# Patient Record
Sex: MALE | Race: WHITE | NOT HISPANIC OR LATINO | Employment: OTHER | URBAN - METROPOLITAN AREA
[De-identification: names, ages, dates, MRNs, and addresses within clinical notes are randomized per-mention and may not be internally consistent; named-entity substitution may affect disease eponyms.]

---

## 2017-01-06 ENCOUNTER — COMMUNICATION - HEALTHEAST (OUTPATIENT)
Dept: NURSING | Facility: CLINIC | Age: 62
End: 2017-01-06

## 2017-01-06 ENCOUNTER — COMMUNICATION - HEALTHEAST (OUTPATIENT)
Dept: FAMILY MEDICINE | Facility: CLINIC | Age: 62
End: 2017-01-06

## 2017-01-06 DIAGNOSIS — F52.8 PSYCHOSEXUAL DYSFUNCTION WITH INHIBITED SEXUAL EXCITEMENT: ICD-10-CM

## 2017-01-06 DIAGNOSIS — E11.9 DIABETES MELLITUS TYPE 2, CONTROLLED (H): ICD-10-CM

## 2017-01-23 ENCOUNTER — OFFICE VISIT - HEALTHEAST (OUTPATIENT)
Dept: FAMILY MEDICINE | Facility: CLINIC | Age: 62
End: 2017-01-23

## 2017-01-23 ENCOUNTER — OFFICE VISIT - HEALTHEAST (OUTPATIENT)
Dept: NURSING | Facility: CLINIC | Age: 62
End: 2017-01-23

## 2017-01-23 ENCOUNTER — COMMUNICATION - HEALTHEAST (OUTPATIENT)
Dept: FAMILY MEDICINE | Facility: CLINIC | Age: 62
End: 2017-01-23

## 2017-01-23 DIAGNOSIS — F52.8 PSYCHOSEXUAL DYSFUNCTION WITH INHIBITED SEXUAL EXCITEMENT: ICD-10-CM

## 2017-01-23 DIAGNOSIS — I10 ESSENTIAL HYPERTENSION: ICD-10-CM

## 2017-01-23 DIAGNOSIS — E11.9 DIABETES (H): ICD-10-CM

## 2017-01-23 DIAGNOSIS — E11.9 DIABETES MELLITUS TYPE 2, CONTROLLED (H): ICD-10-CM

## 2017-01-23 DIAGNOSIS — E11.9 TYPE 2 DIABETES MELLITUS WITHOUT COMPLICATION, WITH LONG-TERM CURRENT USE OF INSULIN (H): ICD-10-CM

## 2017-01-23 DIAGNOSIS — N40.0 BENIGN PROSTATIC HYPERPLASIA, PRESENCE OF LOWER URINARY TRACT SYMPTOMS UNSPECIFIED, UNSPECIFIED MORPHOLOGY: ICD-10-CM

## 2017-01-23 DIAGNOSIS — Z79.4 TYPE 2 DIABETES MELLITUS WITHOUT COMPLICATION, WITH LONG-TERM CURRENT USE OF INSULIN (H): ICD-10-CM

## 2017-01-23 DIAGNOSIS — E78.49 OTHER HYPERLIPIDEMIA: ICD-10-CM

## 2017-01-23 DIAGNOSIS — E66.9 OBESITY, UNSPECIFIED: ICD-10-CM

## 2017-01-23 DIAGNOSIS — E78.5 HYPERLIPIDEMIA, UNSPECIFIED HYPERLIPIDEMIA TYPE: ICD-10-CM

## 2017-01-23 DIAGNOSIS — E11.9 TYPE 2 DIABETES MELLITUS (H): ICD-10-CM

## 2017-01-23 DIAGNOSIS — G47.33 OBSTRUCTIVE SLEEP APNEA (ADULT) (PEDIATRIC): ICD-10-CM

## 2017-01-23 DIAGNOSIS — I10 ESSENTIAL HYPERTENSION WITH GOAL BLOOD PRESSURE LESS THAN 140/90: ICD-10-CM

## 2017-01-23 DIAGNOSIS — Z00.00 PHYSICAL EXAM: ICD-10-CM

## 2017-01-23 LAB
CHOLEST SERPL-MCNC: 182 MG/DL
FASTING STATUS PATIENT QL REPORTED: YES
HBA1C MFR BLD: 7.4 % (ref 3.5–6)
HDLC SERPL-MCNC: 54 MG/DL
LDLC SERPL CALC-MCNC: 61 MG/DL
PSA SERPL-MCNC: 0.3 NG/ML (ref 0–4.5)
TRIGL SERPL-MCNC: 336 MG/DL

## 2017-01-23 ASSESSMENT — MIFFLIN-ST. JEOR: SCORE: 1946.79

## 2017-01-24 ENCOUNTER — COMMUNICATION - HEALTHEAST (OUTPATIENT)
Dept: FAMILY MEDICINE | Facility: CLINIC | Age: 62
End: 2017-01-24

## 2017-03-04 ENCOUNTER — RECORDS - HEALTHEAST (OUTPATIENT)
Dept: ADMINISTRATIVE | Facility: OTHER | Age: 62
End: 2017-03-04

## 2017-03-06 ENCOUNTER — OFFICE VISIT - HEALTHEAST (OUTPATIENT)
Dept: FAMILY MEDICINE | Facility: CLINIC | Age: 62
End: 2017-03-06

## 2017-03-06 DIAGNOSIS — M54.50 LOW BACK PAIN: ICD-10-CM

## 2017-03-18 ENCOUNTER — COMMUNICATION - HEALTHEAST (OUTPATIENT)
Dept: NURSING | Facility: CLINIC | Age: 62
End: 2017-03-18

## 2017-03-18 DIAGNOSIS — E11.9 DIABETES (H): ICD-10-CM

## 2017-05-02 ENCOUNTER — COMMUNICATION - HEALTHEAST (OUTPATIENT)
Dept: FAMILY MEDICINE | Facility: CLINIC | Age: 62
End: 2017-05-02

## 2017-06-14 ENCOUNTER — RECORDS - HEALTHEAST (OUTPATIENT)
Dept: ADMINISTRATIVE | Facility: OTHER | Age: 62
End: 2017-06-14

## 2017-06-15 ENCOUNTER — COMMUNICATION - HEALTHEAST (OUTPATIENT)
Dept: FAMILY MEDICINE | Facility: CLINIC | Age: 62
End: 2017-06-15

## 2017-07-28 ENCOUNTER — COMMUNICATION - HEALTHEAST (OUTPATIENT)
Dept: FAMILY MEDICINE | Facility: CLINIC | Age: 62
End: 2017-07-28

## 2017-07-28 ENCOUNTER — OFFICE VISIT - HEALTHEAST (OUTPATIENT)
Dept: FAMILY MEDICINE | Facility: CLINIC | Age: 62
End: 2017-07-28

## 2017-07-28 DIAGNOSIS — I10 ESSENTIAL HYPERTENSION: ICD-10-CM

## 2017-07-28 DIAGNOSIS — E78.49 OTHER HYPERLIPIDEMIA: ICD-10-CM

## 2017-07-28 DIAGNOSIS — M54.50 LOW BACK PAIN: ICD-10-CM

## 2017-07-28 DIAGNOSIS — Z79.4 TYPE 2 DIABETES MELLITUS WITHOUT COMPLICATION, WITH LONG-TERM CURRENT USE OF INSULIN (H): ICD-10-CM

## 2017-07-28 DIAGNOSIS — E11.9 TYPE 2 DIABETES MELLITUS WITHOUT COMPLICATION, WITH LONG-TERM CURRENT USE OF INSULIN (H): ICD-10-CM

## 2017-07-28 LAB
CHOLEST SERPL-MCNC: 152 MG/DL
FASTING STATUS PATIENT QL REPORTED: YES
HBA1C MFR BLD: 6.4 % (ref 3.5–6)
HDLC SERPL-MCNC: 46 MG/DL
LDLC SERPL CALC-MCNC: 55 MG/DL
TRIGL SERPL-MCNC: 255 MG/DL

## 2017-08-10 ENCOUNTER — COMMUNICATION - HEALTHEAST (OUTPATIENT)
Dept: NURSING | Facility: CLINIC | Age: 62
End: 2017-08-10

## 2017-08-10 DIAGNOSIS — E78.5 HYPERLIPIDEMIA, UNSPECIFIED HYPERLIPIDEMIA TYPE: ICD-10-CM

## 2017-08-10 DIAGNOSIS — E11.9 DIABETES (H): ICD-10-CM

## 2017-10-10 ENCOUNTER — COMMUNICATION - HEALTHEAST (OUTPATIENT)
Dept: NURSING | Facility: CLINIC | Age: 62
End: 2017-10-10

## 2017-10-10 DIAGNOSIS — E11.9 DIABETES (H): ICD-10-CM

## 2017-12-29 ENCOUNTER — COMMUNICATION - HEALTHEAST (OUTPATIENT)
Dept: FAMILY MEDICINE | Facility: CLINIC | Age: 62
End: 2017-12-29

## 2017-12-29 DIAGNOSIS — E11.9 DIABETES MELLITUS TYPE 2, CONTROLLED (H): ICD-10-CM

## 2018-01-24 ENCOUNTER — COMMUNICATION - HEALTHEAST (OUTPATIENT)
Dept: FAMILY MEDICINE | Facility: CLINIC | Age: 63
End: 2018-01-24

## 2018-01-30 ENCOUNTER — COMMUNICATION - HEALTHEAST (OUTPATIENT)
Dept: FAMILY MEDICINE | Facility: CLINIC | Age: 63
End: 2018-01-30

## 2018-01-31 ENCOUNTER — COMMUNICATION - HEALTHEAST (OUTPATIENT)
Dept: NURSING | Facility: CLINIC | Age: 63
End: 2018-01-31

## 2018-01-31 DIAGNOSIS — E11.9 DIABETES MELLITUS, TYPE 2 (H): ICD-10-CM

## 2018-01-31 DIAGNOSIS — E11.9 DIABETES (H): ICD-10-CM

## 2018-01-31 DIAGNOSIS — N40.0 BPH (BENIGN PROSTATIC HYPERPLASIA): ICD-10-CM

## 2018-01-31 DIAGNOSIS — E11.51 DIABETES TYPE 2 WITH ATHEROSCLEROSIS OF ARTERIES OF EXTREMITIES (H): ICD-10-CM

## 2018-01-31 DIAGNOSIS — I10 HYPERTENSION: ICD-10-CM

## 2018-01-31 DIAGNOSIS — I70.209 DIABETES TYPE 2 WITH ATHEROSCLEROSIS OF ARTERIES OF EXTREMITIES (H): ICD-10-CM

## 2018-02-02 ENCOUNTER — OFFICE VISIT - HEALTHEAST (OUTPATIENT)
Dept: FAMILY MEDICINE | Facility: CLINIC | Age: 63
End: 2018-02-02

## 2018-02-02 DIAGNOSIS — Z79.4 TYPE 2 DIABETES MELLITUS WITHOUT COMPLICATION, WITH LONG-TERM CURRENT USE OF INSULIN (H): ICD-10-CM

## 2018-02-02 DIAGNOSIS — E78.5 HYPERLIPIDEMIA, UNSPECIFIED HYPERLIPIDEMIA TYPE: ICD-10-CM

## 2018-02-02 DIAGNOSIS — F52.8 PSYCHOSEXUAL DYSFUNCTION WITH INHIBITED SEXUAL EXCITEMENT: ICD-10-CM

## 2018-02-02 DIAGNOSIS — E11.9 DIABETES MELLITUS, TYPE 2 (H): ICD-10-CM

## 2018-02-02 DIAGNOSIS — E11.9 DIABETES MELLITUS TYPE 2, CONTROLLED (H): ICD-10-CM

## 2018-02-02 DIAGNOSIS — Z23 NEED FOR PROPHYLACTIC VACCINATION AND INOCULATION AGAINST INFLUENZA: ICD-10-CM

## 2018-02-02 DIAGNOSIS — I10 HYPERTENSION: ICD-10-CM

## 2018-02-02 DIAGNOSIS — E11.9 TYPE 2 DIABETES MELLITUS WITHOUT COMPLICATION, WITH LONG-TERM CURRENT USE OF INSULIN (H): ICD-10-CM

## 2018-02-02 DIAGNOSIS — N40.0 BPH (BENIGN PROSTATIC HYPERPLASIA): ICD-10-CM

## 2018-02-02 LAB
ALBUMIN SERPL-MCNC: 4.2 G/DL (ref 3.5–5)
ALP SERPL-CCNC: 80 U/L (ref 45–120)
ALT SERPL W P-5'-P-CCNC: 24 U/L (ref 0–45)
ANION GAP SERPL CALCULATED.3IONS-SCNC: 11 MMOL/L (ref 5–18)
AST SERPL W P-5'-P-CCNC: 20 U/L (ref 0–40)
BILIRUB SERPL-MCNC: 1 MG/DL (ref 0–1)
BUN SERPL-MCNC: 19 MG/DL (ref 8–22)
CALCIUM SERPL-MCNC: 9.6 MG/DL (ref 8.5–10.5)
CHLORIDE BLD-SCNC: 99 MMOL/L (ref 98–107)
CHOLEST SERPL-MCNC: 344 MG/DL
CO2 SERPL-SCNC: 24 MMOL/L (ref 22–31)
CREAT SERPL-MCNC: 1.16 MG/DL (ref 0.7–1.3)
CREAT UR-MCNC: 98.8 MG/DL
FASTING STATUS PATIENT QL REPORTED: YES
GFR SERPL CREATININE-BSD FRML MDRD: >60 ML/MIN/1.73M2
GLUCOSE BLD-MCNC: 383 MG/DL (ref 70–125)
HBA1C MFR BLD: 8.7 % (ref 3.5–6)
HDLC SERPL-MCNC: 44 MG/DL
LDLC SERPL CALC-MCNC: 144 MG/DL
LDLC SERPL CALC-MCNC: ABNORMAL MG/DL
MICROALBUMIN UR-MCNC: 22.66 MG/DL (ref 0–1.99)
MICROALBUMIN/CREAT UR: 229.4 MG/G
POTASSIUM BLD-SCNC: 4.1 MMOL/L (ref 3.5–5)
PROT SERPL-MCNC: 7.6 G/DL (ref 6–8)
SODIUM SERPL-SCNC: 134 MMOL/L (ref 136–145)
TRIGL SERPL-MCNC: 1030 MG/DL

## 2018-02-05 ENCOUNTER — COMMUNICATION - HEALTHEAST (OUTPATIENT)
Dept: FAMILY MEDICINE | Facility: CLINIC | Age: 63
End: 2018-02-05

## 2018-02-05 DIAGNOSIS — R80.9 MICROALBUMINURIA: ICD-10-CM

## 2018-02-09 ENCOUNTER — AMBULATORY - HEALTHEAST (OUTPATIENT)
Dept: FAMILY MEDICINE | Facility: CLINIC | Age: 63
End: 2018-02-09

## 2018-03-21 ENCOUNTER — COMMUNICATION - HEALTHEAST (OUTPATIENT)
Dept: FAMILY MEDICINE | Facility: CLINIC | Age: 63
End: 2018-03-21

## 2018-03-26 ENCOUNTER — AMBULATORY - HEALTHEAST (OUTPATIENT)
Dept: NURSING | Facility: CLINIC | Age: 63
End: 2018-03-26

## 2018-05-29 ENCOUNTER — COMMUNICATION - HEALTHEAST (OUTPATIENT)
Dept: NURSING | Facility: CLINIC | Age: 63
End: 2018-05-29

## 2018-05-29 DIAGNOSIS — E11.9 DIABETES MELLITUS, TYPE 2 (H): ICD-10-CM

## 2018-06-05 ENCOUNTER — AMBULATORY - HEALTHEAST (OUTPATIENT)
Dept: PHARMACY | Facility: CLINIC | Age: 63
End: 2018-06-05

## 2018-06-05 ENCOUNTER — COMMUNICATION - HEALTHEAST (OUTPATIENT)
Dept: NURSING | Facility: CLINIC | Age: 63
End: 2018-06-05

## 2018-06-05 DIAGNOSIS — E11.9 DIABETES MELLITUS, TYPE 2 (H): ICD-10-CM

## 2018-06-05 DIAGNOSIS — Z79.899 MEDICATION MANAGEMENT: ICD-10-CM

## 2018-06-13 ENCOUNTER — OFFICE VISIT - HEALTHEAST (OUTPATIENT)
Dept: PHARMACY | Facility: CLINIC | Age: 63
End: 2018-06-13

## 2018-06-13 ENCOUNTER — AMBULATORY - HEALTHEAST (OUTPATIENT)
Dept: LAB | Facility: CLINIC | Age: 63
End: 2018-06-13

## 2018-06-13 ENCOUNTER — COMMUNICATION - HEALTHEAST (OUTPATIENT)
Dept: PHARMACY | Facility: CLINIC | Age: 63
End: 2018-06-13

## 2018-06-13 DIAGNOSIS — I10 ESSENTIAL HYPERTENSION: ICD-10-CM

## 2018-06-13 DIAGNOSIS — E78.49 OTHER HYPERLIPIDEMIA: ICD-10-CM

## 2018-06-13 DIAGNOSIS — Z79.4 TYPE 2 DIABETES MELLITUS WITHOUT COMPLICATION, WITH LONG-TERM CURRENT USE OF INSULIN (H): ICD-10-CM

## 2018-06-13 DIAGNOSIS — N40.0 BPH (BENIGN PROSTATIC HYPERPLASIA): ICD-10-CM

## 2018-06-13 DIAGNOSIS — E11.9 DIABETES MELLITUS, TYPE 2 (H): ICD-10-CM

## 2018-06-13 DIAGNOSIS — E10.9 TYPE 1 DIABETES MELLITUS WITHOUT COMPLICATION (H): ICD-10-CM

## 2018-06-13 DIAGNOSIS — E11.9 TYPE 2 DIABETES MELLITUS WITHOUT COMPLICATION, WITH LONG-TERM CURRENT USE OF INSULIN (H): ICD-10-CM

## 2018-06-13 DIAGNOSIS — N40.0 BENIGN PROSTATIC HYPERPLASIA, UNSPECIFIED WHETHER LOWER URINARY TRACT SYMPTOMS PRESENT: ICD-10-CM

## 2018-06-13 LAB
ALBUMIN SERPL-MCNC: 4.2 G/DL (ref 3.5–5)
ALP SERPL-CCNC: 68 U/L (ref 45–120)
ALT SERPL W P-5'-P-CCNC: 23 U/L (ref 0–45)
ANION GAP SERPL CALCULATED.3IONS-SCNC: 12 MMOL/L (ref 5–18)
AST SERPL W P-5'-P-CCNC: 20 U/L (ref 0–40)
BILIRUB SERPL-MCNC: 0.7 MG/DL (ref 0–1)
BUN SERPL-MCNC: 23 MG/DL (ref 8–22)
CALCIUM SERPL-MCNC: 9.8 MG/DL (ref 8.5–10.5)
CHLORIDE BLD-SCNC: 103 MMOL/L (ref 98–107)
CHOLEST SERPL-MCNC: 213 MG/DL
CO2 SERPL-SCNC: 23 MMOL/L (ref 22–31)
CREAT SERPL-MCNC: 1.13 MG/DL (ref 0.7–1.3)
FASTING STATUS PATIENT QL REPORTED: YES
GFR SERPL CREATININE-BSD FRML MDRD: >60 ML/MIN/1.73M2
GLUCOSE BLD-MCNC: 215 MG/DL (ref 70–125)
HBA1C MFR BLD: 8.2 % (ref 3.5–6)
HDLC SERPL-MCNC: 55 MG/DL
LDLC SERPL CALC-MCNC: 82 MG/DL
POTASSIUM BLD-SCNC: 4.8 MMOL/L (ref 3.5–5)
PROT SERPL-MCNC: 7.2 G/DL (ref 6–8)
SODIUM SERPL-SCNC: 138 MMOL/L (ref 136–145)
TRIGL SERPL-MCNC: 379 MG/DL

## 2018-07-09 ENCOUNTER — COMMUNICATION - HEALTHEAST (OUTPATIENT)
Dept: FAMILY MEDICINE | Facility: CLINIC | Age: 63
End: 2018-07-09

## 2018-08-10 ENCOUNTER — COMMUNICATION - HEALTHEAST (OUTPATIENT)
Dept: NURSING | Facility: CLINIC | Age: 63
End: 2018-08-10

## 2018-08-10 DIAGNOSIS — E78.5 HYPERLIPIDEMIA, UNSPECIFIED HYPERLIPIDEMIA TYPE: ICD-10-CM

## 2018-08-10 DIAGNOSIS — E11.9 DIABETES MELLITUS, TYPE 2 (H): ICD-10-CM

## 2018-11-15 ENCOUNTER — OFFICE VISIT - HEALTHEAST (OUTPATIENT)
Dept: FAMILY MEDICINE | Facility: CLINIC | Age: 63
End: 2018-11-15

## 2018-11-15 DIAGNOSIS — Z87.81 HISTORY OF FRACTURE OF RIGHT ANKLE: ICD-10-CM

## 2018-11-15 DIAGNOSIS — M25.471 ANKLE SWELLING, RIGHT: ICD-10-CM

## 2018-11-16 ENCOUNTER — RECORDS - HEALTHEAST (OUTPATIENT)
Dept: ADMINISTRATIVE | Facility: OTHER | Age: 63
End: 2018-11-16

## 2018-11-25 ENCOUNTER — COMMUNICATION - HEALTHEAST (OUTPATIENT)
Dept: NURSING | Facility: CLINIC | Age: 63
End: 2018-11-25

## 2018-11-25 DIAGNOSIS — E11.9 DIABETES MELLITUS, TYPE 2 (H): ICD-10-CM

## 2019-03-24 ENCOUNTER — COMMUNICATION - HEALTHEAST (OUTPATIENT)
Dept: FAMILY MEDICINE | Facility: CLINIC | Age: 64
End: 2019-03-24

## 2019-03-24 DIAGNOSIS — I10 HYPERTENSION: ICD-10-CM

## 2019-03-24 DIAGNOSIS — N40.0 BPH (BENIGN PROSTATIC HYPERPLASIA): ICD-10-CM

## 2019-03-24 DIAGNOSIS — E78.5 HYPERLIPIDEMIA, UNSPECIFIED HYPERLIPIDEMIA TYPE: ICD-10-CM

## 2019-03-24 DIAGNOSIS — E11.9 DIABETES MELLITUS, TYPE 2 (H): ICD-10-CM

## 2019-03-27 ENCOUNTER — COMMUNICATION - HEALTHEAST (OUTPATIENT)
Dept: NURSING | Facility: CLINIC | Age: 64
End: 2019-03-27

## 2019-03-27 DIAGNOSIS — E11.9 DIABETES MELLITUS, TYPE 2 (H): ICD-10-CM

## 2019-04-03 ENCOUNTER — RECORDS - HEALTHEAST (OUTPATIENT)
Dept: ADMINISTRATIVE | Facility: OTHER | Age: 64
End: 2019-04-03

## 2019-04-10 ENCOUNTER — OFFICE VISIT - HEALTHEAST (OUTPATIENT)
Dept: PHARMACY | Facility: CLINIC | Age: 64
End: 2019-04-10

## 2019-04-10 ENCOUNTER — OFFICE VISIT - HEALTHEAST (OUTPATIENT)
Dept: FAMILY MEDICINE | Facility: CLINIC | Age: 64
End: 2019-04-10

## 2019-04-10 DIAGNOSIS — I10 ESSENTIAL HYPERTENSION: ICD-10-CM

## 2019-04-10 DIAGNOSIS — Z79.4 TYPE 2 DIABETES MELLITUS WITHOUT COMPLICATION, WITH LONG-TERM CURRENT USE OF INSULIN (H): ICD-10-CM

## 2019-04-10 DIAGNOSIS — N40.0 BENIGN PROSTATIC HYPERPLASIA, UNSPECIFIED WHETHER LOWER URINARY TRACT SYMPTOMS PRESENT: ICD-10-CM

## 2019-04-10 DIAGNOSIS — G47.33 OBSTRUCTIVE SLEEP APNEA (ADULT) (PEDIATRIC): ICD-10-CM

## 2019-04-10 DIAGNOSIS — E11.9 DIABETES MELLITUS, TYPE 2 (H): ICD-10-CM

## 2019-04-10 DIAGNOSIS — E11.9 TYPE 2 DIABETES MELLITUS WITHOUT COMPLICATION, WITH LONG-TERM CURRENT USE OF INSULIN (H): ICD-10-CM

## 2019-04-10 DIAGNOSIS — R53.83 LOW ENERGY: ICD-10-CM

## 2019-04-10 DIAGNOSIS — R80.9 MICROALBUMINURIA: ICD-10-CM

## 2019-04-10 DIAGNOSIS — E78.49 OTHER HYPERLIPIDEMIA: ICD-10-CM

## 2019-04-10 DIAGNOSIS — M54.5 LOW BACK PAIN, UNSPECIFIED BACK PAIN LATERALITY, UNSPECIFIED CHRONICITY, WITH SCIATICA PRESENCE UNSPECIFIED: ICD-10-CM

## 2019-04-10 DIAGNOSIS — Z12.5 SCREENING FOR PROSTATE CANCER: ICD-10-CM

## 2019-04-10 DIAGNOSIS — Z00.00 PHYSICAL EXAM: ICD-10-CM

## 2019-04-10 DIAGNOSIS — F52.8 PSYCHOSEXUAL DYSFUNCTION WITH INHIBITED SEXUAL EXCITEMENT: ICD-10-CM

## 2019-04-10 LAB
ALBUMIN SERPL-MCNC: 4.3 G/DL (ref 3.5–5)
ALP SERPL-CCNC: 64 U/L (ref 45–120)
ALT SERPL W P-5'-P-CCNC: 20 U/L (ref 0–45)
ANION GAP SERPL CALCULATED.3IONS-SCNC: 12 MMOL/L (ref 5–18)
AST SERPL W P-5'-P-CCNC: 23 U/L (ref 0–40)
BILIRUB SERPL-MCNC: 1 MG/DL (ref 0–1)
BUN SERPL-MCNC: 18 MG/DL (ref 8–22)
CALCIUM SERPL-MCNC: 10.1 MG/DL (ref 8.5–10.5)
CHLORIDE BLD-SCNC: 101 MMOL/L (ref 98–107)
CHOLEST SERPL-MCNC: 170 MG/DL
CO2 SERPL-SCNC: 25 MMOL/L (ref 22–31)
CREAT SERPL-MCNC: 0.96 MG/DL (ref 0.7–1.3)
CREAT UR-MCNC: 106.4 MG/DL
ERYTHROCYTE [DISTWIDTH] IN BLOOD BY AUTOMATED COUNT: 12.4 % (ref 11–14.5)
FASTING STATUS PATIENT QL REPORTED: YES
GFR SERPL CREATININE-BSD FRML MDRD: >60 ML/MIN/1.73M2
GLUCOSE BLD-MCNC: 186 MG/DL (ref 70–125)
HBA1C MFR BLD: 9.9 % (ref 3.5–6)
HCT VFR BLD AUTO: 47.6 % (ref 40–54)
HDLC SERPL-MCNC: 52 MG/DL
HGB BLD-MCNC: 16.1 G/DL (ref 14–18)
LDLC SERPL CALC-MCNC: 50 MG/DL
MCH RBC QN AUTO: 31 PG (ref 27–34)
MCHC RBC AUTO-ENTMCNC: 33.7 G/DL (ref 32–36)
MCV RBC AUTO: 92 FL (ref 80–100)
MICROALBUMIN UR-MCNC: 13.41 MG/DL (ref 0–1.99)
MICROALBUMIN/CREAT UR: 126 MG/G
PLATELET # BLD AUTO: 191 THOU/UL (ref 140–440)
PMV BLD AUTO: 6.9 FL (ref 7–10)
POTASSIUM BLD-SCNC: 4.4 MMOL/L (ref 3.5–5)
PROT SERPL-MCNC: 7.4 G/DL (ref 6–8)
PSA SERPL-MCNC: 0.3 NG/ML (ref 0–4.5)
RBC # BLD AUTO: 5.18 MILL/UL (ref 4.4–6.2)
SODIUM SERPL-SCNC: 138 MMOL/L (ref 136–145)
TRIGL SERPL-MCNC: 338 MG/DL
TSH SERPL DL<=0.005 MIU/L-ACNC: 2.26 UIU/ML (ref 0.3–5)
WBC: 6.9 THOU/UL (ref 4–11)

## 2019-04-10 ASSESSMENT — MIFFLIN-ST. JEOR: SCORE: 1938.74

## 2019-04-11 ENCOUNTER — COMMUNICATION - HEALTHEAST (OUTPATIENT)
Dept: NURSING | Facility: CLINIC | Age: 64
End: 2019-04-11

## 2019-04-11 ENCOUNTER — AMBULATORY - HEALTHEAST (OUTPATIENT)
Dept: PHARMACY | Facility: CLINIC | Age: 64
End: 2019-04-11

## 2019-04-11 DIAGNOSIS — E11.9 TYPE 2 DIABETES MELLITUS WITHOUT COMPLICATION, WITH LONG-TERM CURRENT USE OF INSULIN (H): ICD-10-CM

## 2019-04-11 DIAGNOSIS — E11.9 DIABETES MELLITUS, TYPE 2 (H): ICD-10-CM

## 2019-04-11 DIAGNOSIS — Z79.4 TYPE 2 DIABETES MELLITUS WITHOUT COMPLICATION, WITH LONG-TERM CURRENT USE OF INSULIN (H): ICD-10-CM

## 2019-04-12 ENCOUNTER — COMMUNICATION - HEALTHEAST (OUTPATIENT)
Dept: NURSING | Facility: CLINIC | Age: 64
End: 2019-04-12

## 2019-04-12 ENCOUNTER — COMMUNICATION - HEALTHEAST (OUTPATIENT)
Dept: FAMILY MEDICINE | Facility: CLINIC | Age: 64
End: 2019-04-12

## 2019-04-12 DIAGNOSIS — G47.33 OBSTRUCTIVE SLEEP APNEA (ADULT) (PEDIATRIC): ICD-10-CM

## 2019-04-12 LAB
SHBG SERPL-SCNC: 17 NMOL/L (ref 11–80)
TESTOST FREE SERPL-MCNC: 5.23 NG/DL (ref 4.7–24.4)
TESTOST SERPL-MCNC: 198 NG/DL (ref 240–950)

## 2019-04-13 ENCOUNTER — COMMUNICATION - HEALTHEAST (OUTPATIENT)
Dept: FAMILY MEDICINE | Facility: CLINIC | Age: 64
End: 2019-04-13

## 2019-04-16 ENCOUNTER — RECORDS - HEALTHEAST (OUTPATIENT)
Dept: HEALTH INFORMATION MANAGEMENT | Facility: CLINIC | Age: 64
End: 2019-04-16

## 2019-04-19 ENCOUNTER — COMMUNICATION - HEALTHEAST (OUTPATIENT)
Dept: PHARMACY | Facility: CLINIC | Age: 64
End: 2019-04-19

## 2019-05-01 ENCOUNTER — OFFICE VISIT - HEALTHEAST (OUTPATIENT)
Dept: PHARMACY | Facility: CLINIC | Age: 64
End: 2019-05-01

## 2019-05-01 ENCOUNTER — AMBULATORY - HEALTHEAST (OUTPATIENT)
Dept: LAB | Facility: CLINIC | Age: 64
End: 2019-05-01

## 2019-05-01 DIAGNOSIS — I10 ESSENTIAL HYPERTENSION: ICD-10-CM

## 2019-05-01 LAB
ANION GAP SERPL CALCULATED.3IONS-SCNC: 11 MMOL/L (ref 5–18)
BUN SERPL-MCNC: 29 MG/DL (ref 8–22)
CALCIUM SERPL-MCNC: 9.8 MG/DL (ref 8.5–10.5)
CHLORIDE BLD-SCNC: 105 MMOL/L (ref 98–107)
CO2 SERPL-SCNC: 22 MMOL/L (ref 22–31)
CREAT SERPL-MCNC: 1.05 MG/DL (ref 0.7–1.3)
GFR SERPL CREATININE-BSD FRML MDRD: >60 ML/MIN/1.73M2
GLUCOSE BLD-MCNC: 107 MG/DL (ref 70–125)
POTASSIUM BLD-SCNC: 4.1 MMOL/L (ref 3.5–5)
SODIUM SERPL-SCNC: 138 MMOL/L (ref 136–145)

## 2019-05-03 ENCOUNTER — COMMUNICATION - HEALTHEAST (OUTPATIENT)
Dept: FAMILY MEDICINE | Facility: CLINIC | Age: 64
End: 2019-05-03

## 2019-05-08 ENCOUNTER — COMMUNICATION - HEALTHEAST (OUTPATIENT)
Dept: NURSING | Facility: CLINIC | Age: 64
End: 2019-05-08

## 2019-06-30 ENCOUNTER — COMMUNICATION - HEALTHEAST (OUTPATIENT)
Dept: FAMILY MEDICINE | Facility: CLINIC | Age: 64
End: 2019-06-30

## 2019-06-30 DIAGNOSIS — E11.9 DIABETES MELLITUS, TYPE 2 (H): ICD-10-CM

## 2019-09-28 ENCOUNTER — HEALTH MAINTENANCE LETTER (OUTPATIENT)
Age: 64
End: 2019-09-28

## 2019-10-08 ENCOUNTER — COMMUNICATION - HEALTHEAST (OUTPATIENT)
Dept: FAMILY MEDICINE | Facility: CLINIC | Age: 64
End: 2019-10-08

## 2019-10-08 DIAGNOSIS — E78.5 HYPERLIPIDEMIA, UNSPECIFIED HYPERLIPIDEMIA TYPE: ICD-10-CM

## 2019-10-08 DIAGNOSIS — I10 HYPERTENSION: ICD-10-CM

## 2019-10-09 ENCOUNTER — COMMUNICATION - HEALTHEAST (OUTPATIENT)
Dept: FAMILY MEDICINE | Facility: CLINIC | Age: 64
End: 2019-10-09

## 2019-10-09 DIAGNOSIS — N40.0 BPH (BENIGN PROSTATIC HYPERPLASIA): ICD-10-CM

## 2020-04-09 ENCOUNTER — COMMUNICATION - HEALTHEAST (OUTPATIENT)
Dept: FAMILY MEDICINE | Facility: CLINIC | Age: 65
End: 2020-04-09

## 2020-04-09 DIAGNOSIS — I10 ESSENTIAL HYPERTENSION: ICD-10-CM

## 2020-04-09 DIAGNOSIS — I10 HYPERTENSION: ICD-10-CM

## 2020-04-09 DIAGNOSIS — E11.9 DIABETES MELLITUS, TYPE 2 (H): ICD-10-CM

## 2020-04-09 DIAGNOSIS — Z79.4 TYPE 2 DIABETES MELLITUS WITHOUT COMPLICATION, WITH LONG-TERM CURRENT USE OF INSULIN (H): ICD-10-CM

## 2020-04-09 DIAGNOSIS — F52.8 PSYCHOSEXUAL DYSFUNCTION WITH INHIBITED SEXUAL EXCITEMENT: ICD-10-CM

## 2020-04-09 DIAGNOSIS — E78.5 HYPERLIPIDEMIA, UNSPECIFIED HYPERLIPIDEMIA TYPE: ICD-10-CM

## 2020-04-09 DIAGNOSIS — E11.9 TYPE 2 DIABETES MELLITUS WITHOUT COMPLICATION, WITH LONG-TERM CURRENT USE OF INSULIN (H): ICD-10-CM

## 2020-04-09 DIAGNOSIS — N40.0 BENIGN PROSTATIC HYPERPLASIA, UNSPECIFIED WHETHER LOWER URINARY TRACT SYMPTOMS PRESENT: ICD-10-CM

## 2020-04-09 DIAGNOSIS — N40.0 BPH (BENIGN PROSTATIC HYPERPLASIA): ICD-10-CM

## 2020-04-10 ENCOUNTER — COMMUNICATION - HEALTHEAST (OUTPATIENT)
Dept: FAMILY MEDICINE | Facility: CLINIC | Age: 65
End: 2020-04-10

## 2020-04-11 ENCOUNTER — COMMUNICATION - HEALTHEAST (OUTPATIENT)
Dept: FAMILY MEDICINE | Facility: CLINIC | Age: 65
End: 2020-04-11

## 2020-04-11 DIAGNOSIS — E11.9 DIABETES MELLITUS, TYPE 2 (H): ICD-10-CM

## 2020-04-11 DIAGNOSIS — Z79.4 TYPE 2 DIABETES MELLITUS WITHOUT COMPLICATION, WITH LONG-TERM CURRENT USE OF INSULIN (H): ICD-10-CM

## 2020-04-11 DIAGNOSIS — E11.9 TYPE 2 DIABETES MELLITUS WITHOUT COMPLICATION, WITH LONG-TERM CURRENT USE OF INSULIN (H): ICD-10-CM

## 2020-05-13 ENCOUNTER — COMMUNICATION - HEALTHEAST (OUTPATIENT)
Dept: FAMILY MEDICINE | Facility: CLINIC | Age: 65
End: 2020-05-13

## 2020-05-13 DIAGNOSIS — Z79.4 TYPE 2 DIABETES MELLITUS WITHOUT COMPLICATION, WITH LONG-TERM CURRENT USE OF INSULIN (H): ICD-10-CM

## 2020-05-13 DIAGNOSIS — E11.9 TYPE 2 DIABETES MELLITUS WITHOUT COMPLICATION, WITH LONG-TERM CURRENT USE OF INSULIN (H): ICD-10-CM

## 2020-05-13 DIAGNOSIS — E11.9 DIABETES MELLITUS, TYPE 2 (H): ICD-10-CM

## 2020-05-18 ENCOUNTER — COMMUNICATION - HEALTHEAST (OUTPATIENT)
Dept: FAMILY MEDICINE | Facility: CLINIC | Age: 65
End: 2020-05-18

## 2020-05-18 ENCOUNTER — OFFICE VISIT - HEALTHEAST (OUTPATIENT)
Dept: FAMILY MEDICINE | Facility: CLINIC | Age: 65
End: 2020-05-18

## 2020-05-18 DIAGNOSIS — Z12.5 SCREENING FOR PROSTATE CANCER: ICD-10-CM

## 2020-05-18 DIAGNOSIS — I10 ESSENTIAL HYPERTENSION: ICD-10-CM

## 2020-05-18 DIAGNOSIS — E11.9 TYPE 2 DIABETES MELLITUS WITHOUT COMPLICATION, WITH LONG-TERM CURRENT USE OF INSULIN (H): ICD-10-CM

## 2020-05-18 DIAGNOSIS — Z79.4 TYPE 2 DIABETES MELLITUS WITHOUT COMPLICATION, WITH LONG-TERM CURRENT USE OF INSULIN (H): ICD-10-CM

## 2020-05-18 DIAGNOSIS — E78.49 OTHER HYPERLIPIDEMIA: ICD-10-CM

## 2020-05-18 DIAGNOSIS — E11.9 DIABETES MELLITUS, TYPE 2 (H): ICD-10-CM

## 2020-05-18 ASSESSMENT — PATIENT HEALTH QUESTIONNAIRE - PHQ9: SUM OF ALL RESPONSES TO PHQ QUESTIONS 1-9: 0

## 2020-05-26 ENCOUNTER — AMBULATORY - HEALTHEAST (OUTPATIENT)
Dept: LAB | Facility: CLINIC | Age: 65
End: 2020-05-26

## 2020-05-26 DIAGNOSIS — Z79.4 TYPE 2 DIABETES MELLITUS WITHOUT COMPLICATION, WITH LONG-TERM CURRENT USE OF INSULIN (H): ICD-10-CM

## 2020-05-26 DIAGNOSIS — Z12.5 SCREENING FOR PROSTATE CANCER: ICD-10-CM

## 2020-05-26 DIAGNOSIS — E11.9 TYPE 2 DIABETES MELLITUS WITHOUT COMPLICATION, WITH LONG-TERM CURRENT USE OF INSULIN (H): ICD-10-CM

## 2020-05-26 DIAGNOSIS — E78.49 OTHER HYPERLIPIDEMIA: ICD-10-CM

## 2020-05-26 LAB
ALBUMIN SERPL-MCNC: 4 G/DL (ref 3.5–5)
ALP SERPL-CCNC: 67 U/L (ref 45–120)
ALT SERPL W P-5'-P-CCNC: 23 U/L (ref 0–45)
ANION GAP SERPL CALCULATED.3IONS-SCNC: 13 MMOL/L (ref 5–18)
AST SERPL W P-5'-P-CCNC: 21 U/L (ref 0–40)
BILIRUB SERPL-MCNC: 0.9 MG/DL (ref 0–1)
BUN SERPL-MCNC: 17 MG/DL (ref 8–22)
CALCIUM SERPL-MCNC: 10.2 MG/DL (ref 8.5–10.5)
CHLORIDE BLD-SCNC: 102 MMOL/L (ref 98–107)
CHOLEST SERPL-MCNC: 185 MG/DL
CO2 SERPL-SCNC: 22 MMOL/L (ref 22–31)
CREAT SERPL-MCNC: 1.17 MG/DL (ref 0.7–1.3)
CREAT UR-MCNC: 34.9 MG/DL
FASTING STATUS PATIENT QL REPORTED: YES
GFR SERPL CREATININE-BSD FRML MDRD: >60 ML/MIN/1.73M2
GLUCOSE BLD-MCNC: 243 MG/DL (ref 70–125)
HBA1C MFR BLD: 7.5 % (ref 3.5–6)
HDLC SERPL-MCNC: 52 MG/DL
LDLC SERPL CALC-MCNC: 85 MG/DL
LDLC SERPL CALC-MCNC: ABNORMAL MG/DL
MICROALBUMIN UR-MCNC: 15.72 MG/DL (ref 0–1.99)
MICROALBUMIN/CREAT UR: 450.4 MG/G
POTASSIUM BLD-SCNC: 4.3 MMOL/L (ref 3.5–5)
PROT SERPL-MCNC: 7 G/DL (ref 6–8)
PSA SERPL-MCNC: 0.3 NG/ML (ref 0–4.5)
SODIUM SERPL-SCNC: 137 MMOL/L (ref 136–145)
TRIGL SERPL-MCNC: 445 MG/DL

## 2020-05-27 ENCOUNTER — COMMUNICATION - HEALTHEAST (OUTPATIENT)
Dept: FAMILY MEDICINE | Facility: CLINIC | Age: 65
End: 2020-05-27

## 2020-06-02 ENCOUNTER — RECORDS - HEALTHEAST (OUTPATIENT)
Dept: ADMINISTRATIVE | Facility: OTHER | Age: 65
End: 2020-06-02

## 2020-06-02 LAB — RETINOPATHY: POSITIVE

## 2020-06-09 ENCOUNTER — RECORDS - HEALTHEAST (OUTPATIENT)
Dept: HEALTH INFORMATION MANAGEMENT | Facility: CLINIC | Age: 65
End: 2020-06-09

## 2020-07-05 ENCOUNTER — COMMUNICATION - HEALTHEAST (OUTPATIENT)
Dept: FAMILY MEDICINE | Facility: CLINIC | Age: 65
End: 2020-07-05

## 2020-07-05 DIAGNOSIS — I10 HYPERTENSION: ICD-10-CM

## 2020-07-05 DIAGNOSIS — N40.0 BPH (BENIGN PROSTATIC HYPERPLASIA): ICD-10-CM

## 2020-07-05 DIAGNOSIS — E78.5 HYPERLIPIDEMIA, UNSPECIFIED HYPERLIPIDEMIA TYPE: ICD-10-CM

## 2020-07-05 DIAGNOSIS — E11.9 DIABETES MELLITUS, TYPE 2 (H): ICD-10-CM

## 2020-07-14 ENCOUNTER — COMMUNICATION - HEALTHEAST (OUTPATIENT)
Dept: FAMILY MEDICINE | Facility: CLINIC | Age: 65
End: 2020-07-14

## 2020-07-14 DIAGNOSIS — E11.9 TYPE 2 DIABETES MELLITUS WITHOUT COMPLICATION, WITH LONG-TERM CURRENT USE OF INSULIN (H): ICD-10-CM

## 2020-07-14 DIAGNOSIS — Z79.4 TYPE 2 DIABETES MELLITUS WITHOUT COMPLICATION, WITH LONG-TERM CURRENT USE OF INSULIN (H): ICD-10-CM

## 2020-07-25 ENCOUNTER — COMMUNICATION - HEALTHEAST (OUTPATIENT)
Dept: FAMILY MEDICINE | Facility: CLINIC | Age: 65
End: 2020-07-25

## 2020-08-13 ENCOUNTER — COMMUNICATION - HEALTHEAST (OUTPATIENT)
Dept: FAMILY MEDICINE | Facility: CLINIC | Age: 65
End: 2020-08-13

## 2020-08-13 DIAGNOSIS — N40.0 BENIGN PROSTATIC HYPERPLASIA, UNSPECIFIED WHETHER LOWER URINARY TRACT SYMPTOMS PRESENT: ICD-10-CM

## 2020-08-13 DIAGNOSIS — F52.8 PSYCHOSEXUAL DYSFUNCTION WITH INHIBITED SEXUAL EXCITEMENT: ICD-10-CM

## 2020-08-14 RX ORDER — TADALAFIL 5 MG/1
TABLET ORAL
Qty: 85 TABLET | Refills: 2 | Status: SHIPPED | OUTPATIENT
Start: 2020-08-14 | End: 2021-11-29

## 2020-11-17 ENCOUNTER — COMMUNICATION - HEALTHEAST (OUTPATIENT)
Dept: FAMILY MEDICINE | Facility: CLINIC | Age: 65
End: 2020-11-17

## 2020-11-18 ENCOUNTER — COMMUNICATION - HEALTHEAST (OUTPATIENT)
Dept: FAMILY MEDICINE | Facility: CLINIC | Age: 65
End: 2020-11-18

## 2020-11-18 DIAGNOSIS — E11.9 DIABETES (H): ICD-10-CM

## 2021-01-10 ENCOUNTER — HEALTH MAINTENANCE LETTER (OUTPATIENT)
Age: 66
End: 2021-01-10

## 2021-01-27 ENCOUNTER — COMMUNICATION - HEALTHEAST (OUTPATIENT)
Dept: FAMILY MEDICINE | Facility: CLINIC | Age: 66
End: 2021-01-27

## 2021-01-28 ENCOUNTER — COMMUNICATION - HEALTHEAST (OUTPATIENT)
Dept: FAMILY MEDICINE | Facility: CLINIC | Age: 66
End: 2021-01-28

## 2021-02-16 ENCOUNTER — COMMUNICATION - HEALTHEAST (OUTPATIENT)
Dept: FAMILY MEDICINE | Facility: CLINIC | Age: 66
End: 2021-02-16

## 2021-02-16 DIAGNOSIS — I10 HYPERTENSION: ICD-10-CM

## 2021-02-16 DIAGNOSIS — N40.0 BPH (BENIGN PROSTATIC HYPERPLASIA): ICD-10-CM

## 2021-02-16 DIAGNOSIS — E11.9 DIABETES MELLITUS, TYPE 2 (H): ICD-10-CM

## 2021-02-16 DIAGNOSIS — E78.5 HYPERLIPIDEMIA, UNSPECIFIED HYPERLIPIDEMIA TYPE: ICD-10-CM

## 2021-05-08 ENCOUNTER — HEALTH MAINTENANCE LETTER (OUTPATIENT)
Age: 66
End: 2021-05-08

## 2021-05-19 ENCOUNTER — COMMUNICATION - HEALTHEAST (OUTPATIENT)
Dept: FAMILY MEDICINE | Facility: CLINIC | Age: 66
End: 2021-05-19

## 2021-05-19 DIAGNOSIS — E11.9 DIABETES MELLITUS, TYPE 2 (H): ICD-10-CM

## 2021-05-19 DIAGNOSIS — Z79.4 TYPE 2 DIABETES MELLITUS WITHOUT COMPLICATION, WITH LONG-TERM CURRENT USE OF INSULIN (H): ICD-10-CM

## 2021-05-19 DIAGNOSIS — E11.9 TYPE 2 DIABETES MELLITUS WITHOUT COMPLICATION, WITH LONG-TERM CURRENT USE OF INSULIN (H): ICD-10-CM

## 2021-05-19 DIAGNOSIS — I10 HYPERTENSION: ICD-10-CM

## 2021-05-19 DIAGNOSIS — E78.5 HYPERLIPIDEMIA, UNSPECIFIED HYPERLIPIDEMIA TYPE: ICD-10-CM

## 2021-05-19 DIAGNOSIS — E11.9 DIABETES (H): ICD-10-CM

## 2021-05-19 DIAGNOSIS — N40.0 BPH (BENIGN PROSTATIC HYPERPLASIA): ICD-10-CM

## 2021-05-21 RX ORDER — METFORMIN HCL 500 MG
2000 TABLET, EXTENDED RELEASE 24 HR ORAL DAILY
Qty: 360 TABLET | Refills: 0 | Status: SHIPPED | OUTPATIENT
Start: 2021-05-21 | End: 2021-08-25

## 2021-05-21 RX ORDER — ATORVASTATIN CALCIUM 40 MG/1
40 TABLET, FILM COATED ORAL DAILY
Qty: 90 TABLET | Refills: 0 | Status: SHIPPED | OUTPATIENT
Start: 2021-05-21 | End: 2021-10-19 | Stop reason: DRUGHIGH

## 2021-05-21 RX ORDER — TAMSULOSIN HYDROCHLORIDE 0.4 MG/1
CAPSULE ORAL
Qty: 90 CAPSULE | Refills: 0 | Status: SHIPPED | OUTPATIENT
Start: 2021-05-21 | End: 2021-08-25

## 2021-05-21 RX ORDER — LOSARTAN POTASSIUM 100 MG/1
100 TABLET ORAL DAILY
Qty: 90 TABLET | Refills: 0 | Status: SHIPPED | OUTPATIENT
Start: 2021-05-21 | End: 2021-08-25

## 2021-05-21 RX ORDER — GLIPIZIDE 10 MG/1
TABLET, FILM COATED, EXTENDED RELEASE ORAL
Qty: 180 TABLET | Refills: 0 | Status: SHIPPED | OUTPATIENT
Start: 2021-05-21 | End: 2021-08-25

## 2021-05-27 ENCOUNTER — RECORDS - HEALTHEAST (OUTPATIENT)
Dept: ADMINISTRATIVE | Facility: CLINIC | Age: 66
End: 2021-05-27

## 2021-05-27 ASSESSMENT — PATIENT HEALTH QUESTIONNAIRE - PHQ9: SUM OF ALL RESPONSES TO PHQ QUESTIONS 1-9: 0

## 2021-05-27 NOTE — PATIENT INSTRUCTIONS - HE
Recommendations from today's Medication Management (MTM) visit:                                                      1. Start chlorthalidone 25 mg every morning for blood pressure.     2. Take Cialis 10 mg as needed prior to sexual activity.     3. Start pioglitazone (Actos) 30 mg daily for diabetes.     4. Increase Basaglar insulin to 66 units injected daily.     Blood Sugar Goals  Before meals:   2 hours after meal: less than 180       Next MTM appointment:                                                            To schedule another MTM appointment, please call the clinic directly or you may call   the MTM scheduling line at [phone] or toll-free at [phone].     My MTM pharmacist's contact information:                                                      It was a pleasure seeing you today. Thank you for your engagement in getting the most out of your medications! Please feel free to contact me with any questions or concerns you have.      Marci Wang, PharmD, [facility]  Medication Management (MTM) Pharmacist  [facility]  [phone]    You may receive a survey about the MTM services you received by email and/or US Mail.  I would appreciate your feedback to help me serve you better in the future. Your comments will be anonymous.

## 2021-05-27 NOTE — TELEPHONE ENCOUNTER
Refills Approved x 3    Rx renewed per Medication Renewal Policy. Medication was last renewed on   Atorvastatin, Losartan and Tamsulosin =2/2/2018 with 3 refills on all 3 medications.  Last office visit: 11/15/2018 with SABA Garcia NP @ [facility]      Karolina Osborne, [facility] Triage/Med Refill 3/24/2019     Requested Prescriptions   Pending Prescriptions Disp Refills     metFORMIN (GLUCOPHAGE-XR) 500 MG 24 hr tablet [Pharmacy Med Name: METFORMIN ER 500MG 24HR TABS] 360 tablet 0     Sig: TAKE 4 TABLETS BY MOUTH EVERY DAY    Metformin Refill Protocol Failed - 3/24/2019 11:44 AM       Failed - A1C in last 6 months    Hemoglobin A1c   Date Value Ref Range Status   06/13/2018 8.2 (H) 3.5 - 6.0 % Final              Failed - Microalbumin in last year     Microalbumin, Random Urine   Date Value Ref Range Status   02/02/2018 22.66 (H) 0.00 - 1.99 mg/dL Final                 Passed - Blood pressure in last 12 months    BP Readings from Last 1 Encounters:   11/15/18 130/80            Passed - LFT or AST or ALT in last 12 months    Albumin   Date Value Ref Range Status   06/13/2018 4.2 3.5 - 5.0 g/dL Final     Bilirubin, Total   Date Value Ref Range Status   06/13/2018 0.7 0.0 - 1.0 mg/dL Final     Alkaline Phosphatase   Date Value Ref Range Status   06/13/2018 68 45 - 120 U/L Final     AST   Date Value Ref Range Status   06/13/2018 20 0 - 40 U/L Final     ALT   Date Value Ref Range Status   06/13/2018 23 0 - 45 U/L Final     Protein, Total   Date Value Ref Range Status   06/13/2018 7.2 6.0 - 8.0 g/dL Final               Passed - GFR or Serum Creatinine in last 6 months    GFR MDRD Non Af Amer   Date Value Ref Range Status   06/13/2018 >60 >60 mL/min/1.73m2 Final     GFR MDRD Af Amer   Date Value Ref Range Status   06/13/2018 >60 >60 mL/min/1.73m2 Final            Passed - Visit with PCP or prescribing provider visit in last 6 months or next 3 months    Last office visit with prescriber/PCP: Visit date not found OR  same dept: 11/15/2018 Debra Garcia NP OR same specialty: 11/15/2018 Debra Garcia NP Last physical: Visit date not found Last MTM visit: Visit date not found         Next appt within 3 mo: Visit date not found  Next physical within 3 mo: Visit date not found  Prescriber OR PCP: Janes Jeronimo MD  Last diagnosis associated with med order: 1. Diabetes mellitus, type 2 (H)  - metFORMIN (GLUCOPHAGE-XR) 500 MG 24 hr tablet [Pharmacy Med Name: METFORMIN ER 500MG 24HR TABS]; TAKE 4 TABLETS BY MOUTH EVERY DAY  Dispense: 360 tablet; Refill: 0    2. BPH (benign prostatic hyperplasia)  - tamsulosin (FLOMAX) 0.4 mg cap [Pharmacy Med Name: TAMSULOSIN 0.4MG CAPSULES]; TAKE 1 CAPSULE BY MOUTH DAILY  Dispense: 90 capsule; Refill: 0    3. Hypertension  - losartan (COZAAR) 100 MG tablet [Pharmacy Med Name: LOSARTAN 100MG TABLETS]; TAKE 1 TABLET BY MOUTH EVERY DAY  Dispense: 90 tablet; Refill: 0    4. Hyperlipidemia, unspecified hyperlipidemia type  - atorvastatin (LIPITOR) 40 MG tablet [Pharmacy Med Name: ATORVASTATIN 40MG TABLETS]; TAKE 1 TABLET BY MOUTH EVERY DAY  Dispense: 90 tablet; Refill: 0     If protocol passes may refill for 12 months if within 3 months of last provider visit (or a total of 15 months).           tamsulosin (FLOMAX) 0.4 mg cap [Pharmacy Med Name: TAMSULOSIN 0.4MG CAPSULES] 90 capsule 0     Sig: TAKE 1 CAPSULE BY MOUTH DAILY    Alfuzosin/Tamsulosin/Silodosin Refill Protocol  Passed - 3/24/2019 11:44 AM       Passed - PCP or prescribing provider visit in past 12 months      Last office visit with prescriber/PCP: 2/2/2018 Janes Jeronimo MD OR same dept: 11/15/2018 Debra Garcia NP OR same specialty: 11/15/2018 Debra Garcia NP  Last physical: 1/23/2017 Last MTM visit: Visit date not found   Next visit within 3 mo: Visit date not found  Next physical within 3 mo: Visit date not found  Prescriber OR PCP: Janes Jeronimo MD  Last diagnosis associated with med order: 1. Diabetes mellitus, type  2 (H)  - metFORMIN (GLUCOPHAGE-XR) 500 MG 24 hr tablet [Pharmacy Med Name: METFORMIN ER 500MG 24HR TABS]; TAKE 4 TABLETS BY MOUTH EVERY DAY  Dispense: 360 tablet; Refill: 0    2. BPH (benign prostatic hyperplasia)  - tamsulosin (FLOMAX) 0.4 mg cap [Pharmacy Med Name: TAMSULOSIN 0.4MG CAPSULES]; TAKE 1 CAPSULE BY MOUTH DAILY  Dispense: 90 capsule; Refill: 0    3. Hypertension  - losartan (COZAAR) 100 MG tablet [Pharmacy Med Name: LOSARTAN 100MG TABLETS]; TAKE 1 TABLET BY MOUTH EVERY DAY  Dispense: 90 tablet; Refill: 0    4. Hyperlipidemia, unspecified hyperlipidemia type  - atorvastatin (LIPITOR) 40 MG tablet [Pharmacy Med Name: ATORVASTATIN 40MG TABLETS]; TAKE 1 TABLET BY MOUTH EVERY DAY  Dispense: 90 tablet; Refill: 0    If protocol passes may refill for 12 months if within 3 months of last provider visit (or a total of 15 months).             losartan (COZAAR) 100 MG tablet [Pharmacy Med Name: LOSARTAN 100MG TABLETS] 90 tablet 0     Sig: TAKE 1 TABLET BY MOUTH EVERY DAY    Angiotensin Receptor Blocker Protocol Passed - 3/24/2019 11:44 AM       Passed - PCP or prescribing provider visit in past 12 months      Last office visit with prescriber/PCP: 2/2/2018 Janes Jeronimo MD OR same dept: 11/15/2018 Debra Garcia NP OR same specialty: 11/15/2018 Debra Garcia NP  Last physical: 1/23/2017 Last MTM visit: Visit date not found   Next visit within 3 mo: Visit date not found  Next physical within 3 mo: Visit date not found  Prescriber OR PCP: Janes Jeronimo MD  Last diagnosis associated with med order: 1. Diabetes mellitus, type 2 (H)  - metFORMIN (GLUCOPHAGE-XR) 500 MG 24 hr tablet [Pharmacy Med Name: METFORMIN ER 500MG 24HR TABS]; TAKE 4 TABLETS BY MOUTH EVERY DAY  Dispense: 360 tablet; Refill: 0    2. BPH (benign prostatic hyperplasia)  - tamsulosin (FLOMAX) 0.4 mg cap [Pharmacy Med Name: TAMSULOSIN 0.4MG CAPSULES]; TAKE 1 CAPSULE BY MOUTH DAILY  Dispense: 90 capsule; Refill: 0    3. Hypertension  -  losartan (COZAAR) 100 MG tablet [Pharmacy Med Name: LOSARTAN 100MG TABLETS]; TAKE 1 TABLET BY MOUTH EVERY DAY  Dispense: 90 tablet; Refill: 0    4. Hyperlipidemia, unspecified hyperlipidemia type  - atorvastatin (LIPITOR) 40 MG tablet [Pharmacy Med Name: ATORVASTATIN 40MG TABLETS]; TAKE 1 TABLET BY MOUTH EVERY DAY  Dispense: 90 tablet; Refill: 0    If protocol passes may refill for 12 months if within 3 months of last provider visit (or a total of 15 months).            Passed - Serum potassium within the past 12 months    Lab Results   Component Value Date    Potassium 4.8 06/13/2018            Passed - Blood pressure filed in past 12 months    BP Readings from Last 1 Encounters:   11/15/18 130/80            Passed - Serum creatinine within the past 12 months    Creatinine   Date Value Ref Range Status   06/13/2018 1.13 0.70 - 1.30 mg/dL Final             atorvastatin (LIPITOR) 40 MG tablet [Pharmacy Med Name: ATORVASTATIN 40MG TABLETS] 90 tablet 0     Sig: TAKE 1 TABLET BY MOUTH EVERY DAY    Statins Refill Protocol (Hmg CoA Reductase Inhibitors) Passed - 3/24/2019 11:44 AM       Passed - PCP or prescribing provider visit in past 12 months     Last office visit with prescriber/PCP: 2/2/2018 Janes Jeronimo MD OR same dept: 11/15/2018 Debra Garcia NP OR same specialty: 11/15/2018 Debra Garcia NP  Last physical: 1/23/2017 Last MTM visit: Visit date not found   Next visit within 3 mo: Visit date not found  Next physical within 3 mo: Visit date not found  Prescriber OR PCP: Janes Jeronimo MD  Last diagnosis associated with med order: 1. Diabetes mellitus, type 2 (H)  - metFORMIN (GLUCOPHAGE-XR) 500 MG 24 hr tablet [Pharmacy Med Name: METFORMIN ER 500MG 24HR TABS]; TAKE 4 TABLETS BY MOUTH EVERY DAY  Dispense: 360 tablet; Refill: 0    2. BPH (benign prostatic hyperplasia)  - tamsulosin (FLOMAX) 0.4 mg cap [Pharmacy Med Name: TAMSULOSIN 0.4MG CAPSULES]; TAKE 1 CAPSULE BY MOUTH DAILY  Dispense: 90 capsule;  Refill: 0    3. Hypertension  - losartan (COZAAR) 100 MG tablet [Pharmacy Med Name: LOSARTAN 100MG TABLETS]; TAKE 1 TABLET BY MOUTH EVERY DAY  Dispense: 90 tablet; Refill: 0    4. Hyperlipidemia, unspecified hyperlipidemia type  - atorvastatin (LIPITOR) 40 MG tablet [Pharmacy Med Name: ATORVASTATIN 40MG TABLETS]; TAKE 1 TABLET BY MOUTH EVERY DAY  Dispense: 90 tablet; Refill: 0    If protocol passes may refill for 12 months if within 3 months of last provider visit (or a total of 15 months).

## 2021-05-27 NOTE — TELEPHONE ENCOUNTER
RN cannot approve Refill Request: Metformin    RN can NOT refill this medication PCP messaged that patient is overdue for Labs. Last office visit: 2/2/2018 Janes Jeronimo MD Last Physical: 1/23/2017 Last MTM visit: Visit date not found Last visit same specialty: 11/15/2018 Debra Garcia, ALEXANDR.  Next visit within 3 mo: Visit date not found  Next physical within 3 mo: Visit date not found      Karolina Osborne, [facility] Triage/Med Refill 3/24/2019    Requested Prescriptions   Pending Prescriptions Disp Refills     metFORMIN (GLUCOPHAGE-XR) 500 MG 24 hr tablet [Pharmacy Med Name: METFORMIN ER 500MG 24HR TABS] 360 tablet 0     Sig: TAKE 4 TABLETS BY MOUTH EVERY DAY    Metformin Refill Protocol Failed - 3/24/2019 11:44 AM       Failed - A1C in last 6 months    Hemoglobin A1c   Date Value Ref Range Status   06/13/2018 8.2 (H) 3.5 - 6.0 % Final              Failed - Microalbumin in last year     Microalbumin, Random Urine   Date Value Ref Range Status   02/02/2018 22.66 (H) 0.00 - 1.99 mg/dL Final                 Passed - Blood pressure in last 12 months    BP Readings from Last 1 Encounters:   11/15/18 130/80            Passed - LFT or AST or ALT in last 12 months    Albumin   Date Value Ref Range Status   06/13/2018 4.2 3.5 - 5.0 g/dL Final     Bilirubin, Total   Date Value Ref Range Status   06/13/2018 0.7 0.0 - 1.0 mg/dL Final     Alkaline Phosphatase   Date Value Ref Range Status   06/13/2018 68 45 - 120 U/L Final     AST   Date Value Ref Range Status   06/13/2018 20 0 - 40 U/L Final     ALT   Date Value Ref Range Status   06/13/2018 23 0 - 45 U/L Final     Protein, Total   Date Value Ref Range Status   06/13/2018 7.2 6.0 - 8.0 g/dL Final               Passed - GFR or Serum Creatinine in last 6 months    GFR MDRD Non Af Amer   Date Value Ref Range Status   06/13/2018 >60 >60 mL/min/1.73m2 Final     GFR MDRD Af Amer   Date Value Ref Range Status   06/13/2018 >60 >60 mL/min/1.73m2 Final            Passed -  Visit with PCP or prescribing provider visit in last 6 months or next 3 months    Last office visit with prescriber/PCP: Visit date not found OR same dept: 11/15/2018 Debra Garcia NP OR same specialty: 11/15/2018 Debra Garcia NP Last physical: Visit date not found Last MTM visit: Visit date not found         Next appt within 3 mo: Visit date not found  Next physical within 3 mo: Visit date not found  Prescriber OR PCP: Janes Jeronimo MD  Last diagnosis associated with med order: 1. Diabetes mellitus, type 2 (H)  - metFORMIN (GLUCOPHAGE-XR) 500 MG 24 hr tablet [Pharmacy Med Name: METFORMIN ER 500MG 24HR TABS]; TAKE 4 TABLETS BY MOUTH EVERY DAY  Dispense: 360 tablet; Refill: 0    2. BPH (benign prostatic hyperplasia)  - tamsulosin (FLOMAX) 0.4 mg cap [Pharmacy Med Name: TAMSULOSIN 0.4MG CAPSULES]; TAKE 1 CAPSULE BY MOUTH DAILY  Dispense: 90 capsule; Refill: 0    3. Hypertension  - losartan (COZAAR) 100 MG tablet [Pharmacy Med Name: LOSARTAN 100MG TABLETS]; TAKE 1 TABLET BY MOUTH EVERY DAY  Dispense: 90 tablet; Refill: 0    4. Hyperlipidemia, unspecified hyperlipidemia type  - atorvastatin (LIPITOR) 40 MG tablet [Pharmacy Med Name: ATORVASTATIN 40MG TABLETS]; TAKE 1 TABLET BY MOUTH EVERY DAY  Dispense: 90 tablet; Refill: 0     If protocol passes may refill for 12 months if within 3 months of last provider visit (or a total of 15 months).           tamsulosin (FLOMAX) 0.4 mg cap [Pharmacy Med Name: TAMSULOSIN 0.4MG CAPSULES] 90 capsule 0     Sig: TAKE 1 CAPSULE BY MOUTH DAILY    Alfuzosin/Tamsulosin/Silodosin Refill Protocol  Passed - 3/24/2019 11:44 AM       Passed - PCP or prescribing provider visit in past 12 months      Last office visit with prescriber/PCP: 2/2/2018 Janes Jeronimo MD OR same dept: 11/15/2018 Debra Garcia NP OR same specialty: 11/15/2018 Debra Garcia NP  Last physical: 1/23/2017 Last MTM visit: Visit date not found   Next visit within 3 mo: Visit date not found  Next  physical within 3 mo: Visit date not found  Prescriber OR PCP: Janes Jeronimo MD  Last diagnosis associated with med order: 1. Diabetes mellitus, type 2 (H)  - metFORMIN (GLUCOPHAGE-XR) 500 MG 24 hr tablet [Pharmacy Med Name: METFORMIN ER 500MG 24HR TABS]; TAKE 4 TABLETS BY MOUTH EVERY DAY  Dispense: 360 tablet; Refill: 0    2. BPH (benign prostatic hyperplasia)  - tamsulosin (FLOMAX) 0.4 mg cap [Pharmacy Med Name: TAMSULOSIN 0.4MG CAPSULES]; TAKE 1 CAPSULE BY MOUTH DAILY  Dispense: 90 capsule; Refill: 0    3. Hypertension  - losartan (COZAAR) 100 MG tablet [Pharmacy Med Name: LOSARTAN 100MG TABLETS]; TAKE 1 TABLET BY MOUTH EVERY DAY  Dispense: 90 tablet; Refill: 0    4. Hyperlipidemia, unspecified hyperlipidemia type  - atorvastatin (LIPITOR) 40 MG tablet [Pharmacy Med Name: ATORVASTATIN 40MG TABLETS]; TAKE 1 TABLET BY MOUTH EVERY DAY  Dispense: 90 tablet; Refill: 0    If protocol passes may refill for 12 months if within 3 months of last provider visit (or a total of 15 months).             losartan (COZAAR) 100 MG tablet [Pharmacy Med Name: LOSARTAN 100MG TABLETS] 90 tablet 0     Sig: TAKE 1 TABLET BY MOUTH EVERY DAY    Angiotensin Receptor Blocker Protocol Passed - 3/24/2019 11:44 AM       Passed - PCP or prescribing provider visit in past 12 months      Last office visit with prescriber/PCP: 2/2/2018 Janes Jeronimo MD OR same dept: 11/15/2018 Debra Garcia NP OR same specialty: 11/15/2018 Debra Garcia NP  Last physical: 1/23/2017 Last MTM visit: Visit date not found   Next visit within 3 mo: Visit date not found  Next physical within 3 mo: Visit date not found  Prescriber OR PCP: Janes Jeronimo MD  Last diagnosis associated with med order: 1. Diabetes mellitus, type 2 (H)  - metFORMIN (GLUCOPHAGE-XR) 500 MG 24 hr tablet [Pharmacy Med Name: METFORMIN ER 500MG 24HR TABS]; TAKE 4 TABLETS BY MOUTH EVERY DAY  Dispense: 360 tablet; Refill: 0    2. BPH (benign prostatic hyperplasia)  - tamsulosin  (FLOMAX) 0.4 mg cap [Pharmacy Med Name: TAMSULOSIN 0.4MG CAPSULES]; TAKE 1 CAPSULE BY MOUTH DAILY  Dispense: 90 capsule; Refill: 0    3. Hypertension  - losartan (COZAAR) 100 MG tablet [Pharmacy Med Name: LOSARTAN 100MG TABLETS]; TAKE 1 TABLET BY MOUTH EVERY DAY  Dispense: 90 tablet; Refill: 0    4. Hyperlipidemia, unspecified hyperlipidemia type  - atorvastatin (LIPITOR) 40 MG tablet [Pharmacy Med Name: ATORVASTATIN 40MG TABLETS]; TAKE 1 TABLET BY MOUTH EVERY DAY  Dispense: 90 tablet; Refill: 0    If protocol passes may refill for 12 months if within 3 months of last provider visit (or a total of 15 months).            Passed - Serum potassium within the past 12 months    Lab Results   Component Value Date    Potassium 4.8 06/13/2018            Passed - Blood pressure filed in past 12 months    BP Readings from Last 1 Encounters:   11/15/18 130/80            Passed - Serum creatinine within the past 12 months    Creatinine   Date Value Ref Range Status   06/13/2018 1.13 0.70 - 1.30 mg/dL Final             atorvastatin (LIPITOR) 40 MG tablet [Pharmacy Med Name: ATORVASTATIN 40MG TABLETS] 90 tablet 0     Sig: TAKE 1 TABLET BY MOUTH EVERY DAY    Statins Refill Protocol (Hmg CoA Reductase Inhibitors) Passed - 3/24/2019 11:44 AM       Passed - PCP or prescribing provider visit in past 12 months     Last office visit with prescriber/PCP: 2/2/2018 Janes Jeronimo MD OR same dept: 11/15/2018 Debra Garcia NP OR same specialty: 11/15/2018 Debra Garcia NP  Last physical: 1/23/2017 Last MTM visit: Visit date not found   Next visit within 3 mo: Visit date not found  Next physical within 3 mo: Visit date not found  Prescriber OR PCP: Janes Jeronimo MD  Last diagnosis associated with med order: 1. Diabetes mellitus, type 2 (H)  - metFORMIN (GLUCOPHAGE-XR) 500 MG 24 hr tablet [Pharmacy Med Name: METFORMIN ER 500MG 24HR TABS]; TAKE 4 TABLETS BY MOUTH EVERY DAY  Dispense: 360 tablet; Refill: 0    2. BPH (benign  prostatic hyperplasia)  - tamsulosin (FLOMAX) 0.4 mg cap [Pharmacy Med Name: TAMSULOSIN 0.4MG CAPSULES]; TAKE 1 CAPSULE BY MOUTH DAILY  Dispense: 90 capsule; Refill: 0    3. Hypertension  - losartan (COZAAR) 100 MG tablet [Pharmacy Med Name: LOSARTAN 100MG TABLETS]; TAKE 1 TABLET BY MOUTH EVERY DAY  Dispense: 90 tablet; Refill: 0    4. Hyperlipidemia, unspecified hyperlipidemia type  - atorvastatin (LIPITOR) 40 MG tablet [Pharmacy Med Name: ATORVASTATIN 40MG TABLETS]; TAKE 1 TABLET BY MOUTH EVERY DAY  Dispense: 90 tablet; Refill: 0    If protocol passes may refill for 12 months if within 3 months of last provider visit (or a total of 15 months).

## 2021-05-27 NOTE — TELEPHONE ENCOUNTER
RN cannot approve Refill Request    RN can NOT refill this medication Protocol failed and NO refill given.      Jonna Gonzalez, [facility] Triage/Med Refill 3/27/2019    Requested Prescriptions   Pending Prescriptions Disp Refills     BASAGLAR KWIKPEN U-100 INSULIN 100 unit/mL (3 mL) pen [Pharmacy Med Name: BASAGLAR 100 U/ML KWIKPEN INJ 3ML]  0     Sig: INJECT 60 UNITS UNDER THE SKIN AT BEDTIME    Insulin/GLP-1 Refill Protocol Failed - 3/27/2019 10:10 AM       Failed - A1C in last 6 months    Hemoglobin A1c   Date Value Ref Range Status   06/13/2018 8.2 (H) 3.5 - 6.0 % Final              Failed - Microalbumin in last year    Microalbumin, Random Urine   Date Value Ref Range Status   02/02/2018 22.66 (H) 0.00 - 1.99 mg/dL Final                 Passed - Visit with PCP or prescribing provider visit in last 6 months    Last office visit with prescriber/PCP: Visit date not found OR same dept: Visit date not found OR same specialty: Visit date not found Last physical: Visit date not found Last MTM visit: 1/23/2017 Marci Wang, PharmD     Next appt within 3 mo: Visit date not found  Next physical within 3 mo: Visit date not found  Prescriber OR PCP: Janes Jeronimo MD  Last diagnosis associated with med order: 1. Diabetes mellitus, type 2 (H)  - BASAGLAR KWIKPEN U-100 INSULIN 100 unit/mL (3 mL) pen [Pharmacy Med Name: BASAGLAR 100 U/ML KWIKPEN INJ 3ML]; INJECT 60 UNITS UNDER THE SKIN AT BEDTIME; Refill: 0    If protocol passes may refill for 6 months if within 3 months of last provider visit (or a total of 9 months).             Passed - Blood pressure in last year    BP Readings from Last 1 Encounters:   11/15/18 130/80            Passed - Creatinine done in last year    Creatinine   Date Value Ref Range Status   06/13/2018 1.13 0.70 - 1.30 mg/dL Final

## 2021-05-27 NOTE — TELEPHONE ENCOUNTER
Fax received from [facility] pharmacy requesting Prior Authorization    Medication Name: Tadalafil 5mg tablet    [facility]    Patient ID Number: [id number]    Please start PA process

## 2021-05-27 NOTE — TELEPHONE ENCOUNTER
Central PA team  [phone]  Pool: HE PA MED ([id number])          PA has been initiated.       PA form completed and faxed insurance via Cover My Meds     Key:  [id number] - PA Case ID: [id number]     Medication:  Tadalafil 5MG OR TABS    Insurance:  [employer]        Response will be received via fax and may take up to 5-10 business days depending on plan

## 2021-05-27 NOTE — PROGRESS NOTES
MTM Follow Up Encounter  Assessment & Plan                                                     1. Type 2 diabetes mellitus  Uncontrolled as evidenced by elevated A1c of 9.9%; above goal of <7% per ADA guidelines. He would benefit from a GLP-1 agonist, but unfortunately these are all too cost prohibitive on his current insurance plan.  One option would be to change to a mixed insulin such as Novolin 70/30 which is available in pen form for $46 per box, but patient states he has an erratic eating schedule and does not eat regular meals which would place him at increased risk of hypoglycemia and make dosing more challenging with this type of insulin.  Another option is adding mealtime insulin, which he has been on in the past.  For now will add only other alternative generic oral medication, pioglitazone and based on elevated fasting blood sugars, recommended increasing basal insulin by 10% to 66 units SQ daily.  Of course, emphasized importance of lifestyle modification both with healthy eating and not skipping meals and exercise.  Plan   1. Check A1c.   2. Start pioglitazone (Actos) 30 mg daily.   3. Increase Basaglar insulin to 66 units injected daily.     2. Essential hypertension  Blood pressure is above goal of <140/90 mm Hg per JNC-8 hypertension guidelines.  He is on maximum dose losartan.  Discussed with PCP, and recommend adding thiazide diuretic. Medication education and counseling was provided, including indication, expected effect, dosing instructions, and possible side effects. The patient's questions were answered.  Patient will return in 2-3 weeks for blood pressure recheck and monitor electrolytes and kidney function.  Plan   1. Start chlorthalidone 25 mg every morning     3. Hyperlipidemia  Appropriately on a high intensity statin given age and diabetes diagnosis per ACC/AHA guidelines. Last LDL of 82 mg/dl.     4. BPH (benign prostatic hyperplasia)/ED  Sildenafil ineffective. After discussion with  PCP, will change to alternative phosphodiesterase 5 inhibitor, Cialis.  Improved blood sugar and blood pressure control may also improve symptoms.  Plan   1. Take Cialis 10 mg as needed prior to sexual activity.        Follow Up  Return in about 3 weeks (around 5/1/2019).      Subjective & Objective                                                     Ezekiel Aldrich is a 63 y.o. male coming in for a follow up visit for Medication Therapy Management. He was referred to me from Janes Jeronimo MD    Chief Complaint: Diabetes and blood pressure   Medication Adherence/Access: Cost is a concern, so generic, low cost medications are preferred options.     1. Type 2 diabetes mellitus  Ezekiel is taking Basaglar 60 units SQ daily, metformin ER 2000 mg daily, and glipizide ER 20 mg daily. Takes aspirin daily. He cannot afford Victoza so glipizide was started. No hypoglycemia.  Overall he is feeling quite sluggish and fatigued lately with lots of aches and pains.   He checks his BG about three times a week. His BG this morning was 102. Usually it is around 150-160. He has been losing weight and is down from 270 pounds to 252 pounds. He is working on portion control, but admits his diet is not always the best.  He does not usually eat breakfast and other meal times can vary based on his job duties for the day.    Lab Results   Component Value Date    HGBA1C 9.9 (H) 04/10/2019    HGBA1C 8.2 (H) 06/13/2018    HGBA1C 8.7 (H) 02/02/2018     Lab Results   Component Value Date    MICROALBUR 22.66 (H) 02/02/2018    LDLCALC 82 06/13/2018    CREATININE 1.13 06/13/2018       2. Essential hypertension  Ezekiel is taking losartan 100 mg daily. He had a dry cough with lisinopril.  He is surprised his blood pressure is so high today.    3. Hyperlipidemia  Ezekeil is taking atorvastatin 40 mg daily. No noted adverse effects. He is fasting today.     The 10-year ASCVD risk score ([place] ZEN Jr., et al., 2013) is: 28.1%    Values used to calculate  the score:      Age: 63 years      Sex: Male      Is Non- : No      Diabetic: Yes      Tobacco smoker: No      Systolic Blood Pressure: 153 mmHg      Is BP treated: Yes      HDL Cholesterol: 55 mg/dL      Total Cholesterol: 213 mg/dL    4. BPH (benign prostatic hyperplasia)/ED  Ezekiel is taking tamsulosin 0.4 mg daily. He is having issues with erectile dysfunction and not finding sildenafil effective. Is interested in other options.       PMH: reviewed in EPIC   Allergies/ADRs: reviewed in EPIC   Alcohol: reviewed in EPIC   Tobacco:   Social History     Tobacco Use   Smoking Status Former Smoker     Types: Cigarettes     Last attempt to quit: 1985     Years since quittin.9   Smokeless Tobacco Current User     Types: Chew   Tobacco Comment    2 tins/week     Today's Vitals:   BP Readings from Last 3 Encounters:   04/10/19 (!) 153/102   04/10/19 (!) 173/102   11/15/18 130/80     Pulse Readings from Last 3 Encounters:   04/10/19 75   04/10/19 (!) 112   11/15/18 (!) 128     Wt Readings from Last 3 Encounters:   04/10/19 (!) 252 lb 1.6 oz (114.4 kg)   11/15/18 (!) 270 lb 4.8 oz (122.6 kg)   18 (!) 268 lb 9.6 oz (121.8 kg)     ----------------  Much or all of the text in this note was generated through the use of Dragon Dictate voice-to-text software. Errors in spelling or words which seem out of context are unintentional. Sound alike errors, in particular, may have escaped editing.    The patient was given a summary of these recommendations via [employer]    I spent 30 minutes with this patient today; out of pocket cost for today's [facility] visit was reviewed with the patient. All changes were made via collaborative practice agreement with Janes Jeronimo MD. A copy of the visit note was provided to the patient's provider.     Marci Wang, PharmD, BCACP  Medication Management Pharmacist  [facility]        Current Outpatient Medications   Medication Sig  "Dispense Refill     aspirin 81 MG EC tablet Take 81 mg by mouth daily.       atorvastatin (LIPITOR) 40 MG tablet Take 1 tablet (40 mg total) by mouth daily. 90 tablet 1     BD INSULIN PEN NEEDLE UF SHORT 31 gauge x 5/16\" Ndle USE TWICE PER DAY TO INJECT VICTOZA AND LANTUS  3     blood glucose test strips Use 1 each As Directed 2 (two) times a day. Dispense brand per patient's insurance at pharmacy discretion. 200 strip 5     chlorthalidone (HYGROTEN) 25 MG tablet Take 1 tablet (25 mg total) by mouth daily. 30 tablet 3     generic lancets (ACCU-CHEK FASTCLIX) Use 1 each As Directed 2 (two) times a day. Dispense brand per patient's insurance at pharmacy discretion. 100 each 3     glipiZIDE (GLUCOTROL XL) 10 MG 24 hr tablet Take 2 tablets (20 mg total) by mouth daily. 180 tablet 3     insulin glargine (BASAGLAR KWIKPEN U-100 INSULIN) 100 unit/mL (3 mL) pen Inject 66 Units under the skin at bedtime. INJECT 60 UNITS UNDER THE SKIN AT BEDTIME 15 mL 5     losartan (COZAAR) 100 MG tablet Take 1 tablet (100 mg total) by mouth daily. 90 tablet 1     metFORMIN (GLUCOPHAGE-XR) 500 MG 24 hr tablet TAKE 4 TABLETS BY MOUTH EVERY  tablet 0     multivitamin therapeutic (THERAGRAN) tablet Take 1 tablet by mouth daily.       pen needle, diabetic (PEN NEEDLE) 31 gauge x 1/4\" Ndle Use twice daily to inject Victoza and Lantus 200 each 3     pioglitazone (ACTOS) 30 MG tablet Take 1 tablet (30 mg total) by mouth daily. 30 tablet 3     tamsulosin (FLOMAX) 0.4 mg cap Take 1 capsule (0.4 mg total) by mouth daily. 90 capsule 1     No current facility-administered medications for this visit.              "

## 2021-05-28 ENCOUNTER — RECORDS - HEALTHEAST (OUTPATIENT)
Dept: ADMINISTRATIVE | Facility: CLINIC | Age: 66
End: 2021-05-28

## 2021-05-28 NOTE — TELEPHONE ENCOUNTER
If his insurance will not pay for any of the erectile medications there is not much I can do about this though I have heard people getting a fairly good price on the 5 mg tablets of Cialis he may need to shop around to different pharmacies.

## 2021-05-28 NOTE — TELEPHONE ENCOUNTER
Called insurance to check the status of appeal.  Insurance states that this has been denied.  All medications used to treat Erectile dysfunction are excluded from coverage.

## 2021-05-28 NOTE — PROGRESS NOTES
"MTM Consult Encounter    Ezekiel Aldrich is a 64 y.o. male referred for a clinical pharmacist consult from Dr. Janes Jeronimo for BP recheck.     Discussion:   Ezekiel was recently started on chlorthalidone 25 mg daily for high BP. No noted side effects. He also takes losartan 100 mg daily.     BP Readings from Last 3 Encounters:   05/01/19 134/85   04/10/19 (!) 153/102   04/10/19 (!) 173/102     Plan:  1. Check BMP.   2. Blood pressure is well controlled and meeting goal of <140/90 mm Hg per JNC-8 hypertension guidelines.     Follow up:   With PCP & PharmD in 3 months    Marci Wang, PharmD, BCACP  Medication Management Pharmacist  [facility]       Current Outpatient Medications   Medication Sig Dispense Refill     aspirin 81 MG EC tablet Take 81 mg by mouth daily.       atorvastatin (LIPITOR) 40 MG tablet Take 1 tablet (40 mg total) by mouth daily. 90 tablet 1     BD INSULIN PEN NEEDLE UF SHORT 31 gauge x 5/16\" Ndle USE TWICE PER DAY TO INJECT VICTOZA AND LANTUS  3     blood glucose test strips Use 1 each As Directed 2 (two) times a day. Dispense brand per patient's insurance at pharmacy discretion. 200 strip 5     chlorthalidone (HYGROTEN) 25 MG tablet Take 1 tablet (25 mg total) by mouth daily. 30 tablet 3     generic lancets (ACCU-CHEK FASTCLIX) Use 1 each As Directed 2 (two) times a day. Dispense brand per patient's insurance at pharmacy discretion. 100 each 3     glipiZIDE (GLUCOTROL XL) 10 MG 24 hr tablet Take 2 tablets (20 mg total) by mouth daily. 180 tablet 3     insulin glargine (BASAGLAR KWIKPEN U-100 INSULIN) 100 unit/mL (3 mL) pen Inject 66 Units under the skin at bedtime. 45 mL 5     losartan (COZAAR) 100 MG tablet Take 1 tablet (100 mg total) by mouth daily. 90 tablet 1     metFORMIN (GLUCOPHAGE-XR) 500 MG 24 hr tablet TAKE 4 TABLETS BY MOUTH EVERY  tablet 0     multivitamin therapeutic (THERAGRAN) tablet Take 1 tablet by mouth daily.       pen needle, diabetic " "(PEN NEEDLE) 31 gauge x 1/4\" Ndle Use twice daily to inject Victoza and Lantus 200 each 3     pioglitazone (ACTOS) 30 MG tablet Take 1 tablet (30 mg total) by mouth daily. 30 tablet 3     tadalafil (CIALIS) 5 MG tablet 1 daily for a combination of BPH and male erectile disorder. 30 tablet 5     tamsulosin (FLOMAX) 0.4 mg cap Take 1 capsule (0.4 mg total) by mouth daily. 90 capsule 1     No current facility-administered medications for this visit.                         "

## 2021-05-30 VITALS — WEIGHT: 260 LBS | BODY MASS INDEX: 36.78 KG/M2

## 2021-05-30 VITALS — BODY MASS INDEX: 35.42 KG/M2 | HEIGHT: 71 IN | WEIGHT: 253 LBS

## 2021-05-30 NOTE — TELEPHONE ENCOUNTER
Refill Approved    Rx renewed per Medication Renewal Policy. Medication was last renewed on 6/13/18.3/25/19    Jonna Gonzalez, [facility] Triage/Med Refill 7/1/2019     Requested Prescriptions   Pending Prescriptions Disp Refills     metFORMIN (GLUCOPHAGE-XR) 500 MG 24 hr tablet [Pharmacy Med Name: METFORMIN ER 500MG 24HR TABS] 360 tablet 0     Sig: TAKE 4 TABLETS BY MOUTH DAILY       Metformin Refill Protocol Passed - 6/30/2019 12:38 PM        Passed - Blood pressure in last 12 months     BP Readings from Last 1 Encounters:   05/01/19 134/85             Passed - LFT or AST or ALT in last 12 months     Albumin   Date Value Ref Range Status   04/10/2019 4.3 3.5 - 5.0 g/dL Final     Bilirubin, Total   Date Value Ref Range Status   04/10/2019 1.0 0.0 - 1.0 mg/dL Final     Alkaline Phosphatase   Date Value Ref Range Status   04/10/2019 64 45 - 120 U/L Final     AST   Date Value Ref Range Status   04/10/2019 23 0 - 40 U/L Final     ALT   Date Value Ref Range Status   04/10/2019 20 0 - 45 U/L Final     Protein, Total   Date Value Ref Range Status   04/10/2019 7.4 6.0 - 8.0 g/dL Final                Passed - GFR or Serum Creatinine in last 6 months     GFR MDRD Non Af Amer   Date Value Ref Range Status   05/01/2019 >60 >60 mL/min/1.73m2 Final     GFR MDRD Af Amer   Date Value Ref Range Status   05/01/2019 >60 >60 mL/min/1.73m2 Final             Passed - Visit with PCP or prescribing provider visit in last 6 months or next 3 months     Last office visit with prescriber/PCP: Visit date not found OR same dept: 11/15/2018 Debra Garcia NP OR same specialty: 11/15/2018 Debra Garcia NP Last physical: 4/10/2019 Last MTM visit: Visit date not found         Next appt within 3 mo: Visit date not found  Next physical within 3 mo: Visit date not found  Prescriber OR PCP: Janes Jeronimo MD  Last diagnosis associated with med order: 1. Diabetes mellitus, type 2 (H)  - metFORMIN (GLUCOPHAGE-XR) 500 MG 24 hr tablet  [Pharmacy Med Name: METFORMIN ER 500MG 24HR TABS]; TAKE 4 TABLETS BY MOUTH DAILY  Dispense: 360 tablet; Refill: 0  - glipiZIDE (GLUCOTROL XL) 10 MG 24 hr tablet [Pharmacy Med Name: GLIPIZIDE ER 10MG TABLETS]; TAKE 2 TABLETS(20 MG) BY MOUTH DAILY  Dispense: 180 tablet; Refill: 0     If protocol passes may refill for 12 months if within 3 months of last provider visit (or a total of 15 months).           Passed - A1C in last 6 months     Hemoglobin A1c   Date Value Ref Range Status   04/10/2019 9.9 (H) 3.5 - 6.0 % Final               Passed - Microalbumin in last year      Microalbumin, Random Urine   Date Value Ref Range Status   04/10/2019 13.41 (H) 0.00 - 1.99 mg/dL Final                  glipiZIDE (GLUCOTROL XL) 10 MG 24 hr tablet [Pharmacy Med Name: GLIPIZIDE ER 10MG TABLETS] 180 tablet 0     Sig: TAKE 2 TABLETS(20 MG) BY MOUTH DAILY       Oral Hypoglycemics Refill Protocol Passed - 6/30/2019 12:38 PM        Passed - Visit with PCP or prescribing provider visit in last 6 months       Last office visit with prescriber/PCP: Visit date not found OR same dept: 11/15/2018 Debra Garcia NP OR same specialty: 11/15/2018 Debra Garcia NP Last physical: 4/10/2019 Last MTM visit: Visit date not found         Next appt within 3 mo: Visit date not found  Next physical within 3 mo: Visit date not found  Prescriber OR PCP: Janes Jeronimo MD  Last diagnosis associated with med order: 1. Diabetes mellitus, type 2 (H)  - metFORMIN (GLUCOPHAGE-XR) 500 MG 24 hr tablet [Pharmacy Med Name: METFORMIN ER 500MG 24HR TABS]; TAKE 4 TABLETS BY MOUTH DAILY  Dispense: 360 tablet; Refill: 0  - glipiZIDE (GLUCOTROL XL) 10 MG 24 hr tablet [Pharmacy Med Name: GLIPIZIDE ER 10MG TABLETS]; TAKE 2 TABLETS(20 MG) BY MOUTH DAILY  Dispense: 180 tablet; Refill: 0     If protocol passes may refill for 12 months if within 3 months of last provider visit (or a total of 15 months).           Passed - A1C in last 6 months     Hemoglobin A1c   Date  Value Ref Range Status   04/10/2019 9.9 (H) 3.5 - 6.0 % Final               Passed - Microalbumin in last year      Microalbumin, Random Urine   Date Value Ref Range Status   04/10/2019 13.41 (H) 0.00 - 1.99 mg/dL Final                  Passed - Blood pressure in last year     BP Readings from Last 1 Encounters:   05/01/19 134/85             Passed - Serum creatinine in last year     Creatinine   Date Value Ref Range Status   05/01/2019 1.05 0.70 - 1.30 mg/dL Final

## 2021-05-31 VITALS — WEIGHT: 249 LBS | BODY MASS INDEX: 35.22 KG/M2

## 2021-06-01 VITALS — BODY MASS INDEX: 38 KG/M2 | WEIGHT: 268.6 LBS

## 2021-06-01 VITALS — BODY MASS INDEX: 34.66 KG/M2 | WEIGHT: 245 LBS

## 2021-06-02 ENCOUNTER — RECORDS - HEALTHEAST (OUTPATIENT)
Dept: FAMILY MEDICINE | Facility: CLINIC | Age: 66
End: 2021-06-02

## 2021-06-02 VITALS — WEIGHT: 270.3 LBS | BODY MASS INDEX: 38.24 KG/M2

## 2021-06-02 VITALS — WEIGHT: 252.1 LBS | BODY MASS INDEX: 36.09 KG/M2 | HEIGHT: 70 IN

## 2021-06-02 NOTE — TELEPHONE ENCOUNTER
Refill Approved    Rx renewed per Medication Renewal Policy. Medication was last renewed on 3/24/19.    Jonna Gonzalez, [facility] Triage/Med Refill 10/11/2019     Requested Prescriptions   Pending Prescriptions Disp Refills     tamsulosin (FLOMAX) 0.4 mg cap [Pharmacy Med Name: TAMSULOSIN 0.4MG CAPSULES] 90 capsule 0     Sig: TAKE 1 CAPSULE(0.4 MG) BY MOUTH DAILY       Alfuzosin/Tamsulosin/Silodosin Refill Protocol  Passed - 10/9/2019  7:11 PM        Passed - PCP or prescribing provider visit in past 12 months       Last office visit with prescriber/PCP: 2/2/2018 Janes Jeronimo MD OR same dept: 11/15/2018 Debra Garcia NP OR same specialty: 11/15/2018 Debra Garcia NP  Last physical: 4/10/2019 Last MTM visit: Visit date not found   Next visit within 3 mo: Visit date not found  Next physical within 3 mo: Visit date not found  Prescriber OR PCP: Janes Jeronimo MD  Last diagnosis associated with med order: 1. BPH (benign prostatic hyperplasia)  - tamsulosin (FLOMAX) 0.4 mg cap [Pharmacy Med Name: TAMSULOSIN 0.4MG CAPSULES]; TAKE 1 CAPSULE(0.4 MG) BY MOUTH DAILY  Dispense: 90 capsule; Refill: 0    If protocol passes may refill for 12 months if within 3 months of last provider visit (or a total of 15 months).

## 2021-06-02 NOTE — TELEPHONE ENCOUNTER
Refill Approved    Rx renewed per Medication Renewal Policy. Medications were last renewed on 3/24/19.    Samira Alvares, [facility] Triage/Med Refill 10/9/2019     Requested Prescriptions   Pending Prescriptions Disp Refills     losartan (COZAAR) 100 MG tablet [Pharmacy Med Name: LOSARTAN 100MG TABLETS] 90 tablet 0     Sig: TAKE 1 TABLET(100 MG) BY MOUTH DAILY       Angiotensin Receptor Blocker Protocol Passed - 10/8/2019  1:09 PM        Passed - PCP or prescribing provider visit in past 12 months       Last office visit with prescriber/PCP: 2/2/2018 Janes Jeronimo MD OR same dept: 11/15/2018 Debra Garcia NP OR same specialty: 11/15/2018 Debra Garcia NP  Last physical: 4/10/2019 Last MTM visit: Visit date not found   Next visit within 3 mo: Visit date not found  Next physical within 3 mo: Visit date not found  Prescriber OR PCP: Janes Jeronimo MD  Last diagnosis associated with med order: 1. Hypertension  - losartan (COZAAR) 100 MG tablet [Pharmacy Med Name: LOSARTAN 100MG TABLETS]; TAKE 1 TABLET(100 MG) BY MOUTH DAILY  Dispense: 90 tablet; Refill: 0    2. Hyperlipidemia, unspecified hyperlipidemia type  - atorvastatin (LIPITOR) 40 MG tablet [Pharmacy Med Name: ATORVASTATIN 40MG TABLETS]; TAKE 1 TABLET(40 MG) BY MOUTH DAILY  Dispense: 90 tablet; Refill: 0    If protocol passes may refill for 12 months if within 3 months of last provider visit (or a total of 15 months).             Passed - Serum potassium within the past 12 months     Lab Results   Component Value Date    Potassium 4.1 05/01/2019             Passed - Blood pressure filed in past 12 months     BP Readings from Last 1 Encounters:   05/01/19 134/85             Passed - Serum creatinine within the past 12 months     Creatinine   Date Value Ref Range Status   05/01/2019 1.05 0.70 - 1.30 mg/dL Final             atorvastatin (LIPITOR) 40 MG tablet [Pharmacy Med Name: ATORVASTATIN 40MG TABLETS] 90 tablet 0     Sig: TAKE 1 TABLET(40 MG)  BY MOUTH DAILY       Statins Refill Protocol (Hmg CoA Reductase Inhibitors) Passed - 10/8/2019  1:09 PM        Passed - PCP or prescribing provider visit in past 12 months      Last office visit with prescriber/PCP: 2/2/2018 Janes Jeronimo MD OR same dept: 11/15/2018 Debra Garcia NP OR same specialty: 11/15/2018 Debra Garcia NP  Last physical: 4/10/2019 Last MTM visit: Visit date not found   Next visit within 3 mo: Visit date not found  Next physical within 3 mo: Visit date not found  Prescriber OR PCP: Janes Jeronimo MD  Last diagnosis associated with med order: 1. Hypertension  - losartan (COZAAR) 100 MG tablet [Pharmacy Med Name: LOSARTAN 100MG TABLETS]; TAKE 1 TABLET(100 MG) BY MOUTH DAILY  Dispense: 90 tablet; Refill: 0    2. Hyperlipidemia, unspecified hyperlipidemia type  - atorvastatin (LIPITOR) 40 MG tablet [Pharmacy Med Name: ATORVASTATIN 40MG TABLETS]; TAKE 1 TABLET(40 MG) BY MOUTH DAILY  Dispense: 90 tablet; Refill: 0    If protocol passes may refill for 12 months if within 3 months of last provider visit (or a total of 15 months).

## 2021-06-04 VITALS — BODY MASS INDEX: 34.9 KG/M2 | WEIGHT: 245 LBS

## 2021-06-07 NOTE — TELEPHONE ENCOUNTER
RN cannot approve Refill Request    RN can NOT refill this medication PCP messaged that patient is overdue for Labs and Office Visit. Last office visit: 2/2/2018 Janes Jeronimo MD Last Physical: 4/10/2019 Last MTM visit: Visit date not found Last visit same specialty: 11/15/2018 Debra Garcia NP.  Next visit within 3 mo: Visit date not found  Next physical within 3 mo: Visit date not found      Karmen Falcon, [facility] Triage/Med Refill 4/12/2020    Requested Prescriptions   Pending Prescriptions Disp Refills     insulin glargine (BASAGLAR KWIKPEN) 100 unit/mL (3 mL) pen [Pharmacy Med Name: BASAGLAR 100 U/ML KWIKPEN INJ 3ML] 15 mL 0     Sig: INJECT 66 UNITS UNDER THE SKIN AT BEDTIME       Insulin/GLP-1 Refill Protocol Failed - 4/11/2020  9:57 AM        Failed - Visit with PCP or prescribing provider visit in last 6 months     Last office visit with prescriber/PCP: Visit date not found OR same dept: Visit date not found OR same specialty: 11/15/2018 Debra Garcia NP Last physical: Visit date not found Last MTM visit: Visit date not found     Next appt within 3 mo: Visit date not found  Next physical within 3 mo: Visit date not found  Prescriber OR PCP: Janes Jeronimo MD  Last diagnosis associated with med order: 1. Diabetes mellitus, type 2 (H)  - insulin glargine (BASAGLAR KWIKPEN) 100 unit/mL (3 mL) pen [Pharmacy Med Name: BASAGLAR 100 U/ML KWIKPEN INJ 3ML]; INJECT 66 UNITS UNDER THE SKIN AT BEDTIME  Dispense: 15 mL; Refill: 0    2. Type 2 diabetes mellitus without complication, with long-term current use of insulin (H)  - insulin glargine (BASAGLAR KWIKPEN) 100 unit/mL (3 mL) pen [Pharmacy Med Name: BASAGLAR 100 U/ML KWIKPEN INJ 3ML]; INJECT 66 UNITS UNDER THE SKIN AT BEDTIME  Dispense: 15 mL; Refill: 0    If protocol passes may refill for 6 months if within 3 months of last provider visit (or a total of 9 months).              Failed - A1C in last 6 months     Hemoglobin A1c   Date Value Ref  Range Status   04/10/2019 9.9 (H) 3.5 - 6.0 % Final               Failed - Microalbumin in last year     Microalbumin, Random Urine   Date Value Ref Range Status   04/10/2019 13.41 (H) 0.00 - 1.99 mg/dL Final                  Passed - Blood pressure in last year     BP Readings from Last 1 Encounters:   05/01/19 134/85             Passed - Creatinine done in last year     Creatinine   Date Value Ref Range Status   05/01/2019 1.05 0.70 - 1.30 mg/dL Final

## 2021-06-07 NOTE — TELEPHONE ENCOUNTER
I did refill his medications, but he does need a telephone visit in the next several months for further refills.

## 2021-06-07 NOTE — TELEPHONE ENCOUNTER
RN cannot approve Refill Request    RN can NOT refill this medication Protocol failed and NO refill given.       Jonna Gonzalez, [facility] Triage/Med Refill 4/9/2020    Requested Prescriptions   Pending Prescriptions Disp Refills     tamsulosin (FLOMAX) 0.4 mg cap [Pharmacy Med Name: TAMSULOSIN 0.4MG CAPSULES] 90 capsule 1     Sig: TAKE 1 CAPSULE(0.4 MG) BY MOUTH DAILY       Alfuzosin/Tamsulosin/Silodosin Refill Protocol  Failed - 4/9/2020  9:45 AM        Failed - PCP or prescribing provider visit in past 12 months       Last office visit with prescriber/PCP: 2/2/2018 Janes Jeronimo MD OR same dept: Visit date not found OR same specialty: 11/15/2018 Debra Garcia NP  Last physical: 4/10/2019 Last MTM visit: Visit date not found   Next visit within 3 mo: Visit date not found  Next physical within 3 mo: Visit date not found  Prescriber OR PCP: Janes Jeronimo MD  Last diagnosis associated with med order: 1. BPH (benign prostatic hyperplasia)  - tamsulosin (FLOMAX) 0.4 mg cap [Pharmacy Med Name: TAMSULOSIN 0.4MG CAPSULES]; TAKE 1 CAPSULE(0.4 MG) BY MOUTH DAILY  Dispense: 90 capsule; Refill: 1    2. Hyperlipidemia, unspecified hyperlipidemia type  - atorvastatin (LIPITOR) 40 MG tablet [Pharmacy Med Name: ATORVASTATIN 40MG TABLETS]; TAKE 1 TABLET(40 MG) BY MOUTH DAILY  Dispense: 90 tablet; Refill: 1    3. Hypertension  - losartan (COZAAR) 100 MG tablet [Pharmacy Med Name: LOSARTAN 100MG TABLETS]; TAKE 1 TABLET(100 MG) BY MOUTH DAILY  Dispense: 90 tablet; Refill: 1    4. Diabetes mellitus, type 2 (H)  - glipiZIDE (GLUCOTROL XL) 10 MG 24 hr tablet [Pharmacy Med Name: GLIPIZIDE ER 10MG TABLETS]; TAKE 2 TABLETS(20 MG) BY MOUTH DAILY  Dispense: 180 tablet; Refill: 2  - metFORMIN (GLUCOPHAGE-XR) 500 MG 24 hr tablet [Pharmacy Med Name: METFORMIN ER 500MG 24HR TABS]; TAKE 4 TABLETS BY MOUTH DAILY  Dispense: 360 tablet; Refill: 2    5. Type 2 diabetes mellitus without complication, with long-term current use of  insulin  - TRUE METRIX GLUCOSE TEST STRIP strips [Pharmacy Med Name: TRUE METRIX BLOOD GLUCOSE TEST STRP]; AS DIRECTED TEST TWICE DAILY  Dispense: 200 strip; Refill: 5    6. Benign prostatic hyperplasia, unspecified whether lower urinary tract symptoms present  - tadalafiL (CIALIS) 5 MG tablet [Pharmacy Med Name: TADALAFIL 5MG TABLETS]; TAKE 1 DAILY FOR A COMBINATION OF BPH AND MALE ERECTILE DISORDER  Dispense: 30 tablet; Refill: 5    7. Male Erectile Disorder  - tadalafiL (CIALIS) 5 MG tablet [Pharmacy Med Name: TADALAFIL 5MG TABLETS]; TAKE 1 DAILY FOR A COMBINATION OF BPH AND MALE ERECTILE DISORDER  Dispense: 30 tablet; Refill: 5    If protocol passes may refill for 12 months if within 3 months of last provider visit (or a total of 15 months).                atorvastatin (LIPITOR) 40 MG tablet [Pharmacy Med Name: ATORVASTATIN 40MG TABLETS] 90 tablet 1     Sig: TAKE 1 TABLET(40 MG) BY MOUTH DAILY       Statins Refill Protocol (Hmg CoA Reductase Inhibitors) Failed - 4/9/2020  9:45 AM        Failed - PCP or prescribing provider visit in past 12 months      Last office visit with prescriber/PCP: 2/2/2018 Janes Jeronimo MD OR same dept: Visit date not found OR same specialty: 11/15/2018 Debra Garcia NP  Last physical: 4/10/2019 Last MTM visit: Visit date not found   Next visit within 3 mo: Visit date not found  Next physical within 3 mo: Visit date not found  Prescriber OR PCP: Janes Jeronimo MD  Last diagnosis associated with med order: 1. BPH (benign prostatic hyperplasia)  - tamsulosin (FLOMAX) 0.4 mg cap [Pharmacy Med Name: TAMSULOSIN 0.4MG CAPSULES]; TAKE 1 CAPSULE(0.4 MG) BY MOUTH DAILY  Dispense: 90 capsule; Refill: 1    2. Hyperlipidemia, unspecified hyperlipidemia type  - atorvastatin (LIPITOR) 40 MG tablet [Pharmacy Med Name: ATORVASTATIN 40MG TABLETS]; TAKE 1 TABLET(40 MG) BY MOUTH DAILY  Dispense: 90 tablet; Refill: 1    3. Hypertension  - losartan (COZAAR) 100 MG tablet [Pharmacy Med Name:  LOSARTAN 100MG TABLETS]; TAKE 1 TABLET(100 MG) BY MOUTH DAILY  Dispense: 90 tablet; Refill: 1    4. Diabetes mellitus, type 2 (H)  - glipiZIDE (GLUCOTROL XL) 10 MG 24 hr tablet [Pharmacy Med Name: GLIPIZIDE ER 10MG TABLETS]; TAKE 2 TABLETS(20 MG) BY MOUTH DAILY  Dispense: 180 tablet; Refill: 2  - metFORMIN (GLUCOPHAGE-XR) 500 MG 24 hr tablet [Pharmacy Med Name: METFORMIN ER 500MG 24HR TABS]; TAKE 4 TABLETS BY MOUTH DAILY  Dispense: 360 tablet; Refill: 2    5. Type 2 diabetes mellitus without complication, with long-term current use of insulin  - TRUE METRIX GLUCOSE TEST STRIP strips [Pharmacy Med Name: TRUE METRIX BLOOD GLUCOSE TEST STRP]; AS DIRECTED TEST TWICE DAILY  Dispense: 200 strip; Refill: 5    6. Benign prostatic hyperplasia, unspecified whether lower urinary tract symptoms present  - tadalafiL (CIALIS) 5 MG tablet [Pharmacy Med Name: TADALAFIL 5MG TABLETS]; TAKE 1 DAILY FOR A COMBINATION OF BPH AND MALE ERECTILE DISORDER  Dispense: 30 tablet; Refill: 5    7. Male Erectile Disorder  - tadalafiL (CIALIS) 5 MG tablet [Pharmacy Med Name: TADALAFIL 5MG TABLETS]; TAKE 1 DAILY FOR A COMBINATION OF BPH AND MALE ERECTILE DISORDER  Dispense: 30 tablet; Refill: 5    If protocol passes may refill for 12 months if within 3 months of last provider visit (or a total of 15 months).                losartan (COZAAR) 100 MG tablet [Pharmacy Med Name: LOSARTAN 100MG TABLETS] 90 tablet 1     Sig: TAKE 1 TABLET(100 MG) BY MOUTH DAILY       Angiotensin Receptor Blocker Protocol Failed - 4/9/2020  9:45 AM        Failed - PCP or prescribing provider visit in past 12 months       Last office visit with prescriber/PCP: 2/2/2018 Janes Jeronimo MD OR same dept: Visit date not found OR same specialty: 11/15/2018 Debra Garcia NP  Last physical: 4/10/2019 Last MTM visit: Visit date not found   Next visit within 3 mo: Visit date not found  Next physical within 3 mo: Visit date not found  Prescriber OR PCP: Janes Jeronimo  MD  Last diagnosis associated with med order: 1. BPH (benign prostatic hyperplasia)  - tamsulosin (FLOMAX) 0.4 mg cap [Pharmacy Med Name: TAMSULOSIN 0.4MG CAPSULES]; TAKE 1 CAPSULE(0.4 MG) BY MOUTH DAILY  Dispense: 90 capsule; Refill: 1    2. Hyperlipidemia, unspecified hyperlipidemia type  - atorvastatin (LIPITOR) 40 MG tablet [Pharmacy Med Name: ATORVASTATIN 40MG TABLETS]; TAKE 1 TABLET(40 MG) BY MOUTH DAILY  Dispense: 90 tablet; Refill: 1    3. Hypertension  - losartan (COZAAR) 100 MG tablet [Pharmacy Med Name: LOSARTAN 100MG TABLETS]; TAKE 1 TABLET(100 MG) BY MOUTH DAILY  Dispense: 90 tablet; Refill: 1    4. Diabetes mellitus, type 2 (H)  - glipiZIDE (GLUCOTROL XL) 10 MG 24 hr tablet [Pharmacy Med Name: GLIPIZIDE ER 10MG TABLETS]; TAKE 2 TABLETS(20 MG) BY MOUTH DAILY  Dispense: 180 tablet; Refill: 2  - metFORMIN (GLUCOPHAGE-XR) 500 MG 24 hr tablet [Pharmacy Med Name: METFORMIN ER 500MG 24HR TABS]; TAKE 4 TABLETS BY MOUTH DAILY  Dispense: 360 tablet; Refill: 2    5. Type 2 diabetes mellitus without complication, with long-term current use of insulin  - TRUE METRIX GLUCOSE TEST STRIP strips [Pharmacy Med Name: TRUE METRIX BLOOD GLUCOSE TEST STRP]; AS DIRECTED TEST TWICE DAILY  Dispense: 200 strip; Refill: 5    6. Benign prostatic hyperplasia, unspecified whether lower urinary tract symptoms present  - tadalafiL (CIALIS) 5 MG tablet [Pharmacy Med Name: TADALAFIL 5MG TABLETS]; TAKE 1 DAILY FOR A COMBINATION OF BPH AND MALE ERECTILE DISORDER  Dispense: 30 tablet; Refill: 5    7. Male Erectile Disorder  - tadalafiL (CIALIS) 5 MG tablet [Pharmacy Med Name: TADALAFIL 5MG TABLETS]; TAKE 1 DAILY FOR A COMBINATION OF BPH AND MALE ERECTILE DISORDER  Dispense: 30 tablet; Refill: 5    If protocol passes may refill for 12 months if within 3 months of last provider visit (or a total of 15 months).             Passed - Serum potassium within the past 12 months     Lab Results   Component Value Date    Potassium 4.1 05/01/2019              Passed - Blood pressure filed in past 12 months     BP Readings from Last 1 Encounters:   05/01/19 134/85             Passed - Serum creatinine within the past 12 months     Creatinine   Date Value Ref Range Status   05/01/2019 1.05 0.70 - 1.30 mg/dL Final                glipiZIDE (GLUCOTROL XL) 10 MG 24 hr tablet [Pharmacy Med Name: GLIPIZIDE ER 10MG TABLETS] 180 tablet 2     Sig: TAKE 2 TABLETS(20 MG) BY MOUTH DAILY       Oral Hypoglycemics Refill Protocol Failed - 4/9/2020  9:45 AM        Failed - Visit with PCP or prescribing provider visit in last 6 months       Last office visit with prescriber/PCP: Visit date not found OR same dept: Visit date not found OR same specialty: 11/15/2018 Debra Garcia NP Last physical: Visit date not found Last MTM visit: Visit date not found         Next appt within 3 mo: Visit date not found  Next physical within 3 mo: Visit date not found  Prescriber OR PCP: Janes Jeronimo MD  Last diagnosis associated with med order: 1. BPH (benign prostatic hyperplasia)  - tamsulosin (FLOMAX) 0.4 mg cap [Pharmacy Med Name: TAMSULOSIN 0.4MG CAPSULES]; TAKE 1 CAPSULE(0.4 MG) BY MOUTH DAILY  Dispense: 90 capsule; Refill: 1    2. Hyperlipidemia, unspecified hyperlipidemia type  - atorvastatin (LIPITOR) 40 MG tablet [Pharmacy Med Name: ATORVASTATIN 40MG TABLETS]; TAKE 1 TABLET(40 MG) BY MOUTH DAILY  Dispense: 90 tablet; Refill: 1    3. Hypertension  - losartan (COZAAR) 100 MG tablet [Pharmacy Med Name: LOSARTAN 100MG TABLETS]; TAKE 1 TABLET(100 MG) BY MOUTH DAILY  Dispense: 90 tablet; Refill: 1    4. Diabetes mellitus, type 2 (H)  - glipiZIDE (GLUCOTROL XL) 10 MG 24 hr tablet [Pharmacy Med Name: GLIPIZIDE ER 10MG TABLETS]; TAKE 2 TABLETS(20 MG) BY MOUTH DAILY  Dispense: 180 tablet; Refill: 2  - metFORMIN (GLUCOPHAGE-XR) 500 MG 24 hr tablet [Pharmacy Med Name: METFORMIN ER 500MG 24HR TABS]; TAKE 4 TABLETS BY MOUTH DAILY  Dispense: 360 tablet; Refill: 2    5. Type 2 diabetes  mellitus without complication, with long-term current use of insulin  - TRUE METRIX GLUCOSE TEST STRIP strips [Pharmacy Med Name: TRUE METRIX BLOOD GLUCOSE TEST STRP]; AS DIRECTED TEST TWICE DAILY  Dispense: 200 strip; Refill: 5    6. Benign prostatic hyperplasia, unspecified whether lower urinary tract symptoms present  - tadalafiL (CIALIS) 5 MG tablet [Pharmacy Med Name: TADALAFIL 5MG TABLETS]; TAKE 1 DAILY FOR A COMBINATION OF BPH AND MALE ERECTILE DISORDER  Dispense: 30 tablet; Refill: 5    7. Male Erectile Disorder  - tadalafiL (CIALIS) 5 MG tablet [Pharmacy Med Name: TADALAFIL 5MG TABLETS]; TAKE 1 DAILY FOR A COMBINATION OF BPH AND MALE ERECTILE DISORDER  Dispense: 30 tablet; Refill: 5     If protocol passes may refill for 12 months if within 3 months of last provider visit (or a total of 15 months).           Failed - A1C in last 6 months     Hemoglobin A1c   Date Value Ref Range Status   04/10/2019 9.9 (H) 3.5 - 6.0 % Final               Passed - Microalbumin in last year      Microalbumin, Random Urine   Date Value Ref Range Status   04/10/2019 13.41 (H) 0.00 - 1.99 mg/dL Final                  Passed - Blood pressure in last year     BP Readings from Last 1 Encounters:   05/01/19 134/85             Passed - Serum creatinine in last year     Creatinine   Date Value Ref Range Status   05/01/2019 1.05 0.70 - 1.30 mg/dL Final                TRUE METRIX GLUCOSE TEST STRIP strips [Pharmacy Med Name: TRUE METRIX BLOOD GLUCOSE TEST STRP] 200 strip 5     Sig: AS DIRECTED TEST TWICE DAILY       Diabetic Supplies Refill Protocol Failed - 4/9/2020  9:45 AM        Failed - Visit with PCP or prescribing provider visit in last 6 months     Last office visit with prescriber/PCP: 2/2/2018 Janes Jeronimo MD OR same dept: Visit date not found OR same specialty: 11/15/2018 Debra Garcia NP  Last physical: 4/10/2019 Last MTM visit: Visit date not found   Next visit within 3 mo: Visit date not found  Next physical within  3 mo: Visit date not found  Prescriber OR PCP: Janes Jeronimo MD  Last diagnosis associated with med order: 1. BPH (benign prostatic hyperplasia)  - tamsulosin (FLOMAX) 0.4 mg cap [Pharmacy Med Name: TAMSULOSIN 0.4MG CAPSULES]; TAKE 1 CAPSULE(0.4 MG) BY MOUTH DAILY  Dispense: 90 capsule; Refill: 1    2. Hyperlipidemia, unspecified hyperlipidemia type  - atorvastatin (LIPITOR) 40 MG tablet [Pharmacy Med Name: ATORVASTATIN 40MG TABLETS]; TAKE 1 TABLET(40 MG) BY MOUTH DAILY  Dispense: 90 tablet; Refill: 1    3. Hypertension  - losartan (COZAAR) 100 MG tablet [Pharmacy Med Name: LOSARTAN 100MG TABLETS]; TAKE 1 TABLET(100 MG) BY MOUTH DAILY  Dispense: 90 tablet; Refill: 1    4. Diabetes mellitus, type 2 (H)  - glipiZIDE (GLUCOTROL XL) 10 MG 24 hr tablet [Pharmacy Med Name: GLIPIZIDE ER 10MG TABLETS]; TAKE 2 TABLETS(20 MG) BY MOUTH DAILY  Dispense: 180 tablet; Refill: 2  - metFORMIN (GLUCOPHAGE-XR) 500 MG 24 hr tablet [Pharmacy Med Name: METFORMIN ER 500MG 24HR TABS]; TAKE 4 TABLETS BY MOUTH DAILY  Dispense: 360 tablet; Refill: 2    5. Type 2 diabetes mellitus without complication, with long-term current use of insulin  - TRUE METRIX GLUCOSE TEST STRIP strips [Pharmacy Med Name: TRUE METRIX BLOOD GLUCOSE TEST STRP]; AS DIRECTED TEST TWICE DAILY  Dispense: 200 strip; Refill: 5    6. Benign prostatic hyperplasia, unspecified whether lower urinary tract symptoms present  - tadalafiL (CIALIS) 5 MG tablet [Pharmacy Med Name: TADALAFIL 5MG TABLETS]; TAKE 1 DAILY FOR A COMBINATION OF BPH AND MALE ERECTILE DISORDER  Dispense: 30 tablet; Refill: 5    7. Male Erectile Disorder  - tadalafiL (CIALIS) 5 MG tablet [Pharmacy Med Name: TADALAFIL 5MG TABLETS]; TAKE 1 DAILY FOR A COMBINATION OF BPH AND MALE ERECTILE DISORDER  Dispense: 30 tablet; Refill: 5    If protocol passes may refill for 12 months if within 3 months of last provider visit (or a total of 15 months).             Failed - A1C in last 6 months     Hemoglobin A1c   Date  Value Ref Range Status   04/10/2019 9.9 (H) 3.5 - 6.0 % Final                  tadalafiL (CIALIS) 5 MG tablet [Pharmacy Med Name: TADALAFIL 5MG TABLETS] 30 tablet 5     Sig: TAKE 1 DAILY FOR A COMBINATION OF BPH AND MALE ERECTILE DISORDER       Medications for Impotence Refill Protocol Failed - 4/9/2020  9:45 AM        Failed - PCP or prescribing provider visit in last year     Last office visit with prescriber/PCP: 2/2/2018 Janes Jeronimo MD OR same dept: Visit date not found OR same specialty: 11/15/2018 Debra Garcia NP  Last physical: 4/10/2019 Last MTM visit: Visit date not found   Next visit within 3 mo: Visit date not found  Next physical within 3 mo: Visit date not found  Prescriber OR PCP: Janes Jeronimo MD  Last diagnosis associated with med order: 1. BPH (benign prostatic hyperplasia)  - tamsulosin (FLOMAX) 0.4 mg cap [Pharmacy Med Name: TAMSULOSIN 0.4MG CAPSULES]; TAKE 1 CAPSULE(0.4 MG) BY MOUTH DAILY  Dispense: 90 capsule; Refill: 1    2. Hyperlipidemia, unspecified hyperlipidemia type  - atorvastatin (LIPITOR) 40 MG tablet [Pharmacy Med Name: ATORVASTATIN 40MG TABLETS]; TAKE 1 TABLET(40 MG) BY MOUTH DAILY  Dispense: 90 tablet; Refill: 1    3. Hypertension  - losartan (COZAAR) 100 MG tablet [Pharmacy Med Name: LOSARTAN 100MG TABLETS]; TAKE 1 TABLET(100 MG) BY MOUTH DAILY  Dispense: 90 tablet; Refill: 1    4. Diabetes mellitus, type 2 (H)  - glipiZIDE (GLUCOTROL XL) 10 MG 24 hr tablet [Pharmacy Med Name: GLIPIZIDE ER 10MG TABLETS]; TAKE 2 TABLETS(20 MG) BY MOUTH DAILY  Dispense: 180 tablet; Refill: 2  - metFORMIN (GLUCOPHAGE-XR) 500 MG 24 hr tablet [Pharmacy Med Name: METFORMIN ER 500MG 24HR TABS]; TAKE 4 TABLETS BY MOUTH DAILY  Dispense: 360 tablet; Refill: 2    5. Type 2 diabetes mellitus without complication, with long-term current use of insulin  - TRUE METRIX GLUCOSE TEST STRIP strips [Pharmacy Med Name: TRUE METRIX BLOOD GLUCOSE TEST STRP]; AS DIRECTED TEST TWICE DAILY  Dispense: 200  strip; Refill: 5    6. Benign prostatic hyperplasia, unspecified whether lower urinary tract symptoms present  - tadalafiL (CIALIS) 5 MG tablet [Pharmacy Med Name: TADALAFIL 5MG TABLETS]; TAKE 1 DAILY FOR A COMBINATION OF BPH AND MALE ERECTILE DISORDER  Dispense: 30 tablet; Refill: 5    7. Male Erectile Disorder  - tadalafiL (CIALIS) 5 MG tablet [Pharmacy Med Name: TADALAFIL 5MG TABLETS]; TAKE 1 DAILY FOR A COMBINATION OF BPH AND MALE ERECTILE DISORDER  Dispense: 30 tablet; Refill: 5    If protocol passes may refill for 12 months if within 3 months of last provider visit (or a total of 15 months).                metFORMIN (GLUCOPHAGE-XR) 500 MG 24 hr tablet [Pharmacy Med Name: METFORMIN ER 500MG 24HR TABS] 360 tablet 2     Sig: TAKE 4 TABLETS BY MOUTH DAILY       Metformin Refill Protocol Failed - 4/9/2020  9:45 AM        Failed - Visit with PCP or prescribing provider visit in last 6 months or next 3 months     Last office visit with prescriber/PCP: Visit date not found OR same dept: Visit date not found OR same specialty: 11/15/2018 Debra Garcia NP Last physical: Visit date not found Last MTM visit: Visit date not found         Next appt within 3 mo: Visit date not found  Next physical within 3 mo: Visit date not found  Prescriber OR PCP: Janes Jeronimo MD  Last diagnosis associated with med order: 1. BPH (benign prostatic hyperplasia)  - tamsulosin (FLOMAX) 0.4 mg cap [Pharmacy Med Name: TAMSULOSIN 0.4MG CAPSULES]; TAKE 1 CAPSULE(0.4 MG) BY MOUTH DAILY  Dispense: 90 capsule; Refill: 1    2. Hyperlipidemia, unspecified hyperlipidemia type  - atorvastatin (LIPITOR) 40 MG tablet [Pharmacy Med Name: ATORVASTATIN 40MG TABLETS]; TAKE 1 TABLET(40 MG) BY MOUTH DAILY  Dispense: 90 tablet; Refill: 1    3. Hypertension  - losartan (COZAAR) 100 MG tablet [Pharmacy Med Name: LOSARTAN 100MG TABLETS]; TAKE 1 TABLET(100 MG) BY MOUTH DAILY  Dispense: 90 tablet; Refill: 1    4. Diabetes mellitus, type 2 (H)  - glipiZIDE  (GLUCOTROL XL) 10 MG 24 hr tablet [Pharmacy Med Name: GLIPIZIDE ER 10MG TABLETS]; TAKE 2 TABLETS(20 MG) BY MOUTH DAILY  Dispense: 180 tablet; Refill: 2  - metFORMIN (GLUCOPHAGE-XR) 500 MG 24 hr tablet [Pharmacy Med Name: METFORMIN ER 500MG 24HR TABS]; TAKE 4 TABLETS BY MOUTH DAILY  Dispense: 360 tablet; Refill: 2    5. Type 2 diabetes mellitus without complication, with long-term current use of insulin  - TRUE METRIX GLUCOSE TEST STRIP strips [Pharmacy Med Name: TRUE METRIX BLOOD GLUCOSE TEST STRP]; AS DIRECTED TEST TWICE DAILY  Dispense: 200 strip; Refill: 5    6. Benign prostatic hyperplasia, unspecified whether lower urinary tract symptoms present  - tadalafiL (CIALIS) 5 MG tablet [Pharmacy Med Name: TADALAFIL 5MG TABLETS]; TAKE 1 DAILY FOR A COMBINATION OF BPH AND MALE ERECTILE DISORDER  Dispense: 30 tablet; Refill: 5    7. Male Erectile Disorder  - tadalafiL (CIALIS) 5 MG tablet [Pharmacy Med Name: TADALAFIL 5MG TABLETS]; TAKE 1 DAILY FOR A COMBINATION OF BPH AND MALE ERECTILE DISORDER  Dispense: 30 tablet; Refill: 5     If protocol passes may refill for 12 months if within 3 months of last provider visit (or a total of 15 months).           Failed - A1C in last 6 months     Hemoglobin A1c   Date Value Ref Range Status   04/10/2019 9.9 (H) 3.5 - 6.0 % Final               Passed - Blood pressure in last 12 months     BP Readings from Last 1 Encounters:   05/01/19 134/85             Passed - LFT or AST or ALT in last 12 months     Albumin   Date Value Ref Range Status   04/10/2019 4.3 3.5 - 5.0 g/dL Final     Bilirubin, Total   Date Value Ref Range Status   04/10/2019 1.0 0.0 - 1.0 mg/dL Final     Alkaline Phosphatase   Date Value Ref Range Status   04/10/2019 64 45 - 120 U/L Final     AST   Date Value Ref Range Status   04/10/2019 23 0 - 40 U/L Final     ALT   Date Value Ref Range Status   04/10/2019 20 0 - 45 U/L Final     Protein, Total   Date Value Ref Range Status   04/10/2019 7.4 6.0 - 8.0 g/dL Final                 Passed - GFR or Serum Creatinine in last 6 months     GFR MDRD Non Af Amer   Date Value Ref Range Status   05/01/2019 >60 >60 mL/min/1.73m2 Final     GFR MDRD Af Amer   Date Value Ref Range Status   05/01/2019 >60 >60 mL/min/1.73m2 Final             Passed - Microalbumin in last year      Microalbumin, Random Urine   Date Value Ref Range Status   04/10/2019 13.41 (H) 0.00 - 1.99 mg/dL Final

## 2021-06-07 NOTE — TELEPHONE ENCOUNTER
Prior Authorization Request  Who s requesting:  Pharmacy  Pharmacy Name and Location: [id number]  Medication Name: TRUE METRIX GLUCOSE TEST STRIP strips   Insurance Plan: [employer]   Insurance Member ID Number:  [id number]  CoverMyMeds Key: N/A  Informed patient that prior authorizations can take up to 10 business days for response:   NA  Okay to leave a detailed message: No

## 2021-06-07 NOTE — TELEPHONE ENCOUNTER
[facility] PA team  [phone]  Pool: [facility] PA MED ([id number])          PA has been initiated.       PA form completed and faxed insurance via Cover My Meds     Key:  [id number]     Medication:  True Metrix Blood Glucose Test strips    Insurance:  [employer]         Response will be received via fax and may take up to 5-10 business days depending on plan

## 2021-06-07 NOTE — TELEPHONE ENCOUNTER
Left message for patient to call back. If patient calls back, please relay message below.    Patient is due for a telephone visit for any further refills.

## 2021-06-08 NOTE — TELEPHONE ENCOUNTER
RN cannot approve Refill Request    RN can NOT refill this medication Protocol failed and NO refill given.       Jonna Gonzalez, [facility] Triage/Med Refill 5/14/2020    Requested Prescriptions   Pending Prescriptions Disp Refills     insulin glargine (BASAGLAR KWIKPEN) 100 unit/mL (3 mL) pen [Pharmacy Med Name: BASAGLAR 100 U/ML KWIKPEN INJ 3ML] 15 mL 0     Sig: INJECT 66 UNITS UNDER THE SKIN AT BEDTIME       Insulin/GLP-1 Refill Protocol Failed - 5/14/2020  7:59 AM        Failed - Visit with PCP or prescribing provider visit in last 6 months     Last office visit with prescriber/PCP: Visit date not found OR same dept: Visit date not found OR same specialty: 11/15/2018 Debra Garcia NP Last physical: Visit date not found Last MTM visit: Visit date not found     Next appt within 3 mo: Visit date not found  Next physical within 3 mo: Visit date not found  Prescriber OR PCP: Janes Jeronimo MD  Last diagnosis associated with med order: 1. Diabetes mellitus, type 2 (H)  - insulin glargine (BASAGLAR KWIKPEN) 100 unit/mL (3 mL) pen [Pharmacy Med Name: BASAGLAR 100 U/ML KWIKPEN INJ 3ML]; INJECT 66 UNITS UNDER THE SKIN AT BEDTIME  Dispense: 15 mL; Refill: 0    2. Type 2 diabetes mellitus without complication, with long-term current use of insulin (H)  - insulin glargine (BASAGLAR KWIKPEN) 100 unit/mL (3 mL) pen [Pharmacy Med Name: BASAGLAR 100 U/ML KWIKPEN INJ 3ML]; INJECT 66 UNITS UNDER THE SKIN AT BEDTIME  Dispense: 15 mL; Refill: 0    If protocol passes may refill for 6 months if within 3 months of last provider visit (or a total of 9 months).              Failed - A1C in last 6 months     Hemoglobin A1c   Date Value Ref Range Status   04/10/2019 9.9 (H) 3.5 - 6.0 % Final               Failed - Microalbumin in last year     Microalbumin, Random Urine   Date Value Ref Range Status   04/10/2019 13.41 (H) 0.00 - 1.99 mg/dL Final                  Failed - Blood pressure in last year     BP Readings from Last 1  Encounters:   05/01/19 134/85             Failed - Creatinine done in last year     Creatinine   Date Value Ref Range Status   05/01/2019 1.05 0.70 - 1.30 mg/dL Final

## 2021-06-08 NOTE — TELEPHONE ENCOUNTER
Medication Question or Clarification  Who is calling: Wife, Maria Eugenia. ,Patient gave authorization to speak to wife.  What medication are you calling about (include dose and sig)?:     insulin glargine (BASAGLAR KWIKPEN) 100 unit/mL (3 mL) pen  15 mL  5  5/18/2020      Sig: INJECT 66 UNITS UNDER THE SKIN AT BEDTIME       Who prescribed the medication?: Janes Jeronimo MD  What is your question/concern?: Wife reports this medication requires a prior authorization, this has been initiated. Is there anything else that should be done in the meantime. Please advise asap!  Requested Pharmacy: [facility]  Okay to leave a detailed message?: Yes

## 2021-06-08 NOTE — PROGRESS NOTES
ASSESSMENT:  1. Physical exam  Patient is up-to-date on colorectal cancer screening, there is a family history of prostate cancer, now up-to-date on immunizations.  Exercise is suboptimal.  - PSA (Prostatic-Specific Antigen), Annual Screen    2. Type 2 diabetes mellitus.  Recent A1c 8.1, though probably at her control now.  - Glycosylated Hemoglobin A1c  - Microalbumin, Random Urine    3. Hypertension  Well-controlled.    4. Hyperlipidemia  Well-controlled.  - Comprehensive Metabolic Panel  - Lipid Cascade    5. Obesity  BMI over 30, weight overall roughly stable.    6. Obstructive Sleep Apnea  Not on CPAP however the patient does not appear to be symptomatic either.    7. Male Erectile Disorder  Viagra as needed.      PLAN:  1.  Shingles and influenza vaccines.  2.  Laboratory studies as above.  3.  If the A1c is still high, Lantus insulin can be adjusted.  4.  Probable six-month follow-up.    Orders Placed This Encounter   Procedures     Varicella-zoster vaccine subq     Influenza, Seasonal,Quad Inj, 36+ MOS     Glycosylated Hemoglobin A1c     Comprehensive Metabolic Panel     Lipid Cascade     Order Specific Question:   Fasting is required?     Answer:   Yes     PSA (Prostatic-Specific Antigen), Annual Screen     Microalbumin, Random Urine     There are no discontinued medications.    No Follow-up on file.    CHIEF COMPLAINT:  Chief Complaint   Patient presents with     Annual Exam     pt is fasting, NEEDS: zoster,flu vacc        SUBJECTIVE:  Ezekiel is a 61 y.o. male who presents to the clinic today for an annual exam. He met with the pharmacist, Marci Wang, PharmD, prior to his visit today.    Diabetes: He is currently managing his diabetes with Lantus, Victoza, and metformin. His last A1c was taken on 5/13/2016 and was 8.1%. In general, his morning blood sugars have been well-controlled and are better than they have been in the past. He had an eye exam one month ago.    Sleep Apnea: He is not currently using a  CPAP. He has not gotten a sleep study. He states that his wife has told him that he is snoring less after he had lost some weight. He states that he sleeps well. He has no trouble falling back to sleep if he gets up. He feels rested during the day and typically gets 7.5 hours of sleep per night.    Back Pain: He will occasionally experience back pain, which began years ago, presumably after moving a refrigerator. At times, he will experience left-sided buttock and foot numbness. He uses Percocet for this very occasionally.    Health Maintenance: He had his colonoscopy last month on 12/19/2016, which revealed polyps and his mother had colorectal cancer, so he will go back in five years. He does not have an advanced healthcare directive. He is agreeable to the flu shot today, as well as the Shingles vaccine.    REVIEW OF SYSTEMS:   He gets up twice per night in order to urinate. He continues to chew tobacco. He has not seen a dentist in two years. He feels that his feet are often cold, which he believes may be due to frostbite as a child. He will also feel a stabbing sensation on occasion in the bottom of his feet. All other systems are negative.    PFSH:  His brother was diagnosed with prostate cancer last month.  Immunization History   Administered Date(s) Administered     DT (pediatric) 10/24/2003     Influenza, Seasonal, Inj PF 10/16/2012     Influenza, inj, historic 10/11/2005, 10/27/2006, 11/02/2007, 10/21/2008     Influenza, seasonal,quad inj 6-35 mos 12/03/2014     Influenza,seasonal quad, PF, 36+MOS 12/09/2015     Pneumo Polysac 23-V 12/03/2014     Td, historic 10/24/2003     Tdap 12/03/2014     Social History     Social History     Marital status:      Spouse name: N/A     Number of children: N/A     Years of education: N/A     Occupational History     Hardwood floor contractor      Social History Main Topics     Smoking status: Former Smoker     Quit date: 5/1/1985     Smokeless tobacco: Current User  "    Types: Chew      Comment: 2 tins/week     Alcohol use 2.4 oz/week     4 Cans of beer per week     Drug use: No     Sexual activity: Yes     Partners: Female     Other Topics Concern     Not on file     Social History Narrative    Diet-        Exercise- Not regular. Work is physical, walking.     Past Medical History   Diagnosis Date     Acute renal failure      6/11/2016, secondary to dehydration, normalized     Family History   Problem Relation Age of Onset     Colon cancer Mother      Heart attack Mother      Hypertension Father      Bell's palsy Father      Bell's palsy Maternal Grandmother      Prostate cancer Brother 57       MEDICATIONS:  Current Outpatient Prescriptions   Medication Sig Dispense Refill     acetaminophen (TYLENOL) 500 MG tablet Take 1,000 mg by mouth bedtime.       atorvastatin (LIPITOR) 40 MG tablet TAKE 1 TABLET DAILY AS DIRECTED 90 tablet 1     diphenhydrAMINE (BENADRYL) 50 MG tablet Take 100 mg by mouth bedtime.       insulin needles, disposable, 31 X 5/16 \" Ndle Use to inject Victoza once daily. 100 each 5     lancets (MICROLET LANCET) Misc Use to test blood sugars three times daily. 100 each 5     LANTUS SOLOSTAR 100 unit/mL (3 mL) pen INJECT 50 UNITS UNDER THE SKIN DAILY 15 mL 1     liraglutide (VICTOZA 3-FELIX) 0.6 mg/0.1 mL (18 mg/3 mL) PnIj injection Inject 0.3 mL (1.8 mg total) under the skin daily. 9 mL 3     losartan (COZAAR) 100 MG tablet Take 1 tablet (100 mg total) by mouth daily. 90 tablet 3     metFORMIN (GLUCOPHAGE-XR) 500 MG 24 hr tablet TAKE 4 TABLETS BY MOUTH DAILY. 120 tablet 1     multivitamin therapeutic (THERAGRAN) tablet Take 1 tablet by mouth daily.       PEN NEEDLE 31 gauge x 1/4\" Ndle USE 4 TIMES DAILY. 200 each 12     sildenafil (REVATIO) 20 mg tablet Take 1 daily as needed. 60 tablet 0     tamsulosin (FLOMAX) 0.4 mg Cp24 Take 0.4 mg by mouth daily.       No current facility-administered medications for this visit.        TOBACCO USE:  History   Smoking Status " "    Former Smoker     Quit date: 5/1/1985   Smokeless Tobacco     Current User     Types: Chew     Comment: 2 tins/week       VITALS:  Vitals:    01/23/17 0944   BP: 130/84   Pulse: 80   Weight: (!) 253 lb (114.8 kg)   Height: 5' 10.5\" (1.791 m)     Wt Readings from Last 3 Encounters:   01/23/17 (!) 253 lb (114.8 kg)   06/21/16 (!) 244 lb (110.7 kg)   06/11/16 (!) 248 lb 0.1 oz (112.5 kg)       PHYSICAL EXAM:  Constitutional:   Reveals an alert male that appears stated age.  Vitals: per nursing notes.  HEENT: Right Ear: External ear normal.   Left Ear: External ear normal.   Nose: Nose normal.   Mouth/Throat: Oropharynx is clear and moist.   Eyes: Conjunctivae and EOM are normal. Pupils are equal, round, and reactive to light. Right eye exhibits no discharge. Left eye exhibits no discharge.   Neck:  Supple, no carotid bruits or adenopathy.  Back:  No spine or CVA pain.  Thorax:  No bony deformities.  Lungs: Clear to A&P without rales or wheezes.  Respiratory effort normal.  Cardiac:   Regular rate and rhythm, normal S1, S2, no murmur or gallop.  Abdomen:  Soft, active bowel sounds without bruits, mass, or tenderness.  Extremities:   No peripheral edema, pulses in the feet intact.    Skin:  No jaundice, peripheral cyanosis or lesions to suggest malignancy.  Feet: Feet warm. Dorsalis pedis pulses intact. Normal monofilament test.  Neuro:  Alert and oriented. Cranial nerves, motor, sensory exams are intact.  No gross focal deficits.  Psychiatric:  Memory intact, mood appropriate.    QUALITY MEASURES:  I have had an Advance Directives discussion with the patient.  I have counseled the patient for tobacco cessation and the follow up will occur  at the next visit.    DATA REVIEWED:  Additional History from Old Records Summarized (2): Reviewed 12/19/2016 colonoscopy report.  Decision to Obtain Records (1): None  Radiology Tests Summarized or Ordered (1): None  Labs Reviewed or Ordered (1): Ordered labs. Reviewed 5/13/2016 " labs.  Medicine Test Summarized or Ordered (1): None  Independent Review of EKG, X-RAY, or RAPID STREP (2 each): None    The visit lasted a total of 21 minutes face to face with the patient. Over 50% of the time was spent counseling and educating the patient about health maintenance.    I, Rolf Schwarz, am scribing for and in the presence of, Dr. Jeronimo.    I, Dr. Jeronimo, personally performed the services described in this documentation, as scribed by Rolf Schwarz in my presence, and it is both accurate and complete.    Total Data Points: 3

## 2021-06-08 NOTE — TELEPHONE ENCOUNTER
Prior Authorization Request  Who s requesting:  Pharmacy  Pharmacy Name and Location: [facility]  Medication Name: insulin glargine (BASAGLAR KWIKPEN) 100 unit/mL (3 mL) pen   Insurance Plan: not provided  Plan phone # [phone]  Insurance Member ID Number:  [id number]  CoverMyMeds Key: N/A  Informed patient that prior authorizations can take up to 10 business days for response:   NA  Okay to leave a detailed message: No

## 2021-06-08 NOTE — PROGRESS NOTES
"Ezekiel Aldrich is a 65 y.o. male who is being evaluated via a billable video visit.      The patient has been notified of following:     \"This video visit will be conducted via a call between you and your physician/provider. We have found that certain health care needs can be provided without the need for an in-person physical exam.  This service lets us provide the care you need with a video conversation.  If a prescription is necessary we can send it directly to your pharmacy.  If lab work is needed we can place an order for that and you can then stop by our lab to have the test done at a later time.    Video visits are billed at different rates depending on your insurance coverage. Please reach out to your insurance provider with any questions.    If during the course of the call the physician/provider feels a video visit is not appropriate, you will not be charged for this service.\"    Patient has given verbal consent to a Video visit? Yes    Patient would like to receive their AVS by AVS Preference: Lupe.    Patient would like the video invitation sent by: Text to cell phone: [phone]    Will anyone else be joining your video visit? No             Video Start Time: 1:41 PM    Additional provider notes: GENERAL: Healthy, alert and no distress  EYES: Eyes grossly normal to inspection. No discharge or erythema, or obvious scleral/conjunctival abnormalities.  RESP: No audible wheeze, cough, or visible cyanosis.  No visible retractions or increased work of breathing.    NEURO: Cranial nerves grossly intact. Mentation and speech appropriate for age.  PSYCH: Mentation appears normal, affect normal/bright, judgement and insight intact, normal speech and appearance well-groomed        Video-Visit Details      Type of service:  Video Visit    This is a video visit May 18.  The patient has not been seen in about a year.  His A1c a year ago was 9.9 a reviewed with the patient his diabetic medications, he is on " insulin he actually ran out 3 to 4 days ago also send in a new prescription for that.  I did start him on Actos a year ago but he did not take that.  He is still on metformin.  He has gained some weight more recently, under the stress he believes he is eating a bit more and has been less active.    His sugar values can vary considerably if he is eating well in the morning that can be 120 or they can be over 200 if he ate more sweets or other items the night before that would bump up his sugars.    A year ago also I started him on chlorthalidone, he only took it for 7 to 10 days or so stating that it made him feel very dehydrated, he has not had his blood pressure checked.    Otherwise, the patient reports no other significant change in his health status.    I did overall review his medications, his medication list should be accurate and complete.    Ezekiel was seen today for diabetes and hypertension.    Diagnoses and all orders for this visit:    Type 2 diabetes mellitus  (H)  -     insulin glargine (BASAGLAR KWIKPEN) 100 unit/mL (3 mL) pen; INJECT 66 UNITS UNDER THE SKIN AT BEDTIME  -     Glycosylated Hemoglobin A1c; Future  -     Microalbumin, Random Urine; Future  Probably suboptimal control, most recent A1c 9.9.      Hypertension  Probably suboptimal control, did not tolerate chlorthalidone.    Hyperlipidemia  -     Comprehensive Metabolic Panel; Future  -     Lipid Cascade; Future  Atorvastatin      Screening for prostate cancer  -     PSA, Annual Screen (Prostatic-Specific Antigen); Future  Family history of prostate cancer.    PLAN:  1.  Future laboratory studies as above.  2.  Of note, the patient is not taking either chlorthalidone or Actos.  3.  Would like to see the patient in clinic in the next 3 months or so.  I do anticipate suboptimal diabetes control.        Video End Time (time video stopped): 2:04 PM  Originating Location (pt. Location): Home    Distant Location (provider location):  [place]  [facility]     Platform used for Video Visit: Luciana Jeronimo MD

## 2021-06-08 NOTE — TELEPHONE ENCOUNTER
Central PA team  [phone]  Pool: HE PA MED ([id number])          PA has been initiated.       PA form completed and faxed insurance via Cover My Meds     Key:  [id number] - PA Case ID: [id number]     Medication:  Basaglar KwikPen 100UNIT/ML pen-injectors    Insurance:  [employer]         Response will be received via fax and may take up to 5-10 business days depending on plan

## 2021-06-08 NOTE — TELEPHONE ENCOUNTER
Per PA outcome, Levemir or Lantus are the preferred long acting insulins. Will send Rx for Lantus in place of Basaglar. It is same insulin, just different brand name. Use same dose.

## 2021-06-08 NOTE — PROGRESS NOTES
"MTM Encounter    ASSESSMENT AND PLAN    1. Type 2 diabetes mellitus   Degree of control to be determined today with A1c. His reported fasting blood sugars sound well controlled, but with some readings above goal. He is on maximum doses of Victoza and metformin, but we can adjust Lantus.   A1c came back at 7.4%, which is an improvement from 8.1% last year. Goal is <7%. Will advise patient to increase Lantus dose to 57 units daily.   Plan   1. Check A1c.   2. Increase Lantus to 57 units SQ daily.     2. Essential hypertension  Blood pressure is well controlled and meeting goal of <140/90 mm Hg.     3. Hyperlipidemia  Appropriately on a high intensity statin given age and diabetes diagnosis per ACC/AHA guidelines. Last LDL of 71 mg/dl.     4. BPH  Symptoms are stable on tamsulosin.         Return in about 3 months (around 4/23/2017).      SUBJECTIVE AND OBJECTIVE  Ezekiel Aldrich is a 61 y.o. male here for a medication therapy management (MTM) appointment.       1. Type 2 diabetes mellitus   Ezekiel is taking metformin ER 2000mg daily, Victoza 1.8 mg SQ daily, and Lantus 54 units every evening. BG have been \"pretty good.\" This morning it was 161. Usually it is in the 120s-130s. He denies any hypoglycemia. He reports adherence to medications without any concerns of missed doses.     Lab Results   Component Value Date    HGBA1C 7.4 (H) 01/23/2017       2. Essential hypertension  Ezekiel is taking losartan 100 mg daily.   BP Readings from Last 3 Encounters:   01/23/17 130/84   06/21/16 130/70   06/13/16 (!) 175/91       3. Hyperlipidemia  Ezekiel is taking atorvastatin 40 mg daily.     4. BPH  Ezekiel is taking tamsulosin 0.4 mg daily. He says this helps with his urination. He is concerned about having prostate cancer as his brother was just diagnosed.           Ezekiel's medication list was reviewed with them, discussing reason for use, directions for use, and potential side effects of each medication as needed. Indication, " "safety, efficacy, and convenience was assessed for all medications addressed above.  No environmental factors were noted currently affecting patient.  This care plan was communicated via EMR with his primary care provider, Janes Jeornimo MD, who is the authorizing prescriber for this visit.  Direct supervision was available by either the patient's PCP or other available provider.    Time Spent: 45 minutes  Time and complexity billing metrics are included in the doc-flowsheet linked to this visit.       Marci Wang, PharmD, BCACP    Current Outpatient Prescriptions   Medication Sig Dispense Refill     acetaminophen (TYLENOL) 500 MG tablet Take 1,000 mg by mouth bedtime.       atorvastatin (LIPITOR) 40 MG tablet TAKE 1 TABLET DAILY AS DIRECTED 90 tablet 1     diphenhydrAMINE (BENADRYL) 50 MG tablet Take 100 mg by mouth bedtime.       insulin needles, disposable, 31 X 5/16 \" Ndle Use to inject Victoza once daily. 100 each 5     lancets (MICROLET LANCET) Misc Use to test blood sugars three times daily. 100 each 5     LANTUS SOLOSTAR 100 unit/mL (3 mL) pen INJECT 50 UNITS UNDER THE SKIN DAILY 15 mL 1     liraglutide (VICTOZA 3-FELIX) 0.6 mg/0.1 mL (18 mg/3 mL) PnIj injection Inject 0.3 mL (1.8 mg total) under the skin daily. 9 mL 3     losartan (COZAAR) 100 MG tablet Take 1 tablet (100 mg total) by mouth daily. 90 tablet 3     metFORMIN (GLUCOPHAGE-XR) 500 MG 24 hr tablet TAKE 4 TABLETS BY MOUTH DAILY. 120 tablet 1     multivitamin therapeutic (THERAGRAN) tablet Take 1 tablet by mouth daily.       PEN NEEDLE 31 gauge x 1/4\" Ndle USE 4 TIMES DAILY. 200 each 12     sildenafil (REVATIO) 20 mg tablet Take 1 daily as needed. 60 tablet 0     tamsulosin (FLOMAX) 0.4 mg Cp24 Take 0.4 mg by mouth daily.       No current facility-administered medications for this visit.      "

## 2021-06-09 NOTE — PROGRESS NOTES
ASSESSMENT:  1. Low back pain  Chronic long-standing issue with intermittent exacerbations with left radicular symptoms.  Prior episodes have resolved conservatively.      PLAN:  1.  I continued the patient's Valium 5 mg 3 times daily as needed and Percocet one to 2 every 4-6 hours as needed given 20 each with no refills and anticipate short-term usage.  2.  Discussed that since the patient does not want surgery, I would not obtain an MRI at this point.  3.  I do encourage him to walk regularly he skin at take some time off of work, and follow-up as needed.      No orders of the defined types were placed in this encounter.    Medications Discontinued During This Encounter   Medication Reason     oxyCODONE-acetaminophen (PERCOCET) 5-325 mg per tablet Reorder     diazePAM (VALIUM) 5 MG tablet Reorder       No Follow-up on file.    CHIEF COMPLAINT:  Chief Complaint   Patient presents with     Back Pain     back injury- was seen at  this weekend - hurt to walk- got an rx for pain        SUBJECTIVE:  Ezekiel is a 61 y.o. male presenting to the clinic today with concerns of back pain.    He has had ongoing flares of back pain over the last year. Pain has disrupted his sleep in the past. His most recent flare occurred last week. He had taken Advil and Tylenol consistently last week. He describes pain in his left buttocks with numbness in his left toe. On Saturday, pain was severe and he had presented to the [facility]. X-ray of the lumbar spine had shown moderate to advanced degenerative interspace narrowing at L4-5 and L5-S1. Mildly prominent ventral interbody spurring was noted at L2-3  He was prescribed Percocet, Valium, and prednisone. He notes some relief with these medications, as he is able to now ambulate without pain.  Pain only presents on the left side, and is improved when he lays flat on his back. He inquires if he should have an MRI. He would like to avoid surgery. He has tried chiropractic adjustments in  the past with no relief. He is planning to take a week off of work to rest and let his back heal.       REVIEW OF SYSTEMS:   He denies pain in his right leg.   All other systems are negative.    PFSH:  He is self employed.   Immunization History   Administered Date(s) Administered     DT (pediatric) 10/24/2003     Influenza, Seasonal, Inj PF 10/16/2012     Influenza, inj, historic 10/11/2005, 10/27/2006, 11/02/2007, 10/21/2008     Influenza, seasonal,quad inj 36+ mos 01/23/2017     Influenza, seasonal,quad inj 6-35 mos 12/03/2014     Influenza,seasonal quad, PF, 36+MOS 12/09/2015     Pneumo Polysac 23-V 12/03/2014     Td, historic 10/24/2003     Tdap 12/03/2014     ZOSTER 01/23/2017     Social History     Social History     Marital status:      Spouse name: N/A     Number of children: 2     Years of education: N/A     Occupational History     Hardwood floor contractor      Social History Main Topics     Smoking status: Former Smoker     Quit date: 5/1/1985     Smokeless tobacco: Current User     Types: Chew      Comment: 2 tins/week     Alcohol use 1.2 oz/week     2 Cans of beer per week     Drug use: No     Sexual activity: Yes     Partners: Female     Other Topics Concern     Not on file     Social History Narrative    Diet-        Exercise- Not regular. Work is physical, walking.     Past Medical History:   Diagnosis Date     Acute renal failure     6/11/2016, secondary to dehydration, normalized     Family History   Problem Relation Age of Onset     Colon cancer Mother      Heart attack Mother      Hypertension Mother      Hypertension Father      Bell's palsy Father      Bell's palsy Maternal Grandmother      Prostate cancer Brother 57       MEDICATIONS:  Current Outpatient Prescriptions   Medication Sig Dispense Refill     acetaminophen (TYLENOL) 500 MG tablet Take 1,000 mg by mouth bedtime.       atorvastatin (LIPITOR) 40 MG tablet Take 1 tablet (40 mg total) by mouth daily. 90 tablet 1     BD INSULIN  "PEN NEEDLE UF SHORT 31 gauge x 5/16\" Ndle USE TWICE PER DAY TO INJECT VICTOZA AND LANTUS  3     diazePAM (VALIUM) 5 MG tablet Take 1 tablet (5 mg total) by mouth every 6 (six) hours as needed for anxiety. 20 tablet 0     diphenhydrAMINE (BENADRYL) 50 MG tablet Take 100 mg by mouth bedtime.       generic lancets (MICROLET LANCET) Use to test blood sugars three times daily. 100 each 5     insulin glargine (LANTUS SOLOSTAR) 100 unit/mL (3 mL) pen Inject 57 Units under the skin bedtime. 45 mL 3     liraglutide (VICTOZA 3-FELIX) 0.6 mg/0.1 mL (18 mg/3 mL) PnIj injection Inject 0.3 mL (1.8 mg total) under the skin daily. 27 mL 3     losartan (COZAAR) 100 MG tablet Take 1 tablet (100 mg total) by mouth daily. 90 tablet 3     metFORMIN (GLUCOPHAGE-XR) 500 MG 24 hr tablet Take 4 tablets (2,000 mg total) by mouth daily. 360 tablet 3     methylPREDNISolone (MEDROL DOSEPACK) 4 mg tablet Take by mouth as instructed per packaging.       multivitamin therapeutic (THERAGRAN) tablet Take 1 tablet by mouth daily.       oxyCODONE-acetaminophen (PERCOCET) 5-325 mg per tablet Take 1-2 tablets by mouth every 4 (four) hours as needed for pain. 20 tablet 0     pen needle, diabetic (PEN NEEDLE) 31 gauge x 1/4\" Ndle Use twice daily to inject Victoza and Lantus 200 each 3     sildenafil (REVATIO) 20 mg tablet Take 1 daily as needed. 60 tablet 0     tamsulosin (FLOMAX) 0.4 mg Cp24 Take 1 capsule (0.4 mg total) by mouth daily. 90 capsule 3     No current facility-administered medications for this visit.        TOBACCO USE:  History   Smoking Status     Former Smoker     Quit date: 5/1/1985   Smokeless Tobacco     Current User     Types: Chew     Comment: 2 tins/week       VITALS:  Vitals:    03/06/17 1112   BP: 160/90   Pulse: 80   Weight: (!) 260 lb (117.9 kg)     Wt Readings from Last 3 Encounters:   03/06/17 (!) 260 lb (117.9 kg)   01/23/17 (!) 253 lb (114.8 kg)   06/21/16 (!) 244 lb (110.7 kg)       PHYSICAL EXAM:  Constitutional:   Reveals " an alert, pleasant, talkative male who appears mildly uncomfortable.  Vitals: per nursing notes.  Neuro:  Alert and oriented. Cranial nerves, motor, sensory exams are intact.  No gross focal deficits.  Psychiatric:  Memory intact, mood appropriate.    DATA REVIEWED:  Additional History from Old Records Summarized (2): Reviewed [facility] note from 3/4/17 regarding back pain.   Decision to Obtain Records (1): None  Radiology Tests Summarized or Ordered (1): None  Labs Reviewed or Ordered (1): Reviewed X-ray results from [facility] on 3/4/17.   Medicine Test Summarized or Ordered (1): None  Independent Review of EKG, X-RAY, or RAPID STREP (2 each): None    The visit lasted a total of 19 minutes face to face with the patient. Over 50% of the time was spent counseling and educating the patient about back pain.    IDiana, am scribing for and in the presence of, Dr. Jeronimo.    I, Dr. Jeronimo, personally performed the services described in this documentation, as scribed by Diana Reyes in my presence, and it is both accurate and complete.    Total data points: 3

## 2021-06-09 NOTE — TELEPHONE ENCOUNTER
RN cannot approve Refill Request    RN can NOT refill this medication PCP messaged that patient is overdue for Labs and Office Visit. Last office visit: 2/2/2018 Janes Jeronimo MD Last Physical: 4/10/2019 Last MTM visit: Visit date not found Last visit same specialty: 11/15/2018 Debra Garcia NP.  Next visit within 3 mo: Visit date not found  Next physical within 3 mo: Visit date not found      Karmen Falcon, [facility] Triage/Med Refill 7/5/2020    Requested Prescriptions   Pending Prescriptions Disp Refills     metFORMIN (GLUCOPHAGE-XR) 500 MG 24 hr tablet [Pharmacy Med Name: METFORMIN ER 500MG 24HR TABS] 360 tablet 0     Sig: TAKE 4 TABLETS BY MOUTH DAILY       Metformin Refill Protocol Failed - 7/5/2020  1:59 PM        Failed - Blood pressure in last 12 months     BP Readings from Last 1 Encounters:   05/01/19 134/85             Failed - Visit with PCP or prescribing provider visit in last 6 months or next 3 months     Last office visit with prescriber/PCP: Visit date not found OR same dept: Visit date not found OR same specialty: 11/15/2018 Debra Garcia NP Last physical: Visit date not found Last MTM visit: Visit date not found         Next appt within 3 mo: Visit date not found  Next physical within 3 mo: Visit date not found  Prescriber OR PCP: Janes Jeronimo MD  Last diagnosis associated with med order: 1. Diabetes mellitus, type 2 (H)  - metFORMIN (GLUCOPHAGE-XR) 500 MG 24 hr tablet [Pharmacy Med Name: METFORMIN ER 500MG 24HR TABS]; TAKE 4 TABLETS BY MOUTH DAILY  Dispense: 360 tablet; Refill: 0  - glipiZIDE (GLUCOTROL XL) 10 MG 24 hr tablet [Pharmacy Med Name: GLIPIZIDE ER 10MG TABLETS]; TAKE 2 TABLETS(20 MG) BY MOUTH DAILY  Dispense: 180 tablet; Refill: 0    2. Hyperlipidemia, unspecified hyperlipidemia type  - atorvastatin (LIPITOR) 40 MG tablet [Pharmacy Med Name: ATORVASTATIN 40MG TABLETS]; TAKE 1 TABLET(40 MG) BY MOUTH DAILY  Dispense: 90 tablet; Refill: 0    3. Hypertension  -  losartan (COZAAR) 100 MG tablet [Pharmacy Med Name: LOSARTAN 100MG TABLETS]; TAKE 1 TABLET(100 MG) BY MOUTH DAILY  Dispense: 90 tablet; Refill: 0    4. BPH (benign prostatic hyperplasia)  - tamsulosin (FLOMAX) 0.4 mg cap [Pharmacy Med Name: TAMSULOSIN 0.4MG CAPSULES]; TAKE 1 CAPSULE(0.4 MG) BY MOUTH DAILY  Dispense: 90 capsule; Refill: 0     If protocol passes may refill for 12 months if within 3 months of last provider visit (or a total of 15 months).           Passed - LFT or AST or ALT in last 12 months     Albumin   Date Value Ref Range Status   05/26/2020 4.0 3.5 - 5.0 g/dL Final     Bilirubin, Total   Date Value Ref Range Status   05/26/2020 0.9 0.0 - 1.0 mg/dL Final     Alkaline Phosphatase   Date Value Ref Range Status   05/26/2020 67 45 - 120 U/L Final     AST   Date Value Ref Range Status   05/26/2020 21 0 - 40 U/L Final     ALT   Date Value Ref Range Status   05/26/2020 23 0 - 45 U/L Final     Protein, Total   Date Value Ref Range Status   05/26/2020 7.0 6.0 - 8.0 g/dL Final                Passed - GFR or Serum Creatinine in last 6 months     GFR MDRD Non Af Amer   Date Value Ref Range Status   05/26/2020 >60 >60 mL/min/1.73m2 Final     GFR MDRD Af Amer   Date Value Ref Range Status   05/26/2020 >60 >60 mL/min/1.73m2 Final             Passed - A1C in last 6 months     Hemoglobin A1c   Date Value Ref Range Status   05/26/2020 7.5 (H) 3.5 - 6.0 % Final               Passed - Microalbumin in last year      Microalbumin, Random Urine   Date Value Ref Range Status   05/26/2020 15.72 (H) 0.00 - 1.99 mg/dL Final                     atorvastatin (LIPITOR) 40 MG tablet [Pharmacy Med Name: ATORVASTATIN 40MG TABLETS] 90 tablet 0     Sig: TAKE 1 TABLET(40 MG) BY MOUTH DAILY       Statins Refill Protocol (Hmg CoA Reductase Inhibitors) Failed - 7/5/2020  1:59 PM        Failed - PCP or prescribing provider visit in past 12 months      Last office visit with prescriber/PCP: 2/2/2018 Janes Jeronimo MD OR same dept:  Visit date not found OR same specialty: 11/15/2018 Debra Garcia NP  Last physical: 4/10/2019 Last MTM visit: Visit date not found   Next visit within 3 mo: Visit date not found  Next physical within 3 mo: Visit date not found  Prescriber OR PCP: Janes Jeronimo MD  Last diagnosis associated with med order: 1. Diabetes mellitus, type 2 (H)  - metFORMIN (GLUCOPHAGE-XR) 500 MG 24 hr tablet [Pharmacy Med Name: METFORMIN ER 500MG 24HR TABS]; TAKE 4 TABLETS BY MOUTH DAILY  Dispense: 360 tablet; Refill: 0  - glipiZIDE (GLUCOTROL XL) 10 MG 24 hr tablet [Pharmacy Med Name: GLIPIZIDE ER 10MG TABLETS]; TAKE 2 TABLETS(20 MG) BY MOUTH DAILY  Dispense: 180 tablet; Refill: 0    2. Hyperlipidemia, unspecified hyperlipidemia type  - atorvastatin (LIPITOR) 40 MG tablet [Pharmacy Med Name: ATORVASTATIN 40MG TABLETS]; TAKE 1 TABLET(40 MG) BY MOUTH DAILY  Dispense: 90 tablet; Refill: 0    3. Hypertension  - losartan (COZAAR) 100 MG tablet [Pharmacy Med Name: LOSARTAN 100MG TABLETS]; TAKE 1 TABLET(100 MG) BY MOUTH DAILY  Dispense: 90 tablet; Refill: 0    4. BPH (benign prostatic hyperplasia)  - tamsulosin (FLOMAX) 0.4 mg cap [Pharmacy Med Name: TAMSULOSIN 0.4MG CAPSULES]; TAKE 1 CAPSULE(0.4 MG) BY MOUTH DAILY  Dispense: 90 capsule; Refill: 0    If protocol passes may refill for 12 months if within 3 months of last provider visit (or a total of 15 months).                losartan (COZAAR) 100 MG tablet [Pharmacy Med Name: LOSARTAN 100MG TABLETS] 90 tablet 0     Sig: TAKE 1 TABLET(100 MG) BY MOUTH DAILY       Angiotensin Receptor Blocker Protocol Failed - 7/5/2020  1:59 PM        Failed - PCP or prescribing provider visit in past 12 months       Last office visit with prescriber/PCP: 2/2/2018 Janes Jeronimo MD OR same dept: Visit date not found OR same specialty: 11/15/2018 Debra Garcia NP  Last physical: 4/10/2019 Last MTM visit: Visit date not found   Next visit within 3 mo: Visit date not found  Next physical within 3 mo:  Visit date not found  Prescriber OR PCP: Janes Jeronimo MD  Last diagnosis associated with med order: 1. Diabetes mellitus, type 2 (H)  - metFORMIN (GLUCOPHAGE-XR) 500 MG 24 hr tablet [Pharmacy Med Name: METFORMIN ER 500MG 24HR TABS]; TAKE 4 TABLETS BY MOUTH DAILY  Dispense: 360 tablet; Refill: 0  - glipiZIDE (GLUCOTROL XL) 10 MG 24 hr tablet [Pharmacy Med Name: GLIPIZIDE ER 10MG TABLETS]; TAKE 2 TABLETS(20 MG) BY MOUTH DAILY  Dispense: 180 tablet; Refill: 0    2. Hyperlipidemia, unspecified hyperlipidemia type  - atorvastatin (LIPITOR) 40 MG tablet [Pharmacy Med Name: ATORVASTATIN 40MG TABLETS]; TAKE 1 TABLET(40 MG) BY MOUTH DAILY  Dispense: 90 tablet; Refill: 0    3. Hypertension  - losartan (COZAAR) 100 MG tablet [Pharmacy Med Name: LOSARTAN 100MG TABLETS]; TAKE 1 TABLET(100 MG) BY MOUTH DAILY  Dispense: 90 tablet; Refill: 0    4. BPH (benign prostatic hyperplasia)  - tamsulosin (FLOMAX) 0.4 mg cap [Pharmacy Med Name: TAMSULOSIN 0.4MG CAPSULES]; TAKE 1 CAPSULE(0.4 MG) BY MOUTH DAILY  Dispense: 90 capsule; Refill: 0    If protocol passes may refill for 12 months if within 3 months of last provider visit (or a total of 15 months).             Failed - Blood pressure filed in past 12 months     BP Readings from Last 1 Encounters:   05/01/19 134/85             Passed - Serum potassium within the past 12 months     Lab Results   Component Value Date    Potassium 4.3 05/26/2020             Passed - Serum creatinine within the past 12 months     Creatinine   Date Value Ref Range Status   05/26/2020 1.17 0.70 - 1.30 mg/dL Final                tamsulosin (FLOMAX) 0.4 mg cap [Pharmacy Med Name: TAMSULOSIN 0.4MG CAPSULES] 90 capsule 0     Sig: TAKE 1 CAPSULE(0.4 MG) BY MOUTH DAILY       Alfuzosin/Tamsulosin/Silodosin Refill Protocol  Failed - 7/5/2020  1:59 PM        Failed - PCP or prescribing provider visit in past 12 months       Last office visit with prescriber/PCP: 2/2/2018 Janes Jeronimo MD OR same dept: Visit  date not found OR same specialty: 11/15/2018 Debra Garcia NP  Last physical: 4/10/2019 Last MTM visit: Visit date not found   Next visit within 3 mo: Visit date not found  Next physical within 3 mo: Visit date not found  Prescriber OR PCP: Janes Jeronimo MD  Last diagnosis associated with med order: 1. Diabetes mellitus, type 2 (H)  - metFORMIN (GLUCOPHAGE-XR) 500 MG 24 hr tablet [Pharmacy Med Name: METFORMIN ER 500MG 24HR TABS]; TAKE 4 TABLETS BY MOUTH DAILY  Dispense: 360 tablet; Refill: 0  - glipiZIDE (GLUCOTROL XL) 10 MG 24 hr tablet [Pharmacy Med Name: GLIPIZIDE ER 10MG TABLETS]; TAKE 2 TABLETS(20 MG) BY MOUTH DAILY  Dispense: 180 tablet; Refill: 0    2. Hyperlipidemia, unspecified hyperlipidemia type  - atorvastatin (LIPITOR) 40 MG tablet [Pharmacy Med Name: ATORVASTATIN 40MG TABLETS]; TAKE 1 TABLET(40 MG) BY MOUTH DAILY  Dispense: 90 tablet; Refill: 0    3. Hypertension  - losartan (COZAAR) 100 MG tablet [Pharmacy Med Name: LOSARTAN 100MG TABLETS]; TAKE 1 TABLET(100 MG) BY MOUTH DAILY  Dispense: 90 tablet; Refill: 0    4. BPH (benign prostatic hyperplasia)  - tamsulosin (FLOMAX) 0.4 mg cap [Pharmacy Med Name: TAMSULOSIN 0.4MG CAPSULES]; TAKE 1 CAPSULE(0.4 MG) BY MOUTH DAILY  Dispense: 90 capsule; Refill: 0    If protocol passes may refill for 12 months if within 3 months of last provider visit (or a total of 15 months).                glipiZIDE (GLUCOTROL XL) 10 MG 24 hr tablet [Pharmacy Med Name: GLIPIZIDE ER 10MG TABLETS] 180 tablet 0     Sig: TAKE 2 TABLETS(20 MG) BY MOUTH DAILY       Oral Hypoglycemics Refill Protocol Failed - 7/5/2020  1:59 PM        Failed - Visit with PCP or prescribing provider visit in last 6 months       Last office visit with prescriber/PCP: Visit date not found OR same dept: Visit date not found OR same specialty: 11/15/2018 Debra Garcia NP Last physical: Visit date not found Last MTM visit: Visit date not found         Next appt within 3 mo: Visit date not found  Next  physical within 3 mo: Visit date not found  Prescriber OR PCP: Janes Jeronimo MD  Last diagnosis associated with med order: 1. Diabetes mellitus, type 2 (H)  - metFORMIN (GLUCOPHAGE-XR) 500 MG 24 hr tablet [Pharmacy Med Name: METFORMIN ER 500MG 24HR TABS]; TAKE 4 TABLETS BY MOUTH DAILY  Dispense: 360 tablet; Refill: 0  - glipiZIDE (GLUCOTROL XL) 10 MG 24 hr tablet [Pharmacy Med Name: GLIPIZIDE ER 10MG TABLETS]; TAKE 2 TABLETS(20 MG) BY MOUTH DAILY  Dispense: 180 tablet; Refill: 0    2. Hyperlipidemia, unspecified hyperlipidemia type  - atorvastatin (LIPITOR) 40 MG tablet [Pharmacy Med Name: ATORVASTATIN 40MG TABLETS]; TAKE 1 TABLET(40 MG) BY MOUTH DAILY  Dispense: 90 tablet; Refill: 0    3. Hypertension  - losartan (COZAAR) 100 MG tablet [Pharmacy Med Name: LOSARTAN 100MG TABLETS]; TAKE 1 TABLET(100 MG) BY MOUTH DAILY  Dispense: 90 tablet; Refill: 0    4. BPH (benign prostatic hyperplasia)  - tamsulosin (FLOMAX) 0.4 mg cap [Pharmacy Med Name: TAMSULOSIN 0.4MG CAPSULES]; TAKE 1 CAPSULE(0.4 MG) BY MOUTH DAILY  Dispense: 90 capsule; Refill: 0     If protocol passes may refill for 12 months if within 3 months of last provider visit (or a total of 15 months).           Failed - Blood pressure in last year     BP Readings from Last 1 Encounters:   05/01/19 134/85             Passed - A1C in last 6 months     Hemoglobin A1c   Date Value Ref Range Status   05/26/2020 7.5 (H) 3.5 - 6.0 % Final               Passed - Microalbumin in last year      Microalbumin, Random Urine   Date Value Ref Range Status   05/26/2020 15.72 (H) 0.00 - 1.99 mg/dL Final                  Passed - Serum creatinine in last year     Creatinine   Date Value Ref Range Status   05/26/2020 1.17 0.70 - 1.30 mg/dL Final

## 2021-06-09 NOTE — TELEPHONE ENCOUNTER
One touch verio test strips- Plan does not cover this medication.     Call plan @ [phone] to do PA (ID# is [id number]

## 2021-06-09 NOTE — TELEPHONE ENCOUNTER
Rx sent for new glucometer and testing supplies. Pharmacy can fill any brand that is covered by patient's insurance.

## 2021-06-10 NOTE — TELEPHONE ENCOUNTER
Refill Approved    Rx renewed per Medication Renewal Policy. Medication was last renewed on 4/10/20.    Jonna Gonzalez, [facility] Triage/Med Refill 8/14/2020     Requested Prescriptions   Pending Prescriptions Disp Refills     tadalafiL (CIALIS) 5 MG tablet [Pharmacy Med Name: TADALAFIL 5MG TABS] 85 tablet 0     Sig: TAKE ONE TABLET BY MOUTH EVERY DAY       Medications for Impotence Refill Protocol Passed - 8/13/2020  5:18 PM        Passed - PCP or prescribing provider visit in last year     Last office visit with prescriber/PCP: 2/2/2018 Janes Jeronimo MD OR same dept: Visit date not found OR same specialty: 11/15/2018 Debra Garcia NP  Last physical: 4/10/2019 Last MTM visit: Visit date not found   Next visit within 3 mo: Visit date not found  Next physical within 3 mo: Visit date not found  Prescriber OR PCP: Janes Jeronimo MD  Last diagnosis associated with med order: 1. Benign prostatic hyperplasia, unspecified whether lower urinary tract symptoms present  - tadalafiL (CIALIS) 5 MG tablet [Pharmacy Med Name: TADALAFIL 5MG TABS]; TAKE ONE TABLET BY MOUTH EVERY DAY  Dispense: 85 tablet; Refill: 0    2. Male Erectile Disorder  - tadalafiL (CIALIS) 5 MG tablet [Pharmacy Med Name: TADALAFIL 5MG TABS]; TAKE ONE TABLET BY MOUTH EVERY DAY  Dispense: 85 tablet; Refill: 0    If protocol passes may refill for 12 months if within 3 months of last provider visit (or a total of 15 months).

## 2021-06-10 NOTE — TELEPHONE ENCOUNTER
Prior Authorization Request  Who s requesting:  Pharmacy  Pharmacy Name and Location: [facility] #[id number]  Medication Name: blood glucose test strips (One touch Verio Test Strips)  Insurance Plan: [place] [employer]  Insurance Member ID Number:  [id number]  CoverMyMeds Key: N/A  Informed patient that prior authorizations can take up to 10 business days for response:   NA  Okay to leave a detailed message: Yes

## 2021-06-10 NOTE — TELEPHONE ENCOUNTER
Central PA team  [phone]  Pool: HE PA MED ([id number])          PA has been initiated.       PA form completed and faxed insurance via Cover My Meds     Key:  [id number]     Medication:  ONETOUCH VERIO STRIPS    Insurance:  [employer]         Response will be received via fax and may take up to 5-10 business days depending on plan         none

## 2021-06-12 NOTE — PROGRESS NOTES
ASSESSMENT:  1. Type 2 diabetes mellitus  Most recent A1c 7.4, likely better control.  - Glycosylated Hemoglobin A1c    2. Hypertension  Well-controlled.    3. Hyperlipidemia  Darkly well controlled.  - Lipid Cascade  - Comprehensive Metabolic Panel    4. Low back pain  Occasional flares and exacerbations had one in spring, now under good control.      PLAN:  1.  Laboratory studies as above.  2.  If labs stable six-month follow-up.      Orders Placed This Encounter   Procedures     Glycosylated Hemoglobin A1c     Lipid Cascade     Order Specific Question:   Fasting is required?     Answer:   Yes     Comprehensive Metabolic Panel     Medications Discontinued During This Encounter   Medication Reason     oxyCODONE-acetaminophen (PERCOCET) 5-325 mg per tablet Therapy completed     diazePAM (VALIUM) 5 MG tablet Therapy completed       No Follow-up on file.    CHIEF COMPLAINT:  Chief Complaint   Patient presents with     Diabetes     6 month check, pt is fasting        SUBJECTIVE:  Ezekiel is a 62 y.o. male who comes in for diabetes and other comorbidities.  His A1c in January was 7.4, we did adjust his insulin, his sugar this morning was 118 which is fairly typical, he does feel like his diabetes will be better.  Historically his cholesterol has been well controlled.  I saw him several months ago for low back pain this is improved, he was taken Valium and Percocet but is not taking these anymore as his back pain is generally resolved, but he does have a lot of joint aches and joint pains he is a very physically demanding job.    He sometimes feels at the bottom of his feet are colder also he has more or less chronic right ankle swelling he had a previous fracture and hardware placed which is still in place.  I told the patient undoubtedly he has underlying osteoarthritis whether or not the surgeon would remove the hardware is not clear to me however they generally do not do that unless her significant symptoms, in either  case he would not want to miss work is not really a viable option for him at this time.  Discussed with the patient also that there are probably some nerve and may be some circulatory changes causing some diminished sensation and change in sensation in his feet.    No other change in his health status    REVIEW OF SYSTEMS:   No other change in his health status, overall however more joint aches and joint pains from his job.  All other systems are negative.    PFSH:  Immunization History   Administered Date(s) Administered     DT (pediatric) 10/24/2003     Influenza, Seasonal, Inj PF 10/16/2012     Influenza, inj, historic 10/11/2005, 10/27/2006, 11/02/2007, 10/21/2008     Influenza, seasonal,quad inj 36+ mos 01/23/2017     Influenza, seasonal,quad inj 6-35 mos 12/03/2014     Influenza,seasonal quad, PF, 36+MOS 12/09/2015     Pneumo Polysac 23-V 12/03/2014     Td, historic 10/24/2003     Tdap 12/03/2014     ZOSTER 01/23/2017     Social History     Social History     Marital status:      Spouse name: N/A     Number of children: 2     Years of education: N/A     Occupational History     [employer] floor contractor      Social History Main Topics     Smoking status: Former Smoker     Types: Cigarettes     Quit date: 5/1/1985     Smokeless tobacco: Current User     Types: Chew      Comment: 2 tins/week     Alcohol use 1.2 oz/week     2 Cans of beer per week     Drug use: No     Sexual activity: Yes     Partners: Female     Other Topics Concern     Not on file     Social History Narrative    Diet-        Exercise- Not regular. Work is physical, walking.     Past Medical History:   Diagnosis Date     Acute renal failure     6/11/2016, secondary to dehydration, normalized     Family History   Problem Relation Age of Onset     Colon cancer Mother      Heart attack Mother      Hypertension Mother      Hypertension Father      Bell's palsy Father      Bell's palsy Maternal Grandmother      Prostate cancer Brother 57  "      MEDICATIONS:  Current Outpatient Prescriptions   Medication Sig Dispense Refill     acetaminophen (TYLENOL) 500 MG tablet Take 1,000 mg by mouth bedtime.       atorvastatin (LIPITOR) 40 MG tablet Take 1 tablet (40 mg total) by mouth daily. 90 tablet 1     BD INSULIN PEN NEEDLE UF SHORT 31 gauge x 5/16\" Ndle USE TWICE PER DAY TO INJECT VICTOZA AND LANTUS  3     diphenhydrAMINE (BENADRYL) 50 MG tablet Take 100 mg by mouth bedtime.       generic lancets (MICROLET LANCET) Use to test blood sugars three times daily. 100 each 5     insulin glargine (BASAGLAR KWIKPEN) 100 unit/mL (3 mL) pen Inject 57 Units under the skin bedtime. 15 adj dose pen 3     liraglutide (VICTOZA 3-FELIX) 0.6 mg/0.1 mL (18 mg/3 mL) PnIj injection Inject 0.3 mL (1.8 mg total) under the skin daily. 27 mL 3     losartan (COZAAR) 100 MG tablet Take 1 tablet (100 mg total) by mouth daily. 90 tablet 3     metFORMIN (GLUCOPHAGE-XR) 500 MG 24 hr tablet Take 4 tablets (2,000 mg total) by mouth daily. 360 tablet 3     methylPREDNISolone (MEDROL DOSEPACK) 4 mg tablet Take by mouth as instructed per packaging.       multivitamin therapeutic (THERAGRAN) tablet Take 1 tablet by mouth daily.       pen needle, diabetic (PEN NEEDLE) 31 gauge x 1/4\" Ndle Use twice daily to inject Victoza and Lantus 200 each 3     sildenafil (REVATIO) 20 mg tablet Take 1 daily as needed. 60 tablet 0     tamsulosin (FLOMAX) 0.4 mg Cp24 Take 1 capsule (0.4 mg total) by mouth daily. 90 capsule 3     No current facility-administered medications for this visit.        TOBACCO USE:  History   Smoking Status     Former Smoker     Types: Cigarettes     Quit date: 5/1/1985   Smokeless Tobacco     Current User     Types: Chew     Comment: 2 tins/week       VITALS:  Vitals:    07/28/17 0750 07/28/17 0819   BP: 140/70 130/80   Pulse: 74    Weight: (!) 249 lb (112.9 kg)      Wt Readings from Last 3 Encounters:   07/28/17 (!) 249 lb (112.9 kg)   03/06/17 (!) 260 lb (117.9 kg)   01/23/17 (!) " 253 lb (114.8 kg)       PHYSICAL EXAM:  Constitutional:   Reveals a male who appears healthy.  Vitals: per nursing notes.  HEENT:  Ears:  External canals, TMs clear.    Eyes:  EOMs full, PERRL.  Lungs: Clear to A&P without rales or wheezes.  Respiratory effort normal.  Cardiac:   Regular rate and rhythm, normal S1, S2, no murmur or gallop.  Musculoskeletal: No peripheral swelling.  Neuro:  Alert and oriented. Cranial nerves, motor, sensory exams are intact.  No gross focal deficits.  Psychiatric:  Memory intact, mood appropriate.  On exam.  I am not able to palpate the dorsalis pedis pulses, normal monofilament test.  Feet otherwise feel warm.    QUALITY MEASURES:      DATA REVIEWED:  Laboratory studies in January 2017 as well as my office note reviewed.

## 2021-06-13 NOTE — TELEPHONE ENCOUNTER
BD Ultra-Fine Pen Needles  8mm X31 G  short  100      [phone].

## 2021-06-14 ENCOUNTER — COMMUNICATION - HEALTHEAST (OUTPATIENT)
Dept: FAMILY MEDICINE | Facility: CLINIC | Age: 66
End: 2021-06-14

## 2021-06-14 DIAGNOSIS — E11.9 TYPE 2 DIABETES MELLITUS WITHOUT COMPLICATION, WITH LONG-TERM CURRENT USE OF INSULIN (H): ICD-10-CM

## 2021-06-14 DIAGNOSIS — Z79.4 TYPE 2 DIABETES MELLITUS WITHOUT COMPLICATION, WITH LONG-TERM CURRENT USE OF INSULIN (H): ICD-10-CM

## 2021-06-14 RX ORDER — GLUCOSAMINE HCL/CHONDROITIN SU 500-400 MG
1 CAPSULE ORAL 2 TIMES DAILY
Qty: 200 STRIP | Refills: 3 | Status: SHIPPED | OUTPATIENT
Start: 2021-06-14 | End: 2022-06-07

## 2021-06-15 PROBLEM — M54.50 LOW BACK PAIN: Status: ACTIVE | Noted: 2017-03-06

## 2021-06-15 NOTE — TELEPHONE ENCOUNTER
Requested Prescriptions     Pending Prescriptions Disp Refills     atorvastatin (LIPITOR) 40 MG tablet 90 tablet 1     Sig: Take 1 tablet (40 mg total) by mouth daily.     metFORMIN (GLUCOPHAGE-XR) 500 MG 24 hr tablet 360 tablet 1     Sig: Take 4 tablets (2,000 mg total) by mouth daily.     tamsulosin (FLOMAX) 0.4 mg cap 90 capsule 1     Sig: TAKE 1 CAPSULE(0.4 MG) BY MOUTH DAILY     glipiZIDE (GLUCOTROL XL) 10 MG 24 hr tablet 180 tablet 1     Sig: TAKE 2 TABLETS(20 MG) BY MOUTH DAILY     losartan (COZAAR) 100 MG tablet 90 tablet 1     Sig: Take 1 tablet (100 mg total) by mouth daily.       Last VV  5/18/2020.    Thanks,  Leticia Hart, CMA

## 2021-06-15 NOTE — PROGRESS NOTES
ASSESSMENT:  1. Hyperlipidemia  Historically well controlled on atorvastatin.  - atorvastatin (LIPITOR) 40 MG tablet; TAKE 1 TABLET BY MOUTH EVERY DAY  Dispense: 90 tablet; Refill: 3  - Lipid Cascade  - Comprehensive Metabolic Panel    2. Hypertension  Suboptimal control, due to not currently having medication.  - losartan (COZAAR) 100 MG tablet; TAKE 1 TABLET BY MOUTH EVERY DAY  Dispense: 90 tablet; Refill: 3    3. Diabetes mellitus, type 2  Has historically been well controlled, probable suboptimal control, no medication for 1 month.  - metFORMIN (GLUCOPHAGE-XR) 500 MG 24 hr tablet; TAKE 4 TABLETS BY MOUTH EVERY DAY  Dispense: 360 tablet; Refill: 3  - Glycosylated Hemoglobin A1c  - Microalbumin, Random Urine    4. Male Erectile Disorder     - sildenafil, antihypertensive, (REVATIO) 20 mg tablet; Take 1 daily as needed.  Dispense: 60 tablet; Refill: 3    5. BPH (benign prostatic hyperplasia)  Well-controlled on Flomax.  - tamsulosin (FLOMAX) 0.4 mg Cp24; TAKE ONE CAPSULE BY MOUTH DAILY  Dispense: 90 capsule; Refill: 3     6. Need for prophylactic vaccination and inoculation against influenza     - Influenza, Seasonal,Quad Inj, 36+ MOS      PLAN:  1.  Influenza vaccine.  2.  Medications refilled.  3.  We will check with our pharmacist to make sure that the patient has affordable medications or has been insurance issues.  4.  Urine and laboratory studies as above.  5.  Anticipate suboptimal control of her comorbidities but this is mainly due to being on a medication for 1 month, something beyond the patient's control.  6.  Would like to see back in no more than 4-6 months.    Orders Placed This Encounter   Procedures     Influenza, Seasonal,Quad Inj, 36+ MOS     Glycosylated Hemoglobin A1c     Microalbumin, Random Urine     Lipid Cascade     Order Specific Question:   Fasting is required?     Answer:   Yes     Comprehensive Metabolic Panel     Medications Discontinued During This Encounter   Medication Reason      acetaminophen (TYLENOL) 500 MG tablet Therapy completed     diphenhydrAMINE (BENADRYL) 50 MG tablet Therapy completed     insulin glargine (LANTUS SOLOSTAR) 100 unit/mL (3 mL) pen Therapy completed     methylPREDNISolone (MEDROL DOSEPACK) 4 mg tablet Therapy completed     atorvastatin (LIPITOR) 40 MG tablet Reorder     losartan (COZAAR) 100 MG tablet Reorder     metFORMIN (GLUCOPHAGE-XR) 500 MG 24 hr tablet Reorder     sildenafil (REVATIO) 20 mg tablet Reorder     tamsulosin (FLOMAX) 0.4 mg Cp24 Reorder     liraglutide (VICTOZA 3-FELIX) 0.6 mg/0.1 mL (18 mg/3 mL) PnIj injection Reorder       No Follow-up on file.    CHIEF COMPLAINT:  Chief Complaint   Patient presents with     Diabetes     6 month check, pt is fasting - has not taking any rx x 1mo due to insurance        SUBJECTIVE:  Ezekiel is a 62 y.o. male who presents to the clinic with concerns for follow-up of type 2 diabetes mellitus and other co-morbidities. He is fasting today. His A1c was 6.5 on 7/28/2017. He has had concerns with insurance starting 1/1/2018 and has not received any medications since that time. He has not been taking his blood sugars because he does not have any insulin to take. He has intermittent sharp foot pain and tingling. His feet become cold quickly.    Hypertension: His BP is 160/90 today and he has not been taking his 100 mg losartan for the last month due to insurance.    Hyperlipidemia: He feels that his labs will be bad today since he has not been able to take his 40 mg atorvastatin in the last month. He does have any weight gain resulting from his lack of medications.    Health Maintenance: He will receive his seasonal influenza vaccine today.     REVIEW OF SYSTEMS:   He had a couple days of illness this winter but has otherwise been well. He was seen by the eye doctor about 3 months ago and was offered prescription glasses, he prefers wearing the glasses he can purchase from the [employer] store. He has 2 screws in his right ankle.  All other systems are negative.    PFSH:  Immunization History   Administered Date(s) Administered     DT (pediatric) 10/24/2003     Hep A, historic 02/02/2016     Hep B, historic 02/02/2016     Influenza, Seasonal, Inj PF IIV3 10/16/2012     Influenza, inj, historic,unspecified 10/11/2005, 10/27/2006, 11/02/2007, 10/21/2008, 01/23/2017     Influenza, seasonal,quad inj 36+ mos 01/23/2017, 02/02/2018     Influenza, seasonal,quad inj 6-35 mos 12/03/2014     Influenza,seasonal quad, PF, 36+MOS 12/09/2015     Influenza,seasonal, Inj IIV3 10/11/2005, 11/02/2007, 10/21/2008, 12/03/2014     Pneumo Polysac 23-V 12/03/2014     Td, Adult, Absorbed 10/24/2003     Td,adult,historic,unspecified 10/24/2003     Tdap 12/03/2014, 02/02/2016     ZOSTER 01/23/2017     Social History     Social History     Marital status:      Spouse name: N/A     Number of children: 2     Years of education: N/A     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 Work is physical, walking.     Past Medical History:   Diagnosis Date     Acute renal failure     6/11/2016, secondary to dehydration, normalized     Family History   Problem Relation Age of Onset     Colon cancer Mother      Heart attack Mother      Hypertension Mother      Diabetes type II Mother      Hypertension Father      Bell's palsy Father      Diabetes type II Father      Bell's palsy Maternal Grandmother      Prostate cancer Brother 57       MEDICATIONS:  Current Outpatient Prescriptions   Medication Sig Dispense Refill     atorvastatin (LIPITOR) 40 MG tablet  "TAKE 1 TABLET BY MOUTH EVERY DAY 90 tablet 3     BASAGLAR KWIKPEN 100 unit/mL (3 mL) pen INJECT 57 UNITS UNDER THE SKIN EVERY NIGHT AT BEDTIME 15 adj dose pen 3     BD INSULIN PEN NEEDLE UF SHORT 31 gauge x 5/16\" Ndle USE TWICE PER DAY TO INJECT VICTOZA AND LANTUS  3     generic lancets (MICROLET LANCET) Use to test blood sugars three times daily. 100 each 5     liraglutide (VICTOZA 3-FELIX) 0.6 mg/0.1 mL (18 mg/3 mL) PnIj injection Inject 0.3 mL (1.8 mg total) under the skin daily. 27 mL 3     losartan (COZAAR) 100 MG tablet TAKE 1 TABLET BY MOUTH EVERY DAY 90 tablet 3     metFORMIN (GLUCOPHAGE-XR) 500 MG 24 hr tablet TAKE 4 TABLETS BY MOUTH EVERY  tablet 3     multivitamin therapeutic (THERAGRAN) tablet Take 1 tablet by mouth daily.       pen needle, diabetic (PEN NEEDLE) 31 gauge x 1/4\" Ndle Use twice daily to inject Victoza and Lantus 200 each 3     sildenafil, antihypertensive, (REVATIO) 20 mg tablet Take 1 daily as needed. 60 tablet 3     tamsulosin (FLOMAX) 0.4 mg Cp24 TAKE ONE CAPSULE BY MOUTH DAILY 90 capsule 3     No current facility-administered medications for this visit.        TOBACCO USE:  History   Smoking Status     Former Smoker     Types: Cigarettes     Quit date: 5/1/1985   Smokeless Tobacco     Current User     Types: Chew     Comment: 2 tins/week       VITALS:  Vitals:    02/02/18 0845   BP: 160/90   Pulse: 84   Weight: (!) 245 lb (111.1 kg)     Wt Readings from Last 3 Encounters:   02/02/18 (!) 245 lb (111.1 kg)   07/28/17 (!) 249 lb (112.9 kg)   03/06/17 (!) 260 lb (117.9 kg)       PHYSICAL EXAM:  Constitutional:   Reveals an alert man in no acute distress.  Vitals: per nursing notes.  HEENT:  Ears:  External canals, TMs clear.    Eyes:  EOMs full, PERRL.  Lungs: Clear to A&P without rales or wheezes.  Respiratory effort normal.  Cardiac:   Regular rate and rhythm, normal S1, S2, no murmur or gallop.  Musculoskeletal: No peripheral swelling. Foot exam, unable to feel dorsalis pedis " pulses, feet feel slightly cool, normal monofilament test.   Neuro:  Alert and oriented. Cranial nerves, motor, sensory exams are intact.  No gross focal deficits.  Psychiatric:  Memory intact, mood appropriate.    DATA REVIEWED:  Additional History from Old Records Summarized (2): None.  Decision to Obtain Records (1): None.  Radiology Tests Summarized or Ordered (1): None.  Labs Reviewed or Ordered (1): Labs ordered today.  Medicine Test Summarized or Ordered (1): None.  Independent Review of EKG, X-RAY, or RAPID STREP (2 each): None.    The visit lasted a total of 17 minutes face to face with the patient. Over 50% of the time was spent counseling and educating the patient about diabetes follow-up.    I, Emerald Vick, am scribing for and in the presence of, Dr. Jeronimo.    I, Dr. Jeronimo, personally performed the services described in this documentation, as scribed by Emerald Vick in my presence, and it is both accurate and complete.    Total data points: 1

## 2021-06-16 ENCOUNTER — RECORDS - HEALTHEAST (OUTPATIENT)
Dept: ADMINISTRATIVE | Facility: OTHER | Age: 66
End: 2021-06-16

## 2021-06-16 PROBLEM — H35.00 RETINOPATHY: Status: ACTIVE | Noted: 2020-06-09

## 2021-06-16 PROBLEM — R80.9 MICROALBUMINURIA: Status: ACTIVE | Noted: 2018-02-05

## 2021-06-16 PROBLEM — N40.0 BPH (BENIGN PROSTATIC HYPERPLASIA): Status: ACTIVE | Noted: 2018-02-02

## 2021-06-16 NOTE — TELEPHONE ENCOUNTER
Telephone Encounter by Diana Yates at 4/18/2019  7:53 AM     Author: Diana Yates Service: -- Author Type: --    Filed: 4/18/2019  7:55 AM Encounter Date: 4/13/2019 Status: Signed    : Diana Yates       PRIOR AUTHORIZATION DENIED    Denial Rational: ED Medications are excluded from coverage            Appeal Information: If provider would like to appeal please provide a letter of medical necessity and route back to the PA team.

## 2021-06-16 NOTE — PROGRESS NOTES
Chief Complaint   Patient presents with     Blood Pressure Check     I met with Ezekiel Aldrich at the request of Dr. Jeronimo  to recheck his blood pressure.  Blood pressure medications on the MAR were reviewed with patient.    Patient has taken all medications as per usual regimen: Yes  Patient reports tolerating them without any issues or concerns: Yes    Vitals:    03/26/18 1009 03/26/18 1027   BP: 150/70 136/68   Patient Site: Right Arm Right Arm   Patient Position: Sitting Sitting   Cuff Size: Adult Regular Adult Regular       After 5 minutes, the patient's blood pressure was < 140/90, the previous encounter was reviewed, recorded blood pressure below 140/90.  Patient was discharged and the note will be sent to the provider for final review.   Pt states he has been feeling good and no concerns   Jacquelin Kirkpatrick CMA [facility] 3/26/2018 10:36 AM

## 2021-06-17 NOTE — TELEPHONE ENCOUNTER
"RN cannot approve Refill Request    RN can NOT refill this medication PCP messaged that patient is overdue for Labs and Office Visit. Last office visit: 2/2/2018 Janes Jeronimo MD Last Physical: 4/10/2019 Last MTM visit: Visit date not found Last visit same specialty: 11/15/2018 Debra Garcia NP.  Next visit within 3 mo: Visit date not found  Next physical within 3 mo: Visit date not found      Grace MARKY Boothe, [facility] Triage/Med Refill 5/20/2021    Requested Prescriptions   Pending Prescriptions Disp Refills     blood glucose meter (GLUCOMETER) 1 each 0     Sig: Use 1 each As Directed as needed. Dispense glucometer brand per patient's insurance at pharmacy discretion.       Diabetic Supplies Refill Protocol Failed - 5/19/2021  8:12 PM        Failed - Visit with PCP or prescribing provider visit in last 6 months     Last office visit with prescriber/PCP: 2/2/2018 Janes Jeronimo MD OR same dept: Visit date not found OR same specialty: 11/15/2018 Debra Garcia NP  Last physical: 4/10/2019 Last MTM visit: Visit date not found   Next visit within 3 mo: Visit date not found  Next physical within 3 mo: Visit date not found  Prescriber OR PCP: Janes Jeronimo MD  Last diagnosis associated with med order: 1. Type 2 diabetes mellitus without complication, with long-term current use of insulin (H)  - blood glucose meter (GLUCOMETER); Use 1 each As Directed as needed. Dispense glucometer brand per patient's insurance at pharmacy discretion.  Dispense: 1 each; Refill: 0    2. Diabetes (H)  - pen needle, diabetic (PEN NEEDLE) 31 gauge x 1/4\" Ndle; Use twice daily to inject Victoza and Lantus  Dispense: 100 each; Refill: 0    3. Hyperlipidemia, unspecified hyperlipidemia type  - atorvastatin (LIPITOR) 40 MG tablet; Take 1 tablet (40 mg total) by mouth daily.  Dispense: 90 tablet; Refill: 0    4. Diabetes mellitus, type 2 (H)  - metFORMIN (GLUCOPHAGE-XR) 500 MG 24 hr tablet; Take 4 tablets (2,000 mg " "total) by mouth daily.  Dispense: 360 tablet; Refill: 0  - glipiZIDE (GLUCOTROL XL) 10 MG 24 hr tablet; TAKE 2 TABLETS(20 MG) BY MOUTH DAILY  Dispense: 180 tablet; Refill: 0    5. BPH (benign prostatic hyperplasia)  - tamsulosin (FLOMAX) 0.4 mg cap; TAKE 1 CAPSULE(0.4 MG) BY MOUTH DAILY  Dispense: 90 capsule; Refill: 0    6. Hypertension  - losartan (COZAAR) 100 MG tablet; Take 1 tablet (100 mg total) by mouth daily.  Dispense: 90 tablet; Refill: 0    If protocol passes may refill for 12 months if within 3 months of last provider visit (or a total of 15 months).             Failed - A1C in last 6 months     Hemoglobin A1c   Date Value Ref Range Status   05/26/2020 7.5 (H) 3.5 - 6.0 % Final                  pen needle, diabetic (PEN NEEDLE) 31 gauge x 1/4\" Ndle 100 each 0     Sig: Use twice daily to inject Victoza and Lantus       Diabetic Supplies Refill Protocol Failed - 5/19/2021  8:12 PM        Failed - Visit with PCP or prescribing provider visit in last 6 months     Last office visit with prescriber/PCP: 2/2/2018 Janes Jeronimo MD OR same dept: Visit date not found OR same specialty: 11/15/2018 Debra Garcia NP  Last physical: 4/10/2019 Last MTM visit: Visit date not found   Next visit within 3 mo: Visit date not found  Next physical within 3 mo: Visit date not found  Prescriber OR PCP: Janes Jeronimo MD  Last diagnosis associated with med order: 1. Type 2 diabetes mellitus without complication, with long-term current use of insulin (H)  - blood glucose meter (GLUCOMETER); Use 1 each As Directed as needed. Dispense glucometer brand per patient's insurance at pharmacy discretion.  Dispense: 1 each; Refill: 0    2. Diabetes (H)  - pen needle, diabetic (PEN NEEDLE) 31 gauge x 1/4\" Ndle; Use twice daily to inject Victoza and Lantus  Dispense: 100 each; Refill: 0    3. Hyperlipidemia, unspecified hyperlipidemia type  - atorvastatin (LIPITOR) 40 MG tablet; Take 1 tablet (40 mg total) by mouth daily.  " "Dispense: 90 tablet; Refill: 0    4. Diabetes mellitus, type 2 (H)  - metFORMIN (GLUCOPHAGE-XR) 500 MG 24 hr tablet; Take 4 tablets (2,000 mg total) by mouth daily.  Dispense: 360 tablet; Refill: 0  - glipiZIDE (GLUCOTROL XL) 10 MG 24 hr tablet; TAKE 2 TABLETS(20 MG) BY MOUTH DAILY  Dispense: 180 tablet; Refill: 0    5. BPH (benign prostatic hyperplasia)  - tamsulosin (FLOMAX) 0.4 mg cap; TAKE 1 CAPSULE(0.4 MG) BY MOUTH DAILY  Dispense: 90 capsule; Refill: 0    6. Hypertension  - losartan (COZAAR) 100 MG tablet; Take 1 tablet (100 mg total) by mouth daily.  Dispense: 90 tablet; Refill: 0    If protocol passes may refill for 12 months if within 3 months of last provider visit (or a total of 15 months).             Failed - A1C in last 6 months     Hemoglobin A1c   Date Value Ref Range Status   05/26/2020 7.5 (H) 3.5 - 6.0 % Final                  atorvastatin (LIPITOR) 40 MG tablet 90 tablet 0     Sig: Take 1 tablet (40 mg total) by mouth daily.       Statins Refill Protocol (Hmg CoA Reductase Inhibitors) Failed - 5/19/2021  8:12 PM        Failed - PCP or prescribing provider visit in past 12 months      Last office visit with prescriber/PCP: 2/2/2018 Janes Jeronimo MD OR same dept: Visit date not found OR same specialty: 11/15/2018 Debra Garcia, NP  Last physical: 4/10/2019 Last MTM visit: Visit date not found   Next visit within 3 mo: Visit date not found  Next physical within 3 mo: Visit date not found  Prescriber OR PCP: Janes Jeronimo MD  Last diagnosis associated with med order: 1. Type 2 diabetes mellitus without complication, with long-term current use of insulin (H)  - blood glucose meter (GLUCOMETER); Use 1 each As Directed as needed. Dispense glucometer brand per patient's insurance at pharmacy discretion.  Dispense: 1 each; Refill: 0    2. Diabetes (H)  - pen needle, diabetic (PEN NEEDLE) 31 gauge x 1/4\" Ndle; Use twice daily to inject Victoza and Lantus  Dispense: 100 each; Refill: 0    3. " Hyperlipidemia, unspecified hyperlipidemia type  - atorvastatin (LIPITOR) 40 MG tablet; Take 1 tablet (40 mg total) by mouth daily.  Dispense: 90 tablet; Refill: 0    4. Diabetes mellitus, type 2 (H)  - metFORMIN (GLUCOPHAGE-XR) 500 MG 24 hr tablet; Take 4 tablets (2,000 mg total) by mouth daily.  Dispense: 360 tablet; Refill: 0  - glipiZIDE (GLUCOTROL XL) 10 MG 24 hr tablet; TAKE 2 TABLETS(20 MG) BY MOUTH DAILY  Dispense: 180 tablet; Refill: 0    5. BPH (benign prostatic hyperplasia)  - tamsulosin (FLOMAX) 0.4 mg cap; TAKE 1 CAPSULE(0.4 MG) BY MOUTH DAILY  Dispense: 90 capsule; Refill: 0    6. Hypertension  - losartan (COZAAR) 100 MG tablet; Take 1 tablet (100 mg total) by mouth daily.  Dispense: 90 tablet; Refill: 0    If protocol passes may refill for 12 months if within 3 months of last provider visit (or a total of 15 months).                metFORMIN (GLUCOPHAGE-XR) 500 MG 24 hr tablet 360 tablet 0     Sig: Take 4 tablets (2,000 mg total) by mouth daily.       Metformin Refill Protocol Failed - 5/19/2021  8:12 PM        Failed - Blood pressure in last 12 months     BP Readings from Last 1 Encounters:   05/01/19 134/85             Failed - Visit with PCP or prescribing provider visit in last 6 months or next 3 months     Last office visit with prescriber/PCP: Visit date not found OR same dept: Visit date not found OR same specialty: 11/15/2018 Debra Garcia NP Last physical: Visit date not found Last MTM visit: Visit date not found         Next appt within 3 mo: Visit date not found  Next physical within 3 mo: Visit date not found  Prescriber OR PCP: Janes Jeronimo MD  Last diagnosis associated with med order: 1. Type 2 diabetes mellitus without complication, with long-term current use of insulin (H)  - blood glucose meter (GLUCOMETER); Use 1 each As Directed as needed. Dispense glucometer brand per patient's insurance at pharmacy discretion.  Dispense: 1 each; Refill: 0    2. Diabetes (H)  - pen needle,  "diabetic (PEN NEEDLE) 31 gauge x 1/4\" Ndle; Use twice daily to inject Victoza and Lantus  Dispense: 100 each; Refill: 0    3. Hyperlipidemia, unspecified hyperlipidemia type  - atorvastatin (LIPITOR) 40 MG tablet; Take 1 tablet (40 mg total) by mouth daily.  Dispense: 90 tablet; Refill: 0    4. Diabetes mellitus, type 2 (H)  - metFORMIN (GLUCOPHAGE-XR) 500 MG 24 hr tablet; Take 4 tablets (2,000 mg total) by mouth daily.  Dispense: 360 tablet; Refill: 0  - glipiZIDE (GLUCOTROL XL) 10 MG 24 hr tablet; TAKE 2 TABLETS(20 MG) BY MOUTH DAILY  Dispense: 180 tablet; Refill: 0    5. BPH (benign prostatic hyperplasia)  - tamsulosin (FLOMAX) 0.4 mg cap; TAKE 1 CAPSULE(0.4 MG) BY MOUTH DAILY  Dispense: 90 capsule; Refill: 0    6. Hypertension  - losartan (COZAAR) 100 MG tablet; Take 1 tablet (100 mg total) by mouth daily.  Dispense: 90 tablet; Refill: 0     If protocol passes may refill for 12 months if within 3 months of last provider visit (or a total of 15 months).           Failed - A1C in last 6 months     Hemoglobin A1c   Date Value Ref Range Status   05/26/2020 7.5 (H) 3.5 - 6.0 % Final               Passed - LFT or AST or ALT in last 12 months     Albumin   Date Value Ref Range Status   05/26/2020 4.0 3.5 - 5.0 g/dL Final     Bilirubin, Total   Date Value Ref Range Status   05/26/2020 0.9 0.0 - 1.0 mg/dL Final     Alkaline Phosphatase   Date Value Ref Range Status   05/26/2020 67 45 - 120 U/L Final     AST   Date Value Ref Range Status   05/26/2020 21 0 - 40 U/L Final     ALT   Date Value Ref Range Status   05/26/2020 23 0 - 45 U/L Final     Protein, Total   Date Value Ref Range Status   05/26/2020 7.0 6.0 - 8.0 g/dL Final                Passed - GFR or Serum Creatinine in last 6 months     GFR MDRD Non Af Amer   Date Value Ref Range Status   05/26/2020 >60 >60 mL/min/1.73m2 Final     GFR MDRD Af Amer   Date Value Ref Range Status   05/26/2020 >60 >60 mL/min/1.73m2 Final             Passed - Microalbumin in last year  " "    Microalbumin, Random Urine   Date Value Ref Range Status   05/26/2020 15.72 (H) 0.00 - 1.99 mg/dL Final                     tamsulosin (FLOMAX) 0.4 mg cap 90 capsule 0     Sig: TAKE 1 CAPSULE(0.4 MG) BY MOUTH DAILY       Alfuzosin/Tamsulosin/Silodosin Refill Protocol  Failed - 5/19/2021  8:12 PM        Failed - PCP or prescribing provider visit in past 12 months       Last office visit with prescriber/PCP: 2/2/2018 Janes Jeronimo MD OR same dept: Visit date not found OR same specialty: 11/15/2018 Debra Garcia NP  Last physical: 4/10/2019 Last MTM visit: Visit date not found   Next visit within 3 mo: Visit date not found  Next physical within 3 mo: Visit date not found  Prescriber OR PCP: Janes Jeronimo MD  Last diagnosis associated with med order: 1. Type 2 diabetes mellitus without complication, with long-term current use of insulin (H)  - blood glucose meter (GLUCOMETER); Use 1 each As Directed as needed. Dispense glucometer brand per patient's insurance at pharmacy discretion.  Dispense: 1 each; Refill: 0    2. Diabetes (H)  - pen needle, diabetic (PEN NEEDLE) 31 gauge x 1/4\" Ndle; Use twice daily to inject Victoza and Lantus  Dispense: 100 each; Refill: 0    3. Hyperlipidemia, unspecified hyperlipidemia type  - atorvastatin (LIPITOR) 40 MG tablet; Take 1 tablet (40 mg total) by mouth daily.  Dispense: 90 tablet; Refill: 0    4. Diabetes mellitus, type 2 (H)  - metFORMIN (GLUCOPHAGE-XR) 500 MG 24 hr tablet; Take 4 tablets (2,000 mg total) by mouth daily.  Dispense: 360 tablet; Refill: 0  - glipiZIDE (GLUCOTROL XL) 10 MG 24 hr tablet; TAKE 2 TABLETS(20 MG) BY MOUTH DAILY  Dispense: 180 tablet; Refill: 0    5. BPH (benign prostatic hyperplasia)  - tamsulosin (FLOMAX) 0.4 mg cap; TAKE 1 CAPSULE(0.4 MG) BY MOUTH DAILY  Dispense: 90 capsule; Refill: 0    6. Hypertension  - losartan (COZAAR) 100 MG tablet; Take 1 tablet (100 mg total) by mouth daily.  Dispense: 90 tablet; Refill: 0    If protocol passes " "may refill for 12 months if within 3 months of last provider visit (or a total of 15 months).                glipiZIDE (GLUCOTROL XL) 10 MG 24 hr tablet 180 tablet 0     Sig: TAKE 2 TABLETS(20 MG) BY MOUTH DAILY       Oral Hypoglycemics Refill Protocol Failed - 5/19/2021  8:12 PM        Failed - Visit with PCP or prescribing provider visit in last 6 months       Last office visit with prescriber/PCP: Visit date not found OR same dept: Visit date not found OR same specialty: 11/15/2018 Debra Garcia NP Last physical: Visit date not found Last MTM visit: Visit date not found         Next appt within 3 mo: Visit date not found  Next physical within 3 mo: Visit date not found  Prescriber OR PCP: Janes Jeronimo MD  Last diagnosis associated with med order: 1. Type 2 diabetes mellitus without complication, with long-term current use of insulin (H)  - blood glucose meter (GLUCOMETER); Use 1 each As Directed as needed. Dispense glucometer brand per patient's insurance at pharmacy discretion.  Dispense: 1 each; Refill: 0    2. Diabetes (H)  - pen needle, diabetic (PEN NEEDLE) 31 gauge x 1/4\" Ndle; Use twice daily to inject Victoza and Lantus  Dispense: 100 each; Refill: 0    3. Hyperlipidemia, unspecified hyperlipidemia type  - atorvastatin (LIPITOR) 40 MG tablet; Take 1 tablet (40 mg total) by mouth daily.  Dispense: 90 tablet; Refill: 0    4. Diabetes mellitus, type 2 (H)  - metFORMIN (GLUCOPHAGE-XR) 500 MG 24 hr tablet; Take 4 tablets (2,000 mg total) by mouth daily.  Dispense: 360 tablet; Refill: 0  - glipiZIDE (GLUCOTROL XL) 10 MG 24 hr tablet; TAKE 2 TABLETS(20 MG) BY MOUTH DAILY  Dispense: 180 tablet; Refill: 0    5. BPH (benign prostatic hyperplasia)  - tamsulosin (FLOMAX) 0.4 mg cap; TAKE 1 CAPSULE(0.4 MG) BY MOUTH DAILY  Dispense: 90 capsule; Refill: 0    6. Hypertension  - losartan (COZAAR) 100 MG tablet; Take 1 tablet (100 mg total) by mouth daily.  Dispense: 90 tablet; Refill: 0     If protocol passes may " "refill for 12 months if within 3 months of last provider visit (or a total of 15 months).           Failed - A1C in last 6 months     Hemoglobin A1c   Date Value Ref Range Status   05/26/2020 7.5 (H) 3.5 - 6.0 % Final               Failed - Blood pressure in last year     BP Readings from Last 1 Encounters:   05/01/19 134/85             Passed - Microalbumin in last year      Microalbumin, Random Urine   Date Value Ref Range Status   05/26/2020 15.72 (H) 0.00 - 1.99 mg/dL Final                  Passed - Serum creatinine in last year     Creatinine   Date Value Ref Range Status   05/26/2020 1.17 0.70 - 1.30 mg/dL Final                losartan (COZAAR) 100 MG tablet 90 tablet 0     Sig: Take 1 tablet (100 mg total) by mouth daily.       Angiotensin Receptor Blocker Protocol Failed - 5/19/2021  8:12 PM        Failed - PCP or prescribing provider visit in past 12 months       Last office visit with prescriber/PCP: 2/2/2018 Janes Jeronimo MD OR same dept: Visit date not found OR same specialty: 11/15/2018 Debra Garcia, NP  Last physical: 4/10/2019 Last MTM visit: Visit date not found   Next visit within 3 mo: Visit date not found  Next physical within 3 mo: Visit date not found  Prescriber OR PCP: Janes Jeronimo MD  Last diagnosis associated with med order: 1. Type 2 diabetes mellitus without complication, with long-term current use of insulin (H)  - blood glucose meter (GLUCOMETER); Use 1 each As Directed as needed. Dispense glucometer brand per patient's insurance at pharmacy discretion.  Dispense: 1 each; Refill: 0    2. Diabetes (H)  - pen needle, diabetic (PEN NEEDLE) 31 gauge x 1/4\" Ndle; Use twice daily to inject Victoza and Lantus  Dispense: 100 each; Refill: 0    3. Hyperlipidemia, unspecified hyperlipidemia type  - atorvastatin (LIPITOR) 40 MG tablet; Take 1 tablet (40 mg total) by mouth daily.  Dispense: 90 tablet; Refill: 0    4. Diabetes mellitus, type 2 (H)  - metFORMIN (GLUCOPHAGE-XR) 500 MG 24 hr " tablet; Take 4 tablets (2,000 mg total) by mouth daily.  Dispense: 360 tablet; Refill: 0  - glipiZIDE (GLUCOTROL XL) 10 MG 24 hr tablet; TAKE 2 TABLETS(20 MG) BY MOUTH DAILY  Dispense: 180 tablet; Refill: 0    5. BPH (benign prostatic hyperplasia)  - tamsulosin (FLOMAX) 0.4 mg cap; TAKE 1 CAPSULE(0.4 MG) BY MOUTH DAILY  Dispense: 90 capsule; Refill: 0    6. Hypertension  - losartan (COZAAR) 100 MG tablet; Take 1 tablet (100 mg total) by mouth daily.  Dispense: 90 tablet; Refill: 0    If protocol passes may refill for 12 months if within 3 months of last provider visit (or a total of 15 months).             Failed - Blood pressure filed in past 12 months     BP Readings from Last 1 Encounters:   05/01/19 134/85             Passed - Serum potassium within the past 12 months     Lab Results   Component Value Date    Potassium 4.3 05/26/2020             Passed - Serum creatinine within the past 12 months     Creatinine   Date Value Ref Range Status   05/26/2020 1.17 0.70 - 1.30 mg/dL Final

## 2021-06-17 NOTE — TELEPHONE ENCOUNTER
Telephone Encounter by Diana Ytaes at 4/15/2020  7:52 AM     Author: Diana Yates Service: -- Author Type: --    Filed: 4/15/2020  7:53 AM Encounter Date: 4/10/2020 Status: Signed    : Diana Yates       Insurance prefers One Touch Ultra and One touch Verio

## 2021-06-17 NOTE — TELEPHONE ENCOUNTER
Telephone Encounter by Deena Mcfarlane at 7/27/2020 11:26 AM     Author: Deena Mcfarlane Service: -- Author Type: --    Filed: 7/27/2020 11:27 AM Encounter Date: 7/25/2020 Status: Signed    : Deena Mcfarlane       No PA required for OneTouch Verio strips, called pharmacy and made them aware.  Technician at [facility] let me know they already have a paid claim for the Accu-chek test strips.  Closing encounter.

## 2021-06-17 NOTE — TELEPHONE ENCOUNTER
Telephone Encounter by Diana Yates at 5/19/2020  8:09 AM     Author: Diana Yates Service: -- Author Type: --    Filed: 5/19/2020  8:10 AM Encounter Date: 5/18/2020 Status: Signed    : Diana Yates       PRIOR AUTHORIZATION DENIED    Denial Rational: Insurance prefers Lantus and Levemir              Appeal Information: If provider would like to appeal please provide a letter of medical necessity stating why formulary alternatives would not be clinically appropriate for the patient and route back to the PA team.

## 2021-06-18 NOTE — PROGRESS NOTES
MTM Initial Encounter  Assessment & Plan                                                     1. Type 2 diabetes mellitus  Due for A1c today with goal <7%. He would benefit from a GLP-1 agonist, but unfortunately these are all too cost prohibitive on his current insurance plan. If needed, could adjust dose of basal insulin and/or glipizide pending A1c result. Updated Rx for Basaglar and recommended trying alternative pharmacy for lower copay.   Plan   1. Check A1c.     2. Essential hypertension  Blood pressure is well controlled and meeting goal of <140/90 mm Hg per JNC-8 hypertension guidelines.     3. Hyperlipidemia  Appropriately on a high intensity statin given age and diabetes diagnosis per ACC/AHA guidelines. Previous triglycerides were quite elevated, but he was out of medication at the time due to lapse in insurance. He is fasting today so will recheck lipid panel.   Plan   1. Check lipid panel.     4. BPH (benign prostatic hyperplasia)  Stable.        Medication Adherence/Access: Cost is a concern, so generic, low cost medications are preferred options.     Follow Up  Return in about 1 month (around 7/13/2018).        Subjective & Objective                                                     Ezekiel Aldrich is a 63 y.o. male coming in for an initial visit for Medication Therapy Management. He was referred to me from Janes Jeronimo MD    Chief Complaint: Medication cost    1. Type 2 diabetes mellitus  Ezekiel is taking Basaglar 60 units SQ daily, metformin ER 2000 mg daily, and glipizide ER 10 mg daily. Takes aspirin daily. He cannot afford Victoza so glipizide was started. Reported BG have ranged from 130 to over 200 per his report. No hypoglycemia recently. He is upset that his pharmacy will only give him 5 insulin pens at a time, which is not a full 30 day supply.     Lab Results   Component Value Date    HGBA1C 8.2 (H) 06/13/2018       2. Essential hypertension  Ezekiel is taking losartan 100 mg daily. He  "had a dry cough with lisinopril.     3. Hyperlipidemia  Ezekiel is taking atorvastatin 40 mg daily. He is fasting today.     4. BPH (benign prostatic hyperplasia)  Ezekiel is taking tamsulosin 0.4 mg daily.       Medication Adherence/Access: Cost is a concern, so generic, low cost medications are preferred options.     PMH: reviewed in EPIC   Allergies/ADRs: reviewed in EPIC   Alcohol: reviewed in EPIC   Tobacco:   History   Smoking Status     Former Smoker     Types: Cigarettes     Quit date: 5/1/1985   Smokeless Tobacco     Current User     Types: Chew     Comment: 2 tins/week     Today's Vitals:   Vitals:    06/13/18 1106   BP: 122/80   Pulse: 74   Weight: (!) 268 lb 9.6 oz (121.8 kg)     ----------------  Much or all of the text in this note was generated through the use of Dragon Dictate voice-to-text software. Errors in spelling or words which seem out of context are unintentional. Sound alike errors, in particular, may have escaped editing.    The patient was given a summary of these recommendations via GEOLID    I spent 45 minutes with this patient today; Patient is not Medicare Part D. All changes were made via collaborative practice agreement with Janes Jeronimo MD. A copy of the visit note was provided to the patient's provider.     Marci Wang, PharmD, BCACP  Medication Management Pharmacist  [facility]        Current Outpatient Prescriptions   Medication Sig Dispense Refill     aspirin 81 MG EC tablet Take 81 mg by mouth daily.       atorvastatin (LIPITOR) 40 MG tablet TAKE 1 TABLET BY MOUTH EVERY DAY 90 tablet 3     BD INSULIN PEN NEEDLE UF SHORT 31 gauge x 5/16\" Ndle USE TWICE PER DAY TO INJECT VICTOZA AND LANTUS  3     blood glucose test strips Use 1 each As Directed 2 (two) times a day. Dispense brand per patient's insurance at pharmacy discretion. 200 strip 5     generic lancets (ACCU-CHEK FASTCLIX) Use 1 each As Directed 2 (two) times a day. Dispense brand per " "patient's insurance at pharmacy discretion. 100 each 3     glipiZIDE (GLUCOTROL XL) 10 MG 24 hr tablet Take 1 tablet (10 mg total) by mouth daily. 90 tablet 3     insulin glargine (BASAGLAR KWIKPEN U-100 INSULIN) 100 unit/mL (3 mL) pen Inject 60 Units under the skin every morning. 20 adj dose pen 0     losartan (COZAAR) 100 MG tablet TAKE 1 TABLET BY MOUTH EVERY DAY 90 tablet 3     metFORMIN (GLUCOPHAGE-XR) 500 MG 24 hr tablet TAKE 4 TABLETS BY MOUTH EVERY  tablet 3     multivitamin therapeutic (THERAGRAN) tablet Take 1 tablet by mouth daily.       pen needle, diabetic (PEN NEEDLE) 31 gauge x 1/4\" Ndle Use twice daily to inject Victoza and Lantus 200 each 3     sildenafil, antihypertensive, (REVATIO) 20 mg tablet Take 1 daily as needed. 60 tablet 3     tamsulosin (FLOMAX) 0.4 mg Cp24 TAKE ONE CAPSULE BY MOUTH DAILY 90 capsule 3     No current facility-administered medications for this visit.                "

## 2021-06-19 NOTE — LETTER
Letter by Janes Jeronimo MD at      Author: Janes Jeronimo MD Service: -- Author Type: --    Filed:  Encounter Date: 5/3/2019 Status: (Other)         Ezekiel Aldrich  [address] [address]             May 3, 2019         Dear Mr. Aldrich,    Below are the results from your recent visit: Kidney tests are normal and stable, checked because of your new blood pressure medication.    Resulted Orders   Basic Metabolic Panel   Result Value Ref Range    Sodium 138 136 - 145 mmol/L    Potassium 4.1 3.5 - 5.0 mmol/L    Chloride 105 98 - 107 mmol/L    CO2 22 22 - 31 mmol/L    Anion Gap, Calculation 11 5 - 18 mmol/L    Glucose 107 70 - 125 mg/dL    Calcium 9.8 8.5 - 10.5 mg/dL    BUN 29 (H) 8 - 22 mg/dL    Creatinine 1.05 0.70 - 1.30 mg/dL    GFR MDRD Af Amer >60 >60 mL/min/1.73m2    GFR MDRD Non Af Amer >60 >60 mL/min/1.73m2    Narrative    Fasting Glucose reference range is 70-99 mg/dL per  American Diabetes Association (ADA) guidelines.            Please call with questions or contact us using [facility].    Sincerely,        Electronically signed by Janes Jeronimo MD

## 2021-06-19 NOTE — LETTER
Letter by Janes Jeronimo MD at      Author: Janes Jeronimo MD Service: -- Author Type: --    Filed:  Encounter Date: 4/12/2019 Status: (Other)         Ezekiel Aldrich  [address] [facility] [address]             April 12, 2019         Dear Mr. Aldrich,    Below are the results from your recent visit: Diabetes is poorly controlled A1c at 9.9 which is why we increased the insulin and started you also in a new diabetes medication.  The total testosterone level is slightly low but the 3 or available testosterone is within normal limits, I would not put you on supplemental testosterone rather I would focus on medication which is why faxed in Cialis.  But I also put in a referral for you to see the sleep specialist, I suspect some of your issues have to do with inadequately treated sleep apnea.    Cholesterol is overall well controlled though the triglycerides are high.  Blood counts are normal, no anemia.  Thyroid is normal.  There is protein in the urine, this is from diabetes.  PSA or prostate test is normal.    Resulted Orders   HM2(CBC w/o Differential)   Result Value Ref Range    WBC 6.9 4.0 - 11.0 thou/uL    RBC 5.18 4.40 - 6.20 mill/uL    Hemoglobin 16.1 14.0 - 18.0 g/dL    Hematocrit 47.6 40.0 - 54.0 %    MCV 92 80 - 100 fL    MCH 31.0 27.0 - 34.0 pg    MCHC 33.7 32.0 - 36.0 g/dL    RDW 12.4 11.0 - 14.5 %    Platelets 191 140 - 440 thou/uL    MPV 6.9 (L) 7.0 - 10.0 fL   Thyroid Stimulating Hormone (TSH)   Result Value Ref Range    TSH 2.26 0.30 - 5.00 uIU/mL   Comprehensive Metabolic Panel   Result Value Ref Range    Sodium 138 136 - 145 mmol/L    Potassium 4.4 3.5 - 5.0 mmol/L    Chloride 101 98 - 107 mmol/L    CO2 25 22 - 31 mmol/L    Anion Gap, Calculation 12 5 - 18 mmol/L    Glucose 186 (H) 70 - 125 mg/dL    BUN 18 8 - 22 mg/dL    Creatinine 0.96 0.70 - 1.30 mg/dL    GFR MDRD Af Amer >60 >60 mL/min/1.73m2    GFR MDRD Non Af Amer >60 >60 mL/min/1.73m2    Bilirubin, Total 1.0 0.0 - 1.0 mg/dL     Calcium 10.1 8.5 - 10.5 mg/dL    Protein, Total 7.4 6.0 - 8.0 g/dL    Albumin 4.3 3.5 - 5.0 g/dL    Alkaline Phosphatase 64 45 - 120 U/L    AST 23 0 - 40 U/L    ALT 20 0 - 45 U/L    Narrative    Fasting Glucose reference range is 70-99 mg/dL per  American Diabetes Association (ADA) guidelines.   Glycosylated Hemoglobin A1c   Result Value Ref Range    Hemoglobin A1c 9.9 (H) 3.5 - 6.0 %   Lipid Cascade   Result Value Ref Range    Cholesterol 170 <=199 mg/dL    Triglycerides 338 (H) <=149 mg/dL    HDL Cholesterol 52 >=40 mg/dL    LDL Calculated 50 <=129 mg/dL    Patient Fasting > 8hrs? Yes    Testosterone, Free and Total   Result Value Ref Range    Testosterone, Total 198 (L) 240 - 950 ng/dL      Comment:      This test was developed and its performance characteristics determined by the  [facility],  [facility]. It has  not been cleared or approved by the FDA. The laboratory is regulated under CLIA  as qualified to perform high-complexity testing. This test is used for clinical  purposes. It should not be regarded as investigational or for research.    Sex Hormone Bind Globulin 17 11 - 80 nmol/L    Free Testosterone Calc 5.23 4.7 - 24.4 ng/dL      Comment:      Performed and/or entered by:  [address] [address]    Microalbumin, Random Urine   Result Value Ref Range    Microalbumin, Random Urine 13.41 (H) 0.00 - 1.99 mg/dL    Creatinine, Urine 106.4 mg/dL    Microalbumin/Creatinine Ratio Random Urine 126.0 (H) <=19.9 mg/g    Narrative    Microalbumin, Random Urine  <2.0 mg/dL . . . . . . . . Normal  3.0-30.0 mg/dL . . . . . . Microalbuminuria  >30.0 mg/dL . . . . . .  . Clinical Proteinuria  Microalbumin/Creatinine Ratio, Random Urine  <20 mg/g . . . . .. . . . Normal   mg/g . . . . . . . Microalbuminuria  >300 mg/g . . . . . . . . Clinical Proteinuria   PSA (Prostatic-Specific Antigen), Annual Screen    Result Value Ref Range    PSA 0.3 0.0 - 4.5 ng/mL    Narrative    Method is Abbott Prostate-Specific Antigen (PSA)  Standard-WHO 1st International (90:10)            Please call with questions or contact us using [employer].    Sincerely,        Electronically signed by Janes Jeronimo MD

## 2021-06-19 NOTE — LETTER
Letter by Janes Jeronimo MD at      Author: Janes Jeronimo MD Service: -- Author Type: --    Filed:  Encounter Date: 4/13/2019 Status: (Other)                         April 19, 2019    Patient: Ezekiel Aldrich   MR Number: [id number]   YOB: 1955   Date of Visit: 4/10/2019     To whom it may concern,     I am writing on behalf of my patient Ezekiel Aldrich (Patient ID [id number]) to appeal the prior authorization denial for medication tadalafil. He requires this medication for treatment of combination of BPH and erectile dysfunction and is medically necessary.       If you have questions, please do not hesitate to call me.     Sincerely,        Janes Jeronimo MD  [address] [address]        Diana Yates  4/18/2019  7:55 AM  Signed  PRIOR AUTHORIZATION DENIED    Denial Rational: ED Medications are excluded from coverage            Appeal Information: If provider would like to appeal please provide a letter of medical necessity and route back to the PA team.         Diana Yates  4/16/2019 11:20 AM  Signed  Central PA team  [phone]  Pool: HE PA MED ([id number])          PA has been initiated.       PA form completed and faxed insurance via Cover My Meds     Key:  [id number] - PA Case ID: [id number]     Medication:  Tadalafil 5MG OR TABS    Insurance:  [facility]        Response will be received via fax and may take up to 5-10 business days depending on plan

## 2021-06-20 NOTE — LETTER
Letter by Janes eJronimo MD at      Author: Janes Jeronimo MD Service: -- Author Type: --    Filed:  Encounter Date: 5/27/2020 Status: (Other)         Ezekiel Aldrich  [address] [facility] [address]             May 27, 2020         Dear Mr. Aldrich,    Below are the results from your recent visit: Diabetes is actually much better control with A1c at 7.5, was higher previously.  We like it slightly closer to 7, but for now I want you to focus is much as possible good diet and exercise to help lower.  Cholesterol is overall well controlled, though the triglycerides are high, oftentimes good diet and exercise can help lower.  There is some excess protein in the urine from diabetes, has been present previously, again better control of diabetes will also help.    Liver and kidney tests are normal.  The PSA or prostate test is normal.    See us again in the next 3 to 4 months.    Resulted Orders   Comprehensive Metabolic Panel   Result Value Ref Range    Sodium 137 136 - 145 mmol/L    Potassium 4.3 3.5 - 5.0 mmol/L    Chloride 102 98 - 107 mmol/L    CO2 22 22 - 31 mmol/L    Anion Gap, Calculation 13 5 - 18 mmol/L    Glucose 243 (H) 70 - 125 mg/dL    BUN 17 8 - 22 mg/dL    Creatinine 1.17 0.70 - 1.30 mg/dL    GFR MDRD Af Amer >60 >60 mL/min/1.73m2    GFR MDRD Non Af Amer >60 >60 mL/min/1.73m2    Bilirubin, Total 0.9 0.0 - 1.0 mg/dL    Calcium 10.2 8.5 - 10.5 mg/dL    Protein, Total 7.0 6.0 - 8.0 g/dL    Albumin 4.0 3.5 - 5.0 g/dL    Alkaline Phosphatase 67 45 - 120 U/L    AST 21 0 - 40 U/L    ALT 23 0 - 45 U/L    Narrative    Fasting Glucose reference range is 70-99 mg/dL per  American Diabetes Association (ADA) guidelines.   Lipid Cascade   Result Value Ref Range    Cholesterol 185 <=199 mg/dL    Triglycerides 445 (H) <=149 mg/dL    HDL Cholesterol 52 >=40 mg/dL    LDL Calculated        Comment:      Invalid, Triglycerides >400    Patient Fasting > 8hrs? Yes    Glycosylated Hemoglobin A1c   Result Value Ref  Range    Hemoglobin A1c 7.5 (H) 3.5 - 6.0 %   PSA, Annual Screen (Prostatic-Specific Antigen)   Result Value Ref Range    PSA 0.3 0.0 - 4.5 ng/mL    Narrative    Method is Abbott Prostate-Specific Antigen (PSA)  Standard-WHO 1st International (90:10)   Microalbumin, Random Urine   Result Value Ref Range    Microalbumin, Random Urine 15.72 (H) 0.00 - 1.99 mg/dL    Creatinine, Urine 34.9 mg/dL    Microalbumin/Creatinine Ratio Random Urine 450.4 (H) <=19.9 mg/g    Narrative    Microalbumin, Random Urine  <2.0 mg/dL . . . . . . . . Normal  3.0-30.0 mg/dL . . . . . . Microalbuminuria  >30.0 mg/dL . . . . . .  . Clinical Proteinuria    Microalbumin/Creatinine Ratio, Random Urine  <20 mg/g . . . . .. . . . Normal   mg/g . . . . . . . Microalbuminuria  >300 mg/g . . . . . . . . Clinical Proteinuria       Custom LDL Cholesterol, Direct   Result Value Ref Range    Direct LDL 85 <=129 mg/dl            Please call with questions or contact us using [employer].    Sincerely,        Electronically signed by Janes Jeronimo MD

## 2021-06-21 ENCOUNTER — COMMUNICATION - HEALTHEAST (OUTPATIENT)
Dept: FAMILY MEDICINE | Facility: CLINIC | Age: 66
End: 2021-06-21

## 2021-06-21 NOTE — PROGRESS NOTES
Assessment/Plan:     1. Ankle swelling, right  - Ambulatory referral to Orthopedic Surgery    2. History of fracture of right ankle  - Ambulatory referral to Orthopedic Surgery    Patient with significant right ankle swelling, pain, and stiffness.  Previous history of surgery with hardware.  No redness or tenderness, making me less concerned for gout or infection.  Possible bursal cyst.  Due to history of surgery and significant swelling, deferred imaging today in lieu of referral to orthopedics.  Patient agree with this plan.     Subjective:     Ezekiel Aldrich is a 63 y.o. male who presents with increased swelling to this right ankle.  Pertinent history of right ankle fracture approximately 41 years ago.  Patient did have surgery on the ankle at that time, and 3 screws were placed.  He later had one screw removed.  Patient has always had some joint achiness and crepitus, but it is gotten much worse over the last 1-2 months.  Patient is self-employed and installs hardwood floors.  He has been having a lot of difficulty completing his work.  He has noticed a large increase in the swelling to his ankle.  Range of motion is severely reduced, and walking is painful.  He denies any redness to the joint.  He does have some subjective paresthesias to both feet, but this is not new.  Pertinent history of type 2 diabetes.  Patient has tried Ace wrapping the ankle as well as ibuprofen.  Ibuprofen does seem to help slightly.  No history of gout.      The following portions of the patient's history were reviewed and updated as appropriate: allergies, current medications.    Review of Systems  A comprehensive review of systems was performed and was otherwise negative    Objective:     /80 (Patient Site: Right Arm, Patient Position: Sitting, Cuff Size: Adult Large)   Pulse (!) 128   Wt (!) 270 lb 4.8 oz (122.6 kg)   BMI 38.24 kg/m      General Appearance: Alert, cooperative, no distress, appears stated  age  Extremities: Right ankle is significantly swollen. Large area of soft tissue swelling over both the lateral and medial malleolus, worse on the lateral side. No redness or tenderness. Ankle ROM is significantly reduced. Pedal pulses intact. Subjective paresthesias.        Debra Garcia, ALEXANDR-C

## 2021-06-21 NOTE — LETTER
Letter by Janes Jeronimo MD at      Author: Janes Jeornimo MD Service: -- Author Type: --    Filed:  Encounter Date: 1/28/2021 Status: (Other)           Ezekiel Aldrich  [address] [facility] [address]               1/28/2021        Ezekiel Aldrich,    Our records indicate that you are due for a diabetic exam. Your last appointment was on 5/18/2020. Please schedule an office visit to be seen at your convenience. You can call [phone] to schedule or you can also schedule through [employer].     If you have had this done at a different health care facility other than [facility] please help us obtain a copy of your results to update your records. You can also let us know when and where you had the testing completed through a phone call or by sending us a message through [employer]. Here is our fax number if you would like your records faxed to us: [phone].     If you are no longer a patient at [facility] please contact us and let us know  so we can remove you from our list.          Thank you,     Your [facility] Team

## 2021-06-25 NOTE — TELEPHONE ENCOUNTER
Called and spoke with patient.  Scheduled patient's AWV with Dr. Jeronimo on 6/28/21.  Cancelled appt with Debra that was on 7/22.      Leticia Hart, CMA

## 2021-06-26 NOTE — TELEPHONE ENCOUNTER
Called pharmacy, they did receive the prescription that was sent. Pharmacy states they sent in a form last week to fill out since patient is on medicare. It needs to be completed before patient is able to get the strips.

## 2021-06-27 NOTE — PROGRESS NOTES
Progress Notes by Janes Jeronimo MD at 4/10/2019  7:50 AM     Author: Janes Jeronimo MD Service: -- Author Type: Physician    Filed: 4/10/2019  6:00 PM Encounter Date: 4/10/2019 Status: Signed    : Janes Jeronimo MD (Physician)       MALE PREVENTATIVE EXAM    Assessment and Plan:       1. Physical exam  Patient overall has good health habits.    2. Type 2 diabetes mellitus (H)  Poorly controlled, A1c 9.9.  - Glycosylated Hemoglobin A1c  - Microalbumin, Random Urine    3. Hypertension  Suboptimal control.    4. Hyperlipidemia  Has been well controlled.  - Comprehensive Metabolic Panel  - Lipid Cascade    5. Benign prostatic hyperplasia  Flomax.  - tadalafil (CIALIS) 5 MG tablet; 1 daily for a combination of BPH and male erectile disorder.  Dispense: 30 tablet; Refill: 5     6. Microalbuminuria  Patient is on an ARB.    8. Obstructive Sleep Apnea  Notes in the past, the patient is currently not treated and may have untreated sleep apnea.     9. Male Erectile Disorder  Patient has not responded optimally to Viagra.  - Testosterone, Free and Total  - tadalafil (CIALIS) 5 MG tablet; 1 daily for a combination of BPH and male erectile disorder.  Dispense: 30 tablet; Refill: 5    10. Low energy  Probably multifactorial, the patient could have an underlying endocrine disorder but I question whether or not untreated sleep apnea may be playing a role as well.  - HM2(CBC w/o Differential)  - Thyroid Stimulating Hormone (TSH)  - Testosterone, Free and Total    11. Screening for prostate cancer  Family history.  - PSA (Prostatic-Specific Antigen), Annual Screen    PLAN:  1.  Laboratory studies as above.  2.  If laboratory studies are not showing any obvious etiology for low energy and fatigue, I would then do a referral to sleep specialist to work-up sleep apnea.  3.  Trial of Cialis 5 mg daily, though in practice the patient can take between 1 and 4 as needed.  4.  Increase Basaglar from 60 units to 66 units  5.   Add Actos 15 mg daily.  6.  Add chlorthalidone 25 mg daily  7.   Patient follow-up with our pharmacist in 3 weeks  8.  See again in 3 to 4 months.    Next follow up:  Return in about 1 year (around 4/10/2020) for Annual physical.    Immunization Review  Adult Imm Review: No immunizations due today      I discussed the following with the patient:   Adult Healthy Living: Importance of regular exercise  Healthy nutrition        Subjective:   Chief Complaint: Ezekiel Aldrich is an 63 y.o. male here for a preventative health visit.     HPI: Patient states that he has been sick more often than usual this winter. He has had a couple bouts of colds and flu-like illnesses. He slipped on the ice three times this winter and is experiencing right shoulder pain due to one of the falls. He explains that he feels fatigued and has been really dragging at work. He feels like he has no energy to do things around the house. He notes that he snores but his wife says he does not wake up during the night. He also experiences generalized body aches daily. He mentions that sildenafil has not been working for his erectile disorder and gives him heartburn. His inability to have an erection is frustrating for him and his wife. He needs his Basaglar insulin refilled. He has some numbness and tingling in his feet and they get cold easily.     PFSH:  Family: His brother had prostate cancer. His mother  from colon cancer.   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     Healthy Habits  Are you taking a daily aspirin? Yes  Do you typically exercising at least 40 min, 3-4 times per week?  NO, but patient says his job is very physical   Do you usually eat at least 4 servings of fruit and vegetables a day, include whole grains and fiber and avoid regularly eating high fat foods? NO  Have you had an eye exam in the past two years? Yes  Do you see a dentist twice per year? NO  Do you have any concerns regarding sleep? No    Safety Screen  If you own  "firearms, are they secured in a locked gun cabinet or with trigger locks? The patient declines to answer  Do you feel you are safe where you are living?: Yes (4/10/2019  7:57 AM)  Do you feel you are safe in your relationship(s)?: Yes (4/10/2019  7:57 AM)      Review of Systems:  Please see above.  The rest of the review of systems are negative for all systems.     Cancer Screening       Status Date      COLONOSCOPY Next Due 12/19/2021      Done 12/19/2016 COLONOSCOPY EXTERNAL RESULT     Patient has more history with this topic...          Patient Care Team:  Janes Jeronimo MD as PCP - General (Family Medicine)  Marci Wang PharmD as Pharmacist (Pharmacist)        History     Reviewed By Date/Time Sections Reviewed    Janes Jeronimo MD 4/10/2019  8:03 AM Medical, Surgical, Tobacco, Alcohol, Drug Use, Sexual Activity, Family, Social Documentation, Socioeconomic, Lifestyle, Relationships    Argelia Brooks CMA 4/10/2019  7:58 AM Tobacco            Objective:   Vital Signs:   Visit Vitals  BP (!) 179/109   Pulse (!) 112   Ht 5' 10.25\" (1.784 m)   Wt (!) 252 lb 1.6 oz (114.4 kg)   SpO2 96%   BMI 35.92 kg/m           PHYSICAL EXAM  Constitutional:   Reveals a healthy appearing male.  Vitals: per nursing notes.  HEENT: Right Ear: External ear normal.   Left Ear: External ear normal.   Nose: Nose normal.   Mouth/Throat: Oropharynx is clear and moist.   Eyes: Conjunctivae and EOM are normal. Pupils are equal, round, and reactive to light. Right eye exhibits no discharge. Left eye exhibits no discharge.   Neck:  Supple, no carotid bruits or adenopathy.  Back:  No spine or CVA pain.  Thorax:  No bony deformities.  Lungs: Clear to A&P without rales or wheezes.  Respiratory effort normal.  Cardiac:   Regular rate and rhythm, normal S1, S2, no murmur or gallop.  Abdomen:  Soft, active bowel sounds without bruits, mass, or tenderness.  Extremities:   No peripheral edema, pulses in the feet intact.    Skin:  No " jaundice, peripheral cyanosis or lesions to suggest malignancy.  Neuro:  Alert and oriented. Cranial nerves, motor, sensory exams are intact.  No gross focal deficits.  Psychiatric:  Memory intact, mood appropriate.    ADDITIONAL HISTORY SUMMARIZED (2): Reviewed pharmacist note from 6/13/18: Type 2 diabetes, essential hypertension, and hyperlipidemia management.   DECISION TO OBTAIN EXTRA INFORMATION (1): None.   RADIOLOGY TESTS (1): None.  LABS (1): Reviewed labs from 6/13/18: Hemoglobin A1c: 8.2%. Ordered labs. Ordered UA.   MEDICINE TESTS (1): None.  INDEPENDENT REVIEW (2 each): None.     Total data points = 3    Total time was 34 minutes, greater than 50% counseling and coordinating care regarding the above issues.        The 10-year ASCVD risk score (Carlos ZALDIVAR Jr., et al., 2013) is: 35.4%    Values used to calculate the score:      Age: 63 years      Sex: Male      Is Non- : No      Diabetic: Yes      Tobacco smoker: No      Systolic Blood Pressure: 179 mmHg      Is BP treated: Yes      HDL Cholesterol: 55 mg/dL      Total Cholesterol: 213 mg/dL         Medication List           Accurate as of 4/10/19  8:41 AM. If you have any questions, ask your nurse or doctor.               CONTINUE taking these medications    aspirin 81 MG EC tablet  INSTRUCTIONS:  Take 81 mg by mouth daily.        atorvastatin 40 MG tablet  Also known as:  LIPITOR  INSTRUCTIONS:  Take 1 tablet (40 mg total) by mouth daily.        blood glucose test strips  INSTRUCTIONS:  Use 1 each As Directed 2 (two) times a day. Dispense brand per patient's insurance at pharmacy discretion.  Doctor's comments:   test strips        generic lancets  Also known as:  ACCU-CHEK FASTCLIX LANCING DEV  INSTRUCTIONS:  Use 1 each As Directed 2 (two) times a day. Dispense brand per patient's insurance at pharmacy discretion.        glipiZIDE 10 MG 24 hr tablet  Also known as:  GLUCOTROL XL  INSTRUCTIONS:  Take 2 tablets (20 mg total) by  "mouth daily.        insulin glargine 100 unit/mL (3 mL) pen  Also known as:  BASAGLAR KWIKPEN U-100 INSULIN  INSTRUCTIONS:  INJECT 60 UNITS UNDER THE SKIN AT BEDTIME        losartan 100 MG tablet  Also known as:  COZAAR  INSTRUCTIONS:  Take 1 tablet (100 mg total) by mouth daily.        metFORMIN 500 MG 24 hr tablet  Also known as:  GLUCOPHAGE-XR  INSTRUCTIONS:  TAKE 4 TABLETS BY MOUTH EVERY DAY  Doctor's comments:  No more refills until sees MD        multivitamin therapeutic tablet  Also known as:  THERAGRAN  INSTRUCTIONS:  Take 1 tablet by mouth daily.        * pen needle, diabetic 31 gauge x 1/4\" Ndle  Also known as:  PEN NEEDLE  INSTRUCTIONS:  Use twice daily to inject Victoza and Lantus        * BD ULTRA-FINE SHORT PEN NEEDLE 31 gauge x 5/16\" Ndle  INSTRUCTIONS:  USE TWICE PER DAY TO INJECT VICTOZA AND LANTUS  Generic drug:  pen needle, diabetic        sildenafil (antihypertensive) 20 mg tablet  Also known as:  REVATIO  INSTRUCTIONS:  Take 1 daily as needed.        tamsulosin 0.4 mg Cap  Also known as:  FLOMAX  INSTRUCTIONS:  Take 1 capsule (0.4 mg total) by mouth daily.            * This list has 2 medication(s) that are the same as other medications prescribed for you. Read the directions carefully, and ask your doctor or other care provider to review them with you.               Where to Get Your Medications      These medications were sent to [id number] - [place], [place] - [address] AT [facility] & [address]  [address], [place] [address]    Phone:  [phone]     insulin glargine 100 unit/mL (3 mL) pen         Additional Screenings Completed Today:     By signing my name below, Toney NEGRO, attest that this documentation has been prepared under the direction and in the presence of Dr. Janes Jeronimo.  Electronic Signature: Mallorie Patiño. 4/10/2019 8:00 AM.    Dr. Phani NEGRO, personally performed the services described in this documentation. All medical record " entries made by the scribe were at my direction and in my presence. I have reviewed the chart and discharge instructions (if applicable) and agree that the record reflects my personal performance and is accurate and complete.

## 2021-06-28 ENCOUNTER — OFFICE VISIT - HEALTHEAST (OUTPATIENT)
Dept: FAMILY MEDICINE | Facility: CLINIC | Age: 66
End: 2021-06-28

## 2021-06-28 DIAGNOSIS — Z23 NEED FOR VACCINATION: ICD-10-CM

## 2021-06-28 DIAGNOSIS — E11.9 TYPE 2 DIABETES MELLITUS WITHOUT COMPLICATION, WITH LONG-TERM CURRENT USE OF INSULIN (H): ICD-10-CM

## 2021-06-28 DIAGNOSIS — E66.01 MORBID OBESITY (H): ICD-10-CM

## 2021-06-28 DIAGNOSIS — E78.49 OTHER HYPERLIPIDEMIA: ICD-10-CM

## 2021-06-28 DIAGNOSIS — Z12.5 SCREENING FOR PROSTATE CANCER: ICD-10-CM

## 2021-06-28 DIAGNOSIS — Z79.4 TYPE 2 DIABETES MELLITUS WITHOUT COMPLICATION, WITH LONG-TERM CURRENT USE OF INSULIN (H): ICD-10-CM

## 2021-06-28 DIAGNOSIS — Z00.00 WELLNESS EXAMINATION: ICD-10-CM

## 2021-06-28 DIAGNOSIS — Z12.11 SCREEN FOR COLON CANCER: ICD-10-CM

## 2021-06-28 DIAGNOSIS — G60.9 IDIOPATHIC PERIPHERAL NEUROPATHY: ICD-10-CM

## 2021-06-28 DIAGNOSIS — I10 ESSENTIAL HYPERTENSION: ICD-10-CM

## 2021-06-28 DIAGNOSIS — F52.8 PSYCHOSEXUAL DYSFUNCTION WITH INHIBITED SEXUAL EXCITEMENT: ICD-10-CM

## 2021-06-28 LAB
ALBUMIN SERPL-MCNC: 4.2 G/DL (ref 3.5–5)
ALP SERPL-CCNC: 69 U/L (ref 45–120)
ALT SERPL W P-5'-P-CCNC: 31 U/L (ref 0–45)
ANION GAP SERPL CALCULATED.3IONS-SCNC: 15 MMOL/L (ref 5–18)
AST SERPL W P-5'-P-CCNC: 30 U/L (ref 0–40)
BILIRUB SERPL-MCNC: 1.1 MG/DL (ref 0–1)
BUN SERPL-MCNC: 16 MG/DL (ref 8–22)
CALCIUM SERPL-MCNC: 9.8 MG/DL (ref 8.5–10.5)
CHLORIDE BLD-SCNC: 100 MMOL/L (ref 98–107)
CHOLEST SERPL-MCNC: 217 MG/DL
CO2 SERPL-SCNC: 22 MMOL/L (ref 22–31)
CREAT SERPL-MCNC: 1.29 MG/DL (ref 0.7–1.3)
CREAT UR-MCNC: 244.1 MG/DL
FASTING STATUS PATIENT QL REPORTED: YES
GFR SERPL CREATININE-BSD FRML MDRD: 56 ML/MIN/1.73M2
GLUCOSE BLD-MCNC: 275 MG/DL (ref 70–125)
HBA1C MFR BLD: 7.8 %
HDLC SERPL-MCNC: 53 MG/DL
LDLC SERPL CALC-MCNC: 103 MG/DL
LDLC SERPL CALC-MCNC: ABNORMAL MG/DL
MICROALBUMIN UR-MCNC: 77.61 MG/DL (ref 0–1.99)
MICROALBUMIN/CREAT UR: 317.9 MG/G
POTASSIUM BLD-SCNC: 4.6 MMOL/L (ref 3.5–5)
PROT SERPL-MCNC: 7.3 G/DL (ref 6–8)
SODIUM SERPL-SCNC: 137 MMOL/L (ref 136–145)
TRIGL SERPL-MCNC: 488 MG/DL

## 2021-06-28 RX ORDER — VARDENAFIL HYDROCHLORIDE 20 MG/1
20 TABLET ORAL PRN
Qty: 20 TABLET | Refills: 1 | Status: SHIPPED | OUTPATIENT
Start: 2021-06-28 | End: 2021-11-29

## 2021-06-28 ASSESSMENT — PATIENT HEALTH QUESTIONNAIRE - PHQ9: SUM OF ALL RESPONSES TO PHQ QUESTIONS 1-9: 3

## 2021-06-30 ENCOUNTER — COMMUNICATION - HEALTHEAST (OUTPATIENT)
Dept: FAMILY MEDICINE | Facility: CLINIC | Age: 66
End: 2021-06-30

## 2021-06-30 ENCOUNTER — AMBULATORY - HEALTHEAST (OUTPATIENT)
Dept: FAMILY MEDICINE | Facility: CLINIC | Age: 66
End: 2021-06-30

## 2021-06-30 DIAGNOSIS — E78.49 OTHER HYPERLIPIDEMIA: ICD-10-CM

## 2021-06-30 DIAGNOSIS — E11.9 TYPE 2 DIABETES MELLITUS WITHOUT COMPLICATION, WITH LONG-TERM CURRENT USE OF INSULIN (H): ICD-10-CM

## 2021-06-30 DIAGNOSIS — Z79.4 TYPE 2 DIABETES MELLITUS WITHOUT COMPLICATION, WITH LONG-TERM CURRENT USE OF INSULIN (H): ICD-10-CM

## 2021-06-30 RX ORDER — ATORVASTATIN CALCIUM 80 MG/1
TABLET, FILM COATED ORAL
Qty: 90 TABLET | Refills: 3 | Status: SHIPPED | OUTPATIENT
Start: 2021-06-30 | End: 2021-10-19

## 2021-07-03 ENCOUNTER — HEALTH MAINTENANCE LETTER (OUTPATIENT)
Age: 66
End: 2021-07-03

## 2021-07-03 NOTE — ADDENDUM NOTE
Addendum Note by Marci Lee, PharmJAYRO at 2/13/2018  9:51 AM     Author: Marci Lee PharmD Service: -- Author Type: Pharmacist    Filed: 2/13/2018  9:51 AM Encounter Date: 2/9/2018 Status: Signed    : Marci Lee PharmD (Pharmacist)    Addended by: MARCI LEE on: 2/13/2018 09:51 AM        Modules accepted: Orders

## 2021-07-05 ENCOUNTER — COMMUNICATION - HEALTHEAST (OUTPATIENT)
Dept: NURSING | Facility: CLINIC | Age: 66
End: 2021-07-05

## 2021-07-05 ENCOUNTER — COMMUNICATION - HEALTHEAST (OUTPATIENT)
Dept: FAMILY MEDICINE | Facility: CLINIC | Age: 66
End: 2021-07-05

## 2021-07-05 PROBLEM — E66.01 MORBID OBESITY (H): Status: ACTIVE | Noted: 2021-06-28

## 2021-07-05 PROBLEM — G62.9 PERIPHERAL NEUROPATHY: Status: ACTIVE | Noted: 2021-06-28

## 2021-07-05 NOTE — TELEPHONE ENCOUNTER
Telephone Encounter by Marci Wang PharmD at 7/5/2021  1:11 PM     Author: Marci Wang, Anastasia Service: -- Author Type: Pharmacist    Filed: 7/5/2021  1:11 PM Encounter Date: 7/5/2021 Status: Signed    : Marci Wang PharmD (Pharmacist)       [facility] message sent with scheduling information as well.

## 2021-07-05 NOTE — TELEPHONE ENCOUNTER
Telephone Encounter by Moy George at 7/5/2021  1:07 PM     Author: Moy George Service: -- Author Type: --    Filed: 7/5/2021  1:07 PM Encounter Date: 7/5/2021 Status: Signed    : Moy George       Received MTM referral from clinic     Patient was not reachable after several attempts, will route to MTM Pharmacist/Provider as an FYI. Left MTM scheduling information on patients voicemail.    Thank you for the Moy cochran MTM coordinator

## 2021-07-06 VITALS
BODY MASS INDEX: 35.09 KG/M2 | HEART RATE: 110 BPM | WEIGHT: 246.3 LBS | DIASTOLIC BLOOD PRESSURE: 88 MMHG | SYSTOLIC BLOOD PRESSURE: 140 MMHG | OXYGEN SATURATION: 95 %

## 2021-07-06 ASSESSMENT — PATIENT HEALTH QUESTIONNAIRE - PHQ9: SUM OF ALL RESPONSES TO PHQ QUESTIONS 1-9: 3

## 2021-07-07 NOTE — PROGRESS NOTES
Assessment and Plan:     Patient has been advised of split billing requirements and indicates understanding: No  1. Wellness examination  Patient has suboptimal exercise    2. Morbid obesity (H)  BMI greater than 35 with comorbidities.    3. Type 2 diabetes mellitus  (H)  Suboptimal control with A1c at 7.8  - Glycosylated Hemoglobin A1c  - Microalbumin, Random Urine    4. Hyperlipidemia  Generally well controlled.  - Comprehensive Metabolic Panel  - Lipid Cascade    5. Hypertension  Slight suboptimal control.    6. Male Erectile Disorder  Patient has not had optimal response to either Cialis or Viagra.  - vardenafiL (LEVITRA) 20 MG tablet; Take 1 tablet (20 mg total) by mouth as needed for erectile dysfunction.  Dispense: 20 tablet; Refill: 1    7. Screening for prostate cancer       8. Idiopathic peripheral neuropathy  Presumed secondary to diabetes mellitus    9. Screen for colon cancer     - Ambulatory referral for Colonoscopy - [facility] Gupta    10. Need for vaccination     - Pneumococcal polysaccharide vaccine 23-valent 3 yo or older, subq/IM    PLAN:  1.  Pneumonia 23 vaccine.  2.  Referral for colonoscopy.  3.  Laboratory studies as above.  4.  I am going to consult with our pharmacist in terms of additional agents, I am reluctant to go up on his insulin and he is already on the maximum dose of glipizide and Metformin.  5.  Trial of Levitra, if this is not successful with then would consider urology referral.  6.  3 to 6-month follow-up.        The patient's current medical problems were reviewed.    I have had an Advance Directives discussion with the patient.  The following health maintenance schedule was reviewed with the patient and provided in printed form in the after visit summary:   Health Maintenance   Topic Date Due     HEPATITIS C SCREENING  Never done     HIV SCREENING  Never done     ZOSTER VACCINES (2 of 3) 03/20/2017     DIABETIC FOOT EXAM  04/10/2020     BMP  05/26/2021     LIPID  05/26/2021      DIABETIC EYE EXAM  06/02/2021     COLORECTAL CANCER SCREENING  12/19/2021     INFLUENZA VACCINE RULE BASED (Season Ended) 08/01/2021     A1C  12/28/2021     HYPERTENSION FOLLOW-UP  12/28/2021     MEDICARE ANNUAL WELLNESS VISIT  06/28/2022     MICROALBUMIN  06/28/2022     FALL RISK ASSESSMENT  06/28/2022     TD 18+ HE  02/02/2026     ADVANCE CARE PLANNING  06/28/2026     Pneumococcal Vaccine: Pediatrics (0 to 5 Years) and At-Risk Patients (6 to 64 Years)  Completed     Pneumococcal Vaccine: 65+ Years  Completed     TDAP ADULT ONE TIME DOSE  Completed     COVID-19 Vaccine  Completed       Subjective:   Chief Complaint: Ezekiel Aldrich is an 66 y.o. male here for a Welcome to Medicare visit.   HPI: Patient comes in for a Medicare examination.    Patient has underlying diabetes mellitus type 2, he is getting some elevated blood sugars more recently, he is already on Lantus as well as a maximal dose of glipizide and Metformin.    Patient has had some male sexual difficulties now for several years we have tried him on generic Viagra more recently Cialis neither have been all that helpful or effective.  He does state there is been considerable frustration with this we will try different medication.    Patient is having more difficulty in terms of all the physical demands on his current employment he is considering switching fields or doing something that is less demanding or active    Patient has noticed beginning last year that his toes feel cold, at times he has a shooting pain in his lower legs, and I suspect that this is all neuropathy.    Patient does have suboptimal exercise, though his work is quite physically demanding.    Review of Systems:     Please see above.  The rest of the review of systems are negative for all systems.    Patient Care Team:  Janes Jeronimo MD as PCP - General (Family Medicine)  Janes Jeronimo MD as Assigned PCP     Patient Active Problem List   Diagnosis     Hypertension      Obstructive Sleep Apnea     Male Erectile Disorder     Hyperlipidemia     Obesity     Type 2 diabetes mellitus (H)     Low back pain     BPH (benign prostatic hyperplasia)     Microalbuminuria     Retinopathy     Obesity (BMI 35.0-39.9) with comorbidity (H)     Peripheral neuropathy     Past Medical History:   Diagnosis Date     Acute renal failure (H)     2016, secondary to dehydration, normalized      Past Surgical History:   Procedure Laterality Date     ANKLE FRACTURE SURGERY Right     hardware     KNEE SURGERY       MANDIBLE FRACTURE SURGERY      from fx     SHOULDER SURGERY Right     from fx and separation      Family History   Problem Relation Age of Onset     Colon cancer Mother      Heart attack Mother      Hypertension Mother      Diabetes type II Mother      Hypertension Father      Bell's palsy Father      Diabetes type II Father      Bell's palsy Maternal Grandmother      Prostate cancer Brother 57      Social History     Socioeconomic History     Marital status:      Spouse name: Not on file     Number of children: 5     Years of education: Not on file     Highest education level: Not on file   Occupational History     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   Social Needs     Financial resource strain: Not on file     Food insecurity     Worry: Not on file     Inability: Not on file     Transportation needs     Medical: Not on file     Non-medical: Not on file   Tobacco Use     Smoking status: Former Smoker     Types: Cigarettes     Quit date: 1985     Years since quittin.1     Smokeless tobacco: Current User     Types: Chew     Tobacco comment: 2 tins/week   Substance and Sexual Activity     Alcohol use: Yes     Alcohol/week: 2.0 standard drinks     Types: 2 Cans of beer per week     Drug use: No     Sexual activity: Yes     Partners: Female   Lifestyle     Physical activity     Days per week: Not on file     Minutes per session: Not on file     Stress: Not on file   Relationships  "    Social connections     Talks on phone: Not on file     Gets together: Not on file     Attends Holiness service: Not on file     Active member of club or organization: Not on file     Attends meetings of clubs or organizations: Not on file     Relationship status: Not on file     Intimate partner violence     Fear of current or ex partner: Not on file     Emotionally abused: Not on file     Physically abused: Not on file     Forced sexual activity: Not on file   Other Topics Concern     Not on file   Social History Narrative    Diet-        Exercise- Not regular. Work is physical, walking.       Current Outpatient Medications   Medication Sig Dispense Refill     aspirin-acetaminophen-caffeine (EXCEDRIN MIGRAINE) 250-250-65 mg per tablet Take 1 tablet by mouth every 6 (six) hours as needed for pain.       atorvastatin (LIPITOR) 40 MG tablet Take 1 tablet (40 mg total) by mouth daily. 90 tablet 0     blood glucose meter (GLUCOMETER) Use 1 each As Directed as needed. Dispense glucometer brand per patient's insurance at pharmacy discretion. 1 each 0     blood glucose test strips Use 1 each As Directed 2 (two) times a day. Dispense brand per patient's insurance at pharmacy discretion. 200 strip 3     generic lancets Use 1 each As Directed 2 (two) times a day. Dispense brand per patient's insurance at pharmacy discretion. 200 each 3     glipiZIDE (GLUCOTROL XL) 10 MG 24 hr tablet TAKE 2 TABLETS(20 MG) BY MOUTH DAILY 180 tablet 0     insulin glargine (LANTUS SOLOSTAR PEN) 100 unit/mL (3 mL) pen Inject 66 Units under the skin at bedtime. 30 mL 2     losartan (COZAAR) 100 MG tablet Take 1 tablet (100 mg total) by mouth daily. 90 tablet 0     metFORMIN (GLUCOPHAGE-XR) 500 MG 24 hr tablet Take 4 tablets (2,000 mg total) by mouth daily. 360 tablet 0     multivitamin therapeutic (THERAGRAN) tablet Take 1 tablet by mouth daily.       pen needle, diabetic (PEN NEEDLE) 31 gauge x 1/4\" Ndle Use twice daily to inject Victoza and " Lantus 100 each 0     tamsulosin (FLOMAX) 0.4 mg cap TAKE 1 CAPSULE(0.4 MG) BY MOUTH DAILY 90 capsule 0     vardenafiL (LEVITRA) 20 MG tablet Take 1 tablet (20 mg total) by mouth as needed for erectile dysfunction. 20 tablet 1     No current facility-administered medications for this visit.       Objective:   Vital Signs:   Visit Vitals  /88   Pulse (!) 110   Wt (!) 246 lb 4.8 oz (111.7 kg)   SpO2 95%   BMI 35.09 kg/m         EKG:  Not performed    VisionScreening:   Hearing Screening    125Hz 250Hz 500Hz 1000Hz 2000Hz 3000Hz 4000Hz 6000Hz 8000Hz   Right ear:            Left ear:               Visual Acuity Screening    Right eye Left eye Both eyes   Without correction: 20/40 20/63 20/63   With correction:           PHYSICAL EXAM  Physical Exam   Constitutional: He is oriented to person, place, and time. He appears well-developed and well-nourished.   HENT:   Head: Normocephalic and atraumatic.   Eyes: Conjunctivae and EOM are normal.   Cardiovascular: Normal rate, regular rhythm and normal heart sounds.   Pulmonary/Chest: Effort normal and breath sounds normal.   Musculoskeletal: Normal range of motion.   Neurological: He is alert and oriented to person, place, and time.   Skin: Skin is warm and dry.   Psychiatric: He has a normal mood and affect. His behavior is normal. Judgment and thought content normal.         Assessment Results 6/28/2021   Activities of Daily Living No help needed   Instrumental Activities of Daily Living No help needed   Mini Cog Total Score 3   Some recent data might be hidden     A Mini Cog score of 0-2 suggests the possibility of dementia, score of 3-5 suggests no dementia    Identified Health Risks:     He is at risk for lack of exercise and has been provided with information to increase physical activity for the benefit of his well-being.  The patient was counseled and encouraged to consider modifying their diet and eating habits. He was provided with information on recommended  healthy diet options.  The patient was provided with written information regarding signs of hearing loss.  Patient's advanced directive was discussed and I am comfortable with the patient's wishes.

## 2021-07-07 NOTE — PATIENT INSTRUCTIONS - HE
Patient Instructions by Janes Jeronimo MD at 6/28/2021  7:20 AM     Author: Janes Jeronimo MD Service: -- Author Type: Physician    Filed: 6/28/2021  7:30 AM Encounter Date: 6/28/2021 Status: Signed    : Janes Jeronimo MD (Physician)         Patient Education     Exercise for a Healthier Heart  You may wonder how you can improve the health of your heart. If youre thinking about exercise, youre on the right track. You dont need to become an athlete, but you do need a certain amount of brisk exercise to help strengthen your heart. If you have been diagnosed with a heart condition, your doctor may recommend exercise to help stabilize your condition. To help make exercise a habit, choose safe, fun activities.       Be sure to check with your health care provider before starting an exercise program.    Why exercise?  Exercising regularly offers many healthy rewards. It can help you do all of the following:    Improve your blood cholesterol levels to help prevent further heart trouble    Lower your blood pressure to help prevent a stroke or heart attack    Control diabetes, or reduce your risk of getting this disease    Improve your heart and lung function    Reach and maintain a healthy weight    Make your muscles stronger and more limber so you can stay active    Prevent falls and fractures by slowing the loss of bone mass (osteoporosis)    Manage stress better  Exercise tips  Ease into your routine. Set small goals. Then build on them.  Exercise on most days. Aim for a total of 150 or more minutes of moderate to  vigorous intensity activity each week. Consider 40 minutes, 3 to 4 times a week. For best results, activity should last for 40 minutes on average. It is OK to work up to the 40 minute period over time. Examples of moderate-intensity activity is walking one mile in 15 minutes or 30 to 45 minutes of yard work.  Step up your daily activity level. Along with your exercise program, try being more  active throughout the day. Walk instead of drive. Do more household tasks or yard work.  Choose one or more activities you enjoy. Walking is one of the easiest things you can do. You can also try swimming, riding a bike, or taking an exercise class.  Stop exercising and call your doctor if you:    Have chest pain or feel dizzy or lightheaded    Feel burning, tightness, pressure, or heaviness in your chest, neck, shoulders, back, or arms    Have unusual shortness of breath    Have increased joint or muscle pain    Have palpitations or an irregular heartbeat      6252-1014 [employer]. [address] [place] [address]. All rights reserved. This information is not intended as a substitute for professional medical care. Always follow your healthcare professional's instructions.         Patient Education   Understanding [employer] MyPlate  The USDA (US Department of Agriculture) has guidelines to help you make healthy food choices. These are called MyPlate. MyPlate shows the food groups that make up healthy meals using the image of a place setting. Before you eat, think about the healthiest choices for what to put onto your plate or into your cup or bowl. To learn more about building a healthy plate, visit www.choosemyplate.gov.       The Food Groups    Fruits: Any fruit or 100% fruit juice counts as part of the Fruit Group. Fruits may be fresh, canned, frozen, or dried, and may be whole, cut-up, or pureed. Make half your plate fruits and vegetables.    Vegetables: Any vegetable or 100% vegetable juice counts as a member of the Vegetable Group. Vegetables may be fresh, frozen, canned, or dried. They can be served raw or cooked and may be whole, cut-up, or mashed. Make half your plate fruits and vegetables.     Grains: All foods made from grains are part of the Grains Group. These include wheat, rice, oats, cornmeal, and barley such as bread, pasta, oatmeal, cereal, tortillas, and grits. Grains should be  no more than a quarter of your plate. At least half of your grains should be whole grains.    Protein: This group includes meat, poultry, seafood, beans and peas, eggs, processed soy products (like tofu), nuts (including nut butters), and seeds. Make protein choices no more than a quarter of your plate. Meat and poultry choices should be lean or low fat.    Dairy: All fluid milk products and foods made from milk that contain calcium, like yogurt and cheese are part of the Dairy Group. (Foods that have little calcium, such as cream, butter, and cream cheese, are not part of the group.) Most dairy choices should be low-fat or fat-free.    Oils: These are fats that are liquid at room temperature. They include canola, corn, olive, soybean, and sunflower oil. Foods that are mainly oil include mayonnaise, certain salad dressings, and soft margarines. You should have only 5 to 7 teaspoons of oils a day. You probably already get this much from the food you eat.  Use [employer] to Help Build Your Meals  The [employer] can help you plan and track your meals and activity. You can look up individual foods to see or compare their nutritional value. You can get guidelines for what and how much you should eat. You can compare your food choices. And you can assess personal physical activities and see ways you can improve. Go to www.[employer]gov/supertracker/.    9712-3992 [employer]. [address] [place] [address]. All rights reserved. This information is not intended as a substitute for professional medical care. Always follow your healthcare professional's instructions.           Patient Education   Signs of Hearing Loss  Hearing loss is a problem shared by many people. In fact, it is one of the most common health conditions, particularly as people age. Most people over age 65 have some hearing loss, and by age 80, almost everyone does. Because hearing loss usually occurs slowly over the years,  you may not realize your hearing ability has gotten worse.       Have your hearing checked  Contact your [facility] care provider if you:    Have to strain to hear normal conversation.    Have to watch other peoples faces very carefully to follow what theyre saying.    Need to ask people to repeat what theyve said.    Often misunderstand what people are saying.    Turn the volume of the television or radio up so high that others complain.    Feel that people are mumbling when theyre talking to you.    Find that the effort to hear leaves you feeling tired and irritated.    Notice, when using the phone, that you hear better with 1 ear than the other.    1669-8407 [employer]. [address] [place] [address], [place] [address]. All rights reserved. This information is not intended as a substitute for professional medical care. Always follow your healthcare professional's instructions.           Advance Directive  Patients advance directive was discussed and I am comfortable with the patients wishes.  Patient Education   Personalized Prevention Plan  You are due for the preventive services outlined below.  Your care team is available to assist you in scheduling these services.  If you have already completed any of these items, please share that information with your care team to update in your medical record.  Health Maintenance Due   Topic Date Due   ? HEPATITIS C SCREENING  Never done   ? HIV SCREENING  Never done   ? ZOSTER VACCINES (2 of 3) 03/20/2017   ? DIABETIC FOOT EXAM  04/10/2020   ? A1C  11/26/2020   ? Pneumococcal Vaccine: Pediatrics (0 to 5 Years) and At-Risk Patients (6 to 64 Years) (2 of 2) 04/24/2021   ? Pneumococcal Vaccine: 65+ Years (2 of 2) 04/24/2021   ? BMP  05/26/2021   ? LIPID  05/26/2021   ? MICROALBUMIN  05/26/2021   ? DIABETIC EYE EXAM  06/02/2021   ? COLORECTAL CANCER SCREENING  12/19/2021

## 2021-07-22 NOTE — LETTER
Letter by Janes Jeronimo MD at      Author: Janes Jeronimo MD Service: -- Author Type: --    Filed:  Encounter Date: 6/30/2021 Status: (Other)         Ezekiel Aldrich  [address] [facility] [address]             June 30, 2021         Dear Mr. Aldrich,    Below are the results from your recent visit: The A1c is still too high at 7.8, I do not want to just increase the insulin and you are already on the maximum dose of both Metformin and glipizide, need to have you meet with our pharmacist to discuss a potentially weekly noninsulin injectable medication.    Cholesterol is a bit elevated as well including the triglycerides, I want to increase the dose of atorvastatin from 40 mg to 80 mg new prescription faxed in.  You can double up on the 40 mg tablets  Liver and kidney tests are normal.  There is excess protein in the urine this is from diabetes mellitus.  This is a test called a microalbumin, and hopefully will go down his diabetes gets under better control.    I would like to see you back in the next 3 to 6 months.    Resulted Orders   Comprehensive Metabolic Panel   Result Value Ref Range    Sodium 137 136 - 145 mmol/L    Potassium 4.6 3.5 - 5.0 mmol/L    Chloride 100 98 - 107 mmol/L    CO2 22 22 - 31 mmol/L    Anion Gap, Calculation 15 5 - 18 mmol/L    Glucose 275 (H) 70 - 125 mg/dL    BUN 16 8 - 22 mg/dL    Creatinine 1.29 0.70 - 1.30 mg/dL    GFR MDRD Af Amer >60 >60 mL/min/1.73m2    GFR MDRD Non Af Amer 56 (L) >60 mL/min/1.73m2    Bilirubin, Total 1.1 (H) 0.0 - 1.0 mg/dL    Calcium 9.8 8.5 - 10.5 mg/dL    Protein, Total 7.3 6.0 - 8.0 g/dL    Albumin 4.2 3.5 - 5.0 g/dL    Alkaline Phosphatase 69 45 - 120 U/L    AST 30 0 - 40 U/L    ALT 31 0 - 45 U/L    Narrative    Fasting Glucose reference range is 70-99 mg/dL per  American Diabetes Association (ADA) guidelines.   Lipid Cascade   Result Value Ref Range    Cholesterol 217 (H) <=199 mg/dL    Triglycerides 488 (H) <=149 mg/dL    HDL Cholesterol 53  >=40 mg/dL    LDL Calculated        Comment:      Invalid, Triglycerides >400    Patient Fasting > 8hrs? Yes    Glycosylated Hemoglobin A1c   Result Value Ref Range    Hemoglobin A1c 7.8 (H) <=5.6 %   Microalbumin, Random Urine   Result Value Ref Range    Microalbumin, Random Urine 77.61 (H) 0.00 - 1.99 mg/dL    Creatinine, Urine 244.1 mg/dL    Microalbumin/Creatinine Ratio Random Urine 317.9 (H) <=19.9 mg/g    Narrative    Microalbumin, Random Urine  <2.0 mg/dL . . . . . . . . Normal  3.0-30.0 mg/dL . . . . . . Microalbuminuria  >30.0 mg/dL . . . . . .  . Clinical Proteinuria    Microalbumin/Creatinine Ratio, Random Urine  <20 mg/g . . . . .. . . . Normal   mg/g . . . . . . . Microalbuminuria  >300 mg/g . . . . . . . . Clinical Proteinuria       Custom LDL Cholesterol, Direct   Result Value Ref Range    Direct  <=129 mg/dl            Please call with questions or contact us using [facility].    Sincerely,        Electronically signed by Janes Jeronimo MD

## 2021-08-03 ENCOUNTER — TRANSFERRED RECORDS (OUTPATIENT)
Dept: HEALTH INFORMATION MANAGEMENT | Facility: CLINIC | Age: 66
End: 2021-08-03

## 2021-08-03 LAB — RETINOPATHY: POSITIVE

## 2021-08-25 DIAGNOSIS — N40.1 BENIGN PROSTATIC HYPERPLASIA WITH LOWER URINARY TRACT SYMPTOMS, SYMPTOM DETAILS UNSPECIFIED: ICD-10-CM

## 2021-08-25 DIAGNOSIS — N40.0 BPH (BENIGN PROSTATIC HYPERPLASIA): ICD-10-CM

## 2021-08-25 DIAGNOSIS — E11.9 DIABETES MELLITUS, TYPE 2 (H): ICD-10-CM

## 2021-08-25 DIAGNOSIS — E11.69 TYPE 2 DIABETES MELLITUS WITH OTHER SPECIFIED COMPLICATION, WITHOUT LONG-TERM CURRENT USE OF INSULIN (H): Primary | ICD-10-CM

## 2021-08-25 DIAGNOSIS — I10 ESSENTIAL HYPERTENSION: ICD-10-CM

## 2021-08-25 DIAGNOSIS — I10 HYPERTENSION: ICD-10-CM

## 2021-08-25 RX ORDER — LOSARTAN POTASSIUM 100 MG/1
100 TABLET ORAL DAILY
Qty: 90 TABLET | Refills: 3 | Status: SHIPPED | OUTPATIENT
Start: 2021-08-25 | End: 2022-09-02

## 2021-08-25 RX ORDER — TAMSULOSIN HYDROCHLORIDE 0.4 MG/1
CAPSULE ORAL
Qty: 90 CAPSULE | Refills: 3 | Status: SHIPPED | OUTPATIENT
Start: 2021-08-25 | End: 2022-09-02

## 2021-08-25 RX ORDER — GLIPIZIDE 10 MG/1
TABLET, FILM COATED, EXTENDED RELEASE ORAL
Qty: 180 TABLET | Refills: 3 | Status: SHIPPED | OUTPATIENT
Start: 2021-08-25 | End: 2022-09-02

## 2021-08-25 RX ORDER — METFORMIN HCL 500 MG
2000 TABLET, EXTENDED RELEASE 24 HR ORAL DAILY
Qty: 360 TABLET | Refills: 3 | Status: SHIPPED | OUTPATIENT
Start: 2021-08-25 | End: 2022-09-02

## 2021-08-25 NOTE — TELEPHONE ENCOUNTER
Refill Request  Medication name:  Pending Prescriptions:                       Disp   Refills    glipiZIDE (GLUCOTROL XL) 10 MG 24 hr tabl*180 ta*3            Sig: [GLIPIZIDE (GLUCOTROL XL) 10 MG 24 HR TABLET]           TAKE 2 TABLETS(20 MG) BY MOUTH DAILY    losartan (COZAAR) 100 MG tablet           90 tab*3            Sig: Take 1 tablet (100 mg) by mouth daily    tamsulosin (FLOMAX) 0.4 MG capsule        90 cap*3            Sig: [TAMSULOSIN (FLOMAX) 0.4 MG CAP] TAKE 1           CAPSULE(0.4 MG) BY MOUTH DAILY    metFORMIN (GLUCOPHAGE-XR) 500 MG 24 hr ta*360 ta*3            Sig: Take 4 tablets (2,000 mg) by mouth daily    Who prescribed the medication: Dr. Janes Jeronimo  Last refill on medication: 05/21/2021 - 90 days each  [facility]ed [facility]acy[facility]  Last appointment with PCP: 06/28/2021 - physical  Next appointment: Not due

## 2021-10-03 DIAGNOSIS — E11.9 TYPE 2 DIABETES MELLITUS WITHOUT COMPLICATION, WITH LONG-TERM CURRENT USE OF INSULIN (H): ICD-10-CM

## 2021-10-03 DIAGNOSIS — Z79.4 TYPE 2 DIABETES MELLITUS WITHOUT COMPLICATION, WITH LONG-TERM CURRENT USE OF INSULIN (H): ICD-10-CM

## 2021-10-03 RX ORDER — INSULIN GLARGINE 100 [IU]/ML
INJECTION, SOLUTION SUBCUTANEOUS
Qty: 30 ML | Refills: 2 | Status: SHIPPED | OUTPATIENT
Start: 2021-10-03 | End: 2022-06-22

## 2021-10-03 NOTE — TELEPHONE ENCOUNTER
"Refilled, protocol incorrect.     Last Written Prescription Date:  6/14/21  Last Fill Quantity: 30 ml,  # refills: 2   Last office visit provider:  6/28/21     Requested Prescriptions   Pending Prescriptions Disp Refills     LANTUS SOLOSTAR 100 UNIT/ML soln [Pharmacy Med Name: LANTUS SOLOSTAR PEN INJ 3ML] 30 mL 2     Sig: ADMINISTER 66 UNITS UNDER THE SKIN AT BEDTIME       Long Acting Insulin Protocol Failed - 10/3/2021  8:07 AM        Failed - Recent (6 mo) or future (30 days) visit within the authorizing provider's specialty     Patient had office visit in the last 6 months or has a visit in the next 30 days with authorizing provider or within the authorizing provider's specialty.  See \"Patient Info\" tab in inbasket, or \"Choose Columns\" in Meds & Orders section of the refill encounter.            Passed - Serum creatinine on file in past 12 months     Recent Labs   Lab Test 06/28/21  0828   CR 1.29       Ok to refill medication if creatinine is low          Passed - HgbA1C in past 3 or 6 months     If HgbA1C is 8 or greater, it needs to be on file within the past 3 months.  If less than 8, must be on file within the past 6 months.     Recent Labs   Lab Test 06/28/21  0828   A1C 7.8*             Passed - Medication is active on med list        Passed - Patient is age 18 or older             Anum Navarro RN 10/03/21 4:01 PM  "

## 2021-10-19 ENCOUNTER — TELEPHONE (OUTPATIENT)
Dept: FAMILY MEDICINE | Facility: CLINIC | Age: 66
End: 2021-10-19

## 2021-10-19 DIAGNOSIS — E78.49 OTHER HYPERLIPIDEMIA: ICD-10-CM

## 2021-10-19 RX ORDER — ATORVASTATIN CALCIUM 80 MG/1
TABLET, FILM COATED ORAL
Qty: 90 TABLET | Refills: 3 | Status: SHIPPED | OUTPATIENT
Start: 2021-10-19 | End: 2021-10-29

## 2021-10-19 NOTE — TELEPHONE ENCOUNTER
Due for 3-6 month follow up and BP check.     LM and sent efw-suhl message informing patient     If he calls back please assist with scheduling

## 2021-10-23 ENCOUNTER — HEALTH MAINTENANCE LETTER (OUTPATIENT)
Age: 66
End: 2021-10-23

## 2021-10-29 ENCOUNTER — OFFICE VISIT (OUTPATIENT)
Dept: FAMILY MEDICINE | Facility: CLINIC | Age: 66
End: 2021-10-29
Payer: MEDICARE

## 2021-10-29 VITALS — HEART RATE: 116 BPM | OXYGEN SATURATION: 98 %

## 2021-10-29 DIAGNOSIS — L97.519 DIABETIC ULCER OF TOE OF RIGHT FOOT ASSOCIATED WITH TYPE 2 DIABETES MELLITUS, UNSPECIFIED ULCER STAGE (H): Primary | ICD-10-CM

## 2021-10-29 DIAGNOSIS — E11.621 DIABETIC ULCER OF TOE OF RIGHT FOOT ASSOCIATED WITH TYPE 2 DIABETES MELLITUS, UNSPECIFIED ULCER STAGE (H): Primary | ICD-10-CM

## 2021-10-29 DIAGNOSIS — G47.33 OBSTRUCTIVE SLEEP APNEA (ADULT) (PEDIATRIC): ICD-10-CM

## 2021-10-29 DIAGNOSIS — N40.1 BENIGN PROSTATIC HYPERPLASIA WITH NOCTURIA: ICD-10-CM

## 2021-10-29 DIAGNOSIS — Z23 NEED FOR VACCINATION: ICD-10-CM

## 2021-10-29 DIAGNOSIS — R35.1 BENIGN PROSTATIC HYPERPLASIA WITH NOCTURIA: ICD-10-CM

## 2021-10-29 DIAGNOSIS — Z12.5 SCREENING FOR PROSTATE CANCER: ICD-10-CM

## 2021-10-29 DIAGNOSIS — Z79.4 TYPE 2 DIABETES MELLITUS WITH OTHER SPECIFIED COMPLICATION, WITH LONG-TERM CURRENT USE OF INSULIN (H): ICD-10-CM

## 2021-10-29 DIAGNOSIS — E78.49 OTHER HYPERLIPIDEMIA: ICD-10-CM

## 2021-10-29 DIAGNOSIS — R53.83 LOW ENERGY: ICD-10-CM

## 2021-10-29 DIAGNOSIS — E11.69 TYPE 2 DIABETES MELLITUS WITH OTHER SPECIFIED COMPLICATION, WITH LONG-TERM CURRENT USE OF INSULIN (H): ICD-10-CM

## 2021-10-29 LAB
ALBUMIN SERPL-MCNC: 4 G/DL (ref 3.5–5)
ALP SERPL-CCNC: 72 U/L (ref 45–120)
ALT SERPL W P-5'-P-CCNC: 22 U/L (ref 0–45)
ANION GAP SERPL CALCULATED.3IONS-SCNC: 14 MMOL/L (ref 5–18)
AST SERPL W P-5'-P-CCNC: 25 U/L (ref 0–40)
BILIRUB SERPL-MCNC: 0.6 MG/DL (ref 0–1)
BUN SERPL-MCNC: 14 MG/DL (ref 8–22)
CALCIUM SERPL-MCNC: 9.3 MG/DL (ref 8.5–10.5)
CHLORIDE BLD-SCNC: 104 MMOL/L (ref 98–107)
CHOLEST SERPL-MCNC: 164 MG/DL
CO2 SERPL-SCNC: 21 MMOL/L (ref 22–31)
CREAT SERPL-MCNC: 1.55 MG/DL (ref 0.7–1.3)
ERYTHROCYTE [DISTWIDTH] IN BLOOD BY AUTOMATED COUNT: 12.5 % (ref 10–15)
FASTING STATUS PATIENT QL REPORTED: NO
GFR SERPL CREATININE-BSD FRML MDRD: 46 ML/MIN/1.73M2
GLUCOSE BLD-MCNC: 152 MG/DL (ref 70–125)
HBA1C MFR BLD: 7.5 % (ref 0–5.6)
HCT VFR BLD AUTO: 44.5 % (ref 40–53)
HDLC SERPL-MCNC: 59 MG/DL
HGB BLD-MCNC: 15.4 G/DL (ref 13.3–17.7)
LDLC SERPL CALC-MCNC: 68 MG/DL
LDLC SERPL CALC-MCNC: ABNORMAL MG/DL
MCH RBC QN AUTO: 31.9 PG (ref 26.5–33)
MCHC RBC AUTO-ENTMCNC: 34.6 G/DL (ref 31.5–36.5)
MCV RBC AUTO: 92 FL (ref 78–100)
PLATELET # BLD AUTO: 181 10E3/UL (ref 150–450)
POTASSIUM BLD-SCNC: 4.1 MMOL/L (ref 3.5–5)
PROT SERPL-MCNC: 7 G/DL (ref 6–8)
PSA SERPL-MCNC: 0.32 UG/L (ref 0–4.5)
RBC # BLD AUTO: 4.83 10E6/UL (ref 4.4–5.9)
SODIUM SERPL-SCNC: 139 MMOL/L (ref 136–145)
TRIGL SERPL-MCNC: 451 MG/DL
TSH SERPL DL<=0.005 MIU/L-ACNC: 3.18 UIU/ML (ref 0.3–5)
WBC # BLD AUTO: 6.9 10E3/UL (ref 4–11)

## 2021-10-29 PROCEDURE — G0008 ADMIN INFLUENZA VIRUS VAC: HCPCS | Performed by: FAMILY MEDICINE

## 2021-10-29 PROCEDURE — G0103 PSA SCREENING: HCPCS | Performed by: FAMILY MEDICINE

## 2021-10-29 PROCEDURE — 83721 ASSAY OF BLOOD LIPOPROTEIN: CPT | Mod: 59 | Performed by: FAMILY MEDICINE

## 2021-10-29 PROCEDURE — 83036 HEMOGLOBIN GLYCOSYLATED A1C: CPT | Performed by: FAMILY MEDICINE

## 2021-10-29 PROCEDURE — 99215 OFFICE O/P EST HI 40 MIN: CPT | Mod: 25 | Performed by: FAMILY MEDICINE

## 2021-10-29 PROCEDURE — 36415 COLL VENOUS BLD VENIPUNCTURE: CPT | Performed by: FAMILY MEDICINE

## 2021-10-29 PROCEDURE — 80053 COMPREHEN METABOLIC PANEL: CPT | Performed by: FAMILY MEDICINE

## 2021-10-29 PROCEDURE — 90662 IIV NO PRSV INCREASED AG IM: CPT | Performed by: FAMILY MEDICINE

## 2021-10-29 PROCEDURE — 84443 ASSAY THYROID STIM HORMONE: CPT | Performed by: FAMILY MEDICINE

## 2021-10-29 PROCEDURE — 80061 LIPID PANEL: CPT | Performed by: FAMILY MEDICINE

## 2021-10-29 PROCEDURE — 85027 COMPLETE CBC AUTOMATED: CPT | Performed by: FAMILY MEDICINE

## 2021-10-29 RX ORDER — ATORVASTATIN CALCIUM 80 MG/1
TABLET, FILM COATED ORAL
Qty: 90 TABLET | Refills: 3 | Status: ON HOLD | OUTPATIENT
Start: 2021-10-29 | End: 2022-08-03

## 2021-10-29 NOTE — PROGRESS NOTES
tASSESSMENT:  (E11.621,  L97.519) Diabetic ulcer of toe of right foot associated with type 2 diabetes mellitus (H)  (primary encounter diagnosis)  Comment: 2-month history of a right great toe diabetic ulcer.  Plan: Vascular Surgery Referral             (E78.49) Hyperlipidemia  Comment: Generally controlled with atorvastatin  Plan: atorvastatin (LIPITOR) 80 MG tablet,         Comprehensive metabolic panel, Lipid panel         reflex to direct LDL Fasting             (N40.1,  R35.1) Benign prostatic hyperplasia with nocturia  Comment: Patient is on Flomax for perhaps suboptimal control  Plan:      (R53.83) Low energy  Comment: Subjective sensation of low energy, probably multifactorial  Plan: TSH, CBC with platelets             (Z23) Need for vaccination  Comment:    Plan: INFLUENZA, QUAD, HD, PF, 65+ (FLUZONE HD)             (E11.69,  Z79.4) Type 2 diabetes mellitus (H)  Comment: Patient has had recent suboptimal control  Plan: Hemoglobin A1c             (G47.33) Obstructive Sleep Apnea  Comment: Previous diagnosis though no treatment  Plan: SLEEP EVALUATION & MANAGEMENT REFERRAL - ADULT         -             (Z12.5) Screening for prostate cancer  Comment: Radha history of  Plan: PROSTATE SPEC ANTIGEN SCREEN                 PLAN:  1. For the diabetic ulcer, referral to vascular surgery  2. Referral back to sleep medicine  3. Influenza vaccine  4. Laboratory studies as above  5. 3-month follow-up  6. Of note, 41 minutes were spent with the patient, reviewing his multiple medical issues various medical concerns.    Orders Placed This Encounter   Procedures     INFLUENZA, QUAD, HD, PF, 65+ (FLUZONE HD)     Hemoglobin A1c     Comprehensive metabolic panel     Lipid panel reflex to direct LDL Fasting     TSH     CBC with platelets     PROSTATE SPEC ANTIGEN SCREEN     Vascular Surgery Referral     SLEEP EVALUATION & MANAGEMENT REFERRAL - ADULT -     Medications Discontinued During This Encounter   Medication Reason      multivitamin therapeutic (THERAGRAN) tablet Medication Reconciliation Clean Up     atorvastatin (LIPITOR) 80 MG tablet Reorder       No follow-ups on file.    CHIEF COMPLAINT:  chief complaint    SUBJECTIVE:  Ezekiel is a 66 year old male patient comes in with quite a number of concerns.  About 2 months ago so the patient injured his right great toe he got scraped on something, there was a scab that got picked off and then he was told that he has an ulcer.  He has been putting a Band-Aid on that but it does have a foul-smelling pus odor to it.  I told the patient on inspection that this is a diabetic foot ulcer and that he needs to see a specialist on this.  I discussed with the patient that the ulcer is from diabetes but I do have concerns that he may not be getting adequate circulation which is why is not healing.    Patient does have underlying type 2 diabetes as it has not been optimally controlled he is on high-dose insulin as well as some oral medications we will have to recheck an A1c.    Patient's blood pressure is generally adequately controlled.    Patient has been diagnosed with sleep apnea in the past but he is not wearing or using any type of equipment for this.  Of note there is a very strong family history of sleep apnea I told the patient I do think he needs to be reevaluated.    Patient has been on various medications for erectile difficulties not have been all that helpful or successful thus far.    Patient reports a longstanding history of suboptimal energy.  This is probably multifactorial.    Patient is still working though he readily admits that he is starting to feel his age.        REVIEW OF SYSTEMS:      All other systems are negative.    PFSH:  Immunization History   Administered Date(s) Administered     COVID-19,PF,Renato 04/08/2021     DT (PEDS <7y) 10/24/2003     FLU 6-35 months 10/16/2012     Flu, Unspecified 10/11/2005, 10/27/2006, 11/02/2007, 10/21/2008, 01/23/2017     HepA,  Unspecified 2016     HepB, Unspecified 2016     Influenza (IIV3) PF 10/11/2005, 2007, 10/21/2008, 2014     Influenza Vaccine IM > 6 months Valent IIV4 (Alfuria,Fluzone) 2015     Influenza Vaccine, 6+MO IM (QUADRIVALENT W/PRESERVATIVES) 2014, 2017, 2018     Influenza, Quad, High Dose, Pf, 65yr+ (Fluzone HD) 10/29/2021     Pneumococcal 23 valent 2014, 2021     Td (Adult), Adsorbed 10/24/2003     Td,adult,historic,unspecified 10/24/2003     Tdap (Adacel,Boostrix) 2014, 2016     Zoster vaccine, live 2017     Social History     Socioeconomic History     Marital status:      Spouse name: Not on file     Number of children: 5     Years of education: Not on file     Highest education level: Not on file   Occupational History     Occupation: [employer]   Tobacco Use     Smoking status: Former Smoker     Types: Cigarettes, Cigarettes     Quit date: 1985     Years since quittin.5     Smokeless tobacco: Current User     Types: Chew, Chew     Tobacco comment: 2 tins/week   Substance and Sexual Activity     Alcohol use: Yes     Alcohol/week: 2.0 standard drinks     Drug use: No     Sexual activity: Yes     Partners: Female   Other Topics Concern     Not on file   Social History Narrative    Diet-            Exercise- Not regular. Work is physical, walking.     Social Determinants of Health     Financial Resource Strain:      Difficulty of Paying Living Expenses:    Food Insecurity:      Worried About Running Out of Food in the Last Year:      Ran Out of Food in the Last Year:    Transportation Needs:      Lack of Transportation (Medical):      Lack of Transportation (Non-Medical):    Physical Activity:      Days of Exercise per Week:      Minutes of Exercise per Session:    Stress:      Feeling of Stress :    Social Connections:      Frequency of Communication with Friends and Family:      Frequency of Social Gatherings with Friends  and Family:      Attends Congregational Services:      Active Member of Clubs or Organizations:      Attends Club or Organization Meetings:      Marital Status:    Intimate Partner Violence:      Fear of Current or Ex-Partner:      Emotionally Abused:      Physically Abused:      Sexually Abused:      Past Medical History:   Diagnosis Date     Acute renal failure (H)     6/11/2016, secondary to dehydration, normalized     Family History   Problem Relation Age of Onset     Colon Cancer Mother      Coronary Artery Disease Mother      Hypertension Mother      Diabetes Type 2  Mother      Hypertension Father      Bell's palsy Father      Diabetes Type 2  Father      Thyroid Disease Father      Sleep Apnea Father      Sleep Apnea Sister      Prostate Cancer Brother 57     Sleep Apnea Brother      Bell's palsy Maternal Grandmother      Thyroid Disease Daughter        MEDICATIONS:  Current Outpatient Medications   Medication Sig Dispense Refill     aspirin-acetaminophen-caffeine (EXCEDRIN MIGRAINE) 250-250-65 mg per tablet [ASPIRIN-ACETAMINOPHEN-CAFFEINE (EXCEDRIN MIGRAINE) 250-250-65 MG PER TABLET] Take 1 tablet by mouth every 6 (six) hours as needed for pain.       atorvastatin (LIPITOR) 80 MG tablet [ATORVASTATIN (LIPITOR) 80 MG TABLET] Take 1 daily for high cholesterol. 90 tablet 3     blood glucose meter (GLUCOMETER) [BLOOD GLUCOSE METER (GLUCOMETER)] Use 1 each As Directed as needed. Dispense glucometer brand per patient's insurance at pharmacy discretion. 1 each 0     blood glucose test strips [BLOOD GLUCOSE TEST STRIPS] Use 1 each As Directed 2 (two) times a day. Dispense brand per patient's insurance at pharmacy discretion. 200 strip 3     generic lancets [GENERIC LANCETS] Use 1 each As Directed 2 (two) times a day. Dispense brand per patient's insurance at pharmacy discretion. 200 each 3     glipiZIDE (GLUCOTROL XL) 10 MG 24 hr tablet [GLIPIZIDE (GLUCOTROL XL) 10 MG 24 HR TABLET] TAKE 2 TABLETS(20 MG) BY MOUTH DAILY  "180 tablet 3     LANTUS SOLOSTAR 100 UNIT/ML soln ADMINISTER 66 UNITS UNDER THE SKIN AT BEDTIME 30 mL 2     losartan (COZAAR) 100 MG tablet Take 1 tablet (100 mg) by mouth daily 90 tablet 3     metFORMIN (GLUCOPHAGE-XR) 500 MG 24 hr tablet Take 4 tablets (2,000 mg) by mouth daily 360 tablet 3     pen needle, diabetic (PEN NEEDLE) 31 gauge x 1/4\" Ndle [PEN NEEDLE, DIABETIC (PEN NEEDLE) 31 GAUGE X 1/4\" NDLE] Use twice daily to inject Victoza and Lantus 100 each 0     tadalafiL (CIALIS) 5 MG tablet [TADALAFIL (CIALIS) 5 MG TABLET] TAKE ONE TABLET BY MOUTH EVERY DAY 85 tablet 2     tamsulosin (FLOMAX) 0.4 MG capsule [TAMSULOSIN (FLOMAX) 0.4 MG CAP] TAKE 1 CAPSULE(0.4 MG) BY MOUTH DAILY 90 capsule 3     vardenafiL (LEVITRA) 20 MG tablet [VARDENAFIL (LEVITRA) 20 MG TABLET] Take 1 tablet (20 mg total) by mouth as needed for erectile dysfunction. 20 tablet 1       TOBACCO USE:  History   Smoking Status     Former Smoker     Types: Cigarettes, Cigarettes     Quit date: 5/1/1985   Smokeless Tobacco     Current User     Types: Chew, Chew     Comment: 2 tins/week       VITALS:  Vitals:    10/29/21 1522   Pulse: 116   SpO2: 98%     Wt Readings from Last 3 Encounters:   06/28/21 111.7 kg (246 lb 4.8 oz)   05/18/20 111.1 kg (245 lb)   04/10/19 114.4 kg (252 lb 1.6 oz)       PHYSICAL EXAM:  Constitutional:   Reveals a male who appears overall healthy.  Vitals: per nursing notes.  Eyes:  EOMs full, PERRL.  Musculoskeletal: No peripheral swelling.  Neuro:  Alert and oriented. Cranial nerves, motor, sensory exams are intact.  No gross focal deficits.  Psychiatric:  Memory intact, mood appropriate.    Foot exam.  Examination of the plantar surface of the right great toe reveals an obvious ulcer, approximately 2 x 2 cm and 1 cm in depth.  I do not see any active drainage at this time.  The feet do feel warm and I am able to palpate the dorsalis pedis pulses, and the patient does have monofilament sensation to the plantar surfaces " bilaterally.    QUALITY MEASURES:      DATA REVIEWED:

## 2021-10-29 NOTE — LETTER
November 1, 2021      Ezekiel Aldrich  [address] [place] [address]        Dear ,    We are writing to inform you of your test results.  The A1c is a bit high at 7.5 would like this under 7, I actually want you to start a new medication called Actos, while continuing you on all your other diabetic medications including insulin.  I faxed this into your pharmacy.  1 kidney test called creatinine is slightly elevated not clear the significance but will follow.  Cholesterol shows that the triglycerides are elevated, I want you to continue the atorvastatin but focus on good diet and exercise to help lower.    The TSH or thyroid test is normal, blood counts are normal no anemia.  The PSA or prostate test is normal.    I would like to see you back in 3 to 4 months.           Resulted Orders   Hemoglobin A1c   Result Value Ref Range    Hemoglobin A1C 7.5 (H) 0.0 - 5.6 %      Comment:      Normal <5.7%   Prediabetes 5.7-6.4%    Diabetes 6.5% or higher     Note: Adopted from ADA consensus guidelines.   Comprehensive metabolic panel   Result Value Ref Range    Sodium 139 136 - 145 mmol/L    Potassium 4.1 3.5 - 5.0 mmol/L    Chloride 104 98 - 107 mmol/L    Carbon Dioxide (CO2) 21 (L) 22 - 31 mmol/L    Anion Gap 14 5 - 18 mmol/L    Urea Nitrogen 14 8 - 22 mg/dL    Creatinine 1.55 (H) 0.70 - 1.30 mg/dL    Calcium 9.3 8.5 - 10.5 mg/dL    Glucose 152 (H) 70 - 125 mg/dL    Alkaline Phosphatase 72 45 - 120 U/L    AST 25 0 - 40 U/L    ALT 22 0 - 45 U/L    Protein Total 7.0 6.0 - 8.0 g/dL    Albumin 4.0 3.5 - 5.0 g/dL    Bilirubin Total 0.6 0.0 - 1.0 mg/dL    GFR Estimate 46 (L) >60 mL/min/1.73m2      Comment:      As of July 11, 2021, eGFR is calculated by the CKD-EPI creatinine equation, without race adjustment. eGFR can be influenced by muscle mass, exercise, and diet. The reported eGFR is an estimation only and is only applicable if the renal function is stable.   Lipid panel reflex to direct LDL Fasting    Result Value Ref Range    Cholesterol 164 <=199 mg/dL    Triglycerides 451 (H) <=149 mg/dL    Direct Measure HDL 59 >=40 mg/dL      Comment:      HDL Cholesterol Reference Range:     0-2 years:   No reference ranges established for patients under 2 years old  at [email or web address] for lipid analytes.    2-8 years:  Greater than 45 mg/dL     18 years and older:   Female: Greater than or equal to 50 mg/dL   Male:   Greater than or equal to 40 mg/dL    LDL Cholesterol Calculated        Comment:      Cannot estimate LDL when triglyceride exceeds 400 mg/dL    Patient Fasting > 8hrs? No    TSH   Result Value Ref Range    TSH 3.18 0.30 - 5.00 uIU/mL   CBC with platelets   Result Value Ref Range    WBC Count 6.9 4.0 - 11.0 10e3/uL    RBC Count 4.83 4.40 - 5.90 10e6/uL    Hemoglobin 15.4 13.3 - 17.7 g/dL    Hematocrit 44.5 40.0 - 53.0 %    MCV 92 78 - 100 fL    MCH 31.9 26.5 - 33.0 pg    MCHC 34.6 31.5 - 36.5 g/dL    RDW 12.5 10.0 - 15.0 %    Platelet Count 181 150 - 450 10e3/uL   PROSTATE SPEC ANTIGEN SCREEN   Result Value Ref Range    Prostate Specific Antigen Screen 0.32 0.00 - 4.50 ug/L    Narrative    Assay Method is Abbott Prostate-Specific Antigen (PSA)  Standard-WHO 1st International (90:10)   LDL cholesterol direct   Result Value Ref Range    LDL Cholesterol Direct 68 <=129 mg/dL       If you have any questions or concerns, please call the clinic at the number listed above.       Sincerely,      Janes Jeronimo MD

## 2021-11-01 DIAGNOSIS — E11.621 DIABETIC FOOT ULCER (H): ICD-10-CM

## 2021-11-01 DIAGNOSIS — L97.509 DIABETIC FOOT ULCER (H): ICD-10-CM

## 2021-11-01 DIAGNOSIS — G62.9 PERIPHERAL NEUROPATHY: ICD-10-CM

## 2021-11-01 DIAGNOSIS — E66.01 MORBID OBESITY (H): ICD-10-CM

## 2021-11-01 DIAGNOSIS — E11.69 TYPE 2 DIABETES MELLITUS WITH OTHER SPECIFIED COMPLICATION, WITH LONG-TERM CURRENT USE OF INSULIN (H): Primary | ICD-10-CM

## 2021-11-01 DIAGNOSIS — L97.511 NON-PRESSURE CHRONIC ULCER OF OTHER PART OF RIGHT FOOT LIMITED TO BREAKDOWN OF SKIN (H): ICD-10-CM

## 2021-11-01 DIAGNOSIS — Z79.4 TYPE 2 DIABETES MELLITUS WITH OTHER SPECIFIED COMPLICATION, WITH LONG-TERM CURRENT USE OF INSULIN (H): Primary | ICD-10-CM

## 2021-11-01 DIAGNOSIS — G47.33 OBSTRUCTIVE SLEEP APNEA (ADULT) (PEDIATRIC): ICD-10-CM

## 2021-11-01 RX ORDER — PIOGLITAZONEHYDROCHLORIDE 15 MG/1
TABLET ORAL
Qty: 90 TABLET | Refills: 1 | Status: SHIPPED | OUTPATIENT
Start: 2021-11-01 | End: 2022-08-02

## 2021-11-29 ENCOUNTER — ANCILLARY PROCEDURE (OUTPATIENT)
Dept: VASCULAR ULTRASOUND | Facility: CLINIC | Age: 66
End: 2021-11-29
Attending: PODIATRIST
Payer: MEDICARE

## 2021-11-29 ENCOUNTER — OFFICE VISIT (OUTPATIENT)
Dept: VASCULAR SURGERY | Facility: CLINIC | Age: 66
End: 2021-11-29
Attending: FAMILY MEDICINE
Payer: MEDICARE

## 2021-11-29 VITALS
SYSTOLIC BLOOD PRESSURE: 140 MMHG | TEMPERATURE: 111 F | WEIGHT: 246 LBS | HEIGHT: 70 IN | OXYGEN SATURATION: 96 % | DIASTOLIC BLOOD PRESSURE: 88 MMHG | BODY MASS INDEX: 35.22 KG/M2

## 2021-11-29 DIAGNOSIS — G62.9 PERIPHERAL NEUROPATHY: ICD-10-CM

## 2021-11-29 DIAGNOSIS — L97.511 NON-PRESSURE CHRONIC ULCER OF OTHER PART OF RIGHT FOOT LIMITED TO BREAKDOWN OF SKIN (H): ICD-10-CM

## 2021-11-29 DIAGNOSIS — G47.33 OBSTRUCTIVE SLEEP APNEA (ADULT) (PEDIATRIC): ICD-10-CM

## 2021-11-29 DIAGNOSIS — Z79.4 TYPE 2 DIABETES MELLITUS WITH OTHER SPECIFIED COMPLICATION, WITH LONG-TERM CURRENT USE OF INSULIN (H): ICD-10-CM

## 2021-11-29 DIAGNOSIS — E66.01 MORBID OBESITY (H): ICD-10-CM

## 2021-11-29 DIAGNOSIS — E11.621 DIABETIC FOOT ULCER (H): ICD-10-CM

## 2021-11-29 DIAGNOSIS — E11.69 TYPE 2 DIABETES MELLITUS WITH OTHER SPECIFIED COMPLICATION, WITH LONG-TERM CURRENT USE OF INSULIN (H): ICD-10-CM

## 2021-11-29 DIAGNOSIS — L97.511 DIABETIC ULCER OF TOE OF RIGHT FOOT ASSOCIATED WITH TYPE 2 DIABETES MELLITUS, LIMITED TO BREAKDOWN OF SKIN (H): Primary | ICD-10-CM

## 2021-11-29 DIAGNOSIS — L97.509 DIABETIC FOOT ULCER (H): ICD-10-CM

## 2021-11-29 DIAGNOSIS — E11.621 DIABETIC ULCER OF TOE OF RIGHT FOOT ASSOCIATED WITH TYPE 2 DIABETES MELLITUS, LIMITED TO BREAKDOWN OF SKIN (H): Primary | ICD-10-CM

## 2021-11-29 PROCEDURE — 99203 OFFICE O/P NEW LOW 30 MIN: CPT | Mod: 25 | Performed by: PODIATRIST

## 2021-11-29 PROCEDURE — G0463 HOSPITAL OUTPT CLINIC VISIT: HCPCS

## 2021-11-29 PROCEDURE — 93922 UPR/L XTREMITY ART 2 LEVELS: CPT | Mod: 26 | Performed by: SURGERY

## 2021-11-29 PROCEDURE — 93925 LOWER EXTREMITY STUDY: CPT

## 2021-11-29 PROCEDURE — 93922 UPR/L XTREMITY ART 2 LEVELS: CPT

## 2021-11-29 PROCEDURE — 93925 LOWER EXTREMITY STUDY: CPT | Mod: 26 | Performed by: SURGERY

## 2021-11-29 PROCEDURE — 11042 DBRDMT SUBQ TIS 1ST 20SQCM/<: CPT | Performed by: PODIATRIST

## 2021-11-29 ASSESSMENT — MIFFLIN-ST. JEOR: SCORE: 1911.07

## 2021-11-29 ASSESSMENT — PAIN SCALES - GENERAL: PAINLEVEL: NO PAIN (0)

## 2021-11-29 NOTE — PROGRESS NOTES
FOOT AND ANKLE SURGERY/PODIATRY CONSULT NOTE        ASSESSMENT: Diabetic Ulceration right hallux      TREATMENT:  -The right hallux ulceration is stable, no signs of infection.     -I discussed the principles of wound healing today including the importance of limited walking on the involved limb, good vascular perfusion, good glycemic control and the absence of infection.     -Referred for a CAM boot. Recommend limited walking. We discussed that wound healing is dependant on reducing pressure on the right forefoot. Risks of continued walking include non-healing wound, infection and need for surgical treatment.     -After discussion of risk factors and consent obtained 2% Lidocaine HCL jelly was applied, under clean conditions, the right and foot ulceration(s) were debrided using .into the subcutaneous tissue.  Devitalized and nonviable tissue, along with any fibrin and slough, was removed to improve granulation tissue formation, stimulate wound healing, decrease overall bacteria load, disrupt biofilm formation and decrease edge senescence. Wound drainage was scant No. Total excisional debridement was 0.65 sq cm into the subcutaneous tissue with a depth of 0.2 cm.   Ulcers were improved afterwards and .  Measures were as noted on the flow sheet. Medi-honey with a gauze dressing was applied. He will continue to apply Medi-honey with a gauze dressing qoday.     -He will follow-up in 3 weeks.    Cortez Charlton DPM  [facility]      HPI: Ezekiel Aldrich was seen today at the request of Dr. Jeronimo for a sore on his right hallux. The patient states he first noticed the sore several weeks ago and has been applying hydrogen peroxide daily. He works on his feet and is not able to reduce walking at this time. PMH significant for DM2.       Past Medical History:   Diagnosis Date     Acute renal failure (H)     6/11/2016, secondary to dehydration, normalized       Past Surgical History:  "  Procedure Laterality Date     ANKLE FRACTURE SURGERY Right     hardware     KNEE SURGERY       MANDIBLE FRACTURE SURGERY      from fx     SHOULDER SURGERY Right     from fx and separation        Allergies   Allergen Reactions     Lisinopril Cough     Dry cough         Current Outpatient Medications:      aspirin-acetaminophen-caffeine (EXCEDRIN MIGRAINE) 250-250-65 mg per tablet, [ASPIRIN-ACETAMINOPHEN-CAFFEINE (EXCEDRIN MIGRAINE) 250-250-65 MG PER TABLET] Take 1 tablet by mouth every 6 (six) hours as needed for pain., Disp: , Rfl:      atorvastatin (LIPITOR) 80 MG tablet, [ATORVASTATIN (LIPITOR) 80 MG TABLET] Take 1 daily for high cholesterol., Disp: 90 tablet, Rfl: 3     blood glucose meter (GLUCOMETER), [BLOOD GLUCOSE METER (GLUCOMETER)] Use 1 each As Directed as needed. Dispense glucometer brand per patient's insurance at pharmacy discretion., Disp: 1 each, Rfl: 0     blood glucose test strips, [BLOOD GLUCOSE TEST STRIPS] Use 1 each As Directed 2 (two) times a day. Dispense brand per patient's insurance at pharmacy discretion., Disp: 200 strip, Rfl: 3     generic lancets, [GENERIC LANCETS] Use 1 each As Directed 2 (two) times a day. Dispense brand per patient's insurance at pharmacy discretion., Disp: 200 each, Rfl: 3     glipiZIDE (GLUCOTROL XL) 10 MG 24 hr tablet, [GLIPIZIDE (GLUCOTROL XL) 10 MG 24 HR TABLET] TAKE 2 TABLETS(20 MG) BY MOUTH DAILY, Disp: 180 tablet, Rfl: 3     LANTUS SOLOSTAR 100 UNIT/ML soln, ADMINISTER 66 UNITS UNDER THE SKIN AT BEDTIME, Disp: 30 mL, Rfl: 2     losartan (COZAAR) 100 MG tablet, Take 1 tablet (100 mg) by mouth daily, Disp: 90 tablet, Rfl: 3     metFORMIN (GLUCOPHAGE-XR) 500 MG 24 hr tablet, Take 4 tablets (2,000 mg) by mouth daily, Disp: 360 tablet, Rfl: 3     pen needle, diabetic (PEN NEEDLE) 31 gauge x 1/4\" Ndle, [PEN NEEDLE, DIABETIC (PEN NEEDLE) 31 GAUGE X 1/4\" NDLE] Use twice daily to inject Victoza and Lantus, Disp: 100 each, Rfl: 0     pioglitazone (ACTOS) 15 MG tablet, " "Take 1 tablet daily for diabetes, Disp: 90 tablet, Rfl: 1     tamsulosin (FLOMAX) 0.4 MG capsule, [TAMSULOSIN (FLOMAX) 0.4 MG CAP] TAKE 1 CAPSULE(0.4 MG) BY MOUTH DAILY, Disp: 90 capsule, Rfl: 3    Social History     Social History Narrative    Diet-            Exercise- Not regular. Work is physical, walking.       Family History   Problem Relation Age of Onset     Colon Cancer Mother      Coronary Artery Disease Mother      Hypertension Mother      Diabetes Type 2  Mother      Hypertension Father      Bell's palsy Father      Diabetes Type 2  Father      Thyroid Disease Father      Sleep Apnea Father      Sleep Apnea Sister      Prostate Cancer Brother 57     Sleep Apnea Brother      Bell's palsy Maternal Grandmother      Thyroid Disease Daughter        Review of Systems - 10 point Review of Systems is negative except for right hallux ulcer which is noted in HPI.      OBJECTIVE:  Appearance: alert, well appearing, and in no distress.    BP (!) 140/88   Temp (!) 111  F (43.9  C)   Ht 5' 10.25\" (1.784 m)   Wt 246 lb (111.6 kg)   SpO2 96%   BMI 35.05 kg/m      BMI= Body mass index is 35.05 kg/m .    General appearance: Patient is alert and fully cooperative with history & exam.  No sign of distress is noted during the visit.     Psychiatric: Affect is pleasant & appropriate.  Patient appears motivated to improve health.     Respiratory: Breathing is regular & unlabored while sitting.     HEENT: Hearing is intact to spoken word.  Speech is clear.  No gross evidence of visual impairment that would impact ambulation.    Vascular: Dorsalis pedis palpableRight.  Dermatologic:   VASC Wound Right great toe Right great toe (Active)   Pre Size Length 1.3 11/29/21 1600   Pre Size Width 0.5 11/29/21 1600   Pre Size Depth 0.3 11/29/21 1600   Pre Total Sq cm 0.65 11/29/21 1600   Ulceration plantar right hallux has a granular base, does not probe to deep tissues. No erythema right foot.   Neurologic: Diminished to light touch " Right.  Musculoskeletal: Contracted digits noted Right.    Imaging:     No results found.

## 2021-11-29 NOTE — PATIENT INSTRUCTIONS
Important lnstructions    Stop upstairs and  the CAM boot     1. WEIGHT BEARING STATUS: You are to remain LIMITED WEIGHT BEARING on your right foot. LIMITED WEIGHT BEARING MEANS THAT IT IS ONLY OKAY FOR YOU TO APPLY PRESSURE ON THE AFFECTED FOOT WHEN TRANSFERRING FROM YOUR ASSISTIVE DEVICE TO A CHAIR & WHEN AMBULATING INSIDE YOUR HOME (I.E. FROM THE BEDROOM TO THE BATHROOM) BUT NO COMMUNITY AMBULATION.    2. OFFLOADING DEVICE:  3. STABILIZATION DEVICE: Use a CAM BOOT . You will need to WEAR THIS ANYTIME YOU ARE UP AND OUT OF BED, IT IS OKAY TO REMOVE WHEN YOU ARE SLEEPING..     4. PROTEIN SUPPLEMENTS: Drink protein shakes 2x per day, morning and night (Ensure, Boost, Glucerna)     This is in addition to your normal diet    If you are on a renal diet, make sure to get a protein shake that is best suited for this diet. Please ask if you would like a referral to see a  Registered Dietician.     5. ELEVATE: Elevating your leg means laying with your head on a pillow and your foot ABOVE YOUR WAIST.     6. DO NOT MOVE YOUR FOOT.    There is a risk of worsening the wound or incision. To give yourself a higher chance of healing, please DO NOT swing foot back and forth and wiggle foot/toes especially when inside a stabilization device.        Dressing Change lnstructions      Apply medihoney to right big toe   (ok to substitute with Manuka honey hd supra lite)    2. Remove old Medihoney packing.  3. Cleanse with normal saline, pat dry.  4. Apply Medihoney alginate into the wound. Try to avoid medihoney on the intact skin.   5. Cover with a gauze dressing and secure with Roll Gauze and paper tape.  6. Change every other day and as needed.      It is not ok to get your wound wet     Call the clinic for any questions regarding your wound or cares and if you experience signs or symptoms of infection including: fevers, chills, increased redness, increased drainage, foul odor, increased pain at [phone].

## 2021-11-29 NOTE — PROGRESS NOTES
Patient given an order for the CAM boot. Still trying to decide If he is going to wear it because he cannot be off of work.

## 2021-12-14 ENCOUNTER — TRANSFERRED RECORDS (OUTPATIENT)
Dept: HEALTH INFORMATION MANAGEMENT | Facility: CLINIC | Age: 66
End: 2021-12-14
Payer: MEDICARE

## 2021-12-21 NOTE — PATIENT INSTRUCTIONS
Important lnstructions    Stop upstairs and  the CAM boot     1. WEIGHT BEARING STATUS: You are to remain LIMITED WEIGHT BEARING on your right foot. LIMITED WEIGHT BEARING MEANS THAT IT IS ONLY OKAY FOR YOU TO APPLY PRESSURE ON THE AFFECTED FOOT WHEN TRANSFERRING FROM YOUR ASSISTIVE DEVICE TO A CHAIR & WHEN AMBULATING INSIDE YOUR HOME (I.E. FROM THE BEDROOM TO THE BATHROOM) BUT NO COMMUNITY AMBULATION.    2. OFFLOADING DEVICE:  3. STABILIZATION DEVICE: Use a CAM BOOT . You will need to WEAR THIS ANYTIME YOU ARE UP AND OUT OF BED, IT IS OKAY TO REMOVE WHEN YOU ARE SLEEPING..     4. PROTEIN SUPPLEMENTS: Drink protein shakes 2x per day, morning and night (Ensure, Boost, Glucerna)     This is in addition to your normal diet    If you are on a renal diet, make sure to get a protein shake that is best suited for this diet. Please ask if you would like a referral to see a  Registered Dietician.     5. ELEVATE: Elevating your leg means laying with your head on a pillow and your foot ABOVE YOUR WAIST.     6. DO NOT MOVE YOUR FOOT.    There is a risk of worsening the wound or incision. To give yourself a higher chance of healing, please DO NOT swing foot back and forth and wiggle foot/toes especially when inside a stabilization device.        Dressing Change lnstructions      Apply medihoney to right big toe   (ok to substitute with Manuka honey hd supra lite)    2. Remove old Medihoney packing.  3. Cleanse with normal saline, pat dry.  4. Apply Medihoney alginate into the wound. Try to avoid medihoney on the intact skin.   5. Cover with a gauze dressing and secure with Roll Gauze and paper tape.  6. Change every other day and as needed.      It is not ok to get your wound wet     Call the clinic for any questions regarding your wound or cares and if you experience signs or symptoms of infection including: fevers, chills, increased redness, increased drainage, foul odor, increased pain at [phone].

## 2021-12-22 ENCOUNTER — OFFICE VISIT (OUTPATIENT)
Dept: VASCULAR SURGERY | Facility: CLINIC | Age: 66
End: 2021-12-22
Attending: PODIATRIST
Payer: MEDICARE

## 2021-12-22 VITALS — SYSTOLIC BLOOD PRESSURE: 144 MMHG | RESPIRATION RATE: 18 BRPM | DIASTOLIC BLOOD PRESSURE: 82 MMHG | HEART RATE: 84 BPM

## 2021-12-22 DIAGNOSIS — E11.621 DIABETIC ULCER OF TOE OF RIGHT FOOT ASSOCIATED WITH TYPE 2 DIABETES MELLITUS, LIMITED TO BREAKDOWN OF SKIN (H): Primary | ICD-10-CM

## 2021-12-22 DIAGNOSIS — L97.511 DIABETIC ULCER OF TOE OF RIGHT FOOT ASSOCIATED WITH TYPE 2 DIABETES MELLITUS, LIMITED TO BREAKDOWN OF SKIN (H): Primary | ICD-10-CM

## 2021-12-22 PROCEDURE — 11042 DBRDMT SUBQ TIS 1ST 20SQCM/<: CPT | Performed by: PODIATRIST

## 2021-12-22 ASSESSMENT — PAIN SCALES - GENERAL: PAINLEVEL: NO PAIN (0)

## 2021-12-22 NOTE — PROGRESS NOTES
FOOT AND ANKLE SURGERY/PODIATRY Progress Note      ASSESSMENT: Diabetic Ulceration right hallux      TREATMENT:  -The right hallux ulcer is stable, no signs of infection. I discussed risks of not properly offloading the ulcer including prolonged healing, infection and amputation.     -Recommend limited walking in a CAM boot.    -After discussion of risk factors and consent obtained 2% Lidocaine HCL jelly was applied, under clean conditions, the right and foot ulceration(s) were debrided using #15 blade scalpel.  Devitalized and nonviable tissue, along with any fibrin and slough, was removed to improve granulation tissue formation, stimulate wound healing, decrease overall bacteria load, disrupt biofilm formation and decrease edge senescence. Wound drainage was scant No. Total excisional debridement was 0.65 sq cm into the subcutaneous tissue with a depth of 0.2 cm.   Ulcers were improved afterwards and .  Measures were as noted on the flow sheet. Medi-honey with a gauze dressing was applied. He will continue to apply Medi-honey with a gauze dressing qoday.    -He will follow-up in 3 weeks.    Cortez Charlton DPM  [facility] [place]      HPI: Ezekiel Aldrich was seen again today for a right hallux ulcer. He has not obtained a CAM boot, and is not willing to use this device at work.       Past Medical History:   Diagnosis Date     Acute renal failure (H)     6/11/2016, secondary to dehydration, normalized       Past Surgical History:   Procedure Laterality Date     ANKLE FRACTURE SURGERY Right     hardware     KNEE SURGERY       MANDIBLE FRACTURE SURGERY      from fx     SHOULDER SURGERY Right     from fx and separation       Allergies   Allergen Reactions     Lisinopril Cough     Dry cough         Current Outpatient Medications:      aspirin-acetaminophen-caffeine (EXCEDRIN MIGRAINE) 250-250-65 mg per tablet, [ASPIRIN-ACETAMINOPHEN-CAFFEINE (EXCEDRIN MIGRAINE) 250-250-65 MG PER TABLET] Take 1  "tablet by mouth every 6 (six) hours as needed for pain., Disp: , Rfl:      atorvastatin (LIPITOR) 80 MG tablet, [ATORVASTATIN (LIPITOR) 80 MG TABLET] Take 1 daily for high cholesterol., Disp: 90 tablet, Rfl: 3     blood glucose meter (GLUCOMETER), [BLOOD GLUCOSE METER (GLUCOMETER)] Use 1 each As Directed as needed. Dispense glucometer brand per patient's insurance at pharmacy discretion., Disp: 1 each, Rfl: 0     blood glucose test strips, [BLOOD GLUCOSE TEST STRIPS] Use 1 each As Directed 2 (two) times a day. Dispense brand per patient's insurance at pharmacy discretion., Disp: 200 strip, Rfl: 3     generic lancets, [GENERIC LANCETS] Use 1 each As Directed 2 (two) times a day. Dispense brand per patient's insurance at pharmacy discretion., Disp: 200 each, Rfl: 3     glipiZIDE (GLUCOTROL XL) 10 MG 24 hr tablet, [GLIPIZIDE (GLUCOTROL XL) 10 MG 24 HR TABLET] TAKE 2 TABLETS(20 MG) BY MOUTH DAILY, Disp: 180 tablet, Rfl: 3     LANTUS SOLOSTAR 100 UNIT/ML soln, ADMINISTER 66 UNITS UNDER THE SKIN AT BEDTIME, Disp: 30 mL, Rfl: 2     losartan (COZAAR) 100 MG tablet, Take 1 tablet (100 mg) by mouth daily, Disp: 90 tablet, Rfl: 3     metFORMIN (GLUCOPHAGE-XR) 500 MG 24 hr tablet, Take 4 tablets (2,000 mg) by mouth daily, Disp: 360 tablet, Rfl: 3     pen needle, diabetic (PEN NEEDLE) 31 gauge x 1/4\" Ndle, [PEN NEEDLE, DIABETIC (PEN NEEDLE) 31 GAUGE X 1/4\" NDLE] Use twice daily to inject Victoza and Lantus, Disp: 100 each, Rfl: 0     pioglitazone (ACTOS) 15 MG tablet, Take 1 tablet daily for diabetes, Disp: 90 tablet, Rfl: 1     tamsulosin (FLOMAX) 0.4 MG capsule, [TAMSULOSIN (FLOMAX) 0.4 MG CAP] TAKE 1 CAPSULE(0.4 MG) BY MOUTH DAILY, Disp: 90 capsule, Rfl: 3    Review of Systems - 10 point Review of Systems is negative except for right hallux ulcer which is noted in HPI.      OBJECTIVE:  BP (!) 144/82   Pulse 84   Resp 18   General appearance: Patient is alert and fully cooperative with history & exam.  No sign of distress is " noted during the visit.    Vascular: Dorsalis pedis palpableRight.  Dermatologic:    VASC Wound Right great toe Right great toe (Active)   Pre Size Length 0.5 12/22/21 1400   Pre Size Width 0.3 12/22/21 1400   Pre Size Depth 0.2 12/22/21 1400   Pre Total Sq cm 0.65 11/29/21 1600   Granular base right hallux ulcer. No erythema right foot.   Neurologic: Diminished to light touch Right.  Musculoskeletal: Contracted digits noted Right.    Imaging:     US ABDOUL with PPG wo Exercise Bilateral    Result Date: 12/2/2021  BILATERAL RESTING ANKLE-BRACHIAL INDICES (ABDOUL'S) (Date: 11/29/21) Indication: Non healing Right great toe ulcer Previous: none History: Previous Smoker, Diabetic, Hyperlipidemia and Vascular Ulcers  Resting ABDOUL's          Right: mmHg Index     Brachial: 123  Ankle-(PT): 172 1.37 Ankle-(DP): 140 1.11          Digit: 123 0.98               Left: mmHg Index     Brachial: 126  Ankle-(PT): 177 1.40 Ankle-(DP): 150 1.19          Digit: 110 0.87 Resting ankle-brachial index of 1.37 on the right. Toe Pressures of 123 mmHg and TBI of 0.98  Resting ankle-brachial index of 1.40 on the left. Toe Pressures of 110 mmHg and TBI of 0.87  WAVEFORMS: The right dorsalis pedis and posterior tibial arteries show triphasic waveforms. The left dorsalis pedis and posterior tibial arteries show triphasic waveforms. Comments:   Impression:  1. RIGHT LOWER EXTREMITY: ABDOUL is Normal with an ABDOUL of 1.37. and Normal toe pressures of 123 mmHg. 2. LEFT LOWER EXTREMITY: ABDOUL is Normal with an ABDOUL of 1.40. and Normal toe pressures of 110 mmHg. Reference: Wound classification Grade ABDOUL Ankle Systolic Pressure Toe Pressures 0 > 0.80 > 100 mmHg > 60 mmHg 1 0.6 - 0.79 70 - 100 mmHg 40 - 59 mmHg 2 0.4 - 0.59 50-70 mmHg 30 - 39 mmHg 3 < 0.39 < 50 mmHg < 30 mmHg Digit Pressures DBI Disease Category > 0.70 Normal < 0.70 Abnormal > 30 mmHg Potential wound healing < 30 mmHg Impaired wound healing Ankle Brachial Pressures ABDOUL Disease Category > 1.3   Likely vessel calcification with monophasic waveforms, non-diagnostic 0.95-1.30 Normal with multiphasic waveforms 0.50-0.95 Single level disease 0.30-0.50 Multilevel disease < 0.30 Critical limb ischema Volume Plethysmography Recording (VPR) at all levels Normal Sharp systolic peak, fast upstroke, prominent dicrotic notch in wave Mild Sharp systolic peak, fast upstroke, absent dicrotic notch in wave Moderate Flattened systolic peak, slowed upstroke, absent dicrotic notch in wave Severe Low-amplitude wave with = upslope and down slope Occluded Flat Line Multiple Level Segmental Pressures  Normal Abnormal Upper Thigh > 1.2 pressure indices, <30 mmHg between lateral and horizontal cuffs < 0.8 pressure indices suggest proximal occlusion, 0.8-1.2 pressure indices suggest inflow disease, > 30 mmHg between lateral and horizontal cuffs  Lower Thigh < 30 mmHg between lateral and horizontal cuffs > 30 mmHg between lateral and horizontal cuffs Upper Calf < 30 mmHg between lateral and horizontal cuffs > 30 mmHg between lateral and horizontal cuffs Note: As limb diameter increases, pressure measurements also increase.    US Lower Extremity Arterial Duplex Bilateral    Result Date: 12/2/2021  Arterial Duplex Ultrasound (Date: 11/29/21) Lower Extremity Artery Evaluation Indication: Non healing Right great toe ulcer Previous: none History: Previous Smoker, Diabetic, Hyperlipidemia and Vascular Ulcers Technique: Duplex imaging is performed utilizing gray-scale, two-dimensional images, and color-flow imaging. Doppler waveform analysis and spectral Doppler imaging is also performed. LOWER EXTREMITY ARTERIAL DUPLEX EXAM WITH WAVEFORMS Right Leg:(cm/s) Location: Velocities Waveforms EIA:   84  T CFA:   69  T PFA:   48  B SFA Proximal:   68  T SFA Mid:   89  T SFA Distal:   51  T Popliteal Artery:   111  T PTA:   93   T ANDRÉS:   70  T DPA:   95  T Waveforms: T=Triphasic, M=Monophasic, B=Biphasic Left Leg:(cm/s) Location: Velocities  Waveforms EIA:   75  T CFA:  63  T PFA:   40  B SFA Proximal:   73  T SFA Mid:   76  T SFA Distal:   69  T Popliteal Artery:   50  T PTA:   71   T ANDRÉS:   82  T DPA:   80  T Waveforms: T=Triphasic, M=Monophasic, B=Biphasic Impression: Right Lower Extremity: Normal right leg arterial duplex with normal waveforms and normal velocities. Left Lower Extremity: Normal left leg arterial duplex with normal waveforms and normal velocities. Reference: Category Normal 1-19% 20-49% 50-99% Occluded PSV <160 cm/sec without spectral broadening <160 cm/sec with spectral broadening Increased Increased Absent Flow Ratio N/A N/A < 2.0 >2.0 N/A Post-Stenotic Turbulence No No No Yes N/A          Picture:

## 2022-01-07 ENCOUNTER — VIRTUAL VISIT (OUTPATIENT)
Dept: SLEEP MEDICINE | Facility: CLINIC | Age: 67
End: 2022-01-07
Payer: MEDICARE

## 2022-01-07 VITALS — WEIGHT: 240 LBS | HEIGHT: 71 IN | BODY MASS INDEX: 33.6 KG/M2

## 2022-01-07 DIAGNOSIS — R09.81 NASAL CONGESTION: ICD-10-CM

## 2022-01-07 DIAGNOSIS — E66.09 CLASS 1 OBESITY DUE TO EXCESS CALORIES WITH SERIOUS COMORBIDITY AND BODY MASS INDEX (BMI) OF 33.0 TO 33.9 IN ADULT: ICD-10-CM

## 2022-01-07 DIAGNOSIS — G47.30 OBSERVED SLEEP APNEA: ICD-10-CM

## 2022-01-07 DIAGNOSIS — E66.811 CLASS 1 OBESITY DUE TO EXCESS CALORIES WITH SERIOUS COMORBIDITY AND BODY MASS INDEX (BMI) OF 33.0 TO 33.9 IN ADULT: ICD-10-CM

## 2022-01-07 DIAGNOSIS — R06.83 SNORING: Primary | ICD-10-CM

## 2022-01-07 DIAGNOSIS — R35.1 NOCTURIA: ICD-10-CM

## 2022-01-07 PROCEDURE — 99205 OFFICE O/P NEW HI 60 MIN: CPT | Mod: 95 | Performed by: INTERNAL MEDICINE

## 2022-01-07 RX ORDER — ZOLPIDEM TARTRATE 10 MG/1
TABLET ORAL
Qty: 1 TABLET | Refills: 0 | Status: SHIPPED | OUTPATIENT
Start: 2022-01-07 | End: 2022-04-06

## 2022-01-07 RX ORDER — FLUTICASONE PROPIONATE 50 MCG
2 SPRAY, SUSPENSION (ML) NASAL DAILY
Qty: 16 G | Refills: 3 | Status: SHIPPED | OUTPATIENT
Start: 2022-01-07 | End: 2022-08-02

## 2022-01-07 ASSESSMENT — ENCOUNTER SYMPTOMS
HEMOPTYSIS: 0
SPUTUM PRODUCTION: 0
EYE REDNESS: 0
NIGHT SWEATS: 1
EYE PAIN: 0
NAIL CHANGES: 0
DECREASED APPETITE: 0
ALTERED TEMPERATURE REGULATION: 0
EYE WATERING: 0
COUGH: 0
STIFFNESS: 0
POOR WOUND HEALING: 1
SKIN CHANGES: 0
NECK PAIN: 0
JOINT SWELLING: 0
POLYPHAGIA: 0
WHEEZING: 0
CHILLS: 0
BACK PAIN: 0
DYSPNEA ON EXERTION: 0
WEIGHT LOSS: 0
SHORTNESS OF BREATH: 0
FATIGUE: 1
MUSCLE WEAKNESS: 0
EYE IRRITATION: 0
WEIGHT GAIN: 0
POLYDIPSIA: 0
DOUBLE VISION: 0
POSTURAL DYSPNEA: 0
FEVER: 0
ARTHRALGIAS: 1
HALLUCINATIONS: 0
MYALGIAS: 0
COUGH DISTURBING SLEEP: 0
SNORES LOUDLY: 1
INCREASED ENERGY: 0
MUSCLE CRAMPS: 0

## 2022-01-07 ASSESSMENT — SLEEP AND FATIGUE QUESTIONNAIRES
HOW LIKELY ARE YOU TO NOD OFF OR FALL ASLEEP WHILE LYING DOWN TO REST IN THE AFTERNOON WHEN CIRCUMSTANCES PERMIT: WOULD NEVER DOZE
HOW LIKELY ARE YOU TO NOD OFF OR FALL ASLEEP WHILE SITTING QUIETLY AFTER LUNCH WITHOUT ALCOHOL: WOULD NEVER DOZE
HOW LIKELY ARE YOU TO NOD OFF OR FALL ASLEEP WHILE SITTING AND TALKING TO SOMEONE: WOULD NEVER DOZE
HOW LIKELY ARE YOU TO NOD OFF OR FALL ASLEEP WHILE SITTING INACTIVE IN A PUBLIC PLACE: WOULD NEVER DOZE
HOW LIKELY ARE YOU TO NOD OFF OR FALL ASLEEP WHEN YOU ARE A PASSENGER IN A CAR FOR AN HOUR WITHOUT A BREAK: WOULD NEVER DOZE
HOW LIKELY ARE YOU TO NOD OFF OR FALL ASLEEP WHILE WATCHING TV: WOULD NEVER DOZE
HOW LIKELY ARE YOU TO NOD OFF OR FALL ASLEEP IN A CAR, WHILE STOPPED FOR A FEW MINUTES IN TRAFFIC: WOULD NEVER DOZE
HOW LIKELY ARE YOU TO NOD OFF OR FALL ASLEEP WHILE SITTING AND READING: WOULD NEVER DOZE

## 2022-01-07 ASSESSMENT — MIFFLIN-ST. JEOR: SCORE: 1895.76

## 2022-01-07 NOTE — LETTER
1/7/2022         RE: Ezekiel Aldrich  [address] [facility] [address]        Dear Colleague,    Thank you for referring your patient, Ezekiel Aldrich, to the [facility]. Please see a copy of my visit note below.    Ezekiel is a 66 year old who is being evaluated via a billable video visit.      How would you like to obtain your AVS? MyChart  If the video visit is dropped, the invitation should be resent by: Text to cell phone: [phone]  Will anyone else be joining your video visit? No      Video Start Time: 11:14 AM  Video-Visit Details    Type of service:  Video Visit    Video End Time:11:49 AM    Originating Location (pt. Location): Work    Distant Location (provider location):  [facility]     Platform used for Video Visit: Usabilla     Chief complaint: Patient referred by Janes Jeronimo MD for evaluation of possible obstructive sleep apnea    History of Present Illness: 66-year-old gentleman with past medical history notable for hypertension, type 2 diabetes and obesity.  He has a long history of being told about loud snoring and even observed apneas.  He sleeps separately from his wife because of this.  He does admit to poor sleep quality which he attributes to the need to get up to go to the bathroom at least 2 times a night.  He does have some difficulty breathing through his nose.  He often will wake up with a dry sore throat.  He will use some oxygen metolazone sprays which she will be helpful and sometimes improve his sleep quality.  However he is aware he is not supposed to use these for very long.  He usually sleeps on his left side.  Is not comfortable sleeping on his back.  He does sometimes wake up with night sweats.  No headaches.    Typical bedtime is around 1030 or so.  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  He thinks he gets 5 to 6 hours of sleep because of the awakenings at night.  On a rare occasion he  will get up to 8 hours.  He cannot nap during the day.  He usually drinks 2 cups of coffee in the morning.    He recently had a colonoscopy.  He denies any problems with respiratory complications.  He did get treated with supplemental oxygen during the procedure and he felt like he could breathe well with that.  He has a strong family history of obstructive sleep apnea.  He himself has never been tested before but he does admit that he likely has it.     Patient denies restless leg syndrome, sleepwalking, sleep talking, dream enactment behavior or nightmares.    Crabtree Sleepiness Scale   Sitting and reading: Would never doze   Watching TV: Would never doze   Sitting, inactive in a public place (e.g. a theatre or a meeting): Would never doze   As a passenger in a car for an hour without a break: Would never doze   Lying down to rest in the afternoon when circumstances permit: Would never doze   Sitting and talking to someone: Would never doze   Sitting quietly after a lunch without alcohol: Would never doze   In a car, while stopped for a few minutes in traffic: Would never doze   Total score - Crabtree: 0  (Less than 10 normal)    Insomnia Severity Scale  SHIRAZ Total Score: 5  Total score categories:  0-7 = No clinically significant insomnia   8-14 = Subthreshold insomnia   15-21 = Clinical insomnia (moderate severity)  22-28 = Clinical insomnia (severe)    STOP-BANG  Loud Snore   1  Excessively Tired/Sleepy   0  Observed apnea   1  Hypertension   1  BMI> 35 kg/m2   0  Age >50   1  Neck >16 in/40cm   1  Male Gender   1  Total =   6  (0-2 low, 3-4 intermediate, 5-8 high risk of CHALO)      Past Medical History:   Diagnosis Date     Acute renal failure (H)     6/11/2016, secondary to dehydration, normalized     HTN (hypertension)      Obesity      Type 2 diabetes mellitus (H)        Allergies   Allergen Reactions     Lisinopril Cough     Dry cough       Current Outpatient Medications   Medication      "aspirin-acetaminophen-caffeine (EXCEDRIN MIGRAINE) 250-250-65 mg per tablet     atorvastatin (LIPITOR) 80 MG tablet     blood glucose meter (GLUCOMETER)     blood glucose test strips     generic lancets     glipiZIDE (GLUCOTROL XL) 10 MG 24 hr tablet     LANTUS SOLOSTAR 100 UNIT/ML soln     losartan (COZAAR) 100 MG tablet     metFORMIN (GLUCOPHAGE-XR) 500 MG 24 hr tablet     pen needle, diabetic (PEN NEEDLE) 31 gauge x /\" Ndle     pioglitazone (ACTOS) 15 MG tablet     tamsulosin (FLOMAX) 0.4 MG capsule     No current facility-administered medications for this visit.       Social History     Socioeconomic History     Marital status:      Spouse name: Not on file     Number of children: 5     Years of education: Not on file     Highest education level: Not on file   Occupational History     Occupation: Friday   Tobacco Use     Smoking status: Former Smoker     Types: Cigarettes, Cigarettes     Quit date: 1985     Years since quittin.7     Smokeless tobacco: Current User     Types: Chew, Chew     Tobacco comment: 2 tins/week   Substance and Sexual Activity     Alcohol use: Yes     Alcohol/week: 2.0 standard drinks     Drug use: No     Sexual activity: Yes     Partners: Female   Other Topics Concern     Not on file   Social History Narrative    Diet-            Exercise- Not regular. Work is physical, walking.     Social Determinants of Health     Financial Resource Strain: Not on file   Food Insecurity: Not on file   Transportation Needs: Not on file   Physical Activity: Not on file   Stress: Not on file   Social Connections: Not on file   Intimate Partner Violence: Not on file   Housing Stability: Not on file       Family History   Problem Relation Age of Onset     Colon Cancer Mother      Coronary Artery Disease Mother      Hypertension Mother      Diabetes Type 2  Mother      Hypertension Father      Bell's palsy Father      Diabetes Type 2  Father      Thyroid Disease Father      Sleep " Apnea Father      Sleep Apnea Sister      Prostate Cancer Brother 57     Sleep Apnea Brother      Bell's palsy Maternal Grandmother      Thyroid Disease Daughter        Review of Systems:   Answers for HPI/ROS submitted by the patient on 1/7/2022  General Symptoms: Yes  Skin Symptoms: Yes  HENT Symptoms: No  EYE SYMPTOMS: Yes  HEART SYMPTOMS: No  LUNG SYMPTOMS: Yes  INTESTINAL SYMPTOMS: No  URINARY SYMPTOMS: No  REPRODUCTIVE SYMPTOMS: Yes  SKELETAL SYMPTOMS: Yes  BLOOD SYMPTOMS: No  NERVOUS SYSTEM SYMPTOMS: No  MENTAL HEALTH SYMPTOMS: No  Fever: No  Loss of appetite: No  Weight loss: No  Weight gain: No  Fatigue: Yes  Night sweats: Yes  Chills: No  Increased stress: No  Excessive hunger: No  Excessive thirst: No  Feeling hot or cold when others believe the temperature is normal: No  Loss of height: No  Post-operative complications: No  Surgical site pain: No  Hallucinations: No  Change in or Loss of Energy: No  Hyperactivity: No  Confusion: No  Changes in hair: No  Changes in moles/birth marks: No  Itching: No  Rashes: No  Changes in nails: No  Acne: No  Change in facial hair: No  Warts: No  Non-healing sores: Yes  Scarring: No  Flaking of skin: No  Color changes of hands/feet in cold : No  Sun sensitivity: No  Skin thickening: No  Eye pain: No  Vision loss: No  Dry eyes: No  Watery eyes: No  Eye bulging: No  Double vision: No  Flashing of lights: No  Spots: No  Floaters: Yes  Redness: No  Crossed eyes: No  Tunnel Vision: No  Yellowing of eyes: No  Eye irritation: No  Cough: No  Sputum or phlegm: No  Coughing up blood: No  Difficulty breating or shortness of breath: No  Snoring: Yes  Wheezing: No  Difficulty breathing on exertion: No  Nighttime Cough: No  Difficulty breathing when lying flat: No  Scrotal pain or swelling: Yes  Erectile dysfunction: No  Penile discharge: No  Genital ulcers: No  Reduced libido: No  Back pain: No  Muscle aches: No  Neck pain: No  Swollen joints: No  Joint pain: Yes  Bone pain:  "No  Muscle cramps: No  Muscle weakness: No  Joint stiffness: No  Bone fracture: No        EXAM:  Ht 1.803 m (5' 11\")   Wt 108.9 kg (240 lb)   BMI 33.47 kg/m    GENERAL: Alert and no distress  EYES: Eyes grossly normal to inspection.  No discharge or erythema, or obvious scleral/conjunctival abnormalities.  RESP: No audible wheeze, cough, or visible cyanosis.  No visible retractions or increased work of breathing.    SKIN: Visible skin clear. No significant rash, abnormal pigmentation or lesions.  NEURO: Cranial nerves grossly intact.  Mentation and speech appropriate for age.  PSYCH: Mentation appears normal, affect normal, judgement and insight intact, normal speech and appearance well-groomed.       TSH   Date Value Ref Range Status   10/29/2021 3.18 0.30 - 5.00 uIU/mL Final         ASSESSMENT:  66-year-old gentleman with hypertension, type 2 diabetes, obesity, snoring and observed apneas.  He also suffers nocturia, difficulty breathing through his nose and occasional night sweats.  He is at high risk for obstructive sleep apnea.  Identifying and treating obstructive sleep apnea is medically indicated.  He can potentially benefit from the treatment with both improvement of sleep quality as well as decreasing cardiovascular stress and insulin resistance.    PLAN:  Recommended in lab polysomnography with a sleep aid.  Recommended that he start using nasal fluticasone 2 sprays each nostril nightly.  We reviewed the pathophysiology of obstructive sleep apnea and the importance of treating it.  Once I review the results I will send patient a Twilio message and if there is sleep apnea there we will go ahead and generate orders for CPAP therapy did caution patient that there is a shortage of machines at this time.  Once patient starts CPAP he will be enrolled in the sleep therapy management program.  He is agreeable with this plan.      60 minutes spent on the date of the encounter doing chart review, history and " exam, documentation and further activities per the note    Mignon Wasserman M.D.  Pulmonary/Critical Care/Sleep Medicine    [facility]   Floor 1, [address]   [address] [place] [address]   Appointments: [phone]    The above note was dictated using voice recognition software and may include typographical errors. Please contact the author for any clarifications.                Again, thank you for allowing me to participate in the care of your patient.        Sincerely,        Mignon Wasserman MD

## 2022-01-07 NOTE — PATIENT INSTRUCTIONS
"  Start nasal fluticasone two spray each nostril every night stay on this until follow up            MY TREATMENT INFORMATION FOR SLEEP APNEA-  Ezekiel Aldrich    DOCTOR : Mignon Wasserman MD    Am I having a sleep study at a sleep center? ordered  --->Due to insurance clearance delays, you will be contacted within 2-4 weeks to schedule    Am I having a home sleep study? Not ordered  --->Watch the video for the device you are using:    -/drop off device-   https://www.[facility] [facility].com/watch?v=yGGFBdELGhk    -Disposable device sent out require phone/computer application-   https://www.[employer] [facility].com/watch?v=BCce_vbiwxE      Frequently asked questions:  1. What is Obstructive Sleep Apnea (CHALO)? CHALO is the most common type of sleep apnea. Apnea means, \"without breath.\"  Apnea is most often caused by narrowing or collapse of the upper airway as muscles relax during sleep.   Almost everyone has occasional apneas. Most people with sleep apnea have had brief interruptions at night frequently for many years.  The severity of sleep apnea is related to how frequent and severe the events are.   2. What are the consequences of CHALO? Symptoms include: feeling sleepy during the day, snoring loudly, gasping or stopping of breathing, trouble sleeping, and occasionally morning headaches or heartburn at night.  Sleepiness can be serious and even increase the risk of falling asleep while driving. Other health consequences may include development of high blood pressure and other cardiovascular disease in persons who are susceptible. Untreated CHALO  can contribute to heart disease, stroke and diabetes.   3. What are the treatment options? In most situations, sleep apnea is a lifelong disease that must be managed with daily therapy. Medications are not effective for sleep apnea and surgery is generally not considered until other therapies have been tried. Your treatment is your choice . Continuous Positive Airway (CPAP) works right " away and is the therapy that is effective in nearly everyone. An oral device to hold your jaw forward is usually the next most reliable option. Other options include postioning devices (to keep you off your back), weight loss, and surgery including a tongue pacing device. There is more detail about some of these options below.  4. Are my sleep studies covered by insurance? Although we will request verification of coverage, we advise you also check in advance of the study to ensure there is coverage.    Important tips for those choosing CPAP and similar devices   Know your equipment:  CPAP is continuous positive airway pressure that prevents obstructive sleep apnea by keeping the throat from collapsing while you are sleeping. In most cases, the device is  smart  and can slowly self-adjusts if your throat collapses and keeps a record every day of how well you are treated-this information is available to you and your care team.  BPAP is bilevel positive airway pressure that keeps your throat open and also assists each breath with a pressure boost to maintain adequate breathing.  Special kinds of BPAP are used in patients who have inadequate breathing from lung or heart disease. In most cases, the device is  smart  and can slowly self-adjusts to assist breathing. Like CPAP, the device keeps a record of how well you are treated.  Your mask is your connection to the device. You get to choose what feels most comfortable and the staff will help to make sure if fits. Here: are some examples of the different masks that are available:       Key points to remember on your journey with sleep apnea:  1. Sleep study.  PAP devices often need to be adjusted during a sleep study to show that they are effective and adjusted right.  2. Good tips to remember: Try wearing just the mask during a quiet time during the day so your body adapts to wearing it. A humidifier is recommended for comfort in most cases to prevent drying of your nose  and throat. Allergy medication from your provider may help you if you are having nasal congestion.  3. Getting settled-in. It takes more than one night for most of us to get used to wearing a mask. Try wearing just the mask during a quiet time during the day so your body adapts to wearing it. A humidifier is recommended for comfort in most cases. Our team will work with you carefully on the first day and will be in contact within 4 days and again at 2 and 4 weeks for advice and remote device adjustments. Your therapy is evaluated by the device each day.   4. Use it every night. The more you are able to sleep naturally for 7-8 hours, the more likely you will have good sleep and to prevent health risks or symptoms from sleep apnea. Even if you use it 4 hours it helps. Occasionally all of us are unable to use a medical therapy, in sleep apnea, it is not dangerous to miss one night.   5. Communicate. Call our skilled team on the number provided on the first day if your visit for problems that make it difficult to wear the device. Over 2 out of 3 patients can learn to wear the device long-term with help from our team. Remember to call our team or your sleep providers if you are unable to wear the device as we may have other solutions for those who cannot adapt to mask CPAP therapy. It is recommended that you sleep your sleep provider within the first 3 months and yearly after that if you are not having problems.   6. Use it for your health. We encourage use of CPAP masks during daytime quiet periods to allow your face and brain to adapt to the sensation of CPAP so that it will be a more natural sensation to awaken to at night or during naps. This can be very useful during the first few weeks or months of adapting to CPAP though it does not help medically to wear CPAP during wakefulness and  should not be used as a strategy just to meet guidelines.  7. Take care of your equipment. Make sure you clean your mask and tubing  using directions every day and that your filter and mask are replaced as recommended or if they are not working.     BESIDES CPAP, WHAT OTHER THERAPIES ARE THERE?    Positioning Device  Positioning devices are generally used when sleep apnea is mild and only occurs on your back.This example shows a pillow that straps around the waist. It may be appropriate for those whose sleep study shows milder sleep apnea that occurs primarily when lying flat on one's back. Preliminary studies have shown benefit but effectiveness at home may need to be verified by a home sleep test. These devices are generally not covered by medical insurance.  Examples of devices that maintain sleeping on the back to prevent snoring and mild sleep apnea.    Belt type body positioner  http://[employer]/    Electronic reminder  http://nightshifttherapy.com/  http://www.[place].[employer].au/      Oral Appliance  What is oral appliance therapy?  An oral appliance device fits on your teeth at night like a retainer used after having braces. The device is made by a specialized dentist and requires several visits over 1-2 months before a manufactured device is made to fit your teeth and is adjusted to prevent your sleep apnea. Once an oral device is working properly, snoring should be improved. A home sleep test may be recommended at that time if to determine whether the sleep apnea is adequately treated.       Some things to remember:  -Oral devices are often, but not always, covered by your medical insurance. Be sure to check with your insurance provider.   -If you are referred for oral therapy, you will be given a list of specialized dentists to consider or you may choose to visit the Web site of the American Academy of Dental Sleep Medicine  -Oral devices are less likely to work if you have severe sleep apnea or are extremely overweight.     More detailed information  An oral appliance is a small acrylic device that fits over the upper and lower teeth   (similar to a retainer or a mouth guard). This device slightly moves jaw forward, which moves the base of the tongue forward, opens the airway, improves breathing for effective treat snoring and obstructive sleep apnea in perhaps 7 out of 10 people .  The best working devices are custom-made by a dental device  after a mold is made of the teeth 1, 2, 3.  When is an oral appliance indicated?  Oral appliance therapy is recommended as a first-line treatment for patients with primary snoring, mild sleep apnea, and for patients with moderate sleep apnea who prefer appliance therapy to use of CPAP4, 5. Severity of sleep apnea is determined by sleep testing and is based on the number of respiratory events per hour of sleep.   How successful is oral appliance therapy?  The success rate of oral appliance therapy in patients with mild sleep apnea is 75-80% while in patients with moderate sleep apnea it is 50-70%. The chance of success in patients with severe sleep apnea is 40-50%. The research also shows that oral appliances have a beneficial effect on the cardiovascular health of CHALO patients at the same magnitude as CPAP therapy7.  Oral appliances should be a second-line treatment in cases of severe sleep apnea, but if not completely successful then a combination therapy utilizing CPAP plus oral appliance therapy may be effective. Oral appliances tend to be effective in a broad range of patients although studies show that the patients who have the highest success are females, younger patients, those with milder disease, and less severe obesity. 3, 6.   Finding a dentist that practices dental sleep medicine  Specific training is available through the American Academy of Dental Sleep Medicine for dentists interested in working in the field of sleep. To find a dentist who is educated in the field of sleep and the use of oral appliances, near you, visit the Web site of the American Academy of Dental Sleep  Medicine.    References  1. Adalgisa et al. Objectively measured vs self-reported compliance during oral appliance therapy for sleep-disordered breathing. Chest 2013; 144(5): 3061-1086.  2. Katherine et al. Objective measurement of compliance during oral appliance therapy for sleep-disordered breathing. Thorax 2013; 68(1): 91-96.  3. Mau et al. Mandibular advancement devices in 620 men and women with CHALO and snoring: tolerability and predictors of treatment success. Chest 2004; 125: 5075-6468.  4. Kristina et al. Oral appliances for snoring and CHALO: a review. Sleep 2006; 29: 244-262.  5. Albert et al. Oral appliance treatment for CHALO: an update. J Clin Sleep Med 2014; 10(2): 215-227.  6. Brunilda et al. Predictors of OSAH treatment outcome. J Dent Res 2007; 86: 8979-9134.      Weight Loss:    Weight loss is a long-term strategy that may improve sleep apnea in some patients.    Weight management is a personal decision and the decision should be based on your interest and the potential benefits.  If you are interested in exploring weight loss strategies, the following discussion covers the impact on weight loss on sleep apnea and the approaches that may be successful.    Being overweight does not necessarily mean you will have health consequences.  Those who have BMI over 35 or over 27 with existing medical conditions carries greater risk.   Weight loss decreases severity of sleep apnea in most people with obesity. For those with mild obesity who have developed snoring with weight gain, even 15-30 pound weight loss can improve and occasionally eliminate sleep apnea.  Structured and life-long dietary and health habits are necessary to lose weight and keep healthier weight levels.     Though there may be significant health benefits from weight loss, long-term weight loss is very difficult to achieve- studies show success with dietary management in less than 10% of people. In addition, substantial  weight loss may require years of dietary control and may be difficult if patients have severe obesity. In these cases, surgical management may be considered.  Finally, older individuals who have tolerated obesity without health complications may be less likely to benefit from weight loss strategies.        Your BMI is Body mass index is 33.47 kg/m .  Weight management is a personal decision.  If you are interested in exploring weight loss strategies, the following discussion covers the approaches that may be successful. Body mass index (BMI) is one way to tell whether you are at a healthy weight, overweight, or obese. It measures your weight in relation to your height.  A BMI of 18.5 to 24.9 is in the healthy range. A person with a BMI of 25 to 29.9 is considered overweight, and someone with a BMI of 30 or greater is considered obese. More than two-thirds of American adults are considered overweight or obese.  Being overweight or obese increases the risk for further weight gain. Excess weight may lead to heart disease and diabetes.  Creating and following plans for healthy eating and physical activity may help you improve your health.  Weight control is part of healthy lifestyle and includes exercise, emotional health, and healthy eating habits. Careful eating habits lifelong are the mainstay of weight control. Though there are significant health benefits from weight loss, long-term weight loss with diet alone may be very difficult to achieve- studies show long-term success with dietary management in less than 10% of people. Attaining a healthy weight may be especially difficult to achieve in those with severe obesity. In some cases, medications, devices and surgical management might be considered.  What can you do?  If you are overweight or obese and are interested in methods for weight loss, you should discuss this with your provider.     Consider reducing daily calorie intake by 500 calories.     Keep a food  journal.     Avoiding skipping meals, consider cutting portions instead.    Diet combined with exercise helps maintain muscle while optimizing fat loss. Strength training is particularly important for building and maintaining muscle mass. Exercise helps reduce stress, increase energy, and improves fitness. Increasing exercise without diet control, however, may not burn enough calories to loose weight.       Start walking three days a week 10-20 minutes at a time    Work towards walking thirty minutes five days a week     Eventually, increase the speed of your walking for 1-2 minutes at time    And look into health and wellness programs that may be available through your health insurance provider, employer, local community center, or [employer].          Surgery:    Surgery for obstructive sleep apnea is considered generally only when other therapies fail to work. Surgery may be discussed with you if you are having a difficult time tolerating CPAP and or when there is an abnormal structure that requires surgical correction.  Nose and throat surgeries often enlarge the airway to prevent collapse.  Most of these surgeries create pain for 1-2 weeks and up to half of the most common surgeries are not effective throughout life.  You should carefully discuss the benefits and drawbacks to surgery with your sleep provider and surgeon to determine if it is the best solution for you.   More information  Surgery for CHALO is directed at areas that are responsible for narrowing or complete obstruction of the airway during sleep.  There are a wide range of procedures available to enlarge and/or stabilize the airway to prevent blockage of breathing in the three major areas where it can occur: the palate, tongue, and nasal regions.  Successful surgical treatment depends on the accurate identification of the factors responsible for obstructive sleep apnea in each person.  A personalized approach is required because there is no single  treatment that works well for everyone.  Because of anatomic variation, consultation with an examination by a sleep surgeon is a critical first step in determining what surgical options are best for each patient.  In some cases, examination during sedation may be recommended in order to guide the selection of procedures.  Patients will be counseled about risks and benefits as well as the typical recovery course after surgery. Surgery is typically not a cure for a person s CHALO.  However, surgery will often significantly improve one s CHALO severity (termed  success rate ).  Even in the absence of a cure, surgery will decrease the cardiovascular risk associated with OSA7; improve overall quality of life8 (sleepiness, functionality, sleep quality, etc).      Palate Procedures:  Patients with CHALO often have narrowing of their airway in the region of their tonsils and uvula.  The goals of palate procedures are to widen the airway in this region as well as to help the tissues resist collapse.  Modern palate procedure techniques focus on tissue conservation and soft tissue rearrangement, rather than tissue removal.  Often the uvula is preserved in this procedure. Residual sleep apnea is common in patient after pharyngoplasty with an average reduction in sleep apnea events of 33%2.      Tongue Procedures:  ExamWhile patients are awake, the muscles that surround the throat are active and keep this region open for breathing. These muscles relax during sleep, allowing the tongue and other structures to collapse and block breathing.  There are several different tongue procedures available.  Selection of a tongue base procedure depends on characteristics seen on physical exam.  Generally, procedures are aimed at removing bulky tissues in this area or preventing the back of the tongue from falling back during sleep.  Success rates for tongue surgery range from 50-62%3.    Hypoglossal Nerve Stimulation:  Hypoglossal nerve  stimulation has recently received approval from the [place] Food and Drug Administration for the treatment of obstructive sleep apnea.  This is based on research showing that the system was safe and effective in treating sleep apnea6.  Results showed that the median AHI score decreased 68%, from 29.3 to 9.0. This therapy uses an implant system that senses breathing patterns and delivers mild stimulation to airway muscles, which keeps the airway open during sleep.  The system consists of three fully implanted components: a small generator (similar in size to a pacemaker), a breathing sensor, and a stimulation lead.  Using a small handheld remote, a patient turns the therapy on before bed and off upon awakening.    Candidates for this device must be greater than 22 years of age, have moderate to severe CHALO (AHI between 20-65), BMI less than 32, have tried CPAP/oral appliance without success, and have appropriate upper airway anatomy (determined by a sleep endoscopy performed by Dr. Astudillo).    Hypoglossal Nerve Stimulation Pathway:    The sleep surgeon s office will work with the patient through the insurance prior-authorization process (including communications and appeals).    Nasal Procedures:  Nasal obstruction can interfere with nasal breathing during the day and night.  Studies have shown that relief of nasal obstruction can improve the ability of some patients to tolerate positive airway pressure therapy for obstructive sleep apnea1.  Treatment options include medications such as nasal saline, topical corticosteroid and antihistamine sprays, and oral medications such as antihistamines or decongestants. Non-surgical treatments can include external nasal dilators for selected patients. If these are not successful by themselves, surgery can improve the nasal airway either alone or in combination with these other options.      Combination Procedures:  Combination of surgical procedures and other treatments may  be recommended, particularly if patients have more than one area of narrowing or persistent positional disease.  The success rate of combination surgery ranges from 66-80%2,3.    References  1. Sandra LINDSEY. The Role of the Nose in Snoring and Obstructive Sleep Apnoea: An Update.  Eur Arch Otorhinolaryngol. 2011; 268: 1365-73.  2.  Saulo SM; Gómez JA; Jonathan JR; Pallanch JF; Reyna MB; Marshall SG; Adrián OG. Surgical modifications of the upper airway for obstructive sleep apnea in adults: a systematic review and meta-analysis. SLEEP 2010;33(10):1856-1572. Dylan YODER. Hypopharyngeal surgery in obstructive sleep apnea: an evidence-based medicine review.  Arch Otolaryngol Head Neck Surg. 2006 Feb;132(2):206-13.  3. Valente YH1, Alix Y, Patricio ANICETO. The efficacy of anatomically based multilevel surgery for obstructive sleep apnea. Otolaryngol Head Neck Surg. 2003 Oct;129(4):327-35.  4. Dylan YODER, Goldberg A. Hypopharyngeal Surgery in Obstructive Sleep Apnea: An Evidence-Based Medicine Review. Arch Otolaryngol Head Neck Surg. 2006 Feb;132(2):206-13.  5. Myriam KERR et al. Upper-Airway Stimulation for Obstructive Sleep Apnea.  N Engl J Med. 2014 Jan 9;370(2):139-49.  6. Huma Y et al. Increased Incidence of Cardiovascular Disease in Middle-aged Men with Obstructive Sleep Apnea. Am J Respir Crit Care Med; 2002 166: 159-165  7. Larsen EM et al. Studying Life Effects and Effectiveness of Palatopharyngoplasty (SLEEP) study: Subjective Outcomes of Isolated Uvulopalatopharyngoplasty. Otolaryngol Head Neck Surg. 2011; 144: 623-631.        WHAT IF I ONLY HAVE SNORING?      Mandibular advancement devices, lateral sleep positioning, long-term weight loss and treatment of nasal allergies has been shown to improve snoring.    Exercising tongue muscles with a game (https://www.ncbi.nlm.nih.gov/pubmed/16695072) or stimulating the tongue during the day with a device (https://doi.org/10.Counts include 234 beds at the [facility]0/[id number]) have t snoring    Remember to Drive  Safe... Drive Alive     Sleep health profoundly affects your health, mood, and your safety.  Thirty three percent of the population (one in three of us) is not getting enough sleep and many have a sleep disorder. Not getting enough sleep or having an untreated / undertreated sleep condition may make us sleepy without even knowing it. In fact, our driving could be dramatically impaired due to our sleep health. As your provider, here are some things I would like you to know about driving:     Here are some warning signs for impairment and dangerous drowsy driving:              -Having been awake more than 16 hours               -Looking tired               -Eyelid drooping              -Head nodding (it could be too late at this point)              -Driving for more than 30 minutes     Some things you could do to make the driving safer if you are experiencing some drowsiness:              -Stop driving and rest              -Call for transportation              -Make sure your sleep disorder is adequately treated     Some things that have been shown NOT to work when experiencing drowsiness while driving:              -Turning on the radio              -Opening windows              -Eating any  distracting  /  entertaining  foods (e.g., sunflower seeds, candy, or any other)              -Talking on the phone      Your decision may not only impact your life, but also the life of others. Please, remember to drive safe for yourself and all of us.

## 2022-01-07 NOTE — PROGRESS NOTES
Ezekiel is a 66 year old who is being evaluated via a billable video visit.      How would you like to obtain your AVS? MyChart  If the video visit is dropped, the invitation should be resent by: Text to cell phone: [phone]  Will anyone else be joining your video visit? No      Video Start Time: 11:14 AM  Video-Visit Details    Type of service:  Video Visit    Video End Time:11:49 AM    Originating Location (pt. Location): Work    Distant Location (provider location):  [facility]     Platform used for Video Visit: [employer]e     Chief complaint: Patient referred by Janes Jeronimo MD for evaluation of possible obstructive sleep apnea    History of Present Illness: 66-year-old gentleman with past medical history notable for hypertension, type 2 diabetes and obesity.  He has a long history of being told about loud snoring and even observed apneas.  He sleeps separately from his wife because of this.  He does admit to poor sleep quality which he attributes to the need to get up to go to the bathroom at least 2 times a night.  He does have some difficulty breathing through his nose.  He often will wake up with a dry sore throat.  He will use some oxygen metolazone sprays which she will be helpful and sometimes improve his sleep quality.  However he is aware he is not supposed to use these for very long.  He usually sleeps on his left side.  Is not comfortable sleeping on his back.  He does sometimes wake up with night sweats.  No headaches.    Typical bedtime is around 1030 or so.  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  He thinks he gets 5 to 6 hours of sleep because of the awakenings at night.  On a rare occasion he will get up to 8 hours.  He cannot nap during the day.  He usually drinks 2 cups of coffee in the morning.    He recently had a colonoscopy.  He denies any problems with respiratory complications.  He did get treated with supplemental oxygen during the  procedure and he felt like he could breathe well with that.  He has a strong family history of obstructive sleep apnea.  He himself has never been tested before but he does admit that he likely has it.     Patient denies restless leg syndrome, sleepwalking, sleep talking, dream enactment behavior or nightmares.    [place] Sleepiness Scale   Sitting and reading: Would never doze   Watching TV: Would never doze   Sitting, inactive in a public place (e.g. a theatre or a meeting): Would never doze   As a passenger in a car for an hour without a break: Would never doze   Lying down to rest in the afternoon when circumstances permit: Would never doze   Sitting and talking to someone: Would never doze   Sitting quietly after a lunch without alcohol: Would never doze   In a car, while stopped for a few minutes in traffic: Would never doze   Total score - [place]: 0  (Less than 10 normal)    Insomnia Severity Scale  SHIRAZ Total Score: 5  Total score categories:  0-7 = No clinically significant insomnia   8-14 = Subthreshold insomnia   15-21 = Clinical insomnia (moderate severity)  22-28 = Clinical insomnia (severe)    STOP-BANG  Loud Snore   1  Excessively Tired/Sleepy   0  Observed apnea   1  Hypertension   1  BMI> 35 kg/m2   0  Age >50   1  Neck >16 in/40cm   1  Male Gender   1  Total =   6  (0-2 low, 3-4 intermediate, 5-8 high risk of CHALO)      Past Medical History:   Diagnosis Date     Acute renal failure (H)     6/11/2016, secondary to dehydration, normalized     HTN (hypertension)      Obesity      Type 2 diabetes mellitus (H)        Allergies   Allergen Reactions     Lisinopril Cough     Dry cough       Current Outpatient Medications   Medication     aspirin-acetaminophen-caffeine (EXCEDRIN MIGRAINE) 250-250-65 mg per tablet     atorvastatin (LIPITOR) 80 MG tablet     blood glucose meter (GLUCOMETER)     blood glucose test strips     generic lancets     glipiZIDE (GLUCOTROL XL) 10 MG 24 hr tablet     LANTUS SOLOSTAR 100  "UNIT/ML soln     losartan (COZAAR) 100 MG tablet     metFORMIN (GLUCOPHAGE-XR) 500 MG 24 hr tablet     pen needle, diabetic (PEN NEEDLE) 31 gauge x /\" Ndle     pioglitazone (ACTOS) 15 MG tablet     tamsulosin (FLOMAX) 0.4 MG capsule     No current facility-administered medications for this visit.       Social History     Socioeconomic History     Marital status:      Spouse name: Not on file     Number of children: 5     Years of education: Not on file     Highest education level: Not on file   Occupational History     Occupation: [employer]   Tobacco Use     Smoking status: Former Smoker     Types: Cigarettes, Cigarettes     Quit date: 1985     Years since quittin.7     Smokeless tobacco: Current User     Types: Chew, Chew     Tobacco comment: 2 tins/week   Substance and Sexual Activity     Alcohol use: Yes     Alcohol/week: 2.0 standard drinks     Drug use: No     Sexual activity: Yes     Partners: Female   Other Topics Concern     Not on file   Social History Narrative    Diet-            Exercise- Not regular. Work is physical, walking.     Social Determinants of Health     Financial Resource Strain: Not on file   Food Insecurity: Not on file   Transportation Needs: Not on file   Physical Activity: Not on file   Stress: Not on file   Social Connections: Not on file   Intimate Partner Violence: Not on file   Housing Stability: Not on file       Family History   Problem Relation Age of Onset     Colon Cancer Mother      Coronary Artery Disease Mother      Hypertension Mother      Diabetes Type 2  Mother      Hypertension Father      Bell's palsy Father      Diabetes Type 2  Father      Thyroid Disease Father      Sleep Apnea Father      Sleep Apnea Sister      Prostate Cancer Brother 57     Sleep Apnea Brother      Bell's palsy Maternal Grandmother      Thyroid Disease Daughter        Review of Systems:   Answers for HPI/ROS submitted by the patient on 2022  General Symptoms: Yes  Skin " "Symptoms: Yes  HENT Symptoms: No  EYE SYMPTOMS: Yes  HEART SYMPTOMS: No  LUNG SYMPTOMS: Yes  INTESTINAL SYMPTOMS: No  URINARY SYMPTOMS: No  REPRODUCTIVE SYMPTOMS: Yes  SKELETAL SYMPTOMS: Yes  BLOOD SYMPTOMS: No  NERVOUS SYSTEM SYMPTOMS: No  MENTAL HEALTH SYMPTOMS: No  Fever: No  Loss of appetite: No  Weight loss: No  Weight gain: No  Fatigue: Yes  Night sweats: Yes  Chills: No  Increased stress: No  Excessive hunger: No  Excessive thirst: No  Feeling hot or cold when others believe the temperature is normal: No  Loss of height: No  Post-operative complications: No  Surgical site pain: No  Hallucinations: No  Change in or Loss of Energy: No  Hyperactivity: No  Confusion: No  Changes in hair: No  Changes in moles/birth marks: No  Itching: No  Rashes: No  Changes in nails: No  Acne: No  Change in facial hair: No  Warts: No  Non-healing sores: Yes  Scarring: No  Flaking of skin: No  Color changes of hands/feet in cold : No  Sun sensitivity: No  Skin thickening: No  Eye pain: No  Vision loss: No  Dry eyes: No  Watery eyes: No  Eye bulging: No  Double vision: No  Flashing of lights: No  Spots: No  Floaters: Yes  Redness: No  Crossed eyes: No  Tunnel Vision: No  Yellowing of eyes: No  Eye irritation: No  Cough: No  Sputum or phlegm: No  Coughing up blood: No  Difficulty breating or shortness of breath: No  Snoring: Yes  Wheezing: No  Difficulty breathing on exertion: No  Nighttime Cough: No  Difficulty breathing when lying flat: No  Scrotal pain or swelling: Yes  Erectile dysfunction: No  Penile discharge: No  Genital ulcers: No  Reduced libido: No  Back pain: No  Muscle aches: No  Neck pain: No  Swollen joints: No  Joint pain: Yes  Bone pain: No  Muscle cramps: No  Muscle weakness: No  Joint stiffness: No  Bone fracture: No        EXAM:  Ht 1.803 m (5' 11\")   Wt 108.9 kg (240 lb)   BMI 33.47 kg/m    GENERAL: Alert and no distress  EYES: Eyes grossly normal to inspection.  No discharge or erythema, or obvious " scleral/conjunctival abnormalities.  RESP: No audible wheeze, cough, or visible cyanosis.  No visible retractions or increased work of breathing.    SKIN: Visible skin clear. No significant rash, abnormal pigmentation or lesions.  NEURO: Cranial nerves grossly intact.  Mentation and speech appropriate for age.  PSYCH: Mentation appears normal, affect normal, judgement and insight intact, normal speech and appearance well-groomed.       TSH   Date Value Ref Range Status   10/29/2021 3.18 0.30 - 5.00 uIU/mL Final         ASSESSMENT:  66-year-old gentleman with hypertension, type 2 diabetes, obesity, snoring and observed apneas.  He also suffers nocturia, difficulty breathing through his nose and occasional night sweats.  He is at high risk for obstructive sleep apnea.  Identifying and treating obstructive sleep apnea is medically indicated.  He can potentially benefit from the treatment with both improvement of sleep quality as well as decreasing cardiovascular stress and insulin resistance.    PLAN:  Recommended in lab polysomnography with a sleep aid.  Recommended that he start using nasal fluticasone 2 sprays each nostril nightly.  We reviewed the pathophysiology of obstructive sleep apnea and the importance of treating it.  Once I review the results I will send patient a [employer] message and if there is sleep apnea there we will go ahead and generate orders for CPAP therapy did caution patient that there is a shortage of machines at this time.  Once patient starts CPAP he will be enrolled in the sleep therapy management program.  He is agreeable with this plan.      60 minutes spent on the date of the encounter doing chart review, history and exam, documentation and further activities per the note    Mignon Wasserman M.D.  Pulmonary/Critical Care/Sleep Medicine    [facility]   Floor 1, [address]   [address] [place], [place] [address]. [place] [address]    Appointments: [phone]    The above note was dictated using voice recognition software and may include typographical errors. Please contact the author for any clarifications.

## 2022-01-13 ENCOUNTER — OFFICE VISIT (OUTPATIENT)
Dept: VASCULAR SURGERY | Facility: CLINIC | Age: 67
End: 2022-01-13
Attending: PODIATRIST
Payer: MEDICARE

## 2022-01-13 VITALS — DIASTOLIC BLOOD PRESSURE: 88 MMHG | SYSTOLIC BLOOD PRESSURE: 160 MMHG | HEART RATE: 88 BPM | RESPIRATION RATE: 22 BRPM

## 2022-01-13 DIAGNOSIS — L97.511 DIABETIC ULCER OF TOE OF RIGHT FOOT ASSOCIATED WITH TYPE 2 DIABETES MELLITUS, LIMITED TO BREAKDOWN OF SKIN (H): Primary | ICD-10-CM

## 2022-01-13 DIAGNOSIS — E11.621 DIABETIC ULCER OF TOE OF RIGHT FOOT ASSOCIATED WITH TYPE 2 DIABETES MELLITUS, LIMITED TO BREAKDOWN OF SKIN (H): Primary | ICD-10-CM

## 2022-01-13 PROCEDURE — 99212 OFFICE O/P EST SF 10 MIN: CPT | Performed by: PODIATRIST

## 2022-01-13 ASSESSMENT — PAIN SCALES - GENERAL: PAINLEVEL: NO PAIN (0)

## 2022-01-13 NOTE — PATIENT INSTRUCTIONS
Congratulations! Your wound has healed.    For the next 3 weeks Dr. Charlton would like you to remain LIMITED WEIGHT BEARING MEANS THAT IT IS ONLY OKAY FOR YOU TO APPLY LIGHT PRESSURE ON THE AFFECTED FOOT WHEN TRANSFERRING FROM YOUR ASSISTIVE DEVICE TO A CHAIR OR BED.       You may begin showering as normal in 1 weeks      Continue to monitor the area for breakdown & call us if your wound reopens.    Use pumice stone on pressure areas of your feet 2 to 3 times per week to avoid callous build-up.  If callous getsthick and pumice stone is not effective at removing the callous, please call clinic to be seen for sharp debridement.

## 2022-01-13 NOTE — PROGRESS NOTES
FOOT AND ANKLE SURGERY/PODIATRY Progress Note      ASSESSMENT: Diabetic Ulceration right hallux      TREATMENT:  -The right hallux ulceration has resolved with a thin top cover. We discussed that due to his anatomy, there is a pressure point which will continue to build callus tissue. If the callus tissue becomes too thick, skin breakdown will likely occur.     -I recommend use of a pumice stone x2-3 per week to remove callus tissue. I have asked that he return for sharp debridement of the callus prn.     -I recommend continued limited walking x3-4 weeks to allow for skin remodeling.     -He is discharged from my care at this time and will follow-up with me as concerns develop.    Cortez Charlton, CASSY  [facility] [place]      HPI: Ezekiel Aldrich was seen again today for a right hallux ulceration. Since his last visit, he has not used a CAM Boot as recommended but did try to remain limited walking on the right foot.       Past Medical History:   Diagnosis Date     Acute renal failure (H)     6/11/2016, secondary to dehydration, normalized     HTN (hypertension)      Obesity      Type 2 diabetes mellitus (H)        Past Surgical History:   Procedure Laterality Date     ANKLE FRACTURE SURGERY Right     hardware     KNEE SURGERY       MANDIBLE FRACTURE SURGERY      from fx     SHOULDER SURGERY Right     from fx and separation       Allergies   Allergen Reactions     Lisinopril Cough     Dry cough         Current Outpatient Medications:      aspirin-acetaminophen-caffeine (EXCEDRIN MIGRAINE) 250-250-65 mg per tablet, [ASPIRIN-ACETAMINOPHEN-CAFFEINE (EXCEDRIN MIGRAINE) 250-250-65 MG PER TABLET] Take 1 tablet by mouth every 6 (six) hours as needed for pain., Disp: , Rfl:      atorvastatin (LIPITOR) 80 MG tablet, [ATORVASTATIN (LIPITOR) 80 MG TABLET] Take 1 daily for high cholesterol., Disp: 90 tablet, Rfl: 3     blood glucose meter (GLUCOMETER), [BLOOD GLUCOSE METER (GLUCOMETER)] Use 1 each As Directed as  "needed. Dispense glucometer brand per patient's insurance at pharmacy discretion., Disp: 1 each, Rfl: 0     blood glucose test strips, [BLOOD GLUCOSE TEST STRIPS] Use 1 each As Directed 2 (two) times a day. Dispense brand per patient's insurance at pharmacy discretion., Disp: 200 strip, Rfl: 3     fluticasone (FLONASE) 50 MCG/ACT nasal spray, Spray 2 sprays into both nostrils daily, Disp: 16 g, Rfl: 3     generic lancets, [GENERIC LANCETS] Use 1 each As Directed 2 (two) times a day. Dispense brand per patient's insurance at pharmacy discretion., Disp: 200 each, Rfl: 3     glipiZIDE (GLUCOTROL XL) 10 MG 24 hr tablet, [GLIPIZIDE (GLUCOTROL XL) 10 MG 24 HR TABLET] TAKE 2 TABLETS(20 MG) BY MOUTH DAILY, Disp: 180 tablet, Rfl: 3     LANTUS SOLOSTAR 100 UNIT/ML soln, ADMINISTER 66 UNITS UNDER THE SKIN AT BEDTIME, Disp: 30 mL, Rfl: 2     losartan (COZAAR) 100 MG tablet, Take 1 tablet (100 mg) by mouth daily, Disp: 90 tablet, Rfl: 3     metFORMIN (GLUCOPHAGE-XR) 500 MG 24 hr tablet, Take 4 tablets (2,000 mg) by mouth daily, Disp: 360 tablet, Rfl: 3     pen needle, diabetic (PEN NEEDLE) 31 gauge x 1/4\" Ndle, [PEN NEEDLE, DIABETIC (PEN NEEDLE) 31 GAUGE X 1/4\" NDLE] Use twice daily to inject Victoza and Lantus, Disp: 100 each, Rfl: 0     pioglitazone (ACTOS) 15 MG tablet, Take 1 tablet daily for diabetes, Disp: 90 tablet, Rfl: 1     tamsulosin (FLOMAX) 0.4 MG capsule, [TAMSULOSIN (FLOMAX) 0.4 MG CAP] TAKE 1 CAPSULE(0.4 MG) BY MOUTH DAILY, Disp: 90 capsule, Rfl: 3     zolpidem (AMBIEN) 10 MG tablet, Take tablet by mouth 15 minutes prior to sleep, for Sleep Study, Disp: 1 tablet, Rfl: 0    Review of Systems - 10 point Review of Systems is negative except for right foot ulcer which is noted in HPI.      OBJECTIVE:  BP (!) 160/88   Pulse 88   Resp 22   General appearance: Patient is alert and fully cooperative with history & exam.  No sign of distress is noted during the visit.    Vascular: Dorsalis pedis " palpableRight.  Dermatologic:    VASC Wound Right great toe Right great toe (Active)   Pre Size Length 0.5 12/22/21 1400   Pre Size Width 0.2 12/22/21 1400   Pre Size Depth 0.2 12/22/21 1400   Pre Total Sq cm 0.1 12/22/21 1400   Post Size Length 0 01/13/22 1500   Post Size Width 0 01/13/22 1500   Post Size Depth 0 01/13/22 1500   Post Total Sq cm 0 01/13/22 1500   Description callous 01/13/22 1500     Neurologic: Diminished to light touch Right.  Musculoskeletal: Contracted digits noted Right.    Imaging:     No results found.       Picture:

## 2022-01-20 ENCOUNTER — TELEPHONE (OUTPATIENT)
Dept: SLEEP MEDICINE | Facility: CLINIC | Age: 67
End: 2022-01-20
Payer: MEDICARE

## 2022-01-20 NOTE — TELEPHONE ENCOUNTER
Reason for call:  Other   Patient called regarding (reason for call): call back  Additional comments: Patient is requesting a call back to set up sleep study appoitnment     Phone number to reach patient:  Cell number on file:    Telephone Information:   Mobile [phone]       Best Time:  anytime    Can we leave a detailed message on this number?  YES    Travel screening: Not Applicable

## 2022-01-27 ENCOUNTER — TELEPHONE (OUTPATIENT)
Dept: FAMILY MEDICINE | Facility: CLINIC | Age: 67
End: 2022-01-27
Payer: MEDICARE

## 2022-01-27 NOTE — TELEPHONE ENCOUNTER
Reason for call:  Other   Patient called regarding (reason for call): appointment  Additional comments: Patient is requesting a callback to schedule his overnight study,.     Phone number to reach patient:  Cell number on file:    Telephone Information:   Mobile [phone]       Best Time:  any    Can we leave a detailed message on this number?  YES    Travel screening: Negative

## 2022-01-31 ENCOUNTER — TRANSFERRED RECORDS (OUTPATIENT)
Dept: HEALTH INFORMATION MANAGEMENT | Facility: CLINIC | Age: 67
End: 2022-01-31
Payer: MEDICARE

## 2022-01-31 LAB — RETINOPATHY: POSITIVE

## 2022-03-18 ENCOUNTER — THERAPY VISIT (OUTPATIENT)
Dept: SLEEP MEDICINE | Facility: CLINIC | Age: 67
End: 2022-03-18
Attending: INTERNAL MEDICINE
Payer: MEDICARE

## 2022-03-18 DIAGNOSIS — G47.33 OBSTRUCTIVE SLEEP APNEA (ADULT) (PEDIATRIC): ICD-10-CM

## 2022-03-18 DIAGNOSIS — R06.83 SNORING: ICD-10-CM

## 2022-03-18 DIAGNOSIS — R09.81 NASAL CONGESTION: ICD-10-CM

## 2022-03-18 DIAGNOSIS — G47.30 OBSERVED SLEEP APNEA: ICD-10-CM

## 2022-03-18 DIAGNOSIS — R35.1 NOCTURIA: ICD-10-CM

## 2022-03-18 PROCEDURE — 95810 POLYSOM 6/> YRS 4/> PARAM: CPT | Performed by: INTERNAL MEDICINE

## 2022-03-22 ENCOUNTER — MYC MEDICAL ADVICE (OUTPATIENT)
Dept: SLEEP MEDICINE | Facility: CLINIC | Age: 67
End: 2022-03-22
Payer: MEDICARE

## 2022-03-22 DIAGNOSIS — G47.33 OBSTRUCTIVE SLEEP APNEA (ADULT) (PEDIATRIC): Primary | ICD-10-CM

## 2022-03-22 NOTE — PROCEDURES
" SLEEP STUDY INTERPRETATION  DIAGNOSTIC POLYSOMNOGRAPHY REPORT      Patient: DEEP BLOCK  YOB: 1955  Study Date: 3/18/2022  MRN: [id number]  Referring Provider: -  Ordering Provider: Mignon Wasserman MD    Indications for Polysomnography: The patient is a 66 year old Male who is 5' 11\" and weighs 240.0 lbs. His BMI is 33.6, [place] sleepiness scale 0 and neck circumference is 45.5 cm. Relevant medical history includes hypertension, type 2 diabetes and obesity. A diagnostic polysomnogram was performed to evaluate for sleep apnea.    Polysomnogram Data: A full night polysomnogram recorded the standard physiologic parameters including EEG, EOG, EMG, ECG, nasal and oral airflow. Respiratory parameters of chest and abdominal movements were recorded with respiratory inductance plethysmography. Oxygen saturation was recorded by pulse oximetry. Hypopnea scoring rule used: 1B 4%.    Sleep Architecture: Normal sleep efficiency with increased arousal index.   The total recording time of the polysomnogram was 475.7 minutes. The total sleep time was 453.0 minutes. Sleep latency was decreased at 3.2 minutes with the use of a sleep aid zolpidem 10 mg. REM latency was 87.5 minutes. Arousal index was increased at 35.8 arousals per hour. Sleep efficiency was normal at 95.2%. Wake after sleep onset was 19.0 minutes. The patient spent 8.7% of total sleep time in Stage N1, 57.4% in Stage N2, 17.9% in Stage N3, and 16.0% in REM. Time in REM supine was - minutes.    Respiration: Severe supine predominant obstructive sleep apnea.    Events ? The polysomnogram revealed a presence of 35 obstructive, 34 central, and 89 mixed apneas resulting in an apnea index of 20.9 events per hour. There were 133 obstructive hypopneas and 7 central hypopneas resulting in an obstructive hypopnea index of 17.6 and central hypopnea index of 0.9 events per hour. The combined apnea/hypopnea index was 39.7 events per hour (central " apnea/hypopnea index was 5.4 events per hour). The REM AHI was 47.2 events per hour. The supine AHI was 73.2 events per hour. The RERA index was 0.9 events per hour.  The RDI was 40.7 events per hour.    Snoring - was reported as loud.    Respiratory rate and pattern - was notable for normal respiratory rate and pattern.    Sustained Sleep Associated Hypoventilation - Transcutaneous carbon dioxide monitoring was not used, however significant hypoventilation was not suggested by oximetry.    Sleep Associated Hypoxemia - (Greater than 5 minutes O2 sat at or below 88%) was present. Baseline oxygen saturation was 94.7%. Lowest oxygen saturation was 76.0%. Time spent less than or equal to 88% was 30.0 minutes. Time spent less than or equal to 89% was 38.8 minutes.    Movement Activity: Periodic limb movements noted, most of which were not associated with arousals.    Periodic Limb Activity - There were 132 PLMs during the entire study. The PLM index was 17.5 movements per hour. The PLM Arousal Index was 0.3 per hour.    REM EMG Activity - Excessive transient/sustained muscle activity was not present.    Nocturnal Behavior - Abnormal sleep related behaviors were not noted.    Bruxism - None apparent.    Cardiac Summary: Normal sinus rhythm and rates.  The average pulse rate was 78.8 bpm. The minimum pulse rate was 61.0 bpm while the maximum pulse rate was 250.0 bpm.  Arrhythmias were not noted.        Assessment:     Severe supine predominant obstructive sleep apnea with AHI 39.7 events per hour. Central and mixed apneas noted.    Normal sleep efficiency with increased arousal index.    Periodic limb movements noted, most of which were not associated with arousals.    Normal sinus rhythm and rates.    Recommendations:    Due to frequent central and mixed events consider repeat polysomnography with full night titration of positive airway pressure therapy for the control of sleep disordered breathing.    Encourage lateral  sleep positions.    Advice regarding the risks of drowsy driving.    Suggest optimizing sleep schedule and avoiding sleep deprivation.    Weight management (BMI > 30).    Pharmacologic therapy should be used for management of restless legs syndrome only if present and clinically indicated and not based on the presence of periodic limb movements alone.    Diagnostic Codes:   Obstructive Sleep Apnea G47.33  Sleep Hypoxemia G47.36         _____________________________________   Electronically Signed By: Mignon Wasserman MD 3/22/2022

## 2022-03-23 DIAGNOSIS — G47.33 OSA (OBSTRUCTIVE SLEEP APNEA): Primary | ICD-10-CM

## 2022-03-23 LAB — SLPCOMP: NORMAL

## 2022-03-23 RX ORDER — ZOLPIDEM TARTRATE 10 MG/1
TABLET ORAL
Qty: 1 TABLET | Refills: 0 | Status: SHIPPED | OUTPATIENT
Start: 2022-03-23 | End: 2022-04-06

## 2022-04-05 ENCOUNTER — DOCUMENTATION ONLY (OUTPATIENT)
Dept: SLEEP MEDICINE | Facility: CLINIC | Age: 67
End: 2022-04-05
Payer: MEDICARE

## 2022-04-05 NOTE — PROGRESS NOTES
4/5/2022- - [facility] TO CALL [phone] TO SCHEDULE CPAP SETUP IN [facility].

## 2022-04-06 ENCOUNTER — VIRTUAL VISIT (OUTPATIENT)
Dept: SLEEP MEDICINE | Facility: CLINIC | Age: 67
End: 2022-04-06
Payer: MEDICARE

## 2022-04-06 VITALS — BODY MASS INDEX: 33.6 KG/M2 | WEIGHT: 240 LBS | HEIGHT: 71 IN

## 2022-04-06 DIAGNOSIS — R06.83 SNORING: ICD-10-CM

## 2022-04-06 DIAGNOSIS — G47.33 OSA (OBSTRUCTIVE SLEEP APNEA): Primary | ICD-10-CM

## 2022-04-06 DIAGNOSIS — R09.81 NASAL CONGESTION: ICD-10-CM

## 2022-04-06 PROCEDURE — 99214 OFFICE O/P EST MOD 30 MIN: CPT | Mod: 95 | Performed by: INTERNAL MEDICINE

## 2022-04-06 ASSESSMENT — SLEEP AND FATIGUE QUESTIONNAIRES
HOW LIKELY ARE YOU TO NOD OFF OR FALL ASLEEP WHILE SITTING AND TALKING TO SOMEONE: WOULD NEVER DOZE
HOW LIKELY ARE YOU TO NOD OFF OR FALL ASLEEP WHILE SITTING INACTIVE IN A PUBLIC PLACE: SLIGHT CHANCE OF DOZING
HOW LIKELY ARE YOU TO NOD OFF OR FALL ASLEEP WHILE WATCHING TV: SLIGHT CHANCE OF DOZING
HOW LIKELY ARE YOU TO NOD OFF OR FALL ASLEEP IN A CAR, WHILE STOPPED FOR A FEW MINUTES IN TRAFFIC: WOULD NEVER DOZE
HOW LIKELY ARE YOU TO NOD OFF OR FALL ASLEEP WHEN YOU ARE A PASSENGER IN A CAR FOR AN HOUR WITHOUT A BREAK: SLIGHT CHANCE OF DOZING
HOW LIKELY ARE YOU TO NOD OFF OR FALL ASLEEP WHILE LYING DOWN TO REST IN THE AFTERNOON WHEN CIRCUMSTANCES PERMIT: SLIGHT CHANCE OF DOZING
HOW LIKELY ARE YOU TO NOD OFF OR FALL ASLEEP WHILE SITTING AND READING: SLIGHT CHANCE OF DOZING
HOW LIKELY ARE YOU TO NOD OFF OR FALL ASLEEP WHILE SITTING QUIETLY AFTER LUNCH WITHOUT ALCOHOL: WOULD NEVER DOZE

## 2022-04-06 NOTE — PROGRESS NOTES
Ezekiel is a 66 year old who is being evaluated via a billable video visit.      How would you like to obtain your AVS? MyChart  If the video visit is dropped, the invitation should be resent by: Text to cell phone: [phone]  Will anyone else be joining your video visit? Robyn Hodge    Video Start Time: 9:28 AM  Video-Visit Details    Type of service:  Video Visit    Video End Time:9:52 AM    Originating Location (pt. Location): Home    Distant Location (provider location):  [facility]     Platform used for Video Visit: [employer]     Chief complaint: Follow-up sleep study    History of Present Illness: 66-year-old gentleman with history of type 2 diabetes, hypertension, obesity, strong family history of sleep apnea.  He has complaints of snoring, dry mouth, difficulty breathing through his nose, nocturia, difficulty getting enough sleep.  He was diagnosed with obstructive sleep apnea on recent PSG.  Those results were reviewed with him in detail today and they are summarized as follows:  Study date 3/18/2022  Total sleep time with the use of zolpidem 453 minutes.  Sleep efficiency normal 95%, arousal index increased at 36.  Overall there was severe obstructive sleep apnea with AHI at 39.7.  Supine AHI 73, lateral AHI 26.  Snoring was loud.  Frequent central and mixed apneas were also noted particularly when supine.  Frequent periodic limb movements noted, most of which were not associated with arousals.    Due to the severity of sleep apnea and the shortage of CPAP machines orders were generated for CPAP set up  Due to anticipation of complex sleep apnea with frequent centrals and mixed apneas a titration study was ordered.    Patient reports that he does have an appointment to get his CPAP in a couple of weeks.  He has an appointment to get sleep study in May.    [place] Sleepiness Scale  Total score - [place]: 5 (4/6/2022  9:12 AM)   (Less than 10 normal)    Insomnia Severity  "Scale  SHIRAZ Total Score: 15  (normal 0-7, mild 8-14, moderate 15-21, severe 22-28)    Past Medical History:   Diagnosis Date     Acute renal failure (H)     2016, secondary to dehydration, normalized     HTN (hypertension)      Obesity      Type 2 diabetes mellitus (H)        Allergies   Allergen Reactions     Lisinopril Cough     Dry cough       Current Outpatient Medications   Medication     aspirin-acetaminophen-caffeine (EXCEDRIN MIGRAINE) 250-250-65 mg per tablet     atorvastatin (LIPITOR) 80 MG tablet     blood glucose meter (GLUCOMETER)     blood glucose test strips     fluticasone (FLONASE) 50 MCG/ACT nasal spray     generic lancets     glipiZIDE (GLUCOTROL XL) 10 MG 24 hr tablet     LANTUS SOLOSTAR 100 UNIT/ML soln     losartan (COZAAR) 100 MG tablet     metFORMIN (GLUCOPHAGE-XR) 500 MG 24 hr tablet     pen needle, diabetic (PEN NEEDLE) 31 gauge x \" Ndle     pioglitazone (ACTOS) 15 MG tablet     tamsulosin (FLOMAX) 0.4 MG capsule     zolpidem (AMBIEN) 10 MG tablet     zolpidem (AMBIEN) 10 MG tablet     No current facility-administered medications for this visit.       Social History     Socioeconomic History     Marital status:      Spouse name: Not on file     Number of children: 5     Years of education: Not on file     Highest education level: Not on file   Occupational History     Occupation: [employer]   Tobacco Use     Smoking status: Former Smoker     Types: Cigarettes, Cigarettes     Quit date: 1985     Years since quittin.9     Smokeless tobacco: Current User     Types: Chew, Chew     Tobacco comment: 2 tins/week   Substance and Sexual Activity     Alcohol use: Yes     Alcohol/week: 2.0 standard drinks     Drug use: No     Sexual activity: Yes     Partners: Female   Other Topics Concern     Not on file   Social History Narrative    Diet-            Exercise- Not regular. Work is physical, walking.     Social Determinants of Health     Financial Resource Strain: Not on " "file   Food Insecurity: Not on file   Transportation Needs: Not on file   Physical Activity: Not on file   Stress: Not on file   Social Connections: Not on file   Intimate Partner Violence: Not on file   Housing Stability: Not on file       Family History   Problem Relation Age of Onset     Colon Cancer Mother      Coronary Artery Disease Mother      Hypertension Mother      Diabetes Type 2  Mother      Hypertension Father      Bell's palsy Father      Diabetes Type 2  Father      Thyroid Disease Father      Sleep Apnea Father      Sleep Apnea Sister      Prostate Cancer Brother 57     Sleep Apnea Brother      Bell's palsy Maternal Grandmother      Thyroid Disease Daughter            EXAM:  Ht 1.803 m (5' 11\")   Wt 108.9 kg (240 lb)   BMI 33.47 kg/m    GENERAL: Alert and no distress, beard trimmed  EYES: Eyes grossly normal to inspection.  No discharge or erythema, or obvious scleral/conjunctival abnormalities.  RESP: No audible wheeze, cough, or visible cyanosis.  No visible retractions or increased work of breathing.    SKIN: Visible skin clear. No significant rash, abnormal pigmentation or lesions.  NEURO: Cranial nerves grossly intact.  Mentation and speech appropriate for age.  PSYCH: Mentation appears normal, affect normal, judgement and insight intact, normal speech and appearance well-groomed.       PSG 3/18/2022  Weight 240 lbs BMI 33.6  AHI 39.7, RDI 40.7  Lowest oxygen saturation was 76.0%. Time spent less than or equal to 88% was 30.0 minutes.         ASSESSMENT:  66-year-old gentleman with history of diabetes, hypertension, obesity, nocturia, unrefreshing sleep and now severe sleep apnea with hypoxemia.  He is at increased risk for central/complex sleep apnea on CPAP due to frequent central and mixed apneas on his diagnostic test.    PLAN:  Patient is going to keep his upcoming scheduled appointment to start CPAP.  He will be enrolled in the sleep therapy management program we will cancel the in lab " titration study if he is doing well and is not experiencing significant complex apnea per download or symptomatic report.  If he is we will continue with the plan to do a titration study.  Offered the patient referral for ENT due to his ongoing issues of nasal congestion difficulty breathing through his nose despite Flonase.  He is not interested at this time.  Counseled him that he may end up needing a full facemask with CPAP and continues to experience some dry mouth.      33 minutes spent on the date of the encounter doing chart review, history and exam, documentation and further activities per the note    Mignon Wasserman M.D.  Pulmonary/Critical Care/Sleep Medicine    [facility]   Floor 1, [address]   [address] [place] [address]. [place] [address]   Appointments: [phone]    The above note was dictated using voice recognition software and may include typographical errors. Please contact the author for any clarifications.

## 2022-04-06 NOTE — LETTER
4/6/2022        RE: Ezekiel Aldrich  [address] [facility] [facility] [address]        Ezekiel is a 66 year old who is being evaluated via a billable video visit.      How would you like to obtain your AVS? MyChart  If the video visit is dropped, the invitation should be resent by: Text to cell phone: [phone]  Will anyone else be joining your video visit? Robyn Hodge    Video Start Time: 9:28 AM  Video-Visit Details    Type of service:  Video Visit    Video End Time:9:52 AM    Originating Location (pt. Location): Home    Distant Location (provider location):  [facility]     Platform used for Video Visit: [employer]     Chief complaint: Follow-up sleep study    History of Present Illness: 66-year-old gentleman with history of type 2 diabetes, hypertension, obesity, strong family history of sleep apnea.  He has complaints of snoring, dry mouth, difficulty breathing through his nose, nocturia, difficulty getting enough sleep.  He was diagnosed with obstructive sleep apnea on recent PSG.  Those results were reviewed with him in detail today and they are summarized as follows:  Study date 3/18/2022  Total sleep time with the use of zolpidem 453 minutes.  Sleep efficiency normal 95%, arousal index increased at 36.  Overall there was severe obstructive sleep apnea with AHI at 39.7.  Supine AHI 73, lateral AHI 26.  Snoring was loud.  Frequent central and mixed apneas were also noted particularly when supine.  Frequent periodic limb movements noted, most of which were not associated with arousals.    Due to the severity of sleep apnea and the shortage of CPAP machines orders were generated for CPAP set up  Due to anticipation of complex sleep apnea with frequent centrals and mixed apneas a titration study was ordered.    Patient reports that he does have an appointment to get his CPAP in a couple of weeks.  He has an appointment to get sleep study in May.    [place] Sleepiness  "Scale  Total score - [place]: 5 (2022  9:12 AM)   (Less than 10 normal)    Insomnia Severity Scale  SHIRAZ Total Score: 15  (normal 0-7, mild 8-14, moderate 15-21, severe 22-28)    Past Medical History:   Diagnosis Date     Acute renal failure (H)     2016, secondary to dehydration, normalized     HTN (hypertension)      Obesity      Type 2 diabetes mellitus (H)        Allergies   Allergen Reactions     Lisinopril Cough     Dry cough       Current Outpatient Medications   Medication     aspirin-acetaminophen-caffeine (EXCEDRIN MIGRAINE) 250-250-65 mg per tablet     atorvastatin (LIPITOR) 80 MG tablet     blood glucose meter (GLUCOMETER)     blood glucose test strips     fluticasone (FLONASE) 50 MCG/ACT nasal spray     generic lancets     glipiZIDE (GLUCOTROL XL) 10 MG 24 hr tablet     LANTUS SOLOSTAR 100 UNIT/ML soln     losartan (COZAAR) 100 MG tablet     metFORMIN (GLUCOPHAGE-XR) 500 MG 24 hr tablet     pen needle, diabetic (PEN NEEDLE) 31 gauge x 1/4\" Ndle     pioglitazone (ACTOS) 15 MG tablet     tamsulosin (FLOMAX) 0.4 MG capsule     zolpidem (AMBIEN) 10 MG tablet     zolpidem (AMBIEN) 10 MG tablet     No current facility-administered medications for this visit.       Social History     Socioeconomic History     Marital status:      Spouse name: Not on file     Number of children: 5     Years of education: Not on file     Highest education level: Not on file   Occupational History     Occupation: [employer]   Tobacco Use     Smoking status: Former Smoker     Types: Cigarettes, Cigarettes     Quit date: 1985     Years since quittin.9     Smokeless tobacco: Current User     Types: Chew, Chew     Tobacco comment: 2 tins/week   Substance and Sexual Activity     Alcohol use: Yes     Alcohol/week: 2.0 standard drinks     Drug use: No     Sexual activity: Yes     Partners: Female   Other Topics Concern     Not on file   Social History Narrative    Diet-            Exercise- Not regular. Work " "is physical, walking.     Social Determinants of Health     Financial Resource Strain: Not on file   Food Insecurity: Not on file   Transportation Needs: Not on file   Physical Activity: Not on file   Stress: Not on file   Social Connections: Not on file   Intimate Partner Violence: Not on file   Housing Stability: Not on file       Family History   Problem Relation Age of Onset     Colon Cancer Mother      Coronary Artery Disease Mother      Hypertension Mother      Diabetes Type 2  Mother      Hypertension Father      Bell's palsy Father      Diabetes Type 2  Father      Thyroid Disease Father      Sleep Apnea Father      Sleep Apnea Sister      Prostate Cancer Brother 57     Sleep Apnea Brother      Bell's palsy Maternal Grandmother      Thyroid Disease Daughter            EXAM:  Ht 1.803 m (5' 11\")   Wt 108.9 kg (240 lb)   BMI 33.47 kg/m    GENERAL: Alert and no distress, beard trimmed  EYES: Eyes grossly normal to inspection.  No discharge or erythema, or obvious scleral/conjunctival abnormalities.  RESP: No audible wheeze, cough, or visible cyanosis.  No visible retractions or increased work of breathing.    SKIN: Visible skin clear. No significant rash, abnormal pigmentation or lesions.  NEURO: Cranial nerves grossly intact.  Mentation and speech appropriate for age.  PSYCH: Mentation appears normal, affect normal, judgement and insight intact, normal speech and appearance well-groomed.       PSG 3/18/2022  Weight 240 lbs BMI 33.6  AHI 39.7, RDI 40.7  Lowest oxygen saturation was 76.0%. Time spent less than or equal to 88% was 30.0 minutes.         ASSESSMENT:  66-year-old gentleman with history of diabetes, hypertension, obesity, nocturia, unrefreshing sleep and now severe sleep apnea with hypoxemia.  He is at increased risk for central/complex sleep apnea on CPAP due to frequent central and mixed apneas on his diagnostic test.    PLAN:  Patient is going to keep his upcoming scheduled appointment to start " CPAP.  He will be enrolled in the sleep therapy management program we will cancel the in lab titration study if he is doing well and is not experiencing significant complex apnea per download or symptomatic report.  If he is we will continue with the plan to do a titration study.  Offered the patient referral for ENT due to his ongoing issues of nasal congestion difficulty breathing through his nose despite Flonase.  He is not interested at this time.  Counseled him that he may end up needing a full facemask with CPAP and continues to experience some dry mouth.      33 minutes spent on the date of the encounter doing chart review, history and exam, documentation and further activities per the note    Mignon Wasserman M.D.  Pulmonary/Critical Care/Sleep Medicine    [facility]   Floor 1, [address]   [address] [place] [address]. [place] [address]   Appointments: [phone]    The above note was dictated using voice recognition software and may include typographical errors. Please contact the author for any clarifications.                Sincerely,        Mignon Wasserman MD

## 2022-04-06 NOTE — Clinical Note
Getting set up on CPAP (mid April, needs STM.  Please let me know how things are going around week 4 or so.  Right now he has a titration study scheduled for mid May which we should cancel if he is doing well and has low AHI on CPAP.  Thank you.ECHO

## 2022-04-15 ENCOUNTER — DOCUMENTATION ONLY (OUTPATIENT)
Dept: SLEEP MEDICINE | Facility: CLINIC | Age: 67
End: 2022-04-15
Payer: MEDICARE

## 2022-04-15 DIAGNOSIS — G47.33 OBSTRUCTIVE SLEEP APNEA (ADULT) (PEDIATRIC): Primary | ICD-10-CM

## 2022-04-15 NOTE — PROGRESS NOTES
Patient was offered choice of vendor and chose [facility].  Patient Ezekiel Aldrich was set up at [place]  on April 15, 2022. Patient received a Resmed Airsense 11 Pressures were set at 5-15 cm H2O.   Patient s ramp is 5 cm H2O for Auto and FLEX/EPR is EPR, 2.  Patient received a [employer] Mask name: Vitera  Full Face mask size Medium, heated tubing and heated humidifier.  Patient does need to meet compliance. Patient will make a follow up on with Dr. Wasserman.    The patient indicated he is have another sleep study done in the middle part of May.  YASMINE HOROWITZ

## 2022-04-18 ENCOUNTER — DOCUMENTATION ONLY (OUTPATIENT)
Dept: SLEEP MEDICINE | Facility: CLINIC | Age: 67
End: 2022-04-18
Payer: MEDICARE

## 2022-04-18 NOTE — PROGRESS NOTES
3 day Sleep therapy management telephone visit    Diagnostic AHI: 39.7  PSG    Confirmed with patient at time of call- Yes Patient is still interested in STM service       Subjective measures:  Patient was swearing said he doesn't feel any better with CPAP and it's hard for him to sleep with a mask on his face.        Objective data     Order Settings for PAP  CPAP min 5    CPAP max 15             Device settings from machine CPAP min 5.0     CPAP max 15.0           EPR Setting TWO    RESMED soft response  OFF     Assessment: Nighty usage under four hours      Action plan: Patient to have 14 day STM visit. Patient has a follow up visit scheduled:   yes within 31-90 days of set up    Replacement device: No  STM ordered by provider: Yes     Total time spent on accessing and  interpreting remote patient PAP therapy data  10 minutes    Total time spent counseling, coaching  and reviewing PAP therapy data with patient  5 minutes    [id number] no

## 2022-04-19 ENCOUNTER — TELEPHONE (OUTPATIENT)
Dept: FAMILY MEDICINE | Facility: CLINIC | Age: 67
End: 2022-04-19
Payer: MEDICARE

## 2022-05-11 ENCOUNTER — LAB (OUTPATIENT)
Dept: FAMILY MEDICINE | Facility: CLINIC | Age: 67
End: 2022-05-11
Attending: INTERNAL MEDICINE
Payer: MEDICARE

## 2022-05-11 DIAGNOSIS — G47.33 OSA (OBSTRUCTIVE SLEEP APNEA): ICD-10-CM

## 2022-05-11 LAB — SARS-COV-2 RNA RESP QL NAA+PROBE: NEGATIVE

## 2022-05-11 PROCEDURE — U0005 INFEC AGEN DETEC AMPLI PROBE: HCPCS

## 2022-05-11 PROCEDURE — U0003 INFECTIOUS AGENT DETECTION BY NUCLEIC ACID (DNA OR RNA); SEVERE ACUTE RESPIRATORY SYNDROME CORONAVIRUS 2 (SARS-COV-2) (CORONAVIRUS DISEASE [COVID-19]), AMPLIFIED PROBE TECHNIQUE, MAKING USE OF HIGH THROUGHPUT TECHNOLOGIES AS DESCRIBED BY CMS-2020-01-R: HCPCS

## 2022-05-12 ENCOUNTER — TELEPHONE (OUTPATIENT)
Dept: SLEEP MEDICINE | Facility: CLINIC | Age: 67
End: 2022-05-12
Payer: MEDICARE

## 2022-05-12 RX ORDER — ZOLPIDEM TARTRATE 10 MG/1
TABLET ORAL
Qty: 1 TABLET | Refills: 0 | Status: SHIPPED | OUTPATIENT
Start: 2022-05-12 | End: 2022-05-18

## 2022-05-12 NOTE — TELEPHONE ENCOUNTER
I was calling pt to remind him of his sleep study appointment today.  Patient is requesting a sleeping pill for the sleep study on Sunday. He says there is no way he will be able to sleep in lab without a pill.   He uses the [facility] on [address]. He is asking that he get notified when the script is placed.

## 2022-05-15 ENCOUNTER — THERAPY VISIT (OUTPATIENT)
Dept: SLEEP MEDICINE | Facility: CLINIC | Age: 67
End: 2022-05-15
Attending: INTERNAL MEDICINE
Payer: MEDICARE

## 2022-05-15 DIAGNOSIS — G47.33 OBSTRUCTIVE SLEEP APNEA (ADULT) (PEDIATRIC): ICD-10-CM

## 2022-05-15 DIAGNOSIS — G47.37 CENTRAL SLEEP APNEA IN CONDITIONS CLASSIFIED ELSEWHERE: Primary | ICD-10-CM

## 2022-05-15 PROCEDURE — 95811 POLYSOM 6/>YRS CPAP 4/> PARM: CPT | Performed by: INTERNAL MEDICINE

## 2022-05-16 ENCOUNTER — DOCUMENTATION ONLY (OUTPATIENT)
Dept: SLEEP MEDICINE | Facility: CLINIC | Age: 67
End: 2022-05-16
Payer: MEDICARE

## 2022-05-16 NOTE — PROGRESS NOTES
Location of mask fitting: [facility] Show[facility]  reason for mask fitting: per spouse: patient had a sleep study done last night and they recommended him The Airft F30i , M cushion FFM.

## 2022-05-18 PROBLEM — G47.37: Status: ACTIVE | Noted: 2022-05-18

## 2022-05-18 NOTE — PROCEDURES
" SLEEP STUDY INTERPRETATION  TITRATION STUDY      Patient: DEEP BLOCK  YOB: 1955  Study Date: 5/15/2022  MRN: [id number]  Referring Provider: MD Jeronimo Philip  Ordering Provider: MD Wasserman Tereza    Indications for Polysomnography: The patient is a 67 year old male who is 5' 11\" and weighs 240.0 lbs. His BMI is 33.6, [place] sleepiness scale 5 and neck circumference is 45 cm. Relevant medical history includes diabetes, hypertension, obesity, severe sleep apnea on PSG3/18/2022 (Obstructive, central, and mixed apneas). A polysomnogram with PAP titration was performed to correct for sleep apnea. (Home aPAP with persistently elevated AHI.)    Polysomnogram Data: A full night polysomnogram recorded the standard physiologic parameters including EEG, EOG, EMG, ECG, nasal and oral airflow. Respiratory parameters of chest and abdominal movements were recorded with respiratory inductance plethysmography. Oxygen saturation was recorded by pulse oximetry. Hypopnea scoring rule used: 1B 4%.    Treatment PSG  Sleep Architecture: Decreased sleep efficiency with increased wake after sleep onset and normal arousal index.  The total recording time of the polysomnogram was 390.0 minutes. The total sleep time was 300.0 minutes. Sleep latency was decreased at 0.5 minutes with the use of a sleep aid (zolpidem 10 mg). REM latency was 57.5 minutes. Arousal index was normal at 19.4 arousals per hour. Sleep efficiency was decreased at 76.9%. Wake after sleep onset was 89.5 minutes. The patient spent 9.0% of total sleep time in Stage N1, 53.3% in Stage N2, 14.3% in Stage N3, and 23.3% in REM. Time in REM supine was 49.5 minutes.    Respiration: Severe sleep apnea on CPAP and Bilevel, resolved on bilevel with back up rate final settings IPAP 11, EPAP 5, BUR 12    The patient was titrated at pressures ranging from CPAP 5 cmH2O up to BiLevel 11/5/12 cmH2O (ASV also tried). The final pressure achieved was BiLevel 11/5/12 " cmH2O with a residual AHI of 8.9 events per hour. Time in REM supine on final pressure was 36.0 minutes.     This titration was considered Good (residual AHI < 10 events per hour or 50% decrease if baseline AHI > 15 events per hour, and including REM supine sleep at final pressure).    Snoring - was reported as absent on treatment.    Respiratory rate and pattern - was notable for respiratory rate 14 and periodic breathing without delayed lung to finger circulation time. (see image below)    Sustained Sleep Associated Hypoventilation - Transcutaneous carbon dioxide monitoring was not used, however significant hypoventilation was not suggested by oximetry.    Sleep Associated Hypoxemia - (Greater than 5 minutes O2 sat at or below 88%) was present. Baseline oxygen saturation was 94.9%. Lowest oxygen saturation was 79.0%. Time spent less than or equal to 88% was 9.0 minutes. Time spent less than or equal to 89% was 11.7 minutes.    10 minutes window severe central sleep apnea    Movement Activity: No abnormal movement activity.    Periodic Limb Activity - There were 5 PLMs during the entire study. The PLM index was 1.0 movements per hour. The PLM Arousal Index was 0.4 per hour.    REM EMG Activity - Excessive transient/sustained muscle activity was not present.    Nocturnal Behavior - Abnormal sleep related behaviors were not noted.    Bruxism - None apparent.    Cardiac Summary: Normal sinus rhythm and rates.  The average pulse rate was 70.3 bpm. The minimum pulse rate was 60.0 bpm while the maximum pulse rate was 88.0 bpm. Arrhythmias were not noted.      Assessment:     On CPAP and Bilevel there was severe complex sleep apnea. ASV poorly tolerated.    Resolution of complex sleep apnea with final setting bilevel IPAP 11 EPAP 5 back up rate 12.    Decreased sleep efficiency with increased wake after sleep onset and normal arousal index.    No abnormal movement activity.    Normal sinus rhythm and  rates.    Recommendations:    Treatment of CHALO with Bilevel at IPAP 11, EPAP 5 cmH2O, BUR 12.  Recommend clinical follow up with sleep management team, for coaching and review of effectiveness and compliance measures.    Advice regarding the risks of drowsy driving.    Suggest optimizing sleep schedule and avoiding sleep deprivation.    Weight management (BMI > 30).    Pharmacologic therapy should be used for management of restless legs syndrome only if present and clinically indicated and not based on the presence of periodic limb movements alone.    Diagnostic Codes:   Obstructive Sleep Apnea G47.33  Sleep Hypoxemia G47.36   Primary Central Sleep Apnea G47.31      .   _____________________________________   Electronically Signed By: Mignon aWsserman MD 5/18/2022

## 2022-05-19 ENCOUNTER — TELEPHONE (OUTPATIENT)
Dept: HOME HEALTH SERVICES | Facility: CLINIC | Age: 67
End: 2022-05-19
Payer: MEDICARE

## 2022-05-19 LAB — SLPCOMP: NORMAL

## 2022-05-19 NOTE — TELEPHONE ENCOUNTER
Called and spoke with patient to schedule appt for setup of BIPAP ST sleep machine. Instructed pt to return his current cpap machine that he has been using. Scheduled appt for 5/26/22 at 3:30pm at [facility].    Agueda Parker, RRT  [facility]  [phone]

## 2022-05-27 ENCOUNTER — DOCUMENTATION ONLY (OUTPATIENT)
Dept: SLEEP MEDICINE | Facility: CLINIC | Age: 67
End: 2022-05-27
Payer: MEDICARE

## 2022-05-27 DIAGNOSIS — G47.37 CENTRAL SLEEP APNEA IN CONDITIONS CLASSIFIED ELSEWHERE(327.27): Primary | ICD-10-CM

## 2022-05-27 NOTE — PROGRESS NOTES
Patient was offered choice of vendor and chose [facility].  Patient Ezekiel Aldrich was set up at [place]  on May 27, 2022. Patient received a Resmed Aircurve 10 Pressures were set at IPAP 11, EPAP 5, RATE 12.   Patient s ramp is 0 cm H2O for Off and FLEX/EPR is EPR, 2.  Patient received a Resmed Mask name: F30i  Full Face mask size Medium, heated tubing and heated humidifier.  Patient does need to meet compliance. Patient has a follow up on TBD with Dr. Wasserman.    Agueda Parker, RT

## 2022-05-31 ENCOUNTER — VIRTUAL VISIT (OUTPATIENT)
Dept: SLEEP MEDICINE | Facility: CLINIC | Age: 67
End: 2022-05-31
Payer: MEDICARE

## 2022-05-31 VITALS — WEIGHT: 244 LBS | HEIGHT: 70 IN | BODY MASS INDEX: 34.93 KG/M2

## 2022-05-31 DIAGNOSIS — G47.33 OBSTRUCTIVE SLEEP APNEA (ADULT) (PEDIATRIC): Primary | ICD-10-CM

## 2022-05-31 DIAGNOSIS — G47.37 CENTRAL SLEEP APNEA IN CONDITIONS CLASSIFIED ELSEWHERE: ICD-10-CM

## 2022-05-31 PROCEDURE — 99214 OFFICE O/P EST MOD 30 MIN: CPT | Mod: 95 | Performed by: INTERNAL MEDICINE

## 2022-05-31 ASSESSMENT — SLEEP AND FATIGUE QUESTIONNAIRES
HOW LIKELY ARE YOU TO NOD OFF OR FALL ASLEEP IN A CAR, WHILE STOPPED FOR A FEW MINUTES IN TRAFFIC: WOULD NEVER DOZE
HOW LIKELY ARE YOU TO NOD OFF OR FALL ASLEEP WHILE SITTING AND READING: WOULD NEVER DOZE
HOW LIKELY ARE YOU TO NOD OFF OR FALL ASLEEP WHILE LYING DOWN TO REST IN THE AFTERNOON WHEN CIRCUMSTANCES PERMIT: WOULD NEVER DOZE
HOW LIKELY ARE YOU TO NOD OFF OR FALL ASLEEP WHILE SITTING INACTIVE IN A PUBLIC PLACE: WOULD NEVER DOZE
HOW LIKELY ARE YOU TO NOD OFF OR FALL ASLEEP WHEN YOU ARE A PASSENGER IN A CAR FOR AN HOUR WITHOUT A BREAK: WOULD NEVER DOZE
HOW LIKELY ARE YOU TO NOD OFF OR FALL ASLEEP WHILE SITTING AND TALKING TO SOMEONE: WOULD NEVER DOZE
HOW LIKELY ARE YOU TO NOD OFF OR FALL ASLEEP WHILE SITTING QUIETLY AFTER LUNCH WITHOUT ALCOHOL: WOULD NEVER DOZE
HOW LIKELY ARE YOU TO NOD OFF OR FALL ASLEEP WHILE WATCHING TV: WOULD NEVER DOZE

## 2022-05-31 NOTE — PATIENT INSTRUCTIONS
For general sleep health questions:   http://sleepeducation.org    For tips about PAP and COVID-19:  https://www.[facility].org/patients/patient-resources/resources/covid-19-and-home-pap-therapy.pdf    For general info about COVID-19 including vaccines:  https://[facility].org/covid19      Continue PAP therapy every night, for all hours that you are sleeping (including naps.)  As always, try to get at least 8 hours of sleep or more each day, keep a regular sleep schedule, and avoid sleep deprivation. Avoid alcohol.  Reasons that you might need a change to your pressure therapy would be weight gain or loss, waking having inadvertently removed your PAP overnight, having previously felt refreshed by sleep with CPAP use and now waking un-refreshed, and return of daytime sleepiness. Also, the development of new medical problems  (such as heart failure, stroke, medications such as narcotics) can sometimes affect breathing at night and change your PAP therapy needs.  Please bring PAP with you if you are hospitalized.  If anticipating surgery be sure to discuss with your surgeon that you have sleep apnea and use PAP therapy.    Maintain your equipment as recommended which includes routine cleaning and replacement of supplies.      Call DME for any questions regarding supplies or maintenance.    [facility], [facility] [phone]    Do not drive on engage in potentially dangerous activities if feeling sleepy.  Please follow up in sleep clinic again in 2 months.        Tips for your PAP use-    Mask fitting tips  Mask fitting exercise:    To improve your mask seal and your mobility at night, put mask on and secure in place.  Lie down in bed with full pressure and roll to one side, adjust headgear while in that position to eliminate any leaks. Repeat process rolling to other side.     The mask seal does not have to be perfect:   CPAP machines are designed to make up for small  leaks. However, you will not tolerate leaks blowing in your eyes so you will need to adjust.   Any leak should only be near or at the bottom of the mask.  We expect your mask to leak slightly at night.    Do not over-tighten the headgear straps, tighter IS NOT better, we expect minimal leak.    First try re-positioning the mask or headgear before tightening the headgear straps.  Mask leaks are expected due to changing sleeping positions. Try pulling the mask away from your skin allowing the cushion to re-inflate will minimize the leak.  If you struggle for a good fit, try turning the CPAP off and then readjust the mask by pulling it away from your face and then turning back on the CPAP.        Humidifier tips  Humidifiers can be adjusted to increase or decrease the amount of moisture according to your comfort level. You may need to adjust this frequently at first, but then might only change it with seasonal weather changes.     Try INCREASING the humidity if:  You experience a dry, irritated nasal passage or throat.  You have a runny, drippy nose or sneezing fits after using CPAP.  You experience nasal congestion during or after CPAP use.    Try DECREASING the humidity if:  You have excessive condensation or  rain out  in the tubing or mask.  Otherwise keep the tubing warm during the night by running it underneath the blankets or pillow.      Clinic visit after initial PAP set-up   Bring your equipment with you to your 5-8 week follow up clinic visit.  We will be extracting your data from the machine if not available from the cloud based modem.        Travel  Always take your equipment with you when you travel.  If you fly with your equipment bring it on with you as a carry on.  Medical equipment does not count as a carry on.    If you travel international the machines take 110-240v.  The only adapter needed is the adapter that will fit into the receptacle (outlet).    You may also want to bring an extension cord as  many hotel rooms have limited outlets at the bedside.  Do not travel with water in your humidifier chamber.     Cleaning and Maintenance Guidelines    Equipment Frequency Cleaning Method   Mask First Day    Daily      Weekly Soak mask in hot soapy water for 30 minutes, rinse and air dry.  Wipe nasal cushion with a hot soapy (Ivory, baby shampoo) cloth and rinse.  Baby wipes may also be used.  Do not use anti-bacterial soaps,Hazel  liquid soap, rubbing alcohol, bleach or ammonia.  Wash frame in hot soapy water (Ivory, baby shampoo) rinse and let air dry   Headgear Biweekly Wash in hot soapy water, rinse and air dry   Reusable Gray Filter Weekly Wash in hot soapy water, rinse, put in towel squeeze moisture out, let air dry   Disposable White Filter Check Weekly Replace when brown or gray in color; at least every 2 to 3 months   Humidifier Chamber Daily    Weekly Empty distilled water from humidifier and let air dry    Hand wash in hot soapy water, rinse and air dry   Tubing Weekly Wash in hot soapy water, rinse and let air dry   Mask, Tubing and Humidifier Chamber As needed Disinfect: Soak in 1 part distilled white vinegar to 3 parts hot water for 30 minutes, rinse well and air dry  Not the material headgear        MASK AND SUPPLY REORDERING and EQUIPMENT NEEDS through your DME and per your insurance  Reminder: Most insurance companies will allow for a new mask, headgear, tubing, and reusable gray filter every six months.  Disposable white ultra-fine filters are covered monthly.      HOME AND SAFETY INSTRUCTIONS  Do not use frayed or cracked electrical cords, multi plug adaptors, or switched receptacles  Do not immerse electrical equipment into water  Assure that electrical cords do not become a tripping hazard

## 2022-05-31 NOTE — PROGRESS NOTES
Ezekiel is a 67 year old who is being evaluated via a billable video visit.      How would you like to obtain your AVS? MyChart  If the video visit is dropped, the invitation should be resent by: Text to cell phone: [phone]   Will anyone else be joining your video visit? No      Video Start Time: 9:06 AM  Video-Visit Details    Type of service:  Video Visit    Video End Time:9:30 AM    Originating Location (pt. Location): Other car    Distant Location (provider location):  [facility]     Platform used for Video Visit: George     Chief complaint: Follow-up sleep study    History of Present Illness: 67-year-old gentleman with history of hypertension, type 2 diabetes, obesity, loud snoring and observed apneas.  He was found to have severe obstructive sleep apnea complicated by centrals.  On APAP therapy had persistent apnea and recently underwent titration study.  Those results were reviewed with him in detail today.  He reports being very satisfied with the care provided by the nighttime sleep technician.  She worked very diligently with him to find the best fitting mask.  He currently is using the nasal oral mask and he is happy with it.  He does admit to some leak issues.  The study showed persistent centrals on Pap therapy.  ASV was attempted but was ineffective.  Final settings was bilevel with backup rate of 12.  He has been using those settings now for the last 6 nights or so.  He does continue to have dry mouth but is not running out of water like he had been previously.  He does like the current settings better.  He can feel that it is giving him little bit more air.  He is getting some warnings about leak which she is aware of.  He is trying to find the best level of tightness with the straps.  His wife observes resolution of snoring and that he is more alert and energetic during the day.  He thinks that were on the right track.    [place] Sleepiness Scale  Total score -  "[place]: 0 (2022  8:55 AM)   (Less than 10 normal)    Insomnia Severity Scale  SHIRAZ Total Score: 5  (normal 0-7, mild 8-14, moderate 15-21, severe 22-28)    Past Medical History:   Diagnosis Date     Acute renal failure (H)     2016, secondary to dehydration, normalized     HTN (hypertension)      Obesity      Type 2 diabetes mellitus (H)        Allergies   Allergen Reactions     Lisinopril Cough     Dry cough       Current Outpatient Medications   Medication     aspirin-acetaminophen-caffeine (EXCEDRIN MIGRAINE) 250-250-65 mg per tablet     atorvastatin (LIPITOR) 80 MG tablet     blood glucose meter (GLUCOMETER)     blood glucose test strips     fluticasone (FLONASE) 50 MCG/ACT nasal spray     generic lancets     glipiZIDE (GLUCOTROL XL) 10 MG 24 hr tablet     LANTUS SOLOSTAR 100 UNIT/ML soln     losartan (COZAAR) 100 MG tablet     metFORMIN (GLUCOPHAGE-XR) 500 MG 24 hr tablet     pen needle, diabetic (PEN NEEDLE) 31 gauge x 1/4\" Ndle     pioglitazone (ACTOS) 15 MG tablet     tamsulosin (FLOMAX) 0.4 MG capsule     No current facility-administered medications for this visit.       Social History     Socioeconomic History     Marital status:      Spouse name: Not on file     Number of children: 5     Years of education: Not on file     Highest education level: Not on file   Occupational History     Occupation: [employer]   Tobacco Use     Smoking status: Former Smoker     Types: Cigarettes, Cigarettes     Quit date: 1985     Years since quittin.1     Smokeless tobacco: Current User     Types: Chew, Chew     Tobacco comment: 2 tins/week   Substance and Sexual Activity     Alcohol use: Yes     Alcohol/week: 2.0 standard drinks     Drug use: No     Sexual activity: Yes     Partners: Female   Other Topics Concern     Not on file   Social History Narrative    Diet-            Exercise- Not regular. Work is physical, walking.     Social Determinants of Health     Financial Resource Strain: Not " "on file   Food Insecurity: Not on file   Transportation Needs: Not on file   Physical Activity: Not on file   Stress: Not on file   Social Connections: Not on file   Intimate Partner Violence: Not on file   Housing Stability: Not on file       Family History   Problem Relation Age of Onset     Colon Cancer Mother      Coronary Artery Disease Mother      Hypertension Mother      Diabetes Type 2  Mother      Hypertension Father      Bell's palsy Father      Diabetes Type 2  Father      Thyroid Disease Father      Sleep Apnea Father      Sleep Apnea Sister      Prostate Cancer Brother 57     Sleep Apnea Brother      Bell's palsy Maternal Grandmother      Thyroid Disease Daughter            EXAM:  Ht 1.784 m (5' 10.25\")   Wt 110.7 kg (244 lb)   BMI 34.76 kg/m    GENERAL: Aalert and no distress  EYES: Eyes grossly normal to inspection.  No discharge or erythema, or obvious scleral/conjunctival abnormalities.  RESP: No audible wheeze, cough, or visible cyanosis.  No visible retractions or increased work of breathing.    SKIN: Visible skin clear. No significant rash, abnormal pigmentation or lesions.  NEURO: Cranial nerves grossly intact.  Mentation and speech appropriate for age.  PSYCH: Mentation appears normal, affect normal, judgement and insight intact, normal speech and appearance well-groomed.     PSG 3/18/2022  Weight 240 lbs BMI 33.6  AHI 39.7, RDI 40.7  Lowest oxygen saturation was 76.0%. Time spent less than or equal to 88% was 30.0 minutes.     PSG Titration 5/15/2022  Weight 240 lbs BMI 33.6  Bilevel 15/5 Back up rate 12      ASSESSMENT:  67-year-old gentleman with hypertension, type 2 diabetes, obesity, severe complex sleep apnea now on bilevel with a backup rate for the central apnea component.  Too early to make a final assessment as to whether the settings are going to be effective in the long run.    PLAN:  Patient is going to follow-up 2 months.  At that point we will get a better sense of how well the " settings are working for him.  In the meantime, ensure adequate amount of sleep and avoid weight gain.  If he continues to have problems with dry mouth and difficulty breathing through nose resume a trial of fluticasone as he only tried it for about 4 nights.    35 minutes spent on the date of the encounter doing chart review, history and exam, documentation and further activities per the note    Mignon Wasserman M.D.  Pulmonary/Critical Care/Sleep Medicine    [facility]   Floor 1, [address]   [address] [place], [place] [address]. [place] [address]   Appointments: [phone]    The above note was dictated using voice recognition software and may include typographical errors. Please contact the author for any clarifications.

## 2022-06-02 ENCOUNTER — DOCUMENTATION ONLY (OUTPATIENT)
Dept: SLEEP MEDICINE | Facility: CLINIC | Age: 67
End: 2022-06-02
Payer: MEDICARE

## 2022-06-02 NOTE — PROGRESS NOTES
3 day Sleep therapy management telephone visit    Diagnostic AHI: 39.7  PSG    Confirmed with patient at time of call- Yes Patient is still interested in STM service       Subjective measures:  Patient loves his Bi-Pap machine he's feeling better and more rested. Patient sometimes gets a dry mouth.  Patient happy with everything.    He stated he's super busy and to jog his memory if we call him back.        Objective data     Order Settings for PAP  Bi-level S/T      IPAP 11  EPAP 5    Respiratory rate 12              Device settings from machine                           Assessment: Nightly usage over four hours      Action plan: Patient to have 14 day STM visit. Patient has a follow up visit scheduled:   Patient reminded to schedule follow up visit    Replacement device: Yes  STM ordered by provider: Yes     Total time spent on accessing and  interpreting remote patient PAP therapy data  10 minutes    Total time spent counseling, coaching  and reviewing PAP therapy data with patient  11 minutes    [id number] no

## 2022-06-04 ENCOUNTER — HEALTH MAINTENANCE LETTER (OUTPATIENT)
Age: 67
End: 2022-06-04

## 2022-06-06 DIAGNOSIS — E11.9 TYPE 2 DIABETES MELLITUS WITHOUT COMPLICATION, WITH LONG-TERM CURRENT USE OF INSULIN (H): ICD-10-CM

## 2022-06-06 DIAGNOSIS — Z79.4 TYPE 2 DIABETES MELLITUS WITHOUT COMPLICATION, WITH LONG-TERM CURRENT USE OF INSULIN (H): ICD-10-CM

## 2022-06-07 RX ORDER — BLOOD SUGAR DIAGNOSTIC
STRIP MISCELLANEOUS
Qty: 200 STRIP | Refills: 0 | Status: SHIPPED | OUTPATIENT
Start: 2022-06-07 | End: 2022-07-05

## 2022-06-07 NOTE — TELEPHONE ENCOUNTER
"Routing refill request to provider for review/approval because:  Patient needs to be seen because it has been more than 6 months since last office visit.    Last Written Prescription Date:  6/14/21  Last Fill Quantity: 200,  # refills: 3   Last office visit provider:  10/29/21     Requested Prescriptions   Pending Prescriptions Disp Refills     ACCU-CHEK GUIDE test strip [Pharmacy Med Name: ACCU-CHEK GUIDE TEST STRIPS 100S] 200 strip 3     Sig: USE 1 TEST TWICE DAILY       Diabetic Supplies Protocol Failed - 6/6/2022 12:06 PM        Failed - Recent (6 mo) or future (30 days) visit within the authorizing provider's specialty     Patient had office visit in the last 6 months or has a visit in the next 30 days with authorizing provider.  See \"Patient Info\" tab in inbasket, or \"Choose Columns\" in Meds & Orders section of the refill encounter.            Passed - Medication is active on med list        Passed - Patient is 18 years of age or older             Kendall Hayward RN 06/07/22 9:18 AM  "

## 2022-06-21 DIAGNOSIS — Z79.4 TYPE 2 DIABETES MELLITUS WITHOUT COMPLICATION, WITH LONG-TERM CURRENT USE OF INSULIN (H): ICD-10-CM

## 2022-06-21 DIAGNOSIS — E11.9 TYPE 2 DIABETES MELLITUS WITHOUT COMPLICATION, WITH LONG-TERM CURRENT USE OF INSULIN (H): ICD-10-CM

## 2022-06-22 RX ORDER — INSULIN GLARGINE 100 [IU]/ML
INJECTION, SOLUTION SUBCUTANEOUS
Qty: 30 ML | Refills: 2 | Status: SHIPPED | OUTPATIENT
Start: 2022-06-22 | End: 2022-11-05

## 2022-06-22 NOTE — TELEPHONE ENCOUNTER
"Routing refill request to provider for review/approval because:  Labs not current:  A1C  Patient needs to be seen because it has been more than 6 months since last office visit.    Last Written Prescription Date:  10/3/21  Last Fill Quantity: 30 ml,  # refills: 2   Last office visit provider:  10/29/21     Requested Prescriptions   Pending Prescriptions Disp Refills     LANTUS SOLOSTAR 100 UNIT/ML soln [Pharmacy Med Name: LANTUS SOLOSTAR PEN INJ 3ML] 30 mL 2     Sig: ADMINISTER 66 UNITS UNDER THE SKIN AT BEDTIME       Long Acting Insulin Protocol Failed - 6/22/2022 10:59 AM        Failed - HgbA1C in past 3 or 6 months     If HgbA1C is 8 or greater, it needs to be on file within the past 3 months.  If less than 8, must be on file within the past 6 months.     Recent Labs   Lab Test 10/29/21  1616   A1C 7.5*             Failed - Recent (6 mo) or future (30 days) visit within the authorizing provider's specialty     Patient had office visit in the last 6 months or has a visit in the next 30 days with authorizing provider or within the authorizing provider's specialty.  See \"Patient Info\" tab in inbasket, or \"Choose Columns\" in Meds & Orders section of the refill encounter.            Passed - Serum creatinine on file in past 12 months     Recent Labs   Lab Test 10/29/21  1616   CR 1.55*       Ok to refill medication if creatinine is low          Passed - Medication is active on med list        Passed - Patient is age 18 or older             Kendall Hayward RN 06/22/22 11:00 AM  "

## 2022-06-24 DIAGNOSIS — Z79.4 TYPE 2 DIABETES MELLITUS WITH OTHER SPECIFIED COMPLICATION, WITH LONG-TERM CURRENT USE OF INSULIN (H): Primary | ICD-10-CM

## 2022-06-24 DIAGNOSIS — E11.69 TYPE 2 DIABETES MELLITUS WITH OTHER SPECIFIED COMPLICATION, WITH LONG-TERM CURRENT USE OF INSULIN (H): Primary | ICD-10-CM

## 2022-06-25 NOTE — TELEPHONE ENCOUNTER
"Routing refill request to provider for review/approval because:  Patient needs to be seen because:  q 6mo    Last Written Prescription Date:  5/21/21  Last Fill Quantity: 100,  # refills: 0   Last office visit provider:  10/29/21     Requested Prescriptions   Pending Prescriptions Disp Refills     B-D U/F insulin pen needle [Pharmacy Med Name: B-D PEN NDL MINI [id number](3/16)PRPL]       Sig: USE TWICE DAILY TO INJECT VICTOZA AND LANTUS       Diabetic Supplies Protocol Failed - 6/24/2022  2:29 PM        Failed - Recent (6 mo) or future (30 days) visit within the authorizing provider's specialty     Patient had office visit in the last 6 months or has a visit in the next 30 days with authorizing provider.  See \"Patient Info\" tab in inbasket, or \"Choose Columns\" in Meds & Orders section of the refill encounter.            Passed - Medication is active on med list        Passed - Patient is 18 years of age or older             Jonna Gonzalez RN 06/24/22 9:39 PM  "

## 2022-06-27 RX ORDER — FLURBIPROFEN SODIUM 0.3 MG/ML
SOLUTION/ DROPS OPHTHALMIC
Qty: 100 EACH | Refills: 5 | Status: SHIPPED | OUTPATIENT
Start: 2022-06-27 | End: 2023-12-21

## 2022-07-05 DIAGNOSIS — E11.9 TYPE 2 DIABETES MELLITUS WITHOUT COMPLICATION, WITH LONG-TERM CURRENT USE OF INSULIN (H): ICD-10-CM

## 2022-07-05 DIAGNOSIS — Z79.4 TYPE 2 DIABETES MELLITUS WITHOUT COMPLICATION, WITH LONG-TERM CURRENT USE OF INSULIN (H): ICD-10-CM

## 2022-07-05 PROBLEM — K64.9 HEMORRHOIDS: Status: ACTIVE | Noted: 2021-12-14

## 2022-07-05 PROBLEM — Z86.0100 HISTORY OF COLONIC POLYPS: Status: ACTIVE | Noted: 2021-12-16

## 2022-07-05 PROBLEM — K63.5 POLYP OF COLON: Status: ACTIVE | Noted: 2021-12-14

## 2022-07-05 PROBLEM — K57.30 DIVERTICULAR DISEASE OF LARGE INTESTINE: Status: ACTIVE | Noted: 2021-12-14

## 2022-07-06 RX ORDER — BLOOD SUGAR DIAGNOSTIC
STRIP MISCELLANEOUS
Qty: 200 STRIP | Refills: 0 | Status: SHIPPED | OUTPATIENT
Start: 2022-07-06 | End: 2023-01-31

## 2022-07-06 NOTE — TELEPHONE ENCOUNTER
"Routing refill request to provider for review/approval because:  Patient needs to be seen because:  q6m    Last Written Prescription Date:  6/7/22  Last Fill Quantity: 200,  # refills: 0   Last office visit provider:  10/29/21     Requested Prescriptions   Pending Prescriptions Disp Refills     blood glucose (ACCU-CHEK GUIDE) test strip 200 strip 0     Sig: Use to test blood sugar ** times daily or as directed.       Diabetic Supplies Protocol Failed - 7/5/2022 11:04 AM        Failed - Recent (6 mo) or future (30 days) visit within the authorizing provider's specialty     Patient had office visit in the last 6 months or has a visit in the next 30 days with authorizing provider.  See \"Patient Info\" tab in inbasket, or \"Choose Columns\" in Meds & Orders section of the refill encounter.            Passed - Medication is active on med list        Passed - Patient is 18 years of age or older             Jonna Gonzalez RN 07/05/22 7:47 PM  "

## 2022-07-30 ENCOUNTER — HEALTH MAINTENANCE LETTER (OUTPATIENT)
Age: 67
End: 2022-07-30

## 2022-08-01 ENCOUNTER — HOSPITAL ENCOUNTER (OUTPATIENT)
Facility: HOSPITAL | Age: 67
Setting detail: OBSERVATION
Discharge: HOME | End: 2022-08-03
Attending: EMERGENCY MEDICINE | Admitting: INTERNAL MEDICINE
Payer: MEDICARE

## 2022-08-01 ENCOUNTER — APPOINTMENT (OUTPATIENT)
Dept: RADIOLOGY | Facility: HOSPITAL | Age: 67
End: 2022-08-01
Attending: EMERGENCY MEDICINE
Payer: MEDICARE

## 2022-08-01 DIAGNOSIS — I10 PRIMARY HYPERTENSION: ICD-10-CM

## 2022-08-01 DIAGNOSIS — R07.9 CHEST PAIN, UNSPECIFIED TYPE: ICD-10-CM

## 2022-08-01 DIAGNOSIS — I10 BENIGN ESSENTIAL HYPERTENSION: ICD-10-CM

## 2022-08-01 DIAGNOSIS — R01.1 SYSTOLIC MURMUR: ICD-10-CM

## 2022-08-01 DIAGNOSIS — I24.9 ACS (ACUTE CORONARY SYNDROME) (H): Primary | ICD-10-CM

## 2022-08-01 LAB
ANION GAP SERPL CALCULATED.3IONS-SCNC: 14 MMOL/L (ref 5–18)
APTT PPP: 22 SECONDS (ref 22–38)
BASOPHILS # BLD AUTO: 0.1 10E3/UL (ref 0–0.2)
BASOPHILS NFR BLD AUTO: 1 %
BNP SERPL-MCNC: 89 PG/ML (ref 0–60)
BUN SERPL-MCNC: 14 MG/DL (ref 8–22)
CALCIUM SERPL-MCNC: 9.2 MG/DL (ref 8.5–10.5)
CHLORIDE BLD-SCNC: 102 MMOL/L (ref 98–107)
CO2 SERPL-SCNC: 21 MMOL/L (ref 22–31)
CREAT SERPL-MCNC: 1.24 MG/DL (ref 0.7–1.3)
EOSINOPHIL # BLD AUTO: 0.2 10E3/UL (ref 0–0.7)
EOSINOPHIL NFR BLD AUTO: 3 %
ERYTHROCYTE [DISTWIDTH] IN BLOOD BY AUTOMATED COUNT: 12.5 % (ref 10–15)
GFR SERPL CREATININE-BSD FRML MDRD: 64 ML/MIN/1.73M2
GLUCOSE BLD-MCNC: 153 MG/DL (ref 70–125)
HCT VFR BLD AUTO: 45.1 % (ref 40–53)
HGB BLD-MCNC: 16.1 G/DL (ref 13.3–17.7)
HOLD SPECIMEN: NORMAL
HOLD SPECIMEN: NORMAL
IMM GRANULOCYTES # BLD: 0.2 10E3/UL
IMM GRANULOCYTES NFR BLD: 2 %
INR PPP: 0.92 (ref 0.85–1.15)
LYMPHOCYTES # BLD AUTO: 1.8 10E3/UL (ref 0.8–5.3)
LYMPHOCYTES NFR BLD AUTO: 21 %
MCH RBC QN AUTO: 32.9 PG (ref 26.5–33)
MCHC RBC AUTO-ENTMCNC: 35.7 G/DL (ref 31.5–36.5)
MCV RBC AUTO: 92 FL (ref 78–100)
MONOCYTES # BLD AUTO: 0.6 10E3/UL (ref 0–1.3)
MONOCYTES NFR BLD AUTO: 7 %
NEUTROPHILS # BLD AUTO: 5.7 10E3/UL (ref 1.6–8.3)
NEUTROPHILS NFR BLD AUTO: 66 %
NRBC # BLD AUTO: 0 10E3/UL
NRBC BLD AUTO-RTO: 0 /100
PLATELET # BLD AUTO: 186 10E3/UL (ref 150–450)
POTASSIUM BLD-SCNC: 4.3 MMOL/L (ref 3.5–5)
RBC # BLD AUTO: 4.89 10E6/UL (ref 4.4–5.9)
SODIUM SERPL-SCNC: 137 MMOL/L (ref 136–145)
TROPONIN I SERPL-MCNC: 0.12 NG/ML (ref 0–0.29)
WBC # BLD AUTO: 8.4 10E3/UL (ref 4–11)

## 2022-08-01 PROCEDURE — 96374 THER/PROPH/DIAG INJ IV PUSH: CPT

## 2022-08-01 PROCEDURE — 84484 ASSAY OF TROPONIN QUANT: CPT | Performed by: EMERGENCY MEDICINE

## 2022-08-01 PROCEDURE — 86901 BLOOD TYPING SEROLOGIC RH(D): CPT | Performed by: INTERNAL MEDICINE

## 2022-08-01 PROCEDURE — 93005 ELECTROCARDIOGRAM TRACING: CPT | Performed by: EMERGENCY MEDICINE

## 2022-08-01 PROCEDURE — 250N000013 HC RX MED GY IP 250 OP 250 PS 637: Performed by: EMERGENCY MEDICINE

## 2022-08-01 PROCEDURE — 85025 COMPLETE CBC W/AUTO DIFF WBC: CPT | Performed by: EMERGENCY MEDICINE

## 2022-08-01 PROCEDURE — 93005 ELECTROCARDIOGRAM TRACING: CPT | Performed by: STUDENT IN AN ORGANIZED HEALTH CARE EDUCATION/TRAINING PROGRAM

## 2022-08-01 PROCEDURE — 85730 THROMBOPLASTIN TIME PARTIAL: CPT | Performed by: EMERGENCY MEDICINE

## 2022-08-01 PROCEDURE — 36415 COLL VENOUS BLD VENIPUNCTURE: CPT | Performed by: EMERGENCY MEDICINE

## 2022-08-01 PROCEDURE — 80048 BASIC METABOLIC PNL TOTAL CA: CPT | Performed by: EMERGENCY MEDICINE

## 2022-08-01 PROCEDURE — 80048 BASIC METABOLIC PNL TOTAL CA: CPT | Performed by: STUDENT IN AN ORGANIZED HEALTH CARE EDUCATION/TRAINING PROGRAM

## 2022-08-01 PROCEDURE — 85025 COMPLETE CBC W/AUTO DIFF WBC: CPT | Performed by: STUDENT IN AN ORGANIZED HEALTH CARE EDUCATION/TRAINING PROGRAM

## 2022-08-01 PROCEDURE — 83880 ASSAY OF NATRIURETIC PEPTIDE: CPT | Performed by: EMERGENCY MEDICINE

## 2022-08-01 PROCEDURE — 87635 SARS-COV-2 COVID-19 AMP PRB: CPT | Performed by: EMERGENCY MEDICINE

## 2022-08-01 PROCEDURE — 250N000009 HC RX 250: Performed by: EMERGENCY MEDICINE

## 2022-08-01 PROCEDURE — 85379 FIBRIN DEGRADATION QUANT: CPT | Performed by: HOSPITALIST

## 2022-08-01 PROCEDURE — 71045 X-RAY EXAM CHEST 1 VIEW: CPT

## 2022-08-01 PROCEDURE — 99285 EMERGENCY DEPT VISIT HI MDM: CPT | Mod: 25

## 2022-08-01 PROCEDURE — 36415 COLL VENOUS BLD VENIPUNCTURE: CPT | Performed by: STUDENT IN AN ORGANIZED HEALTH CARE EDUCATION/TRAINING PROGRAM

## 2022-08-01 PROCEDURE — 85610 PROTHROMBIN TIME: CPT | Performed by: EMERGENCY MEDICINE

## 2022-08-01 PROCEDURE — C9803 HOPD COVID-19 SPEC COLLECT: HCPCS

## 2022-08-01 PROCEDURE — 83036 HEMOGLOBIN GLYCOSYLATED A1C: CPT | Performed by: HOSPITALIST

## 2022-08-01 PROCEDURE — 86850 RBC ANTIBODY SCREEN: CPT | Performed by: INTERNAL MEDICINE

## 2022-08-01 RX ORDER — METOPROLOL TARTRATE 1 MG/ML
5 INJECTION, SOLUTION INTRAVENOUS ONCE
Status: COMPLETED | OUTPATIENT
Start: 2022-08-01 | End: 2022-08-01

## 2022-08-01 RX ADMIN — METOPROLOL TARTRATE 5 MG: 1 INJECTION, SOLUTION INTRAVENOUS at 23:36

## 2022-08-01 RX ADMIN — NITROGLYCERIN 15 MG: 20 OINTMENT TOPICAL at 23:52

## 2022-08-01 NOTE — Clinical Note
dry, intact, no bleeding and no hematoma. Right femoral site. Angioseal successfully deployed. Band-aid applied.

## 2022-08-01 NOTE — Clinical Note
Stent deployed in the proximal left anterior descending. Max pressure = 12 donovan. Total duration = 26 seconds.

## 2022-08-01 NOTE — Clinical Note
The first balloon was inserted into the left anterior descending and proximal left anterior descending.Max pressure = 14 donovan. Total duration = 8 seconds.

## 2022-08-01 NOTE — Clinical Note
The first balloon was inserted into the left anterior descending and proximal left anterior descending.Max pressure = 12 donovan. Total duration = 7 seconds.     Max pressure = 14 donovan. Total duration = 8 seconds.    Balloon reinflated a second time: Max pressure = 14 donovan. Total duration = 8 seconds.  Balloon reinflated a third time: Max pressure = 14 donovan. Total duration = 10 seconds.

## 2022-08-01 NOTE — Clinical Note
The first balloon was inserted into the left anterior descending and proximal left anterior descending.Max pressure = 12 donovan. Total duration = 11 seconds.     Max pressure = 12 donovan. Total duration = 9 seconds.    Balloon reinflated a second time: Max pressure = 12 donovan. Total duration = 9 seconds.

## 2022-08-01 NOTE — Clinical Note
The first balloon was inserted into the left anterior descending and proximal left anterior descending.Max pressure = 16 donovan. Total duration = 18 seconds.

## 2022-08-02 ENCOUNTER — APPOINTMENT (OUTPATIENT)
Dept: CARDIOLOGY | Facility: HOSPITAL | Age: 67
End: 2022-08-02
Attending: INTERNAL MEDICINE
Payer: MEDICARE

## 2022-08-02 LAB
ABO/RH(D): NORMAL
ABO/RH(D): NORMAL
ACT BLD: 203 SECONDS (ref 74–150)
ACT BLD: 207 SECONDS (ref 74–150)
ANTIBODY SCREEN: NEGATIVE
ATRIAL RATE - MUSE: 72 BPM
CHOLEST SERPL-MCNC: 246 MG/DL
D DIMER PPP FEU-MCNC: 0.56 UG/ML FEU (ref 0–0.5)
DIASTOLIC BLOOD PRESSURE - MUSE: NORMAL MMHG
GLUCOSE BLDC GLUCOMTR-MCNC: 153 MG/DL (ref 70–99)
GLUCOSE BLDC GLUCOMTR-MCNC: 158 MG/DL (ref 70–99)
GLUCOSE BLDC GLUCOMTR-MCNC: 188 MG/DL (ref 70–99)
HBA1C MFR BLD: 7.8 %
HDLC SERPL-MCNC: 47 MG/DL
INTERPRETATION ECG - MUSE: NORMAL
LACTATE SERPL-SCNC: 1 MMOL/L (ref 0.7–2)
LDLC SERPL CALC-MCNC: ABNORMAL MG/DL
LVEF ECHO: NORMAL
P AXIS - MUSE: -6 DEGREES
PR INTERVAL - MUSE: 192 MS
QRS DURATION - MUSE: 100 MS
QT - MUSE: 400 MS
QTC - MUSE: 438 MS
R AXIS - MUSE: 44 DEGREES
SARS-COV-2 RNA RESP QL NAA+PROBE: NEGATIVE
SPECIMEN EXPIRATION DATE: NORMAL
SPECIMEN EXPIRATION DATE: NORMAL
SYSTOLIC BLOOD PRESSURE - MUSE: NORMAL MMHG
T AXIS - MUSE: 254 DEGREES
TRIGL SERPL-MCNC: 557 MG/DL
TROPONIN I SERPL-MCNC: 0.15 NG/ML (ref 0–0.29)
TROPONIN I SERPL-MCNC: 0.16 NG/ML (ref 0–0.29)
VENTRICULAR RATE- MUSE: 72 BPM

## 2022-08-02 PROCEDURE — 250N000009 HC RX 250: Performed by: INTERNAL MEDICINE

## 2022-08-02 PROCEDURE — 86901 BLOOD TYPING SEROLOGIC RH(D): CPT | Performed by: INTERNAL MEDICINE

## 2022-08-02 PROCEDURE — 99152 MOD SED SAME PHYS/QHP 5/>YRS: CPT | Performed by: INTERNAL MEDICINE

## 2022-08-02 PROCEDURE — 250N000013 HC RX MED GY IP 250 OP 250 PS 637: Performed by: INTERNAL MEDICINE

## 2022-08-02 PROCEDURE — 272N000001 HC OR GENERAL SUPPLY STERILE: Performed by: INTERNAL MEDICINE

## 2022-08-02 PROCEDURE — 258N000003 HC RX IP 258 OP 636: Performed by: INTERNAL MEDICINE

## 2022-08-02 PROCEDURE — C1760 CLOSURE DEV, VASC: HCPCS | Performed by: INTERNAL MEDICINE

## 2022-08-02 PROCEDURE — 80061 LIPID PANEL: CPT | Performed by: INTERNAL MEDICINE

## 2022-08-02 PROCEDURE — 99207 PR APP CREDIT; MD BILLING SHARED VISIT: CPT | Performed by: INTERNAL MEDICINE

## 2022-08-02 PROCEDURE — G0378 HOSPITAL OBSERVATION PER HR: HCPCS

## 2022-08-02 PROCEDURE — 36415 COLL VENOUS BLD VENIPUNCTURE: CPT | Performed by: INTERNAL MEDICINE

## 2022-08-02 PROCEDURE — 99205 OFFICE O/P NEW HI 60 MIN: CPT | Mod: 25 | Performed by: INTERNAL MEDICINE

## 2022-08-02 PROCEDURE — 93454 CORONARY ARTERY ANGIO S&I: CPT | Mod: XU | Performed by: INTERNAL MEDICINE

## 2022-08-02 PROCEDURE — 85347 COAGULATION TIME ACTIVATED: CPT

## 2022-08-02 PROCEDURE — C1769 GUIDE WIRE: HCPCS | Performed by: INTERNAL MEDICINE

## 2022-08-02 PROCEDURE — 83605 ASSAY OF LACTIC ACID: CPT | Performed by: INTERNAL MEDICINE

## 2022-08-02 PROCEDURE — 93010 ELECTROCARDIOGRAM REPORT: CPT | Mod: HOP | Performed by: INTERNAL MEDICINE

## 2022-08-02 PROCEDURE — 93005 ELECTROCARDIOGRAM TRACING: CPT

## 2022-08-02 PROCEDURE — 255N000002 HC RX 255 OP 636: Performed by: INTERNAL MEDICINE

## 2022-08-02 PROCEDURE — 92928 PRQ TCAT PLMT NTRAC ST 1 LES: CPT | Mod: LD | Performed by: INTERNAL MEDICINE

## 2022-08-02 PROCEDURE — 999N000054 HC STATISTIC EKG NON-CHARGEABLE

## 2022-08-02 PROCEDURE — C1887 CATHETER, GUIDING: HCPCS | Performed by: INTERNAL MEDICINE

## 2022-08-02 PROCEDURE — C1753 CATH, INTRAVAS ULTRASOUND: HCPCS | Performed by: INTERNAL MEDICINE

## 2022-08-02 PROCEDURE — 93306 TTE W/DOPPLER COMPLETE: CPT | Mod: 26 | Performed by: INTERNAL MEDICINE

## 2022-08-02 PROCEDURE — 36415 COLL VENOUS BLD VENIPUNCTURE: CPT | Performed by: HOSPITALIST

## 2022-08-02 PROCEDURE — 250N000011 HC RX IP 250 OP 636: Performed by: INTERNAL MEDICINE

## 2022-08-02 PROCEDURE — 84484 ASSAY OF TROPONIN QUANT: CPT | Performed by: HOSPITALIST

## 2022-08-02 PROCEDURE — 92978 ENDOLUMINL IVUS OCT C 1ST: CPT | Mod: LD | Performed by: INTERNAL MEDICINE

## 2022-08-02 PROCEDURE — 82962 GLUCOSE BLOOD TEST: CPT | Mod: 91

## 2022-08-02 PROCEDURE — 93454 CORONARY ARTERY ANGIO S&I: CPT | Mod: 26 | Performed by: INTERNAL MEDICINE

## 2022-08-02 PROCEDURE — C1874 STENT, COATED/COV W/DEL SYS: HCPCS | Performed by: INTERNAL MEDICINE

## 2022-08-02 PROCEDURE — 92978 ENDOLUMINL IVUS OCT C 1ST: CPT | Mod: 26 | Performed by: INTERNAL MEDICINE

## 2022-08-02 PROCEDURE — 99153 MOD SED SAME PHYS/QHP EA: CPT | Performed by: INTERNAL MEDICINE

## 2022-08-02 PROCEDURE — 96376 TX/PRO/DX INJ SAME DRUG ADON: CPT

## 2022-08-02 PROCEDURE — C9600 PERC DRUG-EL COR STENT SING: HCPCS | Performed by: INTERNAL MEDICINE

## 2022-08-02 PROCEDURE — C1894 INTRO/SHEATH, NON-LASER: HCPCS | Performed by: INTERNAL MEDICINE

## 2022-08-02 PROCEDURE — 999N000208 ECHOCARDIOGRAM COMPLETE

## 2022-08-02 PROCEDURE — C1725 CATH, TRANSLUMIN NON-LASER: HCPCS | Performed by: INTERNAL MEDICINE

## 2022-08-02 PROCEDURE — 99220 PR INITIAL OBSERVATION CARE,LEVEL III: CPT | Performed by: HOSPITALIST

## 2022-08-02 PROCEDURE — 96375 TX/PRO/DX INJ NEW DRUG ADDON: CPT | Mod: XU

## 2022-08-02 DEVICE — STENT CORONARY DES SYNERGY XD MR US 3.00X20MM H7493941820300: Type: IMPLANTABLE DEVICE | Site: CORONARY | Status: FUNCTIONAL

## 2022-08-02 DEVICE — CLOSURE ANGIOSEAL 6FR 610130: Type: IMPLANTABLE DEVICE | Status: FUNCTIONAL

## 2022-08-02 RX ORDER — HEPARIN SODIUM 1000 [USP'U]/ML
INJECTION, SOLUTION INTRAVENOUS; SUBCUTANEOUS
Status: DISCONTINUED | OUTPATIENT
Start: 2022-08-02 | End: 2022-08-02 | Stop reason: HOSPADM

## 2022-08-02 RX ORDER — METOPROLOL TARTRATE 1 MG/ML
5 INJECTION, SOLUTION INTRAVENOUS
Status: DISCONTINUED | OUTPATIENT
Start: 2022-08-02 | End: 2022-08-03 | Stop reason: HOSPADM

## 2022-08-02 RX ORDER — NITROGLYCERIN 0.4 MG/1
0.4 TABLET SUBLINGUAL EVERY 5 MIN PRN
Status: DISCONTINUED | OUTPATIENT
Start: 2022-08-02 | End: 2022-08-02

## 2022-08-02 RX ORDER — ATORVASTATIN CALCIUM 40 MG/1
80 TABLET, FILM COATED ORAL DAILY
Status: DISCONTINUED | OUTPATIENT
Start: 2022-08-02 | End: 2022-08-02

## 2022-08-02 RX ORDER — ACETAMINOPHEN 650 MG/1
650 SUPPOSITORY RECTAL EVERY 6 HOURS PRN
Status: DISCONTINUED | OUTPATIENT
Start: 2022-08-02 | End: 2022-08-03 | Stop reason: HOSPADM

## 2022-08-02 RX ORDER — FLUMAZENIL 0.1 MG/ML
0.2 INJECTION, SOLUTION INTRAVENOUS
Status: ACTIVE | OUTPATIENT
Start: 2022-08-02 | End: 2022-08-02

## 2022-08-02 RX ORDER — ASPIRIN 81 MG/1
81 TABLET ORAL DAILY
Status: DISCONTINUED | OUTPATIENT
Start: 2022-08-02 | End: 2022-08-03 | Stop reason: HOSPADM

## 2022-08-02 RX ORDER — DIAZEPAM 5 MG
10 TABLET ORAL
Status: COMPLETED | OUTPATIENT
Start: 2022-08-02 | End: 2022-08-02

## 2022-08-02 RX ORDER — TAMSULOSIN HYDROCHLORIDE 0.4 MG/1
0.4 CAPSULE ORAL DAILY
Status: DISCONTINUED | OUTPATIENT
Start: 2022-08-02 | End: 2022-08-03 | Stop reason: HOSPADM

## 2022-08-02 RX ORDER — NITROGLYCERIN 5 MG/ML
VIAL (ML) INTRAVENOUS
Status: DISCONTINUED | OUTPATIENT
Start: 2022-08-02 | End: 2022-08-02 | Stop reason: HOSPADM

## 2022-08-02 RX ORDER — METOPROLOL SUCCINATE 50 MG/1
50 TABLET, EXTENDED RELEASE ORAL DAILY
Status: DISCONTINUED | OUTPATIENT
Start: 2022-08-02 | End: 2022-08-02

## 2022-08-02 RX ORDER — ATROPINE SULFATE 0.1 MG/ML
0.5 INJECTION INTRAVENOUS
Status: ACTIVE | OUTPATIENT
Start: 2022-08-02 | End: 2022-08-02

## 2022-08-02 RX ORDER — FENTANYL CITRATE 50 UG/ML
25 INJECTION, SOLUTION INTRAMUSCULAR; INTRAVENOUS
Status: DISCONTINUED | OUTPATIENT
Start: 2022-08-02 | End: 2022-08-02 | Stop reason: HOSPADM

## 2022-08-02 RX ORDER — OXYCODONE HYDROCHLORIDE 5 MG/1
10 TABLET ORAL EVERY 4 HOURS PRN
Status: DISCONTINUED | OUTPATIENT
Start: 2022-08-02 | End: 2022-08-03 | Stop reason: HOSPADM

## 2022-08-02 RX ORDER — NALOXONE HYDROCHLORIDE 0.4 MG/ML
0.2 INJECTION, SOLUTION INTRAMUSCULAR; INTRAVENOUS; SUBCUTANEOUS
Status: DISCONTINUED | OUTPATIENT
Start: 2022-08-02 | End: 2022-08-02

## 2022-08-02 RX ORDER — NALOXONE HYDROCHLORIDE 0.4 MG/ML
0.4 INJECTION, SOLUTION INTRAMUSCULAR; INTRAVENOUS; SUBCUTANEOUS
Status: DISCONTINUED | OUTPATIENT
Start: 2022-08-02 | End: 2022-08-02

## 2022-08-02 RX ORDER — LOSARTAN POTASSIUM 50 MG/1
100 TABLET ORAL DAILY
Status: DISCONTINUED | OUTPATIENT
Start: 2022-08-02 | End: 2022-08-03 | Stop reason: HOSPADM

## 2022-08-02 RX ORDER — LIDOCAINE 40 MG/G
CREAM TOPICAL
Status: DISCONTINUED | OUTPATIENT
Start: 2022-08-02 | End: 2022-08-02 | Stop reason: HOSPADM

## 2022-08-02 RX ORDER — NALOXONE HYDROCHLORIDE 0.4 MG/ML
0.4 INJECTION, SOLUTION INTRAMUSCULAR; INTRAVENOUS; SUBCUTANEOUS
Status: DISCONTINUED | OUTPATIENT
Start: 2022-08-02 | End: 2022-08-03

## 2022-08-02 RX ORDER — FENTANYL CITRATE 50 UG/ML
25 INJECTION, SOLUTION INTRAMUSCULAR; INTRAVENOUS
Status: DISCONTINUED | OUTPATIENT
Start: 2022-08-02 | End: 2022-08-03 | Stop reason: HOSPADM

## 2022-08-02 RX ORDER — MAGNESIUM HYDROXIDE/ALUMINUM HYDROXICE/SIMETHICONE 120; 1200; 1200 MG/30ML; MG/30ML; MG/30ML
30 SUSPENSION ORAL EVERY 4 HOURS PRN
Status: DISCONTINUED | OUTPATIENT
Start: 2022-08-02 | End: 2022-08-03 | Stop reason: HOSPADM

## 2022-08-02 RX ORDER — ACETAMINOPHEN 325 MG/1
650 TABLET ORAL EVERY 6 HOURS PRN
Status: DISCONTINUED | OUTPATIENT
Start: 2022-08-02 | End: 2022-08-03 | Stop reason: HOSPADM

## 2022-08-02 RX ORDER — ATORVASTATIN CALCIUM 40 MG/1
80 TABLET, FILM COATED ORAL DAILY
Status: DISCONTINUED | OUTPATIENT
Start: 2022-08-02 | End: 2022-08-03 | Stop reason: HOSPADM

## 2022-08-02 RX ORDER — ONDANSETRON 2 MG/ML
4 INJECTION INTRAMUSCULAR; INTRAVENOUS EVERY 6 HOURS PRN
Status: DISCONTINUED | OUTPATIENT
Start: 2022-08-02 | End: 2022-08-03 | Stop reason: HOSPADM

## 2022-08-02 RX ORDER — ASPIRIN 81 MG/1
243 TABLET, CHEWABLE ORAL ONCE
Status: COMPLETED | OUTPATIENT
Start: 2022-08-02 | End: 2022-08-02

## 2022-08-02 RX ORDER — NALOXONE HYDROCHLORIDE 0.4 MG/ML
0.2 INJECTION, SOLUTION INTRAMUSCULAR; INTRAVENOUS; SUBCUTANEOUS
Status: DISCONTINUED | OUTPATIENT
Start: 2022-08-02 | End: 2022-08-03

## 2022-08-02 RX ORDER — SODIUM CHLORIDE 9 MG/ML
INJECTION, SOLUTION INTRAVENOUS CONTINUOUS
Status: ACTIVE | OUTPATIENT
Start: 2022-08-02 | End: 2022-08-02

## 2022-08-02 RX ORDER — ONDANSETRON 4 MG/1
4 TABLET, ORALLY DISINTEGRATING ORAL EVERY 6 HOURS PRN
Status: DISCONTINUED | OUTPATIENT
Start: 2022-08-02 | End: 2022-08-03 | Stop reason: HOSPADM

## 2022-08-02 RX ORDER — MORPHINE SULFATE 2 MG/ML
2-4 INJECTION, SOLUTION INTRAMUSCULAR; INTRAVENOUS
Status: DISCONTINUED | OUTPATIENT
Start: 2022-08-02 | End: 2022-08-03 | Stop reason: HOSPADM

## 2022-08-02 RX ORDER — OXYCODONE HYDROCHLORIDE 5 MG/1
5 TABLET ORAL EVERY 4 HOURS PRN
Status: DISCONTINUED | OUTPATIENT
Start: 2022-08-02 | End: 2022-08-03 | Stop reason: HOSPADM

## 2022-08-02 RX ORDER — FENTANYL CITRATE 50 UG/ML
INJECTION, SOLUTION INTRAMUSCULAR; INTRAVENOUS
Status: DISCONTINUED | OUTPATIENT
Start: 2022-08-02 | End: 2022-08-02 | Stop reason: HOSPADM

## 2022-08-02 RX ORDER — IODIXANOL 320 MG/ML
INJECTION, SOLUTION INTRAVASCULAR
Status: DISCONTINUED | OUTPATIENT
Start: 2022-08-02 | End: 2022-08-02 | Stop reason: HOSPADM

## 2022-08-02 RX ORDER — LOSARTAN POTASSIUM 50 MG/1
100 TABLET ORAL DAILY
Status: DISCONTINUED | OUTPATIENT
Start: 2022-08-02 | End: 2022-08-02

## 2022-08-02 RX ORDER — HYDRALAZINE HYDROCHLORIDE 20 MG/ML
10 INJECTION INTRAMUSCULAR; INTRAVENOUS EVERY 4 HOURS PRN
Status: DISCONTINUED | OUTPATIENT
Start: 2022-08-02 | End: 2022-08-03 | Stop reason: HOSPADM

## 2022-08-02 RX ORDER — NICOTINE POLACRILEX 4 MG
15-30 LOZENGE BUCCAL
Status: DISCONTINUED | OUTPATIENT
Start: 2022-08-02 | End: 2022-08-03 | Stop reason: HOSPADM

## 2022-08-02 RX ORDER — ASPIRIN 81 MG/1
81 TABLET ORAL DAILY
Status: DISCONTINUED | OUTPATIENT
Start: 2022-08-03 | End: 2022-08-02

## 2022-08-02 RX ORDER — HYDRALAZINE HYDROCHLORIDE 20 MG/ML
10 INJECTION INTRAMUSCULAR; INTRAVENOUS ONCE
Status: COMPLETED | OUTPATIENT
Start: 2022-08-02 | End: 2022-08-02

## 2022-08-02 RX ORDER — DEXTROSE MONOHYDRATE 25 G/50ML
25-50 INJECTION, SOLUTION INTRAVENOUS
Status: DISCONTINUED | OUTPATIENT
Start: 2022-08-02 | End: 2022-08-03 | Stop reason: HOSPADM

## 2022-08-02 RX ORDER — CARVEDILOL 3.12 MG/1
6.25 TABLET ORAL 2 TIMES DAILY WITH MEALS
Status: DISCONTINUED | OUTPATIENT
Start: 2022-08-02 | End: 2022-08-02

## 2022-08-02 RX ORDER — NICARDIPINE HYDROCHLORIDE 2.5 MG/ML
INJECTION INTRAVENOUS
Status: DISCONTINUED | OUTPATIENT
Start: 2022-08-02 | End: 2022-08-02 | Stop reason: HOSPADM

## 2022-08-02 RX ORDER — ACETAMINOPHEN 325 MG/1
650 TABLET ORAL EVERY 4 HOURS PRN
Status: DISCONTINUED | OUTPATIENT
Start: 2022-08-02 | End: 2022-08-03 | Stop reason: HOSPADM

## 2022-08-02 RX ORDER — ATORVASTATIN CALCIUM 80 MG/1
80 TABLET, FILM COATED ORAL DAILY
Qty: 90 TABLET | Refills: 3 | Status: SHIPPED | OUTPATIENT
Start: 2022-08-02 | End: 2022-08-03

## 2022-08-02 RX ORDER — CARVEDILOL 3.12 MG/1
6.25 TABLET ORAL 2 TIMES DAILY WITH MEALS
Status: DISCONTINUED | OUTPATIENT
Start: 2022-08-02 | End: 2022-08-03

## 2022-08-02 RX ORDER — ASPIRIN 325 MG
325 TABLET ORAL ONCE
Status: COMPLETED | OUTPATIENT
Start: 2022-08-02 | End: 2022-08-02

## 2022-08-02 RX ORDER — ASPIRIN 81 MG/1
81 TABLET, CHEWABLE ORAL ONCE
Status: DISCONTINUED | OUTPATIENT
Start: 2022-08-02 | End: 2022-08-02

## 2022-08-02 RX ORDER — SODIUM CHLORIDE 9 MG/ML
INJECTION, SOLUTION INTRAVENOUS CONTINUOUS
Status: DISCONTINUED | OUTPATIENT
Start: 2022-08-02 | End: 2022-08-02 | Stop reason: HOSPADM

## 2022-08-02 RX ORDER — NITROGLYCERIN 0.4 MG/1
0.4 TABLET SUBLINGUAL EVERY 5 MIN PRN
Status: DISCONTINUED | OUTPATIENT
Start: 2022-08-02 | End: 2022-08-03 | Stop reason: HOSPADM

## 2022-08-02 RX ADMIN — METOPROLOL TARTRATE 5 MG: 5 INJECTION INTRAVENOUS at 22:08

## 2022-08-02 RX ADMIN — SODIUM CHLORIDE: 9 INJECTION, SOLUTION INTRAVENOUS at 10:40

## 2022-08-02 RX ADMIN — CARVEDILOL 6.25 MG: 3.12 TABLET, FILM COATED ORAL at 18:21

## 2022-08-02 RX ADMIN — HYDRALAZINE HYDROCHLORIDE 10 MG: 20 INJECTION INTRAMUSCULAR; INTRAVENOUS at 18:16

## 2022-08-02 RX ADMIN — NITROGLYCERIN 0.4 MG: 0.4 TABLET, ORALLY DISINTEGRATING SUBLINGUAL at 19:26

## 2022-08-02 RX ADMIN — HYDRALAZINE HYDROCHLORIDE 10 MG: 20 INJECTION INTRAMUSCULAR; INTRAVENOUS at 23:22

## 2022-08-02 RX ADMIN — HYDRALAZINE HYDROCHLORIDE 10 MG: 20 INJECTION INTRAMUSCULAR; INTRAVENOUS at 13:24

## 2022-08-02 RX ADMIN — LOSARTAN POTASSIUM 100 MG: 50 TABLET, FILM COATED ORAL at 14:20

## 2022-08-02 RX ADMIN — PERFLUTREN 2 ML: 6.52 INJECTION, SUSPENSION INTRAVENOUS at 09:50

## 2022-08-02 RX ADMIN — MORPHINE SULFATE 2 MG: 2 INJECTION, SOLUTION INTRAMUSCULAR; INTRAVENOUS at 13:50

## 2022-08-02 RX ADMIN — ASPIRIN 325 MG: 325 TABLET ORAL at 10:17

## 2022-08-02 RX ADMIN — OXYCODONE HYDROCHLORIDE 10 MG: 5 TABLET ORAL at 15:33

## 2022-08-02 RX ADMIN — TAMSULOSIN HYDROCHLORIDE 0.4 MG: 0.4 CAPSULE ORAL at 18:21

## 2022-08-02 RX ADMIN — TICAGRELOR 90 MG: 90 TABLET ORAL at 20:23

## 2022-08-02 RX ADMIN — CARVEDILOL 6.25 MG: 3.12 TABLET, FILM COATED ORAL at 10:17

## 2022-08-02 RX ADMIN — DIAZEPAM 10 MG: 10 TABLET ORAL at 10:17

## 2022-08-02 NOTE — PRE-PROCEDURE
GENERAL PRE-PROCEDURE:   Procedure:  Coronary angiogram with possible PCI  Date/Time:  8/2/2022 9:14 AM    Written consent obtained?: Yes    Risks and benefits: Risks, benefits and alternatives were discussed    Consent given by:  Patient  Patient states understanding of procedure being performed: Yes    Patient's understanding of procedure matches consent: Yes    Procedure consent matches procedure scheduled: Yes    Expected level of sedation:  Moderate  Appropriately NPO:  Yes  ASA Class:  3 (UA, HTN, HLD, family hx of CAD, DM Type II, nicotine use, CHALO, Class I obesity; BMI 33.47kg/m2)  Mallampati  :  Grade 4- soft palate obscured by base of tongue  Lungs:  Lungs clear with good breath sounds bilaterally  Heart:  Systolic murmur  History & Physical reviewed:  History and physical reviewed and no updates needed  Statement of review:  I have reviewed the lab findings, diagnostic data, medications, and the plan for sedation

## 2022-08-02 NOTE — ED PROVIDER NOTES
EMERGENCY DEPARTMENT ENCOUNTER      NAME: Ezekiel Aldrich  AGE: 67 year old male  YOB: 1955  MRN: [id number]  EVALUATION DATE & TIME: 8/1/2022 10:26 PM    PCP: Janes Jeronimo    ED PROVIDER: Brendan Stanton M.D.      Chief Complaint   Patient presents with     Chest Pain         FINAL IMPRESSION:  Chest pain  Hypertension      ED COURSE & MEDICAL DECISION MAKING:    Pertinent Labs & Imaging studies reviewed. (See chart for details)  67 year old male presents to the Emergency Department for evaluation of chest pain.  Patient reports repeat episodes of chest pressure and shortness of breath.  Symptoms been ongoing for some time but seem to happen more frequently.  Tonight had an episode lasting more than 20 minutes.  No obvious triggering factors.  Patient with multiple cardiac risk factors including diabetes, dyslipidemia, hypertension.  Patient also reports family history of early cardiac disease.  On examination obese male in mild distress.  Vital signs significant for hypertension and tachycardia.  Lungs clear.  Trace lower extremity edema.  Obese abdomen.  Patient has symptoms highly suspicious for anginal type pain.  Will proceed nitroglycerin to moderate blood pressure.  Baseline blood work being obtained.  Patient has been taking an aspirin daily over the last few days given his repeat symptoms.  Plan will be for hospitalization and serial troponins.   10:33 PM I met with the patient for the initial interview and physical examination. Discussed plan for treatment and workup in the ED.    11:24 PM.  Initial troponin 0.12.  CBC unremarkable.  Metabolic panel normal.  Call placed to the admitting physician.  Metoprolol x5 mg ordered intravenously for further blood pressure management and heart rate control.  11:45 PM.  Patient informed of plan for hospitalization.  He is agreeable.      At the conclusion of the encounter I discussed the results of all of the tests and the disposition. The questions  were answered and return precautions provided. The patient or family acknowledged understanding and was agreeable with the care plan.       PPE: Provider wore gloves, N95 mask, eye protection, surgical cap, and paper mask.     MEDICATIONS GIVEN IN THE EMERGENCY:  Medications - No data to display    NEW PRESCRIPTIONS STARTED AT TODAY'S ER VISIT  New Prescriptions    No medications on file          =================================================================    HPI    Patient information was obtained from: Patient    Use of Intrepreter: N/A         Ezekiel Aldrich is a 67 year old male with a pertient medical history of CHALO on CPAP, DM II, HTN, HLD, and obesity who presents to the ED for evaluation of chest pain.    Patient reports intermittent substernal chest pain for some time now, however, he had two severe episodes lasting about 20 minutes 2 night ago while lying down. Today, while ambulating he had another sudden onset of substernal chest pain around 8:30 PM which prompted him to come into the ED. He describes it as a lot pressure pushing on his chest. Patient reports that he sometimes gets up and feels dizzy. He is a non-smoker, but regularly drinks around 2 beers a night with dinner. Patient also has been taking baby aspirin the last couple of days.     He otherwise denies nausea or vomiting.       REVIEW OF SYSTEMS   Constitutional:  Denies fever, chills  Respiratory:  Denies productive cough, Positive for shortness of breath  Cardiovascular: Positive for chest pain and fast heartbeat  GI:  Denies abdominal pain, nausea, vomiting, or change in bowel or bladder habits   Musculoskeletal:  Denies any new muscle/joint swelling  Skin:  Denies rash   Neurologic:  Denies focal weakness  All systems negative except as marked.     PAST MEDICAL HISTORY:  Past Medical History:   Diagnosis Date     Acute renal failure (H)     6/11/2016, secondary to dehydration, normalized     HTN (hypertension)      Obesity       Type 2 diabetes mellitus (H)        PAST SURGICAL HISTORY:  Past Surgical History:   Procedure Laterality Date     ANKLE FRACTURE SURGERY Right     hardware     KNEE SURGERY       MANDIBLE FRACTURE SURGERY      from fx     SHOULDER SURGERY Right     from fx and separation         CURRENT MEDICATIONS:    No current facility-administered medications for this encounter.    Current Outpatient Medications:      aspirin-acetaminophen-caffeine (EXCEDRIN MIGRAINE) 250-250-65 mg per tablet, [ASPIRIN-ACETAMINOPHEN-CAFFEINE (EXCEDRIN MIGRAINE) 250-250-65 MG PER TABLET] Take 1 tablet by mouth every 6 (six) hours as needed for pain., Disp: , Rfl:      atorvastatin (LIPITOR) 80 MG tablet, [ATORVASTATIN (LIPITOR) 80 MG TABLET] Take 1 daily for high cholesterol., Disp: 90 tablet, Rfl: 3     B-D U/F insulin pen needle, USE TWICE DAILY TO INJECT VICTOZA AND LANTUS, Disp: 100 each, Rfl: 5     blood glucose (ACCU-CHEK GUIDE) test strip, USE 1 TEST TWICE DAILY, Disp: 200 strip, Rfl: 0     blood glucose meter (GLUCOMETER), [BLOOD GLUCOSE METER (GLUCOMETER)] Use 1 each As Directed as needed. Dispense glucometer brand per patient's insurance at pharmacy discretion., Disp: 1 each, Rfl: 0     fluticasone (FLONASE) 50 MCG/ACT nasal spray, Spray 2 sprays into both nostrils daily, Disp: 16 g, Rfl: 3     generic lancets, [GENERIC LANCETS] Use 1 each As Directed 2 (two) times a day. Dispense brand per patient's insurance at pharmacy discretion., Disp: 200 each, Rfl: 3     glipiZIDE (GLUCOTROL XL) 10 MG 24 hr tablet, [GLIPIZIDE (GLUCOTROL XL) 10 MG 24 HR TABLET] TAKE 2 TABLETS(20 MG) BY MOUTH DAILY, Disp: 180 tablet, Rfl: 3     LANTUS SOLOSTAR 100 UNIT/ML soln, ADMINISTER 66 UNITS UNDER THE SKIN AT BEDTIME, Disp: 30 mL, Rfl: 2     losartan (COZAAR) 100 MG tablet, Take 1 tablet (100 mg) by mouth daily, Disp: 90 tablet, Rfl: 3     metFORMIN (GLUCOPHAGE-XR) 500 MG 24 hr tablet, Take 4 tablets (2,000 mg) by mouth daily, Disp: 360 tablet, Rfl: 3      "pen needle, diabetic (PEN NEEDLE) 31 gauge x 1/4\" Ndle, [PEN NEEDLE, DIABETIC (PEN NEEDLE) 31 GAUGE X 1/4\" NDLE] Use twice daily to inject Victoza and Lantus, Disp: 100 each, Rfl: 0     pioglitazone (ACTOS) 15 MG tablet, Take 1 tablet daily for diabetes, Disp: 90 tablet, Rfl: 1     tamsulosin (FLOMAX) 0.4 MG capsule, [TAMSULOSIN (FLOMAX) 0.4 MG CAP] TAKE 1 CAPSULE(0.4 MG) BY MOUTH DAILY, Disp: 90 capsule, Rfl: 3    ALLERGIES:  Allergies   Allergen Reactions     Lisinopril Cough     Dry cough  Dry cough       FAMILY HISTORY:  Family History   Problem Relation Age of Onset     Colon Cancer Mother      Coronary Artery Disease Mother      Hypertension Mother      Diabetes Type 2  Mother      Hypertension Father      Bell's palsy Father      Diabetes Type 2  Father      Thyroid Disease Father      Sleep Apnea Father      Sleep Apnea Sister      Prostate Cancer Brother 57     Sleep Apnea Brother      Bell's palsy Maternal Grandmother      Thyroid Disease Daughter        SOCIAL HISTORY:   Social History     Socioeconomic History     Marital status:      Number of children: 5   Occupational History     Occupation: [employer]   Tobacco Use     Smoking status: Former Smoker     Types: Cigarettes, Cigarettes     Quit date: 1985     Years since quittin.2     Smokeless tobacco: Current User     Types: Chew, Chew     Tobacco comment: 2 tins/week   Substance and Sexual Activity     Alcohol use: Yes     Alcohol/week: 2.0 standard drinks     Drug use: No     Sexual activity: Yes     Partners: Female   Social History Narrative    Diet-            Exercise- Not regular. Work is physical, walking.       VITALS:  Patient Vitals for the past 24 hrs:   BP Temp Temp src Pulse Resp SpO2 Height Weight   22 2250 -- -- -- 101 21 95 % -- --   22 2230 (!) 172/89 -- -- -- -- -- -- --   22 (!) 153/81 97.8  F (36.6  C) Temporal 119 20 96 % 1.803 m (5' 11\") 108.9 kg (240 lb)        PHYSICAL EXAM  "   Constitutional:  Awake, alert, mild acute distress, obese male  HENT:  Normocephalic, Atraumatic. Bilateral external ears normal. Oropharynx moist. Nose normal. Neck- Normal range of motion with no guarding, No midline cervical tenderness, Supple, No stridor.   Eyes:  PERRL, EOMI with no signs of entrapment, Conjunctiva normal, No discharge.   Respiratory:  Normal breath sounds, No respiratory distress, No wheezing.    Cardiovascular:  Sinus bradycardia, 2/6 systolic ejection murmur at left sternal border  GI:  Soft, No tenderness, No distension, No palpable masses  Musculoskeletal:  Intact distal pulses, No edema. Good range of motion in all major joints. No tenderness to palpation or major deformities noted.  Integument:  Warm, Dry, No erythema, No rash.   Neurologic:  Alert & oriented, Normal motor function, Normal sensory function, No focal deficits noted.   Psychiatric:  Affect normal, Judgment normal, Mood normal.     LAB:  All pertinent labs reviewed and interpreted.  Results for orders placed or performed during the hospital encounter of 08/01/22   Basic metabolic panel   Result Value Ref Range    Sodium 137 136 - 145 mmol/L    Potassium 4.3 3.5 - 5.0 mmol/L    Chloride 102 98 - 107 mmol/L    Carbon Dioxide (CO2) 21 (L) 22 - 31 mmol/L    Anion Gap 14 5 - 18 mmol/L    Urea Nitrogen 14 8 - 22 mg/dL    Creatinine 1.24 0.70 - 1.30 mg/dL    Calcium 9.2 8.5 - 10.5 mg/dL    Glucose 153 (H) 70 - 125 mg/dL    GFR Estimate 64 >60 mL/min/1.73m2   CBC with platelets and differential   Result Value Ref Range    WBC Count 8.4 4.0 - 11.0 10e3/uL    RBC Count 4.89 4.40 - 5.90 10e6/uL    Hemoglobin 16.1 13.3 - 17.7 g/dL    Hematocrit 45.1 40.0 - 53.0 %    MCV 92 78 - 100 fL    MCH 32.9 26.5 - 33.0 pg    MCHC 35.7 31.5 - 36.5 g/dL    RDW 12.5 10.0 - 15.0 %    Platelet Count 186 150 - 450 10e3/uL    % Neutrophils 66 %    % Lymphocytes 21 %    % Monocytes 7 %    % Eosinophils 3 %    % Basophils 1 %    % Immature Granulocytes  2 %    NRBCs per 100 WBC 0 <1 /100    Absolute Neutrophils 5.7 1.6 - 8.3 10e3/uL    Absolute Lymphocytes 1.8 0.8 - 5.3 10e3/uL    Absolute Monocytes 0.6 0.0 - 1.3 10e3/uL    Absolute Eosinophils 0.2 0.0 - 0.7 10e3/uL    Absolute Basophils 0.1 0.0 - 0.2 10e3/uL    Absolute Immature Granulocytes 0.2 <=0.4 10e3/uL    Absolute NRBCs 0.0 10e3/uL   Extra Blue Top Tube   Result Value Ref Range    Hold Specimen JIC    Extra Red Top Tube   Result Value Ref Range    Hold Specimen JIC        RADIOLOGY:  Reviewed all pertinent imaging. Please see official radiology report.      EKG:    Date and time: 8/1/22; 21:03  Rate: 111 bpm  Rhythm: Sinus tachycardia, left ventricular hypertrophy with repolarization abnormality  AK interval: 206 ms  QRS interval: 102 ms  QT/QTc: 334/454 ms  ST changes or T wave changes: None  Change from prior ECG: N/A        I have independently reviewed and interpreted the EKG(s) documented above.      I, Tomy Garcia, am serving as a scribe to document services personally performed by Brendan Stanton MD, based on my observation and the provider's statements to me. I, Brendan Stanton MD attest that Tomy Garcia is acting in a scribe capacity, has observed my performance of the services and has documented them in accordance with my direction.    Brendan Stanton M.D.  Emergency Medicine  [facility] EMERGENCY DEPARTMENT     Brendan Stanton MD  08/01/22 0966       Brendan Stanton MD  08/01/22 2842

## 2022-08-02 NOTE — PROGRESS NOTES
Pt reports not as concerned with his chest pain as he is with the shortness of breath he feels. Appears restless lying in bed, having difficulty lying still. Still Hypertensive, Positioned on right side, will see if B/P comes down. Other VSS.

## 2022-08-02 NOTE — PROGRESS NOTES
[facility]    After midnight - Hospitalist Service    Assessment and Plan    Principal Problem:    Chest pain, unspecified type  Active Problems:    Benign essential hypertension    Hyperlipidemia    Type 2 diabetes mellitus (H)    ACS (acute coronary syndrome) (H)    Patient admitted after midnight, this is follow up.  Ezekiel Aldrich is a 67 year old old male with  chest pain.     1.  Acute chest pain- Intermittent symptoms over the last 3 months concerning for accelerating angina  EKG showed LVH with repolarization abnormalities  - s/p coronary angiography DARRIN placed in LAD  - cards following   - BP control  Coreg 6.25mg BID, losartan, nitrobid  - atorvastatin 80mg daily  - ASA and brilinta      2.  Type 2 diabetes mellitus with peripheral neuropathy with long-term insulin use  - continue home lantus   - holding metformin and glipizide  - novolog sliding scale     3.  Essential hypertension  - BP meds as above  - cards to see and determine further adjustments      4.  Hyperlipidemia  Statin      5.  CHALO  Home CPAP when available     6.  Tobacco use  Chews tobacco daily     7.  Daily alcohol use  2 alcohol beverages with dinner  No previous history of complications, monitor     VTE prophylaxis:  Defer tp cards  DIET: Orders Placed This Encounter      Low Saturated Fat Na <2400 mg    Drains/Lines: none  Weight bearing status: WBAT  Disposition/Barriers to discharge: possible discharge tomorrow pending cards further recs   Code Status:Full Code    Subjective:  Was having CP post-procedure Cards aware     B/P: 175/95, T: 98, P: 90, R: 15     PERTINENT LABS  Recent Labs   Lab 08/02/22  1323 08/02/22  0749 08/01/22  2112   WBC  --   --  8.4   HGB  --   --  16.1   MCV  --   --  92   PLT  --   --  186   INR  --   --  0.92   NA  --   --  137   POTASSIUM  --   --  4.3   CHLORIDE  --   --  102   CO2  --   --  21*   BUN  --   --  14   CR  --   --  1.24   ANIONGAP  --   --  14   RINA  --   --  9.2    * 158* 153*     Recent Results (from the past 24 hour(s))   XR Chest Port 1 View    Narrative    EXAM: XR CHEST PORT 1 VIEW  LOCATION: [facility]  DATE/TIME: 2022 11:32 PM    INDICATION: cp  COMPARISON: None.      Impression    IMPRESSION: Heart size is normal. Lung volumes are mildly diminished without acute infiltrates or large effusions. Mediastinum is within normal limits. Advanced degenerative changes at the right AC joint.   Echocardiogram Complete   Result Value    LVEF  55-60%    Narrative    [id number]  [id number]  [id number]  [id number]^MADY^[place] [address]     Name: DEEP BLOCK  MRN: [id number]  : 1955  Study Date: 2022 09:16 AM  Age: 67 yrs  Gender: Male  Patient Location: [facility]  Reason For Study: Murmur  Ordering Physician: MADY CHA  Referring Physician: MADY CHA  Performed By: AT     BSA: 2.3 m2  Height: 71 in  Weight: 240 lb  HR: 91  ______________________________________________________________________________  Procedure  Complete Echo Adult. Definity (NDC #26164-441) given intravenously. There is  no comparison study available.  ______________________________________________________________________________  Interpretation Summary     1.Left ventricular size, wall motion and function are normal. The ejection  fraction is 55-60%.  2.There is borderline inferior wall hypokinesis.  3.Normal right ventricle size and systolic function.  4.Thickened aortic valve leaflets Mild to moderate valvular aortic stenosis.  At heart rate of 97 bpm peak velocity is 2.3 m/s with a mean gradient of 14  mmHg and dimensionless index 0.39. Valve not well visualized but appears to be  possibly trileaflet.  There is no comparison study available.  ______________________________________________________________________________  I      WMSI = 1.13     % Normal = 88     X - Cannot   0 -                      (2) -  Mildly 2 -          Segments  Size  Interpret    Hyperkinetic 1 - Normal  Hypokinetic  Hypokinetic  1-2     small                                                     7 -          3-5      moderate  3 - Akinetic 4 -          5 -         6 - Akinetic Dyskinetic   6-14    large               Dyskinetic   Aneurysmal  w/scar       w/scar       15-16   diffuse     Left Ventricle  Left ventricular size, wall motion and function are normal. The ejection  fraction is 55-60%. There is borderline concentric left ventricular  hypertrophy. Left ventricular diastolic function is normal. There is  borderline inferior wall hypokinesis.     Right Ventricle  Normal right ventricle size and systolic function. TAPSE is normal, which is  consistent with normal right ventricular systolic function.     Atria  The left atrium is mildly dilated. Right atrial size is normal. There is no  color Doppler evidence of an atrial shunt.     Mitral Valve  Mitral valve leaflets appear normal. There is no evidence of mitral stenosis  or clinically significant mitral regurgitation. There is no mitral  regurgitation noted. There is no mitral valve stenosis.     Tricuspid Valve  The tricuspid valve is not well visualized, but is grossly normal. Right  ventricle systolic pressure estimate normal. There is physiologic tricuspid  regurgitation. There is no tricuspid stenosis.     Aortic Valve  The aortic valve is not well visualized. Thickened aortic valve leaflets. No  aortic regurgitation is present. Mild to moderate valvular aortic stenosis.     Pulmonic Valve  The pulmonic valve is not well seen, but is grossly normal. This degree of  valvular regurgitation is within normal limits. There is no pulmonic valvular  stenosis.     Vessels  The aorta root is normal. Normal size ascending aorta. IVC diameter <2.1 cm  collapsing >50% with sniff suggests a normal RA pressure of 3 mmHg.     Pericardium  There is no pericardial effusion.     Rhythm  Sinus rhythm  was noted.     ______________________________________________________________________________  MMode/2D Measurements & Calculations  Ao root diam: 3.1 cm  asc Aorta Diam: 3.3 cm  LVOT diam: 2.3 cm  LVOT area: 4.2 cm2     LA Volume Indexed (AL/bp): 36.9 ml/m2     Time Measurements  MM HR: 85.0 BPM     Doppler Measurements & Calculations  MV E max sebastián: 85.9 cm/sec  MV A max sebastián: 96.5 cm/sec  MV E/A: 0.89  MV max P.8 mmHg  MV mean P.8 mmHg  MV V2 VTI: 19.7 cm  MVA(VTI): 3.6 cm2  MV dec slope: 548.0 cm/sec2  MV dec time: 0.16 sec  Ao V2 max: 224.9 cm/sec  Ao max P.0 mmHg  Ao V2 mean: 172.6 cm/sec  Ao mean P.1 mmHg  Ao V2 VTI: 49.4 cm  CHUN(I,D): 1.4 cm2  CHUN(V,D): 1.7 cm2     LV V1 max PG: 3.1 mmHg  LV V1 max: 88.3 cm/sec  LV V1 VTI: 16.6 cm  SV(LVOT): 70.1 ml  SI(LVOT): 30.8 ml/m2  PA acc time: 0.11 sec  AV Sebastián Ratio (DI): 0.39  CHUN Index (cm2/m2): 0.62  E/E': 16.5  E/E' av.8  Lateral E/e': 13.2  Medial E/e': 16.4  Peak E' Sebastián: 5.2 cm/sec     ______________________________________________________________________________  Report approved by: Smith Herrera 2022 10:30 AM         Cardiac Catheterization    Narrative      Prox RCA to Mid RCA lesion is 50% stenosed.    Dist RCA lesion is 50% stenosed.    Ost Cx to Mid Cx lesion is 60% stenosed.    Mid LAD lesion is 90% stenosed.    Ultrasound (IVUS) was performed on the LAD lesion. Images show the stent   to be fully deployed.    3.0 Synergy DARRIN placed into the proximal LAD. Stent extends up to but   does not cross the second diagonal artery. Postdilatation with 3.5   noncompliant balloon high-pressure.    Difficulty reaching left coronary from right radial approach. Required   right femoral access to perform PCI of LCA.          Ban Antony MD  [facility] Medicine Service  [phone]

## 2022-08-02 NOTE — ED TRIAGE NOTES
"Pt states 2 nights ago had acute onset of severe CP when getting ready for bed. Reports had a similar situation 3mo ago when walking into a restaurant. Today, while walking into a restaurant, had another acute onset. Pain has decreased since onset at 1830 tonight.  Per report, it is coming in waved. Pain to substernal/epigastric, and states \"my heart was pounding\".  PMH: DM, hyperlipidemia, CHALO,      Triage Assessment     Row Name 08/01/22 2054       Triage Assessment (Adult)    Airway WDL WDL       Respiratory WDL    Respiratory WDL WDL       Cognitive/Neuro/Behavioral WDL    Cognitive/Neuro/Behavioral WDL WDL              "

## 2022-08-02 NOTE — CONSULTS
Cardiology Consult Note    Thank you, Dr. Davidson Quevedo, for asking the [facility] team to see Ezekiel Aldrich in consultation at [facility] emergency department to evaluate chest tightness.      Assessment:   1.  Accelerating chest tightness, concerning for unstable angina in patient with multiple risk factors including type 2 diabetes mellitus, hypertension, hyperlipidemia, nicotine use and family history of heart disease.  I have recommended pursuing coronary angiography to define his anatomy and revascularization options.  I have discussed the procedure including risks and benefits with him and he is agreeable to proceed.  2.  Accelerated hypertension, inadequately controlled.  We will restart the patient's losartan and add carvedilol to help with blood pressure control and heart rate.  3.  Systolic murmur, possibly consistent with aortic valve disease.  No prior history per the patient.  We will obtain a stat echo prior to his angiogram as this will help will assess valve anatomy as well as LV function.  4.  Mixed hyperlipidemia, LDL well controlled with last lipid profile demonstrating an LDL of 68.  Resume atorvastatin 80 mg daily.  5.  CHALO treated with CPAP  6.  Obesity  7.  Ongoing nicotine use with chewing tobacco     Plan:   1.  Restart the patient's losartan and atorvastatin  2.  Begin carvedilol 6.25 mg twice daily  3.  Stat echocardiogram to assess origin of heart murmur and LV function  4.  Pursue coronary angiography to define anatomy and revascularization options    Primary cardiologist: None     Current History:   Mr. Ezekiel Aldrich is a 67 year old male with history of hypertension, type 2 diabetes mellitus, mixed hyperlipidemia, CHALO treated with CPAP, tobacco use who presented to the ED last night for evaluation of accelerating chest discomfort.  Patient states that approximately 1 month ago, he began to note episodes of chest discomfort, initially occurring with  physical activity.  Described it as an intense pressure in the substernal area without radiation.  Symptoms would typically resolve within minutes of rest.  Over the last couple of days, he has had several episodes occurring at rest and again lasting for few minutes and resolving.  His most intense episode occurred last night prompting him to come to the ED for evaluation.  His initial ECG demonstrated sinus tachycardia, left ventricular hypertrophy by voltage criteria and ST-T wave abnormality possibly related to LVH or ischemia.  Initial troponin 0.12 with subsequent troponins gradually increasing to 0.16.  Cardiac consult requested.  At time of my interview, he reports no ongoing chest discomfort.    Past Medical History:     Past Medical History:   Diagnosis Date     Acute renal failure (H)     6/11/2016, secondary to dehydration, normalized     HTN (hypertension)      Obesity      Type 2 diabetes mellitus (H)        Past Surgical History:     Past Surgical History:   Procedure Laterality Date     ANKLE FRACTURE SURGERY Right     hardware     KNEE SURGERY       MANDIBLE FRACTURE SURGERY      from fx     SHOULDER SURGERY Right     from fx and separation       Family History:     Family History   Problem Relation Age of Onset     Colon Cancer Mother      Coronary Artery Disease Mother 45     Hypertension Mother      Diabetes Type 2  Mother      Hypertension Father      Bell's palsy Father      Diabetes Type 2  Father      Thyroid Disease Father      Sleep Apnea Father      Sleep Apnea Sister      Prostate Cancer Brother 57     Sleep Apnea Brother      Bell's palsy Maternal Grandmother      Thyroid Disease Daughter        Social History:    reports that he quit smoking about 37 years ago. His smoking use included cigarettes and cigarettes. His smokeless tobacco use includes chew and chew. He reports current alcohol use of about 2.0 standard drinks of alcohol per week. He reports that he does not use drugs.    Meds:      Current Facility-Administered Medications:      acetaminophen (TYLENOL) tablet 650 mg, 650 mg, Oral, Q6H PRN **OR** acetaminophen (TYLENOL) Suppository 650 mg, 650 mg, Rectal, Q6H PRN, Davidson Quevedo MD     alum & mag hydroxide-simethicone (MAALOX) suspension 30 mL, 30 mL, Oral, Q4H PRN, Davidson Quevedo MD     aspirin EC tablet 81 mg, 81 mg, Oral, Daily, Davidson Quevedo MD     carvedilol (COREG) tablet 6.25 mg, 6.25 mg, Oral, BID w/meals, Karlene Blevins MD     glucose gel 15-30 g, 15-30 g, Oral, Q15 Min PRN **OR** dextrose 50 % injection 25-50 mL, 25-50 mL, Intravenous, Q15 Min PRN **OR** glucagon injection 1 mg, 1 mg, Subcutaneous, Q15 Min PRN, Davidson Quevedo MD     diazepam (VALIUM) tablet 10 mg, 10 mg, Oral, Once PRN, Karlene Blevins MD     HOLD: Beta Blockers The evening before and the morning of procedure, , Does not apply, HOLD, Davidson Quevedo MD     insulin aspart (NovoLOG) injection (RAPID ACTING), 1-7 Units, Subcutaneous, TID AC, Davidson Quevedo MD     insulin aspart (NovoLOG) injection (RAPID ACTING), 1-5 Units, Subcutaneous, At Bedtime, Davidson Quevedo MD     losartan (COZAAR) tablet 100 mg, 100 mg, Oral, Daily, Karlene Blevins MD     morphine (PF) injection 2-4 mg, 2-4 mg, Intravenous, Q3H PRN, Davidson Quevedo MD     naloxone (NARCAN) injection 0.2 mg, 0.2 mg, Intravenous, Q2 Min PRN **OR** naloxone (NARCAN) injection 0.4 mg, 0.4 mg, Intravenous, Q2 Min PRN **OR** naloxone (NARCAN) injection 0.2 mg, 0.2 mg, Intramuscular, Q2 Min PRN **OR** naloxone (NARCAN) injection 0.4 mg, 0.4 mg, Intramuscular, Q2 Min PRN, Davidson Quevedo MD     nitroGLYcerin (NITRO-BID) 2 % ointment 15 mg, 1 inch, Transdermal, Q24h, Brendan Stanton MD, 15 mg at 08/01/22 4019     nitroGLYcerin (NITROSTAT) sublingual tablet 0.4 mg, 0.4 mg, Sublingual, Q5 Min PRN, Davidson Quevedo MD    Current Outpatient Medications:      aspirin-acetaminophen-caffeine (EXCEDRIN MIGRAINE) 250-250-65 mg per  "tablet, Take 2 tablets by mouth daily, Disp: , Rfl:      atorvastatin (LIPITOR) 80 MG tablet, [ATORVASTATIN (LIPITOR) 80 MG TABLET] Take 1 daily for high cholesterol., Disp: 90 tablet, Rfl: 3     B-D U/F insulin pen needle, USE TWICE DAILY TO INJECT VICTOZA AND LANTUS, Disp: 100 each, Rfl: 5     blood glucose (ACCU-CHEK GUIDE) test strip, USE 1 TEST TWICE DAILY, Disp: 200 strip, Rfl: 0     blood glucose meter (GLUCOMETER), [BLOOD GLUCOSE METER (GLUCOMETER)] Use 1 each As Directed as needed. Dispense glucometer brand per patient's insurance at pharmacy discretion., Disp: 1 each, Rfl: 0     generic lancets, [GENERIC LANCETS] Use 1 each As Directed 2 (two) times a day. Dispense brand per patient's insurance at pharmacy discretion., Disp: 200 each, Rfl: 3     glipiZIDE (GLUCOTROL XL) 10 MG 24 hr tablet, [GLIPIZIDE (GLUCOTROL XL) 10 MG 24 HR TABLET] TAKE 2 TABLETS(20 MG) BY MOUTH DAILY, Disp: 180 tablet, Rfl: 3     LANTUS SOLOSTAR 100 UNIT/ML soln, ADMINISTER 66 UNITS UNDER THE SKIN AT BEDTIME, Disp: 30 mL, Rfl: 2     losartan (COZAAR) 100 MG tablet, Take 1 tablet (100 mg) by mouth daily, Disp: 90 tablet, Rfl: 3     metFORMIN (GLUCOPHAGE-XR) 500 MG 24 hr tablet, Take 4 tablets (2,000 mg) by mouth daily, Disp: 360 tablet, Rfl: 3     pen needle, diabetic (PEN NEEDLE) 31 gauge x 1/4\" Ndle, [PEN NEEDLE, DIABETIC (PEN NEEDLE) 31 GAUGE X 1/4\" NDLE] Use twice daily to inject Victoza and Lantus, Disp: 100 each, Rfl: 0     tamsulosin (FLOMAX) 0.4 MG capsule, [TAMSULOSIN (FLOMAX) 0.4 MG CAP] TAKE 1 CAPSULE(0.4 MG) BY MOUTH DAILY, Disp: 90 capsule, Rfl: 3    aspirin  81 mg Oral Daily     carvedilol  6.25 mg Oral BID w/meals     insulin aspart  1-7 Units Subcutaneous TID AC     insulin aspart  1-5 Units Subcutaneous At Bedtime     losartan  100 mg Oral Daily     nitroGLYcerin  1 inch Transdermal Q24h       Allergies:   Lisinopril    Review of Systems:   A 12 point comprehensive review of systems was negative except as noted in the " "current history.    Objective:      Physical Exam  Body mass index is 33.47 kg/m .  BP (!) 169/93   Pulse 90   Temp 97.9  F (36.6  C) (Oral)   Resp 21   Ht 1.803 m (5' 11\")   Wt 108.9 kg (240 lb)   SpO2 95%   BMI 33.47 kg/m      General Appearance:    Well-developed, well-nourished middle-age male in no acute distress   HEENT:   Normocephalic, atraumatic.  Sclera nonicteric.  Mucous membranes moist.   Neck:  No jugular venous distention or carotid bruits   Chest:  Symmetric with normal AP diameter   Lungs:    Clear to auscultation and percussion bilaterally   Cardiovascular:    Regular rate and rhythm.  S1, S2 normal.  1/6 to 2/6 systolic crescendo decrescendo murmur heard at the left sternal border.   Abdomen:   Obese, soft, nondistended with minimal tenderness in the left lower quadrant.   Extremities:  No peripheral edema, clubbing or cyanosis.  Distal pulses are symmetric   Skin:  No rash or lesions   Neurologic:  Alert and oriented x3.  No gross focal deficits.             Cardiographics (personally reviewed)  ECG demonstrates sinus tachycardia, left ventricular hypertrophy by voltage and ST-T wave abnormalities in the anterolateral and inferior leads possibly due to strain or ischemia    Imaging (personally reviewed)  Echocardiogram currently pending.  .    Lab Review (personally reviewed all results)  Recent Labs   Lab Test 10/29/21  1616   CHOL 164   HDL 59   LDL 68   TRIG 451*     Recent Labs   Lab Test 10/29/21  1616 06/28/21  0828 05/26/20  0807   LDL 68 103 85     Recent Labs   Lab Test 08/02/22  0749 08/01/22 2112   NA  --  137   POTASSIUM  --  4.3   CHLORIDE  --  102   CO2  --  21*   * 153*   BUN  --  14   CR  --  1.24   GFRESTIMATED  --  64   RINA  --  9.2     Recent Labs   Lab Test 08/01/22  2112 10/29/21  1616 06/28/21  0828   CR 1.24 1.55* 1.29     Recent Labs   Lab Test 08/01/22  2112 10/29/21  1616 06/28/21  0828   A1C 7.8* 7.5* 7.8*          Recent Labs   Lab Test 08/01/22 2112 "   WBC 8.4   HGB 16.1   HCT 45.1   MCV 92        Recent Labs   Lab Test 08/01/22  2112 10/29/21  1616 04/10/19  0844   HGB 16.1 15.4 16.1    Recent Labs   Lab Test 08/02/22  0458 08/02/22  0155 08/01/22 2112   TROPONINI 0.16 0.15 0.12     Recent Labs   Lab Test 08/01/22  2112   BNP 89*     Recent Labs   Lab Test 10/29/21  1616   TSH 3.18     Recent Labs   Lab Test 08/01/22 2112   INR 0.92

## 2022-08-02 NOTE — PROGRESS NOTES
Pt off bedrest, fem site intact. Radial bruised, unchanged. Pt. Had poor balance getting up, states he was in bed too long. Report called to [facility].

## 2022-08-02 NOTE — PROVIDER NOTIFICATION
"---------------------------------------PROVIDER NOTIFICATION---------------------------------------    Message:  Pt still presenting w/ R-sided CP, no exertion & non-radiating, 7/10 w/ SOB.    Time Sent:  1715 to Dr. Ni    Pt Assessment:    BP (!) 167/85   Pulse 97   Temp 97.8  F (36.6  C) (Oral)   Resp 22   Ht 1.803 m (5' 11\")   Wt 108.9 kg (240 lb)   SpO2 97%   BMI 33.47 kg/m      Intervention/Response:  - EKG    "

## 2022-08-02 NOTE — PHARMACY-ADMISSION MEDICATION HISTORY
"Pharmacy Note - Admission Medication History    Pertinent Provider Information:    - The patient takes Excedrin Migraine 2 tablets daily in the morning to meet his need for Aspirin and Caffeine. Not taking it for migraine.     ______________________________________________________________________    Prior To Admission (PTA) med list completed and updated in EMR.       PTA Med List   Medication Sig Last Dose     aspirin-acetaminophen-caffeine (EXCEDRIN MIGRAINE) 250-250-65 mg per tablet Take 2 tablets by mouth daily 8/1/2022 at Unknown time     atorvastatin (LIPITOR) 80 MG tablet [ATORVASTATIN (LIPITOR) 80 MG TABLET] Take 1 daily for high cholesterol. 8/1/2022 at Unknown time     B-D U/F insulin pen needle USE TWICE DAILY TO INJECT VICTOZA AND LANTUS Unknown at Unknown time     blood glucose (ACCU-CHEK GUIDE) test strip USE 1 TEST TWICE DAILY Unknown at Unknown time     blood glucose meter (GLUCOMETER) [BLOOD GLUCOSE METER (GLUCOMETER)] Use 1 each As Directed as needed. Dispense glucometer brand per patient's insurance at pharmacy discretion. Unknown at Unknown time     generic lancets [GENERIC LANCETS] Use 1 each As Directed 2 (two) times a day. Dispense brand per patient's insurance at pharmacy discretion. Unknown at Unknown time     glipiZIDE (GLUCOTROL XL) 10 MG 24 hr tablet [GLIPIZIDE (GLUCOTROL XL) 10 MG 24 HR TABLET] TAKE 2 TABLETS(20 MG) BY MOUTH DAILY 8/1/2022 at Unknown time     LANTUS SOLOSTAR 100 UNIT/ML soln ADMINISTER 66 UNITS UNDER THE SKIN AT BEDTIME 8/1/2022 at Unknown time     losartan (COZAAR) 100 MG tablet Take 1 tablet (100 mg) by mouth daily 8/1/2022 at Unknown time     metFORMIN (GLUCOPHAGE-XR) 500 MG 24 hr tablet Take 4 tablets (2,000 mg) by mouth daily 8/1/2022 at Unknown time     pen needle, diabetic (PEN NEEDLE) 31 gauge x 1/4\" Ndle [PEN NEEDLE, DIABETIC (PEN NEEDLE) 31 GAUGE X 1/4\" NDLE] Use twice daily to inject Victoza and Lantus Unknown at Unknown time     tamsulosin (FLOMAX) 0.4 MG " capsule [TAMSULOSIN (FLOMAX) 0.4 MG CAP] TAKE 1 CAPSULE(0.4 MG) BY MOUTH DAILY 8/1/2022 at Unknown time       Information source(s): Patient, Family member and CareEverywhere/SureScripts  Method of interview communication: in-person    Summary of Changes to PTA Med List  New:   Discontinued:   Changed:     Patient was asked about OTC/herbal products specifically.  PTA med list reflects this.    In the past week, patient estimated taking medication this percent of the time:  greater than 90%.    Allergies were reviewed, assessed, and updated with the patient.      Patient does not use any multi-dose medications prior to admission.    The information provided in this note is only as accurate as the sources available at the time of the update(s).    Thank you for the opportunity to participate in the care of this patient.    Gretel Wright, PharmD. 8/2/2022 8:45 AM

## 2022-08-02 NOTE — PROVIDER NOTIFICATION
"---------------------------------------PROVIDER NOTIFICATION---------------------------------------    Message:  EKG w/ new changes.  Time Sent:  1847    Pt Assessment:  BP (!) 162/78   Pulse 98   Temp 97.8  F (36.6  C) (Oral)   Resp 22   Ht 1.803 m (5' 11\")   Wt 108.9 kg (240 lb)   SpO2 96%   BMI 33.47 kg/m     R CP still 7/10.  Non-radiating, tightness.      Intervention/Response:  - Page on-call gen cardiologist.    "

## 2022-08-02 NOTE — PROGRESS NOTES
Chest pain post procedure.  Morphine given and ECG done.  MD and NP aware.  Shortness of breath, started on Brilinta.  Plan to transfer to P3 when bed available.

## 2022-08-02 NOTE — DISCHARGE INSTRUCTIONS
Interventional Cardiology  Coronary Angiogram/Angioplasty/Stent/Atherectomy Discharge  Instructions -   Radial (wrist) Approach     The instructions below are to help you understand how to take care of yourself. There is also information about when to call the doctor or emergency services.    **Do not stop your aspirin or platelet inhibitor unless directed by your Cardiologist.  These medications help to prevent platelets in your blood from sticking together and forming a clot.   Examples of these medications are: Ticagrelor (Brilinta), Clopidogrel (Plavix), Prasugrel (Effient)    For 24 hours after procedure:  Do not subject hand/arm to any forceful movements for 24 hours, such as supporting weight when rising from a chair or bed.  Do not drive a car for 24 hours.  The dressing on the puncture site may be removed after 24 hours and left open to air. If minor oozing, you may apply a Band-Aid and remove after 12 hours.   You may shower on the day after your procedure. Do not take a tub bath or wash dishes (no soaking wrist) with the puncture site in water for 3 days after the procedure.    For 48 hours following the procedure:  Do not operate a lawnmower, motorcycle, chainsaw or all-terrain vehicle.  Do not lift anything heavier than 5-10 pounds with affected arm for 5 days.  Avoid excessive bending (flexion/extension) wrist movement.  Do not engage in vigorous exercise (i.e. tennis, golf) using the affected arm for 5 days after discharge.  You may return to work in 72 hours if no complications and no heavy lifting.    If bleeding should occur following discharge:  Sit down and apply firm pressure with your thumb against the puncture site and fingers against back of wrist for 10 minutes.  If the bleeding stops, continue to rest, keeping your wrist still for 2 hours. Notify your doctor as soon as possible.  If bleeding does not stop after 10 minutes or if there is a large amount of bleeding or spurting, call 911  immediately. Do not drive yourself to the hospital.           Contact the Heart Clinic at [phone] if you develop:  Fever over 100.4, that lasts more than one day  Redness, heat, or pus at the puncture site  Change in color or temperature of the hand or arm    Expect mild tingling of hand and tenderness at the puncture site for up to 3 days. You may take Tylenol or a pain medicine recommended by your doctor.                       Our Cardiac Rehab staff may visit briefly with you while your in the hospital.   If they miss you, someone will contact you after you are home.  You are encouraged to enroll in an Outpatient Cardiac Rehab Program     No elective dental work for 6 weeks after having a stent    Your Procedural Physician was: Dr. Archie Ni  the phone number is: [phone]    [facility]:  [phone]  If you are calling after hours, please listen to the entire voicemail, a live  will answer at the end of the message     Interventional Cardiology  Coronary Angiogram/Angioplasty/Stent/Atherectomy Discharge Instructions -   Femoral Approach    The instructions below are to help you understand how to take care of yourself. There is also information about when to call the doctor or emergency services    **Do not stop your aspirin or platelet inhibitor unless directed by your Cardiologist.  These medications help to prevent platelets in your blood from sticking together and forming a clot.  Examples of these medications are:  Ticagrelor (Brilinta), Clopidigrel (Plavix), Prasugrel (Effient)          For the first 72 hours after your procedure:  No driving for 24 hours  Do not lift more than 15 pounds.  If you usually lift 50 pounds or more daily, please contact your cardiologist  Avoid any hard work or tiring activities, this includes, yard work, jogging, biking, sexual activity  During the day get up and walk around every 2 hours  You may return to work after 72 hours  if you are feeling well and your job does not involve heavy lifting          Groin Site / Wound Care / Bleeding    You may take off the dressing on your groin the day after your procedure  Keep the area dry and clean  It is ok to shower with regular soap.  Pat dry, do not rub  You do not need to use a bandage  No tub/pool or hot tub for 1 week  If your groin starts to bleed or begins to swell suddenly after leaving the hospital, lie flat and apply firm pressure above the site for 15 minutes.  If bleeding continues, call 9-1-1  Bruising around the groin area is normal.  It may take 2-3 weeks for this to go away.  It is normal for the bruised area to turn green and/or yellow as it is healing.  A small lump may also be present and may last 2-3 months.  Your leg may be sore or stiff for a few days.  You may take Tylenol or a pain medicine recommended by your doctor  If you have a fever over 100.4, that lasts more than one day - call your cardiologist.      Our Cardiac Rehab staff may visit briefly with you while your in the hospital.  If they miss you, someone will contact you after you are home.  You are encouraged to enroll in an Outpatient Cardiac Rehab Program     No elective dental work for 6 weeks after having a stent.     No driving for 24 hours      Your Procedural Physician was: Dr. Archie Ni  the phone number is: [phone]      [facility]:  [phone]  If you are calling after hours, please listen to the entire voicemail, a live  will answer at the end of the message

## 2022-08-02 NOTE — H&P
"[facility]    History and Physical - Hospitalist Service       Date of Admission:  8/1/2022    Assessment & Plan      Ezekiel Aldrich is a 67 year old male admitted on 8/1/2022. He came to the ED for evaluation of chest pain.    1.  Acute chest pain  Intermittent symptoms over the last 3 months concerning for accelerating angina  EKG showed LVH with repolarization abnormalities  Rule out ACS with serial enzymes  If negative enzymes, evaluate for ischemia with stress echo  Cardiology consult    2.  Type 2 diabetes mellitus with peripheral neuropathy with long-term insulin use  Reconcile PTA medications  Monitor with insulin sliding scale and hypoglycemia protocol    3.  Essential hypertension  Reconcile PTA medications  IV hydralazine as needed    4.  Hyperlipidemia  Reconcile PTA statins    5.  CHALO  Home CPAP when available    6.  Tobacco use  Chews tobacco daily    7.  Daily alcohol use  2 alcohol beverages with dinner  No previous history of complications, monitor     Diet: Combination Diet Clear Liquid; No Caffeine Diet    DVT Prophylaxis: Ambulate every shift  Viramontes Catheter: Not present  Central Lines: None  Cardiac Monitoring: ACTIVE order. Indication: Chest pain/ ACS rule out (24 hours)  Code Status: Full Code      Clinically Significant Risk Factors Present on Admission                 # Hypertension: home medication list includes antihypertensive(s)    # Obesity: Estimated body mass index is 33.47 kg/m  as calculated from the following:    Height as of this encounter: 1.803 m (5' 11\").    Weight as of this encounter: 108.9 kg (240 lb).        Disposition Plan         The patient's care was discussed with the Patient.    Davidson Quevedo MD  Hospitalist Service  [facility]  Securely message with the Vocera Web Console (learn more here)  Text page via [facility] Paging/Directory "         ______________________________________________________________________    Chief Complaint   Chest pain    History is obtained from the patient, electronic health record and emergency department physician    History of Present Illness   Ezekiel Aldrich is a 67 year old male who came to the emergency department for evaluation of chest pain.  Past medical history of hypertension, hyperlipidemia, type 2 diabetes, CHALO, neuropathy, BPH.  Patient chews tobacco on a daily basis, drinks 2 alcoholic beverages with dinner and denies illicit drugs.  He has significant family history of heart disease on his mother side.  Over the last 3 months patient has had intermittent chest discomfort with onset while at rest.  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  He initially attributed some of the symptoms to anxiety.  A couple of weeks ago he traveled to [place] by airplane.  A couple nights ago, he had chest pain while he was getting ready for bed.  He described a low substernal chest pressure and it was difficult to breathe.  He denied diaphoresis, lightheadedness, nausea or vomiting.  Prior to ED evaluation he was walking into a restaurant when he had recurrence of symptoms.  He decided to come in for evaluation and the pressure is subsiding.    Review of Systems    The 10 point Review of Systems is negative other than noted in the HPI or here.     Past Medical History    I have reviewed this patient's medical history and updated it with pertinent information if needed.   Past Medical History:   Diagnosis Date     Acute renal failure (H)     6/11/2016, secondary to dehydration, normalized     HTN (hypertension)      Obesity      Type 2 diabetes mellitus (H)        Past Surgical History   I have reviewed this patient's surgical history and updated it with pertinent information if needed.  Past Surgical History:   Procedure Laterality Date     ANKLE FRACTURE SURGERY  Right     hardware     KNEE SURGERY       MANDIBLE FRACTURE SURGERY      from fx     SHOULDER SURGERY Right     from fx and separation       Social History   I have reviewed this patient's social history and updated it with pertinent information if needed.  Social History     Tobacco Use     Smoking status: Former Smoker     Types: Cigarettes, Cigarettes     Quit date: 1985     Years since quittin.2     Smokeless tobacco: Current User     Types: Chew, Chew     Tobacco comment: 2 tins/week   Substance Use Topics     Alcohol use: Yes     Alcohol/week: 2.0 standard drinks     Drug use: No       Family History   I have reviewed this patient's family history and updated it with pertinent information if needed.  Family History   Problem Relation Age of Onset     Colon Cancer Mother      Coronary Artery Disease Mother      Hypertension Mother      Diabetes Type 2  Mother      Hypertension Father      Bell's palsy Father      Diabetes Type 2  Father      Thyroid Disease Father      Sleep Apnea Father      Sleep Apnea Sister      Prostate Cancer Brother 57     Sleep Apnea Brother      Bell's palsy Maternal Grandmother      Thyroid Disease Daughter        Prior to Admission Medications   Prior to Admission Medications   Prescriptions Last Dose Informant Patient Reported? Taking?   B-D U/F insulin pen needle   No No   Sig: USE TWICE DAILY TO INJECT VICTOZA AND LANTUS   LANTUS SOLOSTAR 100 UNIT/ML soln   No No   Sig: ADMINISTER 66 UNITS UNDER THE SKIN AT BEDTIME   aspirin-acetaminophen-caffeine (EXCEDRIN MIGRAINE) 250-250-65 mg per tablet   Yes No   Sig: [ASPIRIN-ACETAMINOPHEN-CAFFEINE (EXCEDRIN MIGRAINE) 250-250-65 MG PER TABLET] Take 1 tablet by mouth every 6 (six) hours as needed for pain.   atorvastatin (LIPITOR) 80 MG tablet   No No   Sig: [ATORVASTATIN (LIPITOR) 80 MG TABLET] Take 1 daily for high cholesterol.   blood glucose (ACCU-CHEK GUIDE) test strip   No No   Sig: USE 1 TEST TWICE DAILY   blood glucose meter  "(GLUCOMETER)   No No   Sig: [BLOOD GLUCOSE METER (GLUCOMETER)] Use 1 each As Directed as needed. Dispense glucometer brand per patient's insurance at pharmacy discretion.   fluticasone (FLONASE) 50 MCG/ACT nasal spray   No No   Sig: Spray 2 sprays into both nostrils daily   generic lancets   No No   Sig: [GENERIC LANCETS] Use 1 each As Directed 2 (two) times a day. Dispense brand per patient's insurance at pharmacy discretion.   glipiZIDE (GLUCOTROL XL) 10 MG 24 hr tablet   No No   Sig: [GLIPIZIDE (GLUCOTROL XL) 10 MG 24 HR TABLET] TAKE 2 TABLETS(20 MG) BY MOUTH DAILY   losartan (COZAAR) 100 MG tablet   No No   Sig: Take 1 tablet (100 mg) by mouth daily   metFORMIN (GLUCOPHAGE-XR) 500 MG 24 hr tablet   No No   Sig: Take 4 tablets (2,000 mg) by mouth daily   pen needle, diabetic (PEN NEEDLE) 31 gauge x 1/4\" Ndle   No No   Sig: [PEN NEEDLE, DIABETIC (PEN NEEDLE) 31 GAUGE X 1/4\" NDLE] Use twice daily to inject Victoza and Lantus   pioglitazone (ACTOS) 15 MG tablet   No No   Sig: Take 1 tablet daily for diabetes   tamsulosin (FLOMAX) 0.4 MG capsule   No No   Sig: [TAMSULOSIN (FLOMAX) 0.4 MG CAP] TAKE 1 CAPSULE(0.4 MG) BY MOUTH DAILY      Facility-Administered Medications: None     Allergies   Allergies   Allergen Reactions     Lisinopril Cough     Dry cough  Dry cough       Physical Exam   Vital Signs: Temp: 97.8  F (36.6  C) Temp src: Temporal BP: (!) 174/92 Pulse: 93   Resp: (!) 31 SpO2: 95 %      Weight: 240 lbs 0 oz    Constitutional: awake and alert  Respiratory: no increased work of breathing, good air exchange and clear to auscultation  Cardiovascular: regular rate and rhythm, normal S1 and S2 and murmurs include systolic murmur III/VI located at right upper sternal border with radiation to the carotids  GI: normal bowel sounds, soft and non-tender  Skin: no bruising or bleeding and no redness, warmth, or swelling  Musculoskeletal: Chronic right ankle nonpitting edema from previous surgery,  no lower extremity " pitting edema present  there is no redness, warmth, or swelling of the joints  Neurologic: Mental Status Exam:  Level of Alertness:   awake  Motor Exam:  moves all extremities well and symmetrically  Neuropsychiatric: General: normal, calm and normal eye contact    Data   Data reviewed today: I reviewed all medications, new labs and imaging results over the last 24 hours. I personally reviewed the EKG tracing showing Sinus tachycardia at 111 bpm, first-degree AV block, LVH with repolarization abnormalities including diffuse ST waves depression.    Recent Labs   Lab 08/01/22 2112   WBC 8.4   HGB 16.1   MCV 92      INR 0.92      POTASSIUM 4.3   CHLORIDE 102   CO2 21*   BUN 14   CR 1.24   ANIONGAP 14   RINA 9.2   *     Recent Results (from the past 24 hour(s))   XR Chest Port 1 View    Narrative    EXAM: XR CHEST PORT 1 VIEW  LOCATION: [facility]  DATE/TIME: 8/1/2022 11:32 PM    INDICATION: cp  COMPARISON: None.      Impression    IMPRESSION: Heart size is normal. Lung volumes are mildly diminished without acute infiltrates or large effusions. Mediastinum is within normal limits. Advanced degenerative changes at the right AC joint.

## 2022-08-03 ENCOUNTER — APPOINTMENT (OUTPATIENT)
Dept: OCCUPATIONAL THERAPY | Facility: HOSPITAL | Age: 67
End: 2022-08-03
Attending: INTERNAL MEDICINE
Payer: MEDICARE

## 2022-08-03 VITALS
BODY MASS INDEX: 34.59 KG/M2 | HEART RATE: 118 BPM | WEIGHT: 247.1 LBS | RESPIRATION RATE: 20 BRPM | DIASTOLIC BLOOD PRESSURE: 91 MMHG | SYSTOLIC BLOOD PRESSURE: 173 MMHG | HEIGHT: 71 IN | OXYGEN SATURATION: 97 % | TEMPERATURE: 98.4 F

## 2022-08-03 LAB
ANION GAP SERPL CALCULATED.3IONS-SCNC: 10 MMOL/L (ref 5–18)
ATRIAL RATE - MUSE: 111 BPM
ATRIAL RATE - MUSE: 99 BPM
BUN SERPL-MCNC: 15 MG/DL (ref 8–22)
CALCIUM SERPL-MCNC: 8.9 MG/DL (ref 8.5–10.5)
CHLORIDE BLD-SCNC: 105 MMOL/L (ref 98–107)
CO2 SERPL-SCNC: 22 MMOL/L (ref 22–31)
CREAT SERPL-MCNC: 1.07 MG/DL (ref 0.7–1.3)
DIASTOLIC BLOOD PRESSURE - MUSE: NORMAL MMHG
DIASTOLIC BLOOD PRESSURE - MUSE: NORMAL MMHG
GFR SERPL CREATININE-BSD FRML MDRD: 77 ML/MIN/1.73M2
GLUCOSE BLD-MCNC: 228 MG/DL (ref 70–125)
GLUCOSE BLDC GLUCOMTR-MCNC: 254 MG/DL (ref 70–99)
HGB BLD-MCNC: 15.6 G/DL (ref 13.3–17.7)
INTERPRETATION ECG - MUSE: NORMAL
INTERPRETATION ECG - MUSE: NORMAL
P AXIS - MUSE: 60 DEGREES
P AXIS - MUSE: 71 DEGREES
PLATELET # BLD AUTO: 147 10E3/UL (ref 150–450)
POTASSIUM BLD-SCNC: 4 MMOL/L (ref 3.5–5)
PR INTERVAL - MUSE: 176 MS
PR INTERVAL - MUSE: 206 MS
QRS DURATION - MUSE: 100 MS
QRS DURATION - MUSE: 102 MS
QT - MUSE: 334 MS
QT - MUSE: 360 MS
QTC - MUSE: 454 MS
QTC - MUSE: 462 MS
R AXIS - MUSE: 42 DEGREES
R AXIS - MUSE: 43 DEGREES
SODIUM SERPL-SCNC: 137 MMOL/L (ref 136–145)
SYSTOLIC BLOOD PRESSURE - MUSE: NORMAL MMHG
SYSTOLIC BLOOD PRESSURE - MUSE: NORMAL MMHG
T AXIS - MUSE: 229 DEGREES
T AXIS - MUSE: 243 DEGREES
VENTRICULAR RATE- MUSE: 111 BPM
VENTRICULAR RATE- MUSE: 99 BPM

## 2022-08-03 PROCEDURE — G0378 HOSPITAL OBSERVATION PER HR: HCPCS

## 2022-08-03 PROCEDURE — 250N000012 HC RX MED GY IP 250 OP 636 PS 637: Performed by: INTERNAL MEDICINE

## 2022-08-03 PROCEDURE — 97110 THERAPEUTIC EXERCISES: CPT | Mod: GO

## 2022-08-03 PROCEDURE — 97165 OT EVAL LOW COMPLEX 30 MIN: CPT | Mod: GO

## 2022-08-03 PROCEDURE — 36415 COLL VENOUS BLD VENIPUNCTURE: CPT | Performed by: INTERNAL MEDICINE

## 2022-08-03 PROCEDURE — 99214 OFFICE O/P EST MOD 30 MIN: CPT | Performed by: INTERNAL MEDICINE

## 2022-08-03 PROCEDURE — 82962 GLUCOSE BLOOD TEST: CPT

## 2022-08-03 PROCEDURE — 96372 THER/PROPH/DIAG INJ SC/IM: CPT | Performed by: INTERNAL MEDICINE

## 2022-08-03 PROCEDURE — 250N000013 HC RX MED GY IP 250 OP 250 PS 637: Performed by: INTERNAL MEDICINE

## 2022-08-03 PROCEDURE — 80048 BASIC METABOLIC PNL TOTAL CA: CPT | Performed by: INTERNAL MEDICINE

## 2022-08-03 PROCEDURE — 85018 HEMOGLOBIN: CPT | Performed by: INTERNAL MEDICINE

## 2022-08-03 PROCEDURE — 93005 ELECTROCARDIOGRAM TRACING: CPT | Performed by: INTERNAL MEDICINE

## 2022-08-03 PROCEDURE — 99217 PR OBSERVATION CARE DISCHARGE: CPT | Performed by: INTERNAL MEDICINE

## 2022-08-03 PROCEDURE — 85049 AUTOMATED PLATELET COUNT: CPT | Performed by: INTERNAL MEDICINE

## 2022-08-03 PROCEDURE — 97535 SELF CARE MNGMENT TRAINING: CPT | Mod: GO

## 2022-08-03 RX ORDER — ATROPINE SULFATE 0.1 MG/ML
0.5 INJECTION INTRAVENOUS
Status: DISCONTINUED | OUTPATIENT
Start: 2022-08-03 | End: 2022-08-03 | Stop reason: HOSPADM

## 2022-08-03 RX ORDER — OXYCODONE HYDROCHLORIDE 5 MG/1
10 TABLET ORAL EVERY 4 HOURS PRN
Status: CANCELLED | OUTPATIENT
Start: 2022-08-03

## 2022-08-03 RX ORDER — ATORVASTATIN CALCIUM 80 MG/1
80 TABLET, FILM COATED ORAL DAILY
Qty: 90 TABLET | Refills: 3 | Status: SHIPPED | OUTPATIENT
Start: 2022-08-03 | End: 2022-10-18

## 2022-08-03 RX ORDER — CARVEDILOL 12.5 MG/1
12.5 TABLET ORAL 2 TIMES DAILY WITH MEALS
Qty: 60 TABLET | Refills: 0 | Status: SHIPPED | OUTPATIENT
Start: 2022-08-03 | End: 2022-08-03

## 2022-08-03 RX ORDER — FENTANYL CITRATE 50 UG/ML
25 INJECTION, SOLUTION INTRAMUSCULAR; INTRAVENOUS
Status: DISCONTINUED | OUTPATIENT
Start: 2022-08-03 | End: 2022-08-03 | Stop reason: CLARIF

## 2022-08-03 RX ORDER — LIDOCAINE 40 MG/G
CREAM TOPICAL
Status: CANCELLED | OUTPATIENT
Start: 2022-08-03

## 2022-08-03 RX ORDER — CARVEDILOL 12.5 MG/1
12.5 TABLET ORAL 2 TIMES DAILY WITH MEALS
Status: DISCONTINUED | OUTPATIENT
Start: 2022-08-03 | End: 2022-08-03 | Stop reason: HOSPADM

## 2022-08-03 RX ORDER — CARVEDILOL 3.12 MG/1
6.25 TABLET ORAL ONCE
Status: DISCONTINUED | OUTPATIENT
Start: 2022-08-03 | End: 2022-08-03

## 2022-08-03 RX ORDER — NALOXONE HYDROCHLORIDE 0.4 MG/ML
0.2 INJECTION, SOLUTION INTRAMUSCULAR; INTRAVENOUS; SUBCUTANEOUS
Status: DISCONTINUED | OUTPATIENT
Start: 2022-08-03 | End: 2022-08-03 | Stop reason: HOSPADM

## 2022-08-03 RX ORDER — NALOXONE HYDROCHLORIDE 0.4 MG/ML
0.4 INJECTION, SOLUTION INTRAMUSCULAR; INTRAVENOUS; SUBCUTANEOUS
Status: DISCONTINUED | OUTPATIENT
Start: 2022-08-03 | End: 2022-08-03 | Stop reason: HOSPADM

## 2022-08-03 RX ORDER — ACETAMINOPHEN 325 MG/1
650 TABLET ORAL EVERY 4 HOURS PRN
Status: CANCELLED | OUTPATIENT
Start: 2022-08-03

## 2022-08-03 RX ORDER — CARVEDILOL 12.5 MG/1
12.5 TABLET ORAL 2 TIMES DAILY WITH MEALS
Qty: 60 TABLET | Refills: 0 | Status: SHIPPED | OUTPATIENT
Start: 2022-08-03 | End: 2022-08-29

## 2022-08-03 RX ORDER — OXYCODONE HYDROCHLORIDE 5 MG/1
5 TABLET ORAL EVERY 4 HOURS PRN
Status: CANCELLED | OUTPATIENT
Start: 2022-08-03

## 2022-08-03 RX ORDER — FLUMAZENIL 0.1 MG/ML
0.2 INJECTION, SOLUTION INTRAVENOUS
Status: DISCONTINUED | OUTPATIENT
Start: 2022-08-03 | End: 2022-08-03 | Stop reason: HOSPADM

## 2022-08-03 RX ADMIN — INSULIN GLARGINE 66 UNITS: 100 INJECTION, SOLUTION SUBCUTANEOUS at 08:22

## 2022-08-03 RX ADMIN — Medication 81 MG: at 08:15

## 2022-08-03 RX ADMIN — TAMSULOSIN HYDROCHLORIDE 0.4 MG: 0.4 CAPSULE ORAL at 08:15

## 2022-08-03 RX ADMIN — LOSARTAN POTASSIUM 100 MG: 50 TABLET, FILM COATED ORAL at 08:16

## 2022-08-03 RX ADMIN — ATORVASTATIN CALCIUM 80 MG: 40 TABLET, FILM COATED ORAL at 08:15

## 2022-08-03 RX ADMIN — TICAGRELOR 90 MG: 90 TABLET ORAL at 08:15

## 2022-08-03 RX ADMIN — CARVEDILOL 6.25 MG: 3.12 TABLET, FILM COATED ORAL at 08:15

## 2022-08-03 RX ADMIN — CARVEDILOL 6.25 MG: 3.12 TABLET, FILM COATED ORAL at 11:01

## 2022-08-03 RX ADMIN — INSULIN ASPART 3 UNITS: 100 INJECTION, SOLUTION INTRAVENOUS; SUBCUTANEOUS at 08:17

## 2022-08-03 NOTE — PLAN OF CARE
Goal Outcome Evaluation:    PRIMARY DIAGNOSIS: TR BAND RECOVERY   OUTPATIENT/OBSERVATION GOALS TO BE MET BEFORE DISCHARGE:  TR band status: removed  Radial pulse and CMS (circulation, motion, sensation) are WDL: Yes  Bleeding or hematoma present at site: Yes. PT. Had hematoma above and below TR band in CSC that was reported to NP.  Remained unchanged at this time site had bruising but soft      Activity restriction education reviewed with patient: Yes. Pink wrist band applied, arm board applied.  Pt. Needing frequent reminders and education about activity restriction and protecting angio access sites.  Pt. Became agitated at when trying to rest as he became tangled in cords resulting in IV being pulled out and blood getting on sheets.  Pt. Became very agitated and was walking around the room moving things around and swearing.  Pt. Prompted several times about protecting agio site. Pt did verbalize understand after sitting down in chair for several minutes.  He apologized for how he acted and became calm again.    Stable vital signs:   VSS with exception of SBP remaining elevated. PRNs given and SBP stabilized in 150s  Pt. Stated chest pain improved and no longer felt after 930 pm   ADLs back to baseline:  Yes  Activity and level of assistance: Ambulating independently.  Interpretation of rhythm per telemetry tech: Normal sinus with 1st degree AV block     Discharge Planner Nurse   Safe discharge environment identified: Yes  Barriers to discharge: Yes. Pt. Needs to be cleared by MD       Entered by: Adriana rBandt RN 08/03/2022 6:54 AM     Please review provider order for any additional goals.   Nurse to notify provider when observation goals have been met and patient is ready for discharge.

## 2022-08-03 NOTE — PLAN OF CARE
"SHIFT 1600 - 1930    Problem: Pain (Cardiac Catheterization)  Goal: Acceptable Pain Control  Outcome: Ongoing, Not Progressing     Problem: Bleeding (Cardiac Catheterization)  Goal: Absence of Bleeding  Outcome: Ongoing, Not Progressing     Problem: Arrhythmia/Dysrhythmia (Cardiac Catheterization)  Goal: Stable Heart Rate and Rhythm  Outcome: Ongoing, Not Progressing     Pt arrived presenting w/ R CP tightness 7/10 that is non-radiating w/ SOB.  Providers paged.  EKG ordered.  Nitro administered, little relief.  Hypertensive.  Hydralazine administered x1.  Pt restless & anxious, wanting to go home.  TR band in placed w/ 3ccs.  CMS intact.  R radial pulse weak.  R femoral WNL.  Pt ambulating w/ SBA.  Spouse at bedside.      /70   Pulse 108   Temp 98.1  F (36.7  C) (Oral)   Resp 20   Ht 1.803 m (5' 11\")   Wt 108.9 kg (240 lb)   SpO2 96%   BMI 33.47 kg/m      "

## 2022-08-03 NOTE — PROGRESS NOTES
Cardiac Rehab        08/03/22 3835   Quick Adds   Quick Adds Certification   Type of Visit Initial Occupational Therapy Evaluation   Living Environment   People in Home spouse   Current Living Arrangements house   Home Accessibility stairs to enter home;stairs within home   Number of Stairs, Main Entrance 2   Stair Railings, Main Entrance railings safe and in good condition   Number of Stairs, Within Home, Primary greater than 10 stairs   Stair Railings, Within Home, Primary railings safe and in good condition   Self-Care   Equipment Currently Used at Home none   Fall history within last six months no   Activity/Exercise/Self-Care Comment pt. works full-time (owns floor business)   Instrumental Activities of Daily Living (IADL)   IADL Comments patient independent prior   General Information   Onset of Illness/Injury or Date of Surgery 08/01/22   Referring Physician Archie Ni MD   Patient/Family Therapy Goal Statement (OT) get home   Additional Occupational Profile Info/Pertinent History of Current Problem Ezekiel Aldrich is a 67 year old old male with  chest pain. Patient underwent cornory angiogram and 2 DRARIN placed.   Existing Precautions/Restrictions cardiac   Right Upper Extremity (Weight-bearing Status)   (R wrist used to angiogram)   Cognitive Status Examination   Orientation Status orientation to person, place and time   Range of Motion Comprehensive   General Range of Motion no range of motion deficits identified   Strength Comprehensive (MMT)   General Manual Muscle Testing (MMT) Assessment no strength deficits identified   Bed Mobility   Bed Mobility supine-sit;sit-supine   Supine-Sit [place] (Bed Mobility) independent   Sit-Supine [place] (Bed Mobility) independent   Comment (Bed Mobility) Independent with LE dressing   Transfers   Transfers sit-stand transfer   Sit-Stand Transfer   Sit-Stand [place] (Transfers) independent   Clinical Impression   Criteria for Skilled Therapeutic  Interventions Met (OT) Yes, treatment indicated   OT Diagnosis Decreased endurance for ADLs   Influenced by the following impairments s/p stent placement   OT Problem List-Impairments impacting ADL problems related to;activity tolerance impaired   Assessment of Occupational Performance 1-3 Performance Deficits   Identified Performance Deficits endurance for ADLs   Planned Therapy Interventions (OT) home program guidelines;progressive activity/exercise   Clinical Decision Making Complexity (OT) low complexity   Risk & Benefits of therapy have been explained evaluation/treatment results reviewed;care plan/treatment goals reviewed   OT Discharge Planning   OT Discharge Recommendation (DC Rec) home with outpatient cardiac rehab   Therapy Certification   Start of Care Date 08/03/22   Certification date from 08/03/22   Certification date to 08/10/22   Medical Diagnosis stent placement   Total Evaluation Time (Minutes)   Total Evaluation Time (Minutes) 14   OT Goals   Therapy Frequency (OT) One time eval and treatment   OT Predicted Duration/Target Date for Goal Attainment 08/05/22   OT Goals Cardiac Phase 1   OT: Understanding of cardiac education to maximize quality of life, condition management, and health outcomes Patient;Verbalize   OT: Functional/aerobic ambulation tolerance with stable cardiovascular response in order to return to home and community environment 100 feet;Independent

## 2022-08-03 NOTE — PLAN OF CARE
Cardiac Rehab Discharge Summary    Reason for therapy discharge:    Discharged to home with outpatient therapy.    Progress towards therapy goal(s). See goals on Care Plan in [facility] electronic health record for goal details.  Goals met    Therapy recommendation(s):    Continued therapy is recommended.  Rationale/Recommendations:  outpatient cardiac rehab.

## 2022-08-03 NOTE — PLAN OF CARE
Goal Outcome Evaluation:  PRIMARY DIAGNOSIS: TR BAND RECOVERY   OUTPATIENT/OBSERVATION GOALS TO BE MET BEFORE DISCHARGE:  TR band status: removed  Radial pulse and CMS (circulation, motion, sensation) are WDL: Yes  Bleeding or hematoma present at site: Yes. Sites remain unchanged from previous status.      Activity restriction education reviewed with patient: Yes. Arm band removed. Pt. In bed and resting since then. Pt. Responded to gentle reminders about protecting agios sites.   Stable vital signs:  Yes  ADLs back to baseline:  Yes  Activity and level of assistance: Ambulating independently.  Interpretation of rhythm per telemetry tech: Normal sinus  and 1 degree AV block     Discharge Planner Nurse   Safe discharge environment identified: Yes  Barriers to discharge: Yes. Pt. Still needs to be cleared by MD.    Pt. Had no further episodes of agitation and remained calm and cooperative often making jokes and accepting of care and frequent checks.        Entered by: Adriana Brandt RN 08/03/2022 6:59 AM     Please review provider order for any additional goals.   Nurse to notify provider when observation goals have been met and patient is ready for discharge.

## 2022-08-03 NOTE — CONSULTS
"Care Management Initial Consult    General Information  Assessment completed with: Patient,         Primary Care Provider verified and updated as needed:     Readmission within the last 30 days:           Advance Care Planning:       General Information Comments: Patient stating he didn't need to answer assessmnt questions \"I already did\".    Communication Assessment  Patient's communication style: spoken language (English or Bilingual)    Hearing Difficulty or Deaf: yes   Wear Glasses or Blind: yes    Cognitive  Cognitive/Neuro/Behavioral: WDL           Mood/Behavior: agitated, restless          Living Environment:   People in home:       Current living Arrangements:        Able to return to prior arrangements:         Family/Social Support:  Care provided by:    Provides care for:                  Description of Support System:           Current Resources:   Patient receiving home care services:       Community Resources:    Equipment currently used at home: none  Supplies currently used at home:      Employment/Financial:  Employment Status:          Financial Concerns:             Lifestyle & Psychosocial Needs:  Social Determinants of Health     Tobacco Use: High Risk     Smoking Tobacco Use: Former Smoker     Smokeless Tobacco Use: Current User   Alcohol Use: Not on file   Financial Resource Strain: Not on file   Food Insecurity: Not on file   Transportation Needs: Not on file   Physical Activity: Not on file   Stress: Not on file   Social Connections: Not on file   Intimate Partner Violence: Not on file   Depression: Not at risk     PHQ-2 Score: 1   Housing Stability: Not on file       Functional Status:  Prior to admission patient needed assistance:                    Additional Information:    Unable to complete full assessment. Attempted to instruct on care management roll and he said he didn't need to answer questions.  Stating \"I already answered questions.\"    Attempted to inform of Observation status " and review hand out, he requested no more discussions.  Provide hand out which he would not sign.     Anticipating he will return home with spouse.     Jocy Farley RN

## 2022-08-03 NOTE — DISCHARGE SUMMARY
[facility]  Hospitalist Discharge Summary      Date of Admission:  8/1/2022  Date of Discharge:  8/3/2022  Discharging Provider: Ban Antony MD  Discharge Service: Hospitalist Service    Discharge Diagnoses   Principal Problem:    Chest pain, unspecified type  Active Problems:    Benign essential hypertension    Hyperlipidemia    Type 2 diabetes mellitus (H)    ACS (acute coronary syndrome) (H)      Follow-ups Needed After Discharge   Follow-up Appointments     Follow-up and recommended labs and tests       Cardiology arranging follow-up with MELINDA in clinic  Dr. Blevins in 4-5 weeks             Unresulted Labs Ordered in the Past 30 Days of this Admission     No orders found from 7/2/2022 to 8/2/2022.          Discharge Disposition   Discharged to home  Condition at discharge: Stable      Hospital Course   Ezekiel Aldrich is a 67 year old old male with  chest pain.     1.  Acute chest pain- Intermittent symptoms over the last 3 months concerning for accelerating angina  EKG showed LVH with repolarization abnormalities  - s/p coronary angiography DARRIN placed in LAD  - Cardiology seen and adjusted medication sand cleared for discharge with follow-up   - atorvastatin 80mg daily  - ASA and brilinta   - cards recommends Echo as outpatient yearly and will arrange      2.  Type 2 diabetes mellitus with peripheral neuropathy with long-term insulin use  - continue home lantus   - holding metformin and glipizide until after discharge, metformin 48hrs from angiography      3.  Essential hypertension  - coreg increased to 12,5 mg BID  - losartan      4.  Hyperlipidemia  Statin      5.  CHALO  Home CPAP     6.  Tobacco use  Chews tobacco daily discussed cessation      7.  Daily alcohol use  2 alcohol beverages with dinner      Consultations This Hospital Stay   CARDIOLOGY IP CONSULT  CARE MANAGEMENT / SOCIAL WORK IP CONSULT  NUTRITION SERVICES ADULT IP CONSULT  CARDIAC REHAB IP CONSULT  PHARMACY IP  CONSULT  CARE MANAGEMENT / SOCIAL WORK IP CONSULT    Code Status   Full Code    Time Spent on this Encounter          Ban Antony MD  [facility]  [address] [place] [id number]  Phone: [phone]  Fax: [phone]  ______________________________________________________________________    Physical Exam   Vital Signs: Temp: 98.4  F (36.9  C) Temp src: Oral BP: (!) 173/91 (RN notified) Pulse: 118   Resp: 20 SpO2: 97 % O2 Device: None (Room air) Oxygen Delivery: 3 LPM  Weight: 247 lbs 1.6 oz  Physical Exam  Constitutional:       Appearance: He is obese.   HENT:      Head: Normocephalic and atraumatic.   Cardiovascular:      Rate and Rhythm: Normal rate and regular rhythm.      Pulses: Normal pulses.   Pulmonary:      Effort: Pulmonary effort is normal.      Breath sounds: Normal breath sounds.   Abdominal:      General: Bowel sounds are normal.      Palpations: Abdomen is soft.   Musculoskeletal:      Right lower leg: No edema.      Left lower leg: No edema.      Comments: Deformity on hand s   Skin:     General: Skin is warm and dry.      Capillary Refill: Capillary refill takes less than 2 seconds.   Neurological:      General: No focal deficit present.      Mental Status: He is alert and oriented to person, place, and time.            Primary Care Physician   Janes Jeronimo    Discharge Orders      Cardiac Rehab Referral      Reason for your hospital stay    Principal Problem:    Chest pain, unspecified type  Active Problems:    Benign essential hypertension    Hyperlipidemia    Type 2 diabetes mellitus (H)    ACS (acute coronary syndrome) (H)     Follow-up and recommended labs and tests     Cardiology arranging follow-up with MELINDA in clinic  Dr. Blevins in 4-5 weeks     Activity    Your activity upon discharge: per post cardiac angiogram orders and Dr. Blevins recommends to stay home from work for a week     Brief Discharge Instructions    Hold metformin for 48  hours from procedure, resume 2022     Diet    Follow this diet upon discharge: Orders Placed This Encounter      Low Saturated Fat Na <2400 mg       Significant Results and Procedures   Most Recent 3 CBC's:Recent Labs   Lab Test 22  0458 08/01/22  2112 10/29/21  1616 04/10/19  0844   WBC  --  8.4 6.9 6.9   HGB 15.6 16.1 15.4 16.1   MCV  --  92 92 92   * 186 181 191     Most Recent 3 BMP's:Recent Labs   Lab Test 22  0801 22  0458 22  1733 22  0749 08/01/22  2112 10/29/21  1616   NA  --  137  --   --  137 139   POTASSIUM  --  4.0  --   --  4.3 4.1   CHLORIDE  --  105  --   --  102 104   CO2  --  22  --   --  21* 21*   BUN  --  15  --   --  14 14   CR  --  1.07  --   --  1.24 1.55*   ANIONGAP  --  10  --   --  14 14   RINA  --  8.9  --   --  9.2 9.3   * 228* 188*   < > 153* 152*    < > = values in this interval not displayed.   ,   Results for orders placed or performed during the hospital encounter of 22   XR Chest Port 1 View    Narrative    EXAM: XR CHEST PORT 1 VIEW  LOCATION: [facility]  DATE/TIME: 2022 11:32 PM    INDICATION: cp  COMPARISON: None.      Impression    IMPRESSION: Heart size is normal. Lung volumes are mildly diminished without acute infiltrates or large effusions. Mediastinum is within normal limits. Advanced degenerative changes at the right AC joint.   Echocardiogram Complete     Value    LVEF  55-60%    Narrative    [id number]  [id number]  [id number]  [id number]^SEMAJ^MADY^[place] [address]     Name: DEEP BLOCK  MRN: [id number]  : 1955  Study Date: 2022 09:16 AM  Age: 67 yrs  Gender: Male  Patient Location: [facility]  Reason For Study: Murmur  Ordering Physician: MADY CHA  Referring Physician: MADY CHA  Performed By: AT     BSA: 2.3 m2  Height: 71 in  Weight: 240 lb  HR:  91  ______________________________________________________________________________  Procedure  Complete Echo Adult. Definity (NDC #20283-269) given intravenously. There is  no comparison study available.  ______________________________________________________________________________  Interpretation Summary     1.Left ventricular size, wall motion and function are normal. The ejection  fraction is 55-60%.  2.There is borderline inferior wall hypokinesis.  3.Normal right ventricle size and systolic function.  4.Thickened aortic valve leaflets Mild to moderate valvular aortic stenosis.  At heart rate of 97 bpm peak velocity is 2.3 m/s with a mean gradient of 14  mmHg and dimensionless index 0.39. Valve not well visualized but appears to be  possibly trileaflet.  There is no comparison study available.  ______________________________________________________________________________  I      WMSI = 1.13     % Normal = 88     X - Cannot   0 -                      (2) - Mildly 2 -          Segments  Size  Interpret    Hyperkinetic 1 - Normal  Hypokinetic  Hypokinetic  1-2     small                                                     7 -          3-5      moderate  3 - Akinetic 4 -          5 -         6 - Akinetic Dyskinetic   6-14    large               Dyskinetic   Aneurysmal  w/scar       w/scar       15-16   diffuse     Left Ventricle  Left ventricular size, wall motion and function are normal. The ejection  fraction is 55-60%. There is borderline concentric left ventricular  hypertrophy. Left ventricular diastolic function is normal. There is  borderline inferior wall hypokinesis.     Right Ventricle  Normal right ventricle size and systolic function. TAPSE is normal, which is  consistent with normal right ventricular systolic function.     Atria  The left atrium is mildly dilated. Right atrial size is normal. There is no  color Doppler evidence of an atrial shunt.     Mitral Valve  Mitral valve leaflets appear normal.  There is no evidence of mitral stenosis  or clinically significant mitral regurgitation. There is no mitral  regurgitation noted. There is no mitral valve stenosis.     Tricuspid Valve  The tricuspid valve is not well visualized, but is grossly normal. Right  ventricle systolic pressure estimate normal. There is physiologic tricuspid  regurgitation. There is no tricuspid stenosis.     Aortic Valve  The aortic valve is not well visualized. Thickened aortic valve leaflets. No  aortic regurgitation is present. Mild to moderate valvular aortic stenosis.     Pulmonic Valve  The pulmonic valve is not well seen, but is grossly normal. This degree of  valvular regurgitation is within normal limits. There is no pulmonic valvular  stenosis.     Vessels  The aorta root is normal. Normal size ascending aorta. IVC diameter <2.1 cm  collapsing >50% with sniff suggests a normal RA pressure of 3 mmHg.     Pericardium  There is no pericardial effusion.     Rhythm  Sinus rhythm was noted.     ______________________________________________________________________________  MMode/2D Measurements & Calculations  Ao root diam: 3.1 cm  asc Aorta Diam: 3.3 cm  LVOT diam: 2.3 cm  LVOT area: 4.2 cm2     LA Volume Indexed (AL/bp): 36.9 ml/m2     Time Measurements  MM HR: 85.0 BPM     Doppler Measurements & Calculations  MV E max sebastián: 85.9 cm/sec  MV A max sebastián: 96.5 cm/sec  MV E/A: 0.89  MV max P.8 mmHg  MV mean P.8 mmHg  MV V2 VTI: 19.7 cm  MVA(VTI): 3.6 cm2  MV dec slope: 548.0 cm/sec2  MV dec time: 0.16 sec  Ao V2 max: 224.9 cm/sec  Ao max P.0 mmHg  Ao V2 mean: 172.6 cm/sec  Ao mean P.1 mmHg  Ao V2 VTI: 49.4 cm  CHUN(I,D): 1.4 cm2  CHUN(V,D): 1.7 cm2     LV V1 max PG: 3.1 mmHg  LV V1 max: 88.3 cm/sec  LV V1 VTI: 16.6 cm  SV(LVOT): 70.1 ml  SI(LVOT): 30.8 ml/m2  PA acc time: 0.11 sec  AV Sebastián Ratio (DI): 0.39  CHUN Index (cm2/m2): 0.62  E/E': 16.5  E/E' av.8  Lateral E/e': 13.2  Medial E/e': 16.4  Peak E' Sebastián: 5.2 cm/sec      ______________________________________________________________________________  Report approved by: Smith Herrera 08/02/2022 10:30 AM         Cardiac Catheterization    Narrative      Prox RCA to Mid RCA lesion is 50% stenosed.    Dist RCA lesion is 50% stenosed.    Ost Cx to Mid Cx lesion is 60% stenosed.    Mid LAD lesion is 90% stenosed.    Ultrasound (IVUS) was performed on the LAD lesion. Images show the stent   to be fully deployed.    3.0 Synergy DARRIN placed into the proximal LAD. Stent extends up to but   does not cross the second diagonal artery. Postdilatation with 3.5   noncompliant balloon high-pressure.    Difficulty reaching left coronary from right radial approach. Required   right femoral access to perform PCI of LCA.            Discharge Medications   Current Discharge Medication List      START taking these medications    Details   aspirin (ASA) 81 MG EC tablet Take 1 tablet (81 mg) by mouth daily Start tomorrow.  Qty: 30 tablet, Refills: 3    Associated Diagnoses: ACS (acute coronary syndrome) (H)      !! atorvastatin (LIPITOR) 80 MG tablet Take 1 tablet (80 mg) by mouth daily  Qty: 90 tablet, Refills: 3    Associated Diagnoses: ACS (acute coronary syndrome) (H)      carvedilol (COREG) 12.5 MG tablet Take 1 tablet (12.5 mg) by mouth 2 times daily (with meals) for 30 days  Qty: 60 tablet, Refills: 0    Associated Diagnoses: Benign essential hypertension; ACS (acute coronary syndrome) (H)      ticagrelor (BRILINTA) 90 MG tablet Take 1 tablet (90 mg) by mouth 2 times daily Dose to start tomorrow morning.  Qty: 180 tablet, Refills: 3    Associated Diagnoses: ACS (acute coronary syndrome) (H)       !! - Potential duplicate medications found. Please discuss with provider.      CONTINUE these medications which have NOT CHANGED    Details   aspirin-acetaminophen-caffeine (EXCEDRIN MIGRAINE) 250-250-65 mg per tablet Take 2 tablets by mouth daily      !! atorvastatin (LIPITOR) 80 MG tablet  [ATORVASTATIN (LIPITOR) 80 MG TABLET] Take 1 daily for high cholesterol.  Qty: 90 tablet, Refills: 3    Associated Diagnoses: Other hyperlipidemia      !! B-D U/F insulin pen needle USE TWICE DAILY TO INJECT VICTOZA AND LANTUS  Qty: 100 each, Refills: 5    Associated Diagnoses: Type 2 diabetes mellitus with other specified complication, with long-term current use of insulin (H)      blood glucose (ACCU-CHEK GUIDE) test strip USE 1 TEST TWICE DAILY  Qty: 200 strip, Refills: 0    Associated Diagnoses: Type 2 diabetes mellitus without complication, with long-term current use of insulin (H)      blood glucose meter (GLUCOMETER) [BLOOD GLUCOSE METER (GLUCOMETER)] Use 1 each As Directed as needed. Dispense glucometer brand per patient's insurance at pharmacy discretion.  Qty: 1 each, Refills: 0    Associated Diagnoses: Type 2 diabetes mellitus without complication, with long-term current use of insulin (H)      generic lancets [GENERIC LANCETS] Use 1 each As Directed 2 (two) times a day. Dispense brand per patient's insurance at pharmacy discretion.  Qty: 200 each, Refills: 3    Associated Diagnoses: Type 2 diabetes mellitus without complication, with long-term current use of insulin (H)      glipiZIDE (GLUCOTROL XL) 10 MG 24 hr tablet [GLIPIZIDE (GLUCOTROL XL) 10 MG 24 HR TABLET] TAKE 2 TABLETS(20 MG) BY MOUTH DAILY  Qty: 180 tablet, Refills: 3    Associated Diagnoses: Type 2 diabetes mellitus with other specified complication, without long-term current use of insulin (H)      LANTUS SOLOSTAR 100 UNIT/ML soln ADMINISTER 66 UNITS UNDER THE SKIN AT BEDTIME  Qty: 30 mL, Refills: 2    Associated Diagnoses: Type 2 diabetes mellitus without complication, with long-term current use of insulin (H)      losartan (COZAAR) 100 MG tablet Take 1 tablet (100 mg) by mouth daily  Qty: 90 tablet, Refills: 3    Associated Diagnoses: Essential hypertension      metFORMIN (GLUCOPHAGE-XR) 500 MG 24 hr tablet Take 4 tablets (2,000 mg) by  "mouth daily  Qty: 360 tablet, Refills: 3    Associated Diagnoses: Type 2 diabetes mellitus with other specified complication, without long-term current use of insulin (H)      !! pen needle, diabetic (PEN NEEDLE) 31 gauge x 1/4\" Ndle [PEN NEEDLE, DIABETIC (PEN NEEDLE) 31 GAUGE X 1/4\" NDLE] Use twice daily to inject Victoza and Lantus  Qty: 100 each, Refills: 0    Associated Diagnoses: Diabetes (H)      tamsulosin (FLOMAX) 0.4 MG capsule [TAMSULOSIN (FLOMAX) 0.4 MG CAP] TAKE 1 CAPSULE(0.4 MG) BY MOUTH DAILY  Qty: 90 capsule, Refills: 3    Associated Diagnoses: Benign prostatic hyperplasia with lower urinary tract symptoms, symptom details unspecified       !! - Potential duplicate medications found. Please discuss with provider.        Allergies   Allergies   Allergen Reactions     Lisinopril Cough     Dry cough     "

## 2022-08-03 NOTE — PROGRESS NOTES
[facility]      OUTPATIENT OCCUPATIONAL THERAPY  EVALUATION  PLAN OF TREATMENT FOR OUTPATIENT REHABILITATION  (COMPLETE FOR INITIAL CLAIMS ONLY)  Patient's Last Name, First Name, M.I.  YOB: 1955  Ezekiel Aldrich                          Provider's Name  [facility] Medical Record No.  [id number]                               Onset Date:  08/01/22   Start of Care Date:  08/03/22     Type:     ___PT   _X_OT   ___SLP Medical Diagnosis:  stent placement                        OT Diagnosis:  Decreased endurance for ADLs   Visits from SOC:  1   _________________________________________________________________________________  Plan of Treatment/Functional Goals    Planned Interventions: home program guidelines, progressive activity/exercise   Goals: See Occupational Therapy Goals on Care Plan in [facility] electronic health record.    Therapy Frequency: One time eval and treatment  Predicted Duration of Therapy Intervention: 08/05/22  _________________________________________________________________________________    I CERTIFY THE NEED FOR THESE SERVICES FURNISHED UNDER        THIS PLAN OF TREATMENT AND WHILE UNDER MY CARE     (Physician co-signature of this document indicates review and certification of the therapy plan).              Certification date from: 08/03/22, Certification date to: 08/10/22    Referring Physician: Archie Ni MD            Initial Assessment        See Occupational Therapy evaluation dated 08/03/22 in Epic electronic health record.

## 2022-08-03 NOTE — PROGRESS NOTES
Cardiology Progress Note    Assessment/Plan:  1.  ACS, status post drug-eluting stent placement to the mid LAD for 90% stenosis.  He does have 50% stenosis in 2 areas of the right coronary artery and 60% stenosis in the ostial to mid left circumflex.  We will need to monitor for recurrent ischemic symptoms.  At this point, patient initiated on dual antiplatelet therapy with Brilinta and aspirin which she will need to continue for 12 months.  2.  Essential hypertension, poorly controlled.  Patient restarted on his usual dose of losartan and was begun on carvedilol 6.25 mg twice daily.  Blood pressure and heart rate continued to remain high this morning.  We will increase carvedilol to 12.5 mg twice daily.  3.  Mixed hyperlipidemia, now on high-dose atorvastatin.  Maintain LDL below 70  4.  Type 2 diabetes mellitus with hemoglobin A1c of 7.8.  Needs tighter diabetic control going forward.  5.  Mild to moderate aortic valve stenosis.  Echocardiogram suggested tricuspid valve although would not be surprised if this is bicuspid.  He will need close monitoring of his valve with yearly echoes.    Okay to discharge patient home today.  He should follow-up with me in 4 to 5 weeks.  We will place referral for outpatient cardiac rehab.    Primary cardiologist: Karlene Blevins MD    Subjective:  Patient reports no recurrence of the chest discomfort which prompted his presentation.  Denies shortness of breath.  Does have significant bruising in the right radial area.      aspirin  81 mg Oral Daily     atorvastatin  80 mg Oral Daily     carvedilol  12.5 mg Oral BID w/meals     insulin aspart  1-7 Units Subcutaneous TID AC     insulin glargine  66 Units Subcutaneous QAM AC     losartan  100 mg Oral Daily     tamsulosin  0.4 mg Oral Daily     ticagrelor  90 mg Oral Q12H         Objective:   Vital signs in last 24 hours:  Temp:  [97.8  F (36.6  C)-98.7  F (37.1  C)] 98.4  F (36.9  C)  Pulse:  [] 118  Resp:  [10-27] 20  BP:  (123-206)/() 173/91  SpO2:  [94 %-99 %] 97 %  Weight:        Review of Systems:  Negative    Physical Exam:  General appearance: alert, cooperative, no distress   Head: Normocephalic, without obvious abnormality, atraumatic  Neck: no JVD   Lungs: clear to auscultation bilaterally   Heart: Regular rate and rhythm.  S1 normal, S2 slightly reduced.  2/6 crescendo decrescendo systolic murmur heard at the left sternal border.  Extremities: Small area of hematoma over the right radial area with significant ecchymosis.  No bruit.    Cardiographics (personally reviewed):   Telemetry demonstrates sinus rhythm    Imaging (personally reviewed):   ______________________________________________________________________________  Procedure  Complete Echo Adult. Definity (NDC #22291-492) given intravenously. There is  no comparison study available.  ______________________________________________________________________________  Interpretation Summary     1.Left ventricular size, wall motion and function are normal. The ejection  fraction is 55-60%.  2.There is borderline inferior wall hypokinesis.  3.Normal right ventricle size and systolic function.  4.Thickened aortic valve leaflets Mild to moderate valvular aortic stenosis.  At heart rate of 97 bpm peak velocity is 2.3 m/s with a mean gradient of 14  mmHg and dimensionless index 0.39. Valve not well visualized but appears to be  possibly trileaflet.  There is no comparison study available.    Lab Results (personally reviewed):     Recent Labs   Lab Test 08/02/22  0458 10/29/21  1616   CHOL 246* 164   HDL 47 59   LDL  --  68   TRIG 557* 451*     Recent Labs   Lab Test 10/29/21  1616 06/28/21  0828 05/26/20  0807   LDL 68 103 85     Recent Labs   Lab Test 08/03/22  0801 08/03/22  0458   NA  --  137   POTASSIUM  --  4.0   CHLORIDE  --  105   CO2  --  22   * 228*   BUN  --  15   CR  --  1.07   GFRESTIMATED  --  77   RINA  --  8.9     Recent Labs   Lab Test 08/03/22  0458  08/01/22 2112 10/29/21  1616   CR 1.07 1.24 1.55*     Recent Labs   Lab Test 08/01/22  2112 10/29/21  1616 06/28/21  0828   A1C 7.8* 7.5* 7.8*          Recent Labs   Lab Test 08/03/22  0458 08/01/22 2112   WBC  --  8.4   HGB 15.6 16.1   HCT  --  45.1   MCV  --  92   * 186     Recent Labs   Lab Test 08/03/22  0458 08/01/22 2112 10/29/21  1616   HGB 15.6 16.1 15.4    Recent Labs   Lab Test 08/02/22  0458 08/02/22  0155 08/01/22 2112   TROPONINI 0.16 0.15 0.12     Recent Labs   Lab Test 08/01/22 2112   BNP 89*     Recent Labs   Lab Test 10/29/21  1616   TSH 3.18     Recent Labs   Lab Test 08/01/22 2112   INR 0.92          Karlene Blevins MD

## 2022-08-03 NOTE — PROVIDER NOTIFICATION
"---------------------------------------PROVIDER NOTIFICATION---------------------------------------    Message:  R CP, non-radiating, tightness w/ some SOB.  EKG ordered.    Time Sent:  ~1915 to Dr. Rivera    Pt Assessment:  /71   Pulse 108   Temp 98.1  F (36.7  C) (Oral)   Resp 20   Ht 1.803 m (5' 11\")   Wt 108.9 kg (240 lb)   SpO2 96%   BMI 33.47 kg/m      Intervention/Response:  - SL Nitroglycerin  - Reassess         Note:    @ 1930 deferred to night RN.  "

## 2022-08-04 ENCOUNTER — PATIENT OUTREACH (OUTPATIENT)
Dept: FAMILY MEDICINE | Facility: CLINIC | Age: 67
End: 2022-08-04

## 2022-08-07 ENCOUNTER — MYC MEDICAL ADVICE (OUTPATIENT)
Dept: FAMILY MEDICINE | Facility: CLINIC | Age: 67
End: 2022-08-07

## 2022-08-08 NOTE — TELEPHONE ENCOUNTER
Outgoing Call:  Pt will return call later.    Per Discharge instructions        Diamante TERRAZAS RN  [facility]

## 2022-08-09 ENCOUNTER — TRANSFERRED RECORDS (OUTPATIENT)
Dept: HEALTH INFORMATION MANAGEMENT | Facility: CLINIC | Age: 67
End: 2022-08-09

## 2022-08-09 LAB — RETINOPATHY: POSITIVE

## 2022-08-29 DIAGNOSIS — I24.9 ACS (ACUTE CORONARY SYNDROME) (H): ICD-10-CM

## 2022-08-29 DIAGNOSIS — I10 BENIGN ESSENTIAL HYPERTENSION: ICD-10-CM

## 2022-08-30 RX ORDER — CARVEDILOL 12.5 MG/1
TABLET ORAL
Qty: 180 TABLET | Refills: 1 | Status: SHIPPED | OUTPATIENT
Start: 2022-08-30 | End: 2022-12-13

## 2022-08-31 ENCOUNTER — HOSPITAL ENCOUNTER (OUTPATIENT)
Dept: CARDIAC REHAB | Facility: HOSPITAL | Age: 67
Discharge: HOME | End: 2022-08-31
Attending: INTERNAL MEDICINE
Payer: MEDICARE

## 2022-08-31 DIAGNOSIS — I24.9 ACS (ACUTE CORONARY SYNDROME) (H): ICD-10-CM

## 2022-08-31 PROCEDURE — 93797 PHYS/QHP OP CAR RHAB WO ECG: CPT

## 2022-08-31 PROCEDURE — 93798 PHYS/QHP OP CAR RHAB W/ECG: CPT

## 2022-09-01 DIAGNOSIS — N40.1 BENIGN PROSTATIC HYPERPLASIA WITH LOWER URINARY TRACT SYMPTOMS, SYMPTOM DETAILS UNSPECIFIED: ICD-10-CM

## 2022-09-01 DIAGNOSIS — E11.69 TYPE 2 DIABETES MELLITUS WITH OTHER SPECIFIED COMPLICATION, WITHOUT LONG-TERM CURRENT USE OF INSULIN (H): ICD-10-CM

## 2022-09-01 DIAGNOSIS — I10 ESSENTIAL HYPERTENSION: ICD-10-CM

## 2022-09-02 RX ORDER — TAMSULOSIN HYDROCHLORIDE 0.4 MG/1
CAPSULE ORAL
Qty: 90 CAPSULE | Refills: 3 | Status: SHIPPED | OUTPATIENT
Start: 2022-09-02 | End: 2023-08-19

## 2022-09-02 RX ORDER — GLIPIZIDE 10 MG/1
TABLET, FILM COATED, EXTENDED RELEASE ORAL
Qty: 180 TABLET | Refills: 3 | Status: SHIPPED | OUTPATIENT
Start: 2022-09-02 | End: 2023-10-18

## 2022-09-02 RX ORDER — LOSARTAN POTASSIUM 100 MG/1
TABLET ORAL
Qty: 90 TABLET | Refills: 3 | Status: SHIPPED | OUTPATIENT
Start: 2022-09-02 | End: 2023-07-19

## 2022-09-02 RX ORDER — METFORMIN HCL 500 MG
TABLET, EXTENDED RELEASE 24 HR ORAL
Qty: 360 TABLET | Refills: 3 | Status: SHIPPED | OUTPATIENT
Start: 2022-09-02 | End: 2023-09-05

## 2022-09-02 ASSESSMENT — SLEEP AND FATIGUE QUESTIONNAIRES
HOW LIKELY ARE YOU TO NOD OFF OR FALL ASLEEP WHILE SITTING AND READING: WOULD NEVER DOZE
HOW LIKELY ARE YOU TO NOD OFF OR FALL ASLEEP WHILE SITTING INACTIVE IN A PUBLIC PLACE: WOULD NEVER DOZE
HOW LIKELY ARE YOU TO NOD OFF OR FALL ASLEEP WHILE SITTING QUIETLY AFTER LUNCH WITHOUT ALCOHOL: WOULD NEVER DOZE
HOW LIKELY ARE YOU TO NOD OFF OR FALL ASLEEP WHILE WATCHING TV: WOULD NEVER DOZE
HOW LIKELY ARE YOU TO NOD OFF OR FALL ASLEEP WHILE LYING DOWN TO REST IN THE AFTERNOON WHEN CIRCUMSTANCES PERMIT: SLIGHT CHANCE OF DOZING
HOW LIKELY ARE YOU TO NOD OFF OR FALL ASLEEP WHILE SITTING AND TALKING TO SOMEONE: WOULD NEVER DOZE
HOW LIKELY ARE YOU TO NOD OFF OR FALL ASLEEP IN A CAR, WHILE STOPPED FOR A FEW MINUTES IN TRAFFIC: WOULD NEVER DOZE
HOW LIKELY ARE YOU TO NOD OFF OR FALL ASLEEP WHEN YOU ARE A PASSENGER IN A CAR FOR AN HOUR WITHOUT A BREAK: WOULD NEVER DOZE

## 2022-09-02 NOTE — TELEPHONE ENCOUNTER
"Routing refill request to provider for review/approval because:  bp out of range  Due to be seen    Last Written Prescription Date:  8/25/21  Last Fill Quantity: 90,  # refills: 3   Last office visit provider:  10/29/21     Requested Prescriptions   Pending Prescriptions Disp Refills     glipiZIDE (GLUCOTROL XL) 10 MG 24 hr tablet [Pharmacy Med Name: GLIPIZIDE ER 10MG TABLETS] 180 tablet 3     Sig: TAKE 2 TABLETS BY MOUTH DAILY       Sulfonylurea Agents Passed - 9/1/2022  3:33 PM        Passed - Patient has documented A1c within the specified period of time.     If HgbA1C is 8 or greater, it needs to be on file within the past 3 months.  If less than 8, must be on file within the past 6 months.     Recent Labs   Lab Test 08/01/22 2112   A1C 7.8*             Passed - Medication is active on med list        Passed - Patient is age 18 or older        Passed - Patient has a recent creatinine (normal) within the past 12 mos.     Recent Labs   Lab Test 08/03/22  0458   CR 1.07       Ok to refill medication if creatinine is low          Passed - Recent (6 mo) or future (30 days) visit within the authorizing provider's specialty     Patient had office visit in the last 6 months or has a visit in the next 30 days with authorizing provider or within the authorizing provider's specialty.  See \"Patient Info\" tab in inbasket, or \"Choose Columns\" in Meds & Orders section of the refill encounter.               metFORMIN (GLUCOPHAGE XR) 500 MG 24 hr tablet [Pharmacy Med Name: METFORMIN ER 500MG 24HR TABS] 360 tablet 3     Sig: TAKE 4 TABLETS(2000 MG) BY MOUTH DAILY       Biguanide Agents Passed - 9/1/2022  3:33 PM        Passed - Patient is age 10 or older        Passed - Patient has documented A1c within the specified period of time.     If HgbA1C is 8 or greater, it needs to be on file within the past 3 months.  If less than 8, must be on file within the past 6 months.     Recent Labs   Lab Test 08/01/22 2112   A1C 7.8*         " "    Passed - Patient's CR is NOT>1.4 OR Patient's EGFR is NOT<45 within past 12 mos.     Recent Labs   Lab Test 08/03/22  0458 10/29/21  1616 06/28/21  0828   GFRESTIMATED 77   < > 56*   GFRESTBLACK  --   --  >60    < > = values in this interval not displayed.       Recent Labs   Lab Test 08/03/22 0458   CR 1.07             Passed - Patient does NOT have a diagnosis of CHF.        Passed - Medication is active on med list        Passed - Recent (6 mo) or future (30 days) visit within the authorizing provider's specialty     Patient had office visit in the last 6 months or has a visit in the next 30 days with authorizing provider or within the authorizing provider's specialty.  See \"Patient Info\" tab in inPortal Professket, or \"Choose Columns\" in Meds & Orders section of the refill encounter.               tamsulosin (FLOMAX) 0.4 MG capsule [Pharmacy Med Name: TAMSULOSIN 0.4MG CAPSULES] 90 capsule 3     Sig: TAKE 1 CAPSULE BY MOUTH EVERY DAY       Alpha Blockers Failed - 9/1/2022  3:33 PM        Failed - Blood pressure under 140/90 in past 12 months     BP Readings from Last 3 Encounters:   08/03/22 (!) 173/91   01/13/22 (!) 160/88   12/22/21 (!) 144/82                 Passed - Recent (12 mo) or future (30 days) visit within the authorizing provider's specialty     Patient has had an office visit with the authorizing provider or a provider within the authorizing providers department within the previous 12 mos or has a future within next 30 days. See \"Patient Info\" tab in inPortal Professket, or \"Choose Columns\" in Meds & Orders section of the refill encounter.              Passed - Patient does not have Tadalafil, Vardenafil, or Sildenafil on their medication list        Passed - Medication is active on med list        Passed - Patient is 18 years of age or older           losartan (COZAAR) 100 MG tablet [Pharmacy Med Name: LOSARTAN 100MG TABLETS] 90 tablet 3     Sig: TAKE 1 TABLET(100 MG) BY MOUTH DAILY       Angiotensin-II Receptors " "Failed - 9/1/2022  3:33 PM        Failed - Last blood pressure under 140/90 in past 12 months     BP Readings from Last 3 Encounters:   08/03/22 (!) 173/91   01/13/22 (!) 160/88   12/22/21 (!) 144/82                 Passed - Recent (12 mo) or future (30 days) visit within the authorizing provider's specialty     Patient has had an office visit with the authorizing provider or a provider within the authorizing providers department within the previous 12 mos or has a future within next 30 days. See \"Patient Info\" tab in inbasket, or \"Choose Columns\" in Meds & Orders section of the refill encounter.              Passed - Medication is active on med list        Passed - Patient is age 18 or older        Passed - Normal serum creatinine on file in past 12 months     Recent Labs   Lab Test 08/03/22  0458   CR 1.07       Ok to refill medication if creatinine is low          Passed - Normal serum potassium on file in past 12 months     Recent Labs   Lab Test 08/03/22  0458   POTASSIUM 4.0                         Jonna Gonzalez, RN 09/02/22 4:46 PM  "

## 2022-09-06 ENCOUNTER — VIRTUAL VISIT (OUTPATIENT)
Dept: SLEEP MEDICINE | Facility: CLINIC | Age: 67
End: 2022-09-06
Payer: MEDICARE

## 2022-09-06 VITALS — WEIGHT: 240 LBS | BODY MASS INDEX: 33.6 KG/M2 | HEIGHT: 71 IN

## 2022-09-06 DIAGNOSIS — E66.811 CLASS 1 OBESITY DUE TO EXCESS CALORIES WITH SERIOUS COMORBIDITY AND BODY MASS INDEX (BMI) OF 33.0 TO 33.9 IN ADULT: ICD-10-CM

## 2022-09-06 DIAGNOSIS — G47.37 CENTRAL SLEEP APNEA IN CONDITIONS CLASSIFIED ELSEWHERE(327.27): ICD-10-CM

## 2022-09-06 DIAGNOSIS — G47.33 OSA (OBSTRUCTIVE SLEEP APNEA): Primary | ICD-10-CM

## 2022-09-06 DIAGNOSIS — E66.09 CLASS 1 OBESITY DUE TO EXCESS CALORIES WITH SERIOUS COMORBIDITY AND BODY MASS INDEX (BMI) OF 33.0 TO 33.9 IN ADULT: ICD-10-CM

## 2022-09-06 PROCEDURE — 99213 OFFICE O/P EST LOW 20 MIN: CPT | Mod: 95 | Performed by: INTERNAL MEDICINE

## 2022-09-06 NOTE — PATIENT INSTRUCTIONS
For general sleep health questions:   http://sleepeducation.org    For tips about PAP and COVID-19:  https://www.[facility].org/patients/patient-resources/resources/covid-19-and-home-pap-therapy.pdf    For general info about COVID-19 including vaccines:  https://[employer].org/covid19      Continue PAP therapy every night, for all hours that you are sleeping (including naps.)  As always, try to get at least 8 hours of sleep or more each day, keep a regular sleep schedule, and avoid sleep deprivation. Avoid alcohol.  Reasons that you might need a change to your pressure therapy would be weight gain or loss, waking having inadvertently removed your PAP overnight, having previously felt refreshed by sleep with CPAP use and now waking un-refreshed, and return of daytime sleepiness. Also, the development of new medical problems  (such as heart failure, stroke, medications such as narcotics) can sometimes affect breathing at night and change your PAP therapy needs.  Please bring PAP with you if you are hospitalized.  If anticipating surgery be sure to discuss with your surgeon that you have sleep apnea and use PAP therapy.    Maintain your equipment as recommended which includes routine cleaning and replacement of supplies.      Call DME for any questions regarding supplies or maintenance.    [facility], [facility] [phone]    Do not drive on engage in potentially dangerous activities if feeling sleepy.  Please follow up in sleep clinic again in 12 months.        Tips for your PAP use-    Mask fitting tips  Mask fitting exercise:    To improve your mask seal and your mobility at night, put mask on and secure in place.  Lie down in bed with full pressure and roll to one side, adjust headgear while in that position to eliminate any leaks. Repeat process rolling to other side.     The mask seal does not have to be perfect:   CPAP machines are designed to make up for small  leaks. However, you will not tolerate leaks blowing in your eyes so you will need to adjust.   Any leak should only be near or at the bottom of the mask.  We expect your mask to leak slightly at night.    Do not over-tighten the headgear straps, tighter IS NOT better, we expect minimal leak.    First try re-positioning the mask or headgear before tightening the headgear straps.  Mask leaks are expected due to changing sleeping positions. Try pulling the mask away from your skin allowing the cushion to re-inflate will minimize the leak.  If you struggle for a good fit, try turning the CPAP off and then readjust the mask by pulling it away from your face and then turning back on the CPAP.        Humidifier tips  Humidifiers can be adjusted to increase or decrease the amount of moisture according to your comfort level. You may need to adjust this frequently at first, but then might only change it with seasonal weather changes.     Try INCREASING the humidity if:  You experience a dry, irritated nasal passage or throat.  You have a runny, drippy nose or sneezing fits after using CPAP.  You experience nasal congestion during or after CPAP use.    Try DECREASING the humidity if:  You have excessive condensation or  rain out  in the tubing or mask.  Otherwise keep the tubing warm during the night by running it underneath the blankets or pillow.      Clinic visit after initial PAP set-up   Bring your equipment with you to your 5-8 week follow up clinic visit.  We will be extracting your data from the machine if not available from the cloud based modem.        Travel  Always take your equipment with you when you travel.  If you fly with your equipment bring it on with you as a carry on.  Medical equipment does not count as a carry on.    If you travel international the machines take 110-240v.  The only adapter needed is the adapter that will fit into the receptacle (outlet).    You may also want to bring an extension cord as  many hotel rooms have limited outlets at the bedside.  Do not travel with water in your humidifier chamber.     Cleaning and Maintenance Guidelines    Equipment Frequency Cleaning Method   Mask First Day    Daily      Weekly Soak mask in hot soapy water for 30 minutes, rinse and air dry.  Wipe nasal cushion with a hot soapy (Ivory, baby shampoo) cloth and rinse.  Baby wipes may also be used.  Do not use anti-bacterial soaps,Hazel  liquid soap, rubbing alcohol, bleach or ammonia.  Wash frame in hot soapy water (Ivory, baby shampoo) rinse and let air dry   Headgear Biweekly Wash in hot soapy water, rinse and air dry   Reusable Gray Filter Weekly Wash in hot soapy water, rinse, put in towel squeeze moisture out, let air dry   Disposable White Filter Check Weekly Replace when brown or gray in color; at least every 2 to 3 months   Humidifier Chamber Daily    Weekly Empty distilled water from humidifier and let air dry    Hand wash in hot soapy water, rinse and air dry   Tubing Weekly Wash in hot soapy water, rinse and let air dry   Mask, Tubing and Humidifier Chamber As needed Disinfect: Soak in 1 part distilled white vinegar to 3 parts hot water for 30 minutes, rinse well and air dry  Not the material headgear        MASK AND SUPPLY REORDERING and EQUIPMENT NEEDS through your DME and per your insurance  Reminder: Most insurance companies will allow for a new mask, headgear, tubing, and reusable gray filter every six months.  Disposable white ultra-fine filters are covered monthly.      HOME AND SAFETY INSTRUCTIONS  Do not use frayed or cracked electrical cords, multi plug adaptors, or switched receptacles  Do not immerse electrical equipment into water  Assure that electrical cords do not become a tripping hazard

## 2022-09-06 NOTE — NURSING NOTE
Patient declined individual allergy and medication review by support staff because they were just reviewed during e check in and he didn't think we needed to review them since he is seeing a sleep Doctor.     The last flu vaccine was 10-29-21.    Ana Cintron VF

## 2022-09-06 NOTE — NURSING NOTE
Sent Pt a delayed [employer] message for 1 year follow up appointment reminder.    Lalo Ashford CMA

## 2022-09-06 NOTE — PROGRESS NOTES
Ezekiel is a 67 year old who is being evaluated via a billable video visit.      How would you like to obtain your AVS? MyChart  If the video visit is dropped, the invitation should be resent by: Text to cell phone: [phone]  Will anyone else be joining your video visit? No        Video-Visit Details    Video Start Time: 11:05 AM    Type of service:  Video Visit    Video End Time:11:20 AM    Originating Location (pt. Location): Other car    Distant Location (provider location):  [facility]     Platform used f follow-up PAP therapy or Video Visit: Doximity     Chief complaint: Follow-up complex sleep apnea    History of Present Illness:67-year-old gentleman with history of hypertension, type 2 diabetes, obesity and severe complex obstructive sleep apnea.  He is currently on bilevel with a backup rate.  He is here for compliance and benefit check.  He has been very compliant with both CPAP and then  bilevel.  He feels that bilevel has been more effective than CPAP.  CPAP was changed to bilevel based on titration study for the complex sleep apnea. In general with CPAP he has been waking up less he no longer wakes up gasping.  He is not snoring.  He gets up less to go to the bathroom.  Usually takes about 15 minutes to fall asleep.    He has questions about feeding the machine clean and replacement schedule.  He has a beard and is not interested in shaving it at this time.  He does get awoken by the machine making some noise when he has leak, he fixes it and then can go back to sleep easily.    He updates me that he recently had a stent placed in his heart.        [place] Sleepiness Scale  Total score - [place]: 0 (9/2/2022  9:15 PM)   (Less than 10 normal)    Insomnia Severity Scale  SHIRAZ Total Score: 6  (normal 0-7, mild 8-14, moderate 15-21, severe 22-28)    Past Medical History:   Diagnosis Date     Acute renal failure (H)     6/11/2016, secondary to dehydration, normalized     HTN  "(hypertension)      Obesity      Type 2 diabetes mellitus (H)        Allergies   Allergen Reactions     Lisinopril Cough     Dry cough       Current Outpatient Medications   Medication     aspirin (ASA) 81 MG EC tablet     aspirin-acetaminophen-caffeine (EXCEDRIN MIGRAINE) 250-250-65 mg per tablet     atorvastatin (LIPITOR) 80 MG tablet     B-D U/F insulin pen needle     blood glucose (ACCU-CHEK GUIDE) test strip     blood glucose meter (GLUCOMETER)     carvedilol (COREG) 12.5 MG tablet     generic lancets     glipiZIDE (GLUCOTROL XL) 10 MG 24 hr tablet     LANTUS SOLOSTAR 100 UNIT/ML soln     losartan (COZAAR) 100 MG tablet     metFORMIN (GLUCOPHAGE XR) 500 MG 24 hr tablet     pen needle, diabetic (PEN NEEDLE) 31 gauge x \" Ndle     tamsulosin (FLOMAX) 0.4 MG capsule     ticagrelor (BRILINTA) 90 MG tablet     No current facility-administered medications for this visit.       Social History     Socioeconomic History     Marital status:      Spouse name: Not on file     Number of children: 5     Years of education: Not on file     Highest education level: Not on file   Occupational History     Occupation: [employer]   Tobacco Use     Smoking status: Former Smoker     Types: Cigarettes, Cigarettes     Quit date: 1985     Years since quittin.3     Smokeless tobacco: Current User     Types: Chew, Chew     Tobacco comment: 2 tins/week   Substance and Sexual Activity     Alcohol use: Yes     Alcohol/week: 2.0 standard drinks     Drug use: No     Sexual activity: Yes     Partners: Female   Other Topics Concern     Not on file   Social History Narrative    Diet-            Exercise- Not regular. Work is physical, walking.     Social Determinants of Health     Financial Resource Strain: Not on file   Food Insecurity: Not on file   Transportation Needs: Not on file   Physical Activity: Not on file   Stress: Not on file   Social Connections: Not on file   Intimate Partner Violence: Not on file   Housing " "Stability: Not on file       Family History   Problem Relation Age of Onset     Colon Cancer Mother      Coronary Artery Disease Mother      Hypertension Mother      Diabetes Type 2  Mother      Hypertension Father      Bell's palsy Father      Diabetes Type 2  Father      Thyroid Disease Father      Sleep Apnea Father      Sleep Apnea Sister      Prostate Cancer Brother 57     Sleep Apnea Brother      Bell's palsy Maternal Grandmother      Thyroid Disease Daughter            EXAM:  Ht 1.803 m (5' 11\")   Wt 108.9 kg (240 lb)   BMI 33.47 kg/m    GENERAL: Alert and no distress, short beard   EYES: Eyes grossly normal to inspection.  No discharge or erythema, or obvious scleral/conjunctival abnormalities.  RESP: No audible wheeze, cough, or visible cyanosis.  No visible retractions or increased work of breathing.    SKIN: Visible skin clear. No significant rash, abnormal pigmentation or lesions.  NEURO: Cranial nerves grossly intact.  Mentation and speech appropriate for age.  PSYCH: Mentation appears normal, affect normal, judgement and insight intact, normal speech and appearance well-groomed.       PSG 3/18/2022  Weight 240 lbs BMI 33.6  AHI 39.7, RDI 40.7  Lowest oxygen saturation was 76.0%. Time spent less than or equal to 88% was 30.0 minutes.      PSG Titration 5/15/2022  Weight 240 lbs BMI 33.6  Bilevel 15/5 Back up rate 12       ResMed   [id number] data from 8/4/2022-9/2/2022 cmH2O 30 day usage data:    96% of days with > 4 hours of use. 0/30 days with no use.   Average use 414 minutes per day.   95%ile Leak 46.45 L/min. (excessive)  IPAP 11 EPAP 5  Backup rate 12  AHI 6.36 events per hour.   On a night when there is no significant leak AHI 2.2    ASSESSMENT:  67-year-old gentleman with hypertension, type 2 diabetes, obesity, coronary artery disease, severe complex sleep apnea.  He is meeting compliance goals and getting excellent clinical benefit with bilevel with backup rate for the central component.  He " is having some intermittent leak that when excessive causes a slight elevation in hypopneas.  Overall having an AHI less than 10 is acceptable.    PLAN:  Patient is congratulated on meeting compliance goals.  He is urged to continue to use PAP therapy all night every night and continue to work on optimizing mask fit.  Encouraged him to keep his beard is trim as tolerable.  We discussed proper cleaning regimen and replacement schedule.  Patient should follow-up with me in 1 year or earlier if needed.      27 minutes spent on the date of the encounter doing chart review, history and exam, documentation and further activities per the note    Mignon Wasserman M.D.  Pulmonary/Critical Care/Sleep Medicine    [facility]   Floor 1, [address]   [address] [place], [place] [address]. [place] [address]   Appointments: [phone]    The above note was dictated using voice recognition software and may include typographical errors. Please contact the author for any clarifications.

## 2022-09-20 ENCOUNTER — OFFICE VISIT (OUTPATIENT)
Dept: FAMILY MEDICINE | Facility: CLINIC | Age: 67
End: 2022-09-20
Payer: MEDICARE

## 2022-09-20 VITALS
WEIGHT: 251.6 LBS | OXYGEN SATURATION: 96 % | DIASTOLIC BLOOD PRESSURE: 70 MMHG | TEMPERATURE: 97.8 F | BODY MASS INDEX: 35.22 KG/M2 | HEIGHT: 71 IN | SYSTOLIC BLOOD PRESSURE: 136 MMHG | HEART RATE: 73 BPM

## 2022-09-20 DIAGNOSIS — H91.93 BILATERAL HEARING LOSS, UNSPECIFIED HEARING LOSS TYPE: ICD-10-CM

## 2022-09-20 DIAGNOSIS — M54.50 CHRONIC LOW BACK PAIN, UNSPECIFIED BACK PAIN LATERALITY, UNSPECIFIED WHETHER SCIATICA PRESENT: ICD-10-CM

## 2022-09-20 DIAGNOSIS — G47.33 OBSTRUCTIVE SLEEP APNEA (ADULT) (PEDIATRIC): ICD-10-CM

## 2022-09-20 DIAGNOSIS — G89.29 CHRONIC LOW BACK PAIN, UNSPECIFIED BACK PAIN LATERALITY, UNSPECIFIED WHETHER SCIATICA PRESENT: ICD-10-CM

## 2022-09-20 DIAGNOSIS — I25.83 CORONARY ARTERY DISEASE DUE TO LIPID RICH PLAQUE: ICD-10-CM

## 2022-09-20 DIAGNOSIS — Z00.00 WELLNESS EXAMINATION: Primary | ICD-10-CM

## 2022-09-20 DIAGNOSIS — E78.2 MIXED HYPERLIPIDEMIA: ICD-10-CM

## 2022-09-20 DIAGNOSIS — E11.69 TYPE 2 DIABETES MELLITUS WITH OTHER SPECIFIED COMPLICATION, WITH LONG-TERM CURRENT USE OF INSULIN (H): ICD-10-CM

## 2022-09-20 DIAGNOSIS — F52.8 PSYCHOSEXUAL DYSFUNCTION WITH INHIBITED SEXUAL EXCITEMENT: ICD-10-CM

## 2022-09-20 DIAGNOSIS — Z79.4 TYPE 2 DIABETES MELLITUS WITH OTHER SPECIFIED COMPLICATION, WITH LONG-TERM CURRENT USE OF INSULIN (H): ICD-10-CM

## 2022-09-20 DIAGNOSIS — Z12.5 SCREENING FOR PROSTATE CANCER: ICD-10-CM

## 2022-09-20 DIAGNOSIS — I25.10 CORONARY ARTERY DISEASE DUE TO LIPID RICH PLAQUE: ICD-10-CM

## 2022-09-20 PROBLEM — K63.5 POLYP OF COLON: Status: RESOLVED | Noted: 2021-12-14 | Resolved: 2022-09-20

## 2022-09-20 PROBLEM — G47.37: Status: RESOLVED | Noted: 2022-05-18 | Resolved: 2022-09-20

## 2022-09-20 PROBLEM — K57.30 DIVERTICULAR DISEASE OF LARGE INTESTINE: Status: RESOLVED | Noted: 2021-12-14 | Resolved: 2022-09-20

## 2022-09-20 PROBLEM — Z86.0100 HISTORY OF COLONIC POLYPS: Status: RESOLVED | Noted: 2021-12-16 | Resolved: 2022-09-20

## 2022-09-20 PROBLEM — K64.9 HEMORRHOIDS: Status: RESOLVED | Noted: 2021-12-14 | Resolved: 2022-09-20

## 2022-09-20 PROBLEM — R07.9 CHEST PAIN, UNSPECIFIED TYPE: Status: RESOLVED | Noted: 2022-08-01 | Resolved: 2022-09-20

## 2022-09-20 LAB
ANION GAP SERPL CALCULATED.3IONS-SCNC: 9 MMOL/L (ref 7–15)
BUN SERPL-MCNC: 22.8 MG/DL (ref 8–23)
CALCIUM SERPL-MCNC: 9.2 MG/DL (ref 8.8–10.2)
CHLORIDE SERPL-SCNC: 105 MMOL/L (ref 98–107)
CHOLEST SERPL-MCNC: 147 MG/DL
CREAT SERPL-MCNC: 1.2 MG/DL (ref 0.67–1.17)
DEPRECATED HCO3 PLAS-SCNC: 24 MMOL/L (ref 22–29)
GFR SERPL CREATININE-BSD FRML MDRD: 67 ML/MIN/1.73M2
GLUCOSE SERPL-MCNC: 178 MG/DL (ref 70–99)
HDLC SERPL-MCNC: 49 MG/DL
LDLC SERPL CALC-MCNC: 50 MG/DL
NONHDLC SERPL-MCNC: 98 MG/DL
POTASSIUM SERPL-SCNC: 4.5 MMOL/L (ref 3.4–5.3)
PSA SERPL-MCNC: 0.27 NG/ML (ref 0–4.5)
SODIUM SERPL-SCNC: 138 MMOL/L (ref 136–145)
TRIGL SERPL-MCNC: 242 MG/DL

## 2022-09-20 PROCEDURE — G0103 PSA SCREENING: HCPCS | Performed by: FAMILY MEDICINE

## 2022-09-20 PROCEDURE — 80048 BASIC METABOLIC PNL TOTAL CA: CPT | Performed by: FAMILY MEDICINE

## 2022-09-20 PROCEDURE — 99214 OFFICE O/P EST MOD 30 MIN: CPT | Mod: 25 | Performed by: FAMILY MEDICINE

## 2022-09-20 PROCEDURE — G0438 PPPS, INITIAL VISIT: HCPCS | Performed by: FAMILY MEDICINE

## 2022-09-20 PROCEDURE — 36415 COLL VENOUS BLD VENIPUNCTURE: CPT | Performed by: FAMILY MEDICINE

## 2022-09-20 PROCEDURE — 80061 LIPID PANEL: CPT | Performed by: FAMILY MEDICINE

## 2022-09-20 ASSESSMENT — ENCOUNTER SYMPTOMS
JOINT SWELLING: 0
EYE PAIN: 0
CHILLS: 0
HEMATURIA: 0
PARESTHESIAS: 0
NERVOUS/ANXIOUS: 0
HEADACHES: 0
PALPITATIONS: 0
DYSURIA: 0
NAUSEA: 0
DIARRHEA: 0
FREQUENCY: 0
ARTHRALGIAS: 0
WEAKNESS: 0
CONSTIPATION: 0
HEMATOCHEZIA: 0
DIZZINESS: 0
HEARTBURN: 0
COUGH: 0
SORE THROAT: 0
MYALGIAS: 0
SHORTNESS OF BREATH: 0
ABDOMINAL PAIN: 0
FEVER: 0

## 2022-09-20 ASSESSMENT — ACTIVITIES OF DAILY LIVING (ADL): CURRENT_FUNCTION: NO ASSISTANCE NEEDED

## 2022-09-20 NOTE — PROGRESS NOTES
"SUBJECTIVE:   Ezekiel is a 67 year old who presents for Preventive Visit.  Patient has been advised of split billing requirements and indicates understanding: Yes  Are you in the first 12 months of your Medicare coverage?  No    HPI  Patient comes in for his annual Medicare wellness examination.  The patient was admitted in early August after having chest pain for several months, he was diagnosed with acute coronary syndrome he underwent a drug-eluting stent to the left anterior descending artery.  Patient is feeling better, he only went to 1 cardiac rehab and then due to some ankle issues and scheduling he has not gone to anymore but his work is very physical he is up and down steps and the like and is doing well.    Patient's diabetes has not been optimally controlled A1c was 7.8 on admission, he does not check his blood sugars at all, does not appear that there is any significant adjustments to his diabetic medications.    Patient's blood pressure is well controlled at this time.    The patient reports that he is on a bilevel CPAP machine which has been extremely helpful he is able to sleep through the night.    Patient's wife believes he is not hearing as well and believes that he should at least have a hearing evaluation.    Patient has tried both the maximum dose of Cialis and Viagra he has not had good results with either these medications now for several years.              Healthy Habits:     In general, how would you rate your overall health?  Good    Frequency of exercise:  None    Do you usually eat at least 4 servings of fruit and vegetables a day, include whole grains    & fiber and avoid regularly eating high fat or \"junk\" foods?  No    Taking medications regularly:  Yes    Barriers to taking medications:  None    Ability to successfully perform activities of daily living:  No assistance needed    Home Safety:  No safety concerns identified    Hearing Impairment:  Feel that people are mumbling or not " speaking clearly    In the past 6 months, have you been bothered by leaking of urine?  No    In general, how would you rate your overall mental or emotional health?  Good      PHQ-2 Total Score: 0    Additional concerns today:  Yes    Do you feel safe in your environment? Yes    Have you ever done Advance Care Planning? (For example, a Health Directive, POLST, or a discussion with a medical provider or your loved ones about your wishes): No, advance care planning information given to patient to review.  Patient declined advance care planning discussion at this time.       Fall risk  Fallen 2 or more times in the past year?: No  Any fall with injury in the past year?: No    Cognitive Screening   1) Repeat 3 items (Leader, Season, Table)    2) Clock draw: NORMAL  3) 3 item recall: Recalls 1 object   Results: ABNORMAL clock, 1-2 items recalled: PROBABLE COGNITIVE IMPAIRMENT, **INFORM PROVIDER**    Mini-CogTM Copyright EMILIANO Bell. Licensed by the author for use in [facility]; reprinted with permission (lorena@[facility]). All rights reserved.      Do you have sleep apnea, excessive snoring or daytime drowsiness?: yes    Reviewed and updated as needed this visit by clinical staff   Tobacco  Allergies  Meds   Med Hx  Surg Hx  Fam Hx  Soc Hx          Reviewed and updated as needed this visit by Provider                   Social History     Tobacco Use     Smoking status: Former Smoker     Types: Cigarettes, Cigarettes     Quit date: 1985     Years since quittin.4     Smokeless tobacco: Current User     Types: Chew, Chew     Tobacco comment: 2 tins/week   Substance Use Topics     Alcohol use: Yes     Alcohol/week: 2.0 standard drinks         Alcohol Use 2022   Prescreen: >3 drinks/day or >7 drinks/week? No           -------------------------------------    Current providers sharing in care for this patient include:   Patient Care Team:  Janes Jeronimo MD as PCP - General  Janes Jeronimo MD as  Assigned PCP  Cortez Charlton DPM as Assigned Musculoskeletal Provider  Mignon Wasserman MD as Assigned Pulmonology Provider  Karlene Blevins MD as MD (Cardiovascular Disease)    The following health maintenance items are reviewed in Epic and correct as of today:  Health Maintenance   Topic Date Due     ANNUAL REVIEW OF HM ORDERS  Never done     HEPATITIS C SCREENING  Never done     ZOSTER IMMUNIZATION (2 of 3) 03/20/2017     AORTIC ANEURYSM SCREENING (SYSTEM ASSIGNED)  Never done     COVID-19 Vaccine (2 - Booster for Renato series) 06/03/2021     MEDICARE ANNUAL WELLNESS VISIT  06/28/2022     MICROALBUMIN  06/28/2022     INFLUENZA VACCINE (1) 09/01/2022     Pneumococcal Vaccine: 65+ Years (2 - PCV) 06/28/2022     DIABETIC FOOT EXAM  10/29/2022     COLORECTAL CANCER SCREENING  12/14/2022     A1C  02/01/2023     LIPID  08/02/2023     BMP  08/03/2023     EYE EXAM  08/09/2023     FALL RISK ASSESSMENT  09/20/2023     DTAP/TDAP/TD IMMUNIZATION (3 - Td or Tdap) 02/02/2026     ADVANCE CARE PLANNING  06/28/2026     PHQ-2 (once per calendar year)  Completed     IPV IMMUNIZATION  Aged Out     MENINGITIS IMMUNIZATION  Aged Out     Lab work is in process           Review of Systems   Constitutional: Negative for chills and fever.   HENT: Positive for hearing loss. Negative for congestion, ear pain and sore throat.    Eyes: Negative for pain and visual disturbance.   Respiratory: Negative for cough and shortness of breath.    Cardiovascular: Negative for chest pain, palpitations and peripheral edema.   Gastrointestinal: Negative for abdominal pain, constipation, diarrhea, heartburn, hematochezia and nausea.   Genitourinary: Positive for impotence. Negative for dysuria, frequency, genital sores, hematuria, penile discharge and urgency.   Musculoskeletal: Negative for arthralgias, joint swelling and myalgias.   Skin: Negative for rash.   Neurological: Negative for dizziness, weakness, headaches and paresthesias.  "  Psychiatric/Behavioral: Negative for mood changes. The patient is not nervous/anxious.      Constitutional, HEENT, cardiovascular, pulmonary, GI, , musculoskeletal, neuro, skin, endocrine and psych systems are negative, except as otherwise noted.    OBJECTIVE:   /70   Pulse 73   Temp 97.8  F (36.6  C) (Oral)   Ht 1.803 m (5' 11\")   Wt 114.1 kg (251 lb 9.6 oz)   SpO2 96%   BMI 35.09 kg/m   Estimated body mass index is 35.09 kg/m  as calculated from the following:    Height as of this encounter: 1.803 m (5' 11\").    Weight as of this encounter: 114.1 kg (251 lb 9.6 oz).  Physical Exam  GENERAL: healthy, alert and no distress  EYES: Eyes grossly normal to inspection, PERRL and conjunctivae and sclerae normal  HENT: ear canals and TM's normal, nose and mouth without ulcers or lesions  NECK: no adenopathy, no asymmetry, masses, or scars and thyroid normal to palpation  RESP: lungs clear to auscultation - no rales, rhonchi or wheezes  CV: regular rate and rhythm, normal S1 S2, no S3 or S4, no murmur, click or rub, no peripheral edema and peripheral pulses strong  ABDOMEN: soft, nontender, no hepatosplenomegaly, no masses and bowel sounds normal  MS: no gross musculoskeletal defects noted, no edema  SKIN: no suspicious lesions or rashes  NEURO: Normal strength and tone, mentation intact and speech normal  PSYCH: mentation appears normal, affect normal/bright    Diagnostic Test Results:  Labs reviewed in Epic    ASSESSMENT / PLAN:   (Z00.00) Wellness examination  (primary encounter diagnosis)  Comment: Patient's health is compromised by multiple comorbidities, suboptimal diabetes among other issues  Plan:        (G47.33) Obstructive Sleep Apnea  Comment: Patient is doing well with bilevel CPAP  Plan:      (E78.2) Hyperlipidemia  Comment: Atorvastatin  Plan: Basic metabolic panel, Lipid panel reflex to         direct LDL Fasting             (E11.69,  Z79.4) Type 2 diabetes mellitus (H)  Comment: Suboptimal " "control most recent A1c is 7.8  Plan:      (I25.10,  I25.83) Coronary artery disease   Comment: New diagnosis of coronary artery disease in August, status post stent placement.  Plan: Adult Cardiology Eval  Referral             (H91.93) Bilateral hearing loss  Comment: Subjective sensation of diminished auditory acuity  Plan: Adult ENT  Referral, Adult Audiology          Referral             (F52.8) Male Erectile Disorder  Comment: The patient has not had optimal response to either Cialis or Viagra.  Plan: Adult Urology  Referral             (Z12.5) Screening for prostate cancer  Comment: No family history of  Plan: PROSTATE SPEC ANTIGEN SCREEN             PLAN:  1.  Referral to cardiology, patient does need follow-up.  2.  Referral to ENT and audiology  3.  Referral to urology  4.  Laboratory studies as above  5.  Patient is due for vaccinations, he will consider  6.  Want to see the patient back in mid November for follow-up of his comorbidities particularly diabetes.          COUNSELING:  Reviewed preventive health counseling, as reflected in patient instructions       Regular exercise       Healthy diet/nutrition    Estimated body mass index is 35.09 kg/m  as calculated from the following:    Height as of this encounter: 1.803 m (5' 11\").    Weight as of this encounter: 114.1 kg (251 lb 9.6 oz).    Weight management plan: Discussed healthy diet and exercise guidelines    He reports that he quit smoking about 37 years ago. His smoking use included cigarettes and cigarettes. His smokeless tobacco use includes chew and chew.      Appropriate preventive services were discussed with this patient, including applicable screening as appropriate for cardiovascular disease, diabetes, osteopenia/osteoporosis, and glaucoma.  As appropriate for age/gender, discussed screening for colorectal cancer, prostate cancer, breast cancer, and cervical cancer. Checklist reviewing preventive services " available has been given to the patient.    Reviewed patients plan of care and provided an AVS. The Intermediate Care Plan ( asthma action plan, low back pain action plan, and migraine action plan) for Ezekiel meets the Care Plan requirement. This Care Plan has been established and reviewed with the Patient.    Counseling Resources:  ATP IV Guidelines  Pooled Cohorts Equation Calculator  Breast Cancer Risk Calculator  Breast Cancer: Medication to Reduce Risk  FRAX Risk Assessment  ICSI Preventive Guidelines  Dietary Guidelines for Americans, 2010  USDA's MyPlate  ASA Prophylaxis  Lung CA Screening    Janes Jeronimo MD  [facility]    Identified Health Risks:

## 2022-09-20 NOTE — LETTER
September 20, 2022      Ezekiel Aldrich  [address] [place] [address]        Dear ,    We are writing to inform you of your test results.  Creatinine the kidney test is just barely elevated this is not significant the rest of the kidney tests are normal.  Cholesterol is now under much better control, just a slight elevation of triglycerides.  The PSA or prostate test is normal    See us again in 2 months        Resulted Orders   Basic metabolic panel   Result Value Ref Range    Sodium 138 136 - 145 mmol/L    Potassium 4.5 3.4 - 5.3 mmol/L    Chloride 105 98 - 107 mmol/L    Carbon Dioxide (CO2) 24 22 - 29 mmol/L    Anion Gap 9 7 - 15 mmol/L    Urea Nitrogen 22.8 8.0 - 23.0 mg/dL    Creatinine 1.20 (H) 0.67 - 1.17 mg/dL    Calcium 9.2 8.8 - 10.2 mg/dL    Glucose 178 (H) 70 - 99 mg/dL    GFR Estimate 67 >60 mL/min/1.73m2      Comment:      Effective December 21, 2021 eGFRcr in adults is calculated using the 2021 CKD-EPI creatinine equation which includes age and gender (Sd reyna al., NEJ, DOI: 10.1056/WXMFar6500932)   Lipid panel reflex to direct LDL Fasting   Result Value Ref Range    Cholesterol 147 <200 mg/dL    Triglycerides 242 (H) <150 mg/dL    Direct Measure HDL 49 >=40 mg/dL    LDL Cholesterol Calculated 50 <=100 mg/dL    Non HDL Cholesterol 98 <130 mg/dL    Narrative    Cholesterol  Desirable:  <200 mg/dL    Triglycerides  Normal:  Less than 150 mg/dL  Borderline High:  150-199 mg/dL  High:  200-499 mg/dL  Very High:  Greater than or equal to 500 mg/dL    Direct Measure HDL  Female:  Greater than or equal to 50 mg/dL   Male:  Greater than or equal to 40 mg/dL    LDL Cholesterol  Desirable:  <100mg/dL  Above Desirable:  100-129 mg/dL   Borderline High:  130-159 mg/dL   High:  160-189 mg/dL   Very High:  >= 190 mg/dL    Non HDL Cholesterol  Desirable:  130 mg/dL  Above Desirable:  130-159 mg/dL  Borderline High:  160-189 mg/dL  High:  190-219 mg/dL  Very High:  Greater than or equal to 220  mg/dL   PROSTATE SPEC ANTIGEN SCREEN   Result Value Ref Range    Prostate Specific Antigen Screen 0.27 0.00 - 4.50 ng/mL    Narrative    This result is obtained using the Roche Elecsys total PSA method on the baldemar e801 immunoassay analyzer. Results obtained with different assay methods or kits cannot be used interchangeably.       If you have any questions or concerns, please call the clinic at the number listed above.       Sincerely,      Janes Jeronimo MD

## 2022-10-09 ENCOUNTER — HEALTH MAINTENANCE LETTER (OUTPATIENT)
Age: 67
End: 2022-10-09

## 2022-10-18 DIAGNOSIS — I24.9 ACS (ACUTE CORONARY SYNDROME) (H): ICD-10-CM

## 2022-10-18 RX ORDER — ATORVASTATIN CALCIUM 80 MG/1
TABLET, FILM COATED ORAL
Qty: 90 TABLET | Refills: 2 | Status: SHIPPED | OUTPATIENT
Start: 2022-10-18 | End: 2023-12-04

## 2022-11-01 ENCOUNTER — OFFICE VISIT (OUTPATIENT)
Dept: CARDIOLOGY | Facility: CLINIC | Age: 67
End: 2022-11-01
Attending: FAMILY MEDICINE
Payer: MEDICARE

## 2022-11-01 VITALS
BODY MASS INDEX: 34.44 KG/M2 | HEIGHT: 71 IN | RESPIRATION RATE: 16 BRPM | HEART RATE: 78 BPM | SYSTOLIC BLOOD PRESSURE: 134 MMHG | WEIGHT: 246 LBS | DIASTOLIC BLOOD PRESSURE: 78 MMHG

## 2022-11-01 DIAGNOSIS — E78.2 MIXED HYPERLIPIDEMIA: ICD-10-CM

## 2022-11-01 DIAGNOSIS — I25.10 CORONARY ARTERY DISEASE DUE TO LIPID RICH PLAQUE: Primary | ICD-10-CM

## 2022-11-01 DIAGNOSIS — I35.0 NONRHEUMATIC AORTIC VALVE STENOSIS: ICD-10-CM

## 2022-11-01 DIAGNOSIS — I25.83 CORONARY ARTERY DISEASE DUE TO LIPID RICH PLAQUE: Primary | ICD-10-CM

## 2022-11-01 PROCEDURE — 99214 OFFICE O/P EST MOD 30 MIN: CPT | Performed by: INTERNAL MEDICINE

## 2022-11-01 NOTE — PATIENT INSTRUCTIONS
Continue current medications  Will plan to change Brilinta for clopidogrel when your supply is due to run out  Follow up in August 2023 with echo prior to the visit

## 2022-11-01 NOTE — LETTER
11/1/2022    Janes Jeronimo MD  [address] [place] [address]    RE: Ezekiel Aldrich       Dear Colleague,     I had the pleasure of seeing Ezekiel Aldrich in the [facility].      Thank you, Dr. Janes Jeronimo, for asking the [facility] team to see Mr. Ezekiel Aldrich to follow-up on coronary artery disease, aortic stenosis.    Assessment/Recommendations   Assessment:    1.  Coronary artery disease, status post drug-eluting stenting of the left anterior descending for unstable angina in early August 2022.  He is doing well since then with no anginal symptoms.  Elected not to participate in outpatient cardiac rehab given his issues with his ankle.  At this point we will continue with medical therapy and aggressive risk factor modification.  He is having financial issues paying for his Brilinta.  Suggested when he finishes his current supply in 2 months that we switch him to clopidogrel with a loading dose on the first day.  2.  Aortic valve stenosis, mild by echocardiogram in August 2022.  We will plan follow-up echo in 1 year.  3.  Mixed hyperlipidemia with excellent LDL control at 50.      Plan:  1.  Continue current medications for now.  2.  Plan to switch from Brilinta to clopidogrel in 2 to 3 months when his current supply of Brilinta runs out  3.  Plan echo in July 2023 and follow-up visit with me in August 2023 unless change in symptoms arise.       History of Present Illness    Mr. Ezekiel Aldrich is a 67 year old male with history of essential hypertension, mixed hyperlipidemia, 2-2 diabetes mellitus who presented in early August to [facility] with complaint of accelerating chest discomfort.  Did rule in for non-ST elevation MI/unstable angina and subsequently underwent coronary angiography demonstrating severe disease in the left anterior descending.  He underwent successful percutaneous intervention with drug-eluting stents.  His postprocedure  "course was unremarkable and he was discharged home on dual antiplatelet therapy including Brilinta.  He was also noted to have a systolic murmur on examination with a subsequent echo showing mild aortic valve stenosis    Today in the office, he states he has been feeling well.  Denies any recurrence of the anginal symptoms.  No lightheadedness.  Is concerned about the cost of Brilinta which is quite expensive.  He just received a new prescription.  Told him when that runs out, I would switch him to clopidogrel with a loading dose the first day.    ECG (personally reviewed): No ECG today    Cardiac Imaging Studies (personally reviewed): No new imaging     Physical Examination Review of Systems   /78 (BP Location: Left arm, Patient Position: Sitting, Cuff Size: Adult Large)   Pulse 78   Resp 16   Ht 1.803 m (5' 11\")   Wt 111.6 kg (246 lb)   BMI 34.31 kg/m    Body mass index is 34.31 kg/m .  Wt Readings from Last 3 Encounters:   11/01/22 111.6 kg (246 lb)   09/20/22 114.1 kg (251 lb 9.6 oz)   09/06/22 108.9 kg (240 lb)     General Appearance:   Awake, Alert, No acute distress.   HEENT:  No scleral icterus; the mucous membranes were pink and moist.   Neck: No cervical bruits or jugular venous distention    Chest: The spine was straight. The chest was symmetric.   Lungs:   Respirations unlabored; the lungs are clear to auscultation. No wheezing   Cardiovascular:    Regular rate and rhythm.  S1, S2 normal.  No murmur or gallop   Abdomen:  No organomegaly, masses, bruits, or tenderness. Bowels sounds are present   Extremities:  No peripheral edema bilaterally   Skin: No xanthelasma. Warm, Dry.   Musculoskeletal: No tenderness.   Neurologic: Mood and affect are appropriate.    Enc Vitals  BP: 134/78  Pulse: 78  Resp: 16  Weight: 111.6 kg (246 lb) (With shoes.)  Height: 180.3 cm (5' 11\")                                         Medical History  Surgical History Family History Social History   Past Medical History: "   Diagnosis Date     Acute renal failure (H)     2016, secondary to dehydration, normalized    Past Surgical History:   Procedure Laterality Date     ANKLE FRACTURE SURGERY Right     hardware     CV CORONARY ANGIOGRAM N/A 2022    Procedure: Coronary Angiogram;  Surgeon: Archie Ni MD;  Location: [facility] CATH LAB CV     CV INTRAVASULAR ULTRASOUND N/A 2022    Procedure: Intravascular Ultrasound;  Surgeon: Archie Ni MD;  Location: [facility] CATH LAB CV     CV PCI N/A 2022    Percutaneous Coronary Intervention;  Surgeon: Archie Ni MD;  Location: [facility] CATH LAB CV     KNEE SURGERY       MANDIBLE FRACTURE SURGERY      from fx     SHOULDER SURGERY Right     from fx and separation    Family History   Problem Relation Age of Onset     Colon Cancer Mother      Coronary Artery Disease Mother      Hypertension Mother      Diabetes Type 2  Mother      Hypertension Father      Bell's palsy Father      Diabetes Type 2  Father      Thyroid Disease Father      Sleep Apnea Father      Sleep Apnea Sister      Prostate Cancer Brother 57     Sleep Apnea Brother      Bell's palsy Maternal Grandmother      Thyroid Disease Daughter     Social History     Socioeconomic History     Marital status:      Spouse name: Not on file     Number of children: 5     Years of education: Not on file     Highest education level: Not on file   Occupational History     Occupation: [employer]   Tobacco Use     Smoking status: Former     Types: Cigarettes     Quit date: 1985     Years since quittin.5     Smokeless tobacco: Current     Types: Chew     Tobacco comments:     2 tins/week   Substance and Sexual Activity     Alcohol use: Yes     Alcohol/week: 2.0 standard drinks     Drug use: No     Sexual activity: Yes     Partners: Female   Other Topics Concern     Not on file   Social History Narrative    Diet-            Exercise- Not regular. Work is physical, walking.     Social  Determinants of Health     Financial Resource Strain: Not on file   Food Insecurity: Not on file   Transportation Needs: Not on file   Physical Activity: Not on file   Stress: Not on file   Social Connections: Not on file   Intimate Partner Violence: Not on file   Housing Stability: Not on file          Medications  Allergies   Current Outpatient Medications   Medication Sig Dispense Refill     aspirin (ASA) 81 MG EC tablet Take 1 tablet (81 mg) by mouth daily Start tomorrow. 30 tablet 3     atorvastatin (LIPITOR) 80 MG tablet TAKE 1 TABLET BY MOUTH DAILY FOR HIGH CHOLESTEROL 90 tablet 2     B-D U/F insulin pen needle USE TWICE DAILY TO INJECT VICTOZA AND LANTUS 100 each 5     blood glucose (ACCU-CHEK GUIDE) test strip USE 1 TEST TWICE DAILY 200 strip 0     blood glucose meter (GLUCOMETER) [BLOOD GLUCOSE METER (GLUCOMETER)] Use 1 each As Directed as needed. Dispense glucometer brand per patient's insurance at pharmacy discretion. 1 each 0     carvedilol (COREG) 12.5 MG tablet TAKE 1 TABLET(12.5 MG) BY MOUTH TWICE DAILY WITH MEALS 180 tablet 1     generic lancets [GENERIC LANCETS] Use 1 each As Directed 2 (two) times a day. Dispense brand per patient's insurance at pharmacy discretion. 200 each 3     glipiZIDE (GLUCOTROL XL) 10 MG 24 hr tablet TAKE 2 TABLETS BY MOUTH DAILY 180 tablet 3     LANTUS SOLOSTAR 100 UNIT/ML soln ADMINISTER 66 UNITS UNDER THE SKIN AT BEDTIME 30 mL 2     losartan (COZAAR) 100 MG tablet TAKE 1 TABLET(100 MG) BY MOUTH DAILY 90 tablet 3     metFORMIN (GLUCOPHAGE XR) 500 MG 24 hr tablet TAKE 4 TABLETS(2000 MG) BY MOUTH DAILY 360 tablet 3     tamsulosin (FLOMAX) 0.4 MG capsule TAKE 1 CAPSULE BY MOUTH EVERY DAY 90 capsule 3      Allergies   Allergen Reactions     Lisinopril Cough     Dry cough         Lab Results    Chemistry/lipid CBC Cardiac Enzymes/BNP/TSH/INR   Recent Labs   Lab Test 09/20/22  0850   TRIG 242*   LDL 50   BUN 22.8      CO2 24    Recent Labs   Lab Test 08/03/22  7608  08/01/22 2112   WBC  --  8.4   HGB 15.6 16.1   HCT  --  45.1   MCV  --  92   * 186    Recent Labs   Lab Test 08/02/22  0458 08/02/22  0155 08/01/22  2112 10/29/21  1616   TROPONINI 0.16   < > 0.12  --    BNP  --   --  89*  --    TSH  --   --   --  3.18   INR  --   --  0.92  --     < > = values in this interval not displayed.        A total of 42 minutes was spent reviewing patient's medical records, obtaining history and performing examination, as well as discussing diagnoses/ recommendations with patient and answering all questions.                    Thank you for allowing me to participate in the care of your patient.      Sincerely,     Karlene Blevins MD     [facility]  cc:   Janes Jeronimo MD  [address] [place] [address]

## 2022-11-01 NOTE — PROGRESS NOTES
Thank you, Dr. Janes Jeronimo, for asking the [facility] team to see Mr. Ezekiel Aldrich to follow-up on coronary artery disease, aortic stenosis.    Assessment/Recommendations   Assessment:    1.  Coronary artery disease, status post drug-eluting stenting of the left anterior descending for unstable angina in early August 2022.  He is doing well since then with no anginal symptoms.  Elected not to participate in outpatient cardiac rehab given his issues with his ankle.  At this point we will continue with medical therapy and aggressive risk factor modification.  He is having financial issues paying for his Brilinta.  Suggested when he finishes his current supply in 2 months that we switch him to clopidogrel with a loading dose on the first day.  2.  Aortic valve stenosis, mild by echocardiogram in August 2022.  We will plan follow-up echo in 1 year.  3.  Mixed hyperlipidemia with excellent LDL control at 50.      Plan:  1.  Continue current medications for now.  2.  Plan to switch from Brilinta to clopidogrel in 2 to 3 months when his current supply of Brilinta runs out  3.  Plan echo in July 2023 and follow-up visit with me in August 2023 unless change in symptoms arise.       History of Present Illness    Mr. Ezekiel Aldrich is a 67 year old male with history of essential hypertension, mixed hyperlipidemia, 2-2 diabetes mellitus who presented in early August to [facility] with complaint of accelerating chest discomfort.  Did rule in for non-ST elevation MI/unstable angina and subsequently underwent coronary angiography demonstrating severe disease in the left anterior descending.  He underwent successful percutaneous intervention with drug-eluting stents.  His postprocedure course was unremarkable and he was discharged home on dual antiplatelet therapy including Brilinta.  He was also noted to have a systolic murmur on examination with a subsequent echo showing mild aortic valve  "stenosis    Today in the office, he states he has been feeling well.  Denies any recurrence of the anginal symptoms.  No lightheadedness.  Is concerned about the cost of Brilinta which is quite expensive.  He just received a new prescription.  Told him when that runs out, I would switch him to clopidogrel with a loading dose the first day.    ECG (personally reviewed): No ECG today    Cardiac Imaging Studies (personally reviewed): No new imaging     Physical Examination Review of Systems   /78 (BP Location: Left arm, Patient Position: Sitting, Cuff Size: Adult Large)   Pulse 78   Resp 16   Ht 1.803 m (5' 11\")   Wt 111.6 kg (246 lb)   BMI 34.31 kg/m    Body mass index is 34.31 kg/m .  Wt Readings from Last 3 Encounters:   11/01/22 111.6 kg (246 lb)   09/20/22 114.1 kg (251 lb 9.6 oz)   09/06/22 108.9 kg (240 lb)     General Appearance:   Awake, Alert, No acute distress.   HEENT:  No scleral icterus; the mucous membranes were pink and moist.   Neck: No cervical bruits or jugular venous distention    Chest: The spine was straight. The chest was symmetric.   Lungs:   Respirations unlabored; the lungs are clear to auscultation. No wheezing   Cardiovascular:    Regular rate and rhythm.  S1, S2 normal.  No murmur or gallop   Abdomen:  No organomegaly, masses, bruits, or tenderness. Bowels sounds are present   Extremities:  No peripheral edema bilaterally   Skin: No xanthelasma. Warm, Dry.   Musculoskeletal: No tenderness.   Neurologic: Mood and affect are appropriate.    Enc Vitals  BP: 134/78  Pulse: 78  Resp: 16  Weight: 111.6 kg (246 lb) (With shoes.)  Height: 180.3 cm (5' 11\")                                         Medical History  Surgical History Family History Social History   Past Medical History:   Diagnosis Date     Acute renal failure (H)     6/11/2016, secondary to dehydration, normalized    Past Surgical History:   Procedure Laterality Date     ANKLE FRACTURE SURGERY Right     hardware     CV " CORONARY ANGIOGRAM N/A 2022    Procedure: Coronary Angiogram;  Surgeon: Archie Ni MD;  Location: [facility] CATH LAB CV     CV INTRAVASULAR ULTRASOUND N/A 2022    Procedure: Intravascular Ultrasound;  Surgeon: Archie Ni MD;  Location: [facility] CATH LAB CV     CV PCI N/A 2022    Percutaneous Coronary Intervention;  Surgeon: Archie Ni MD;  Location: [facility] CATH LAB CV     KNEE SURGERY       MANDIBLE FRACTURE SURGERY      from fx     SHOULDER SURGERY Right     from fx and separation    Family History   Problem Relation Age of Onset     Colon Cancer Mother      Coronary Artery Disease Mother      Hypertension Mother      Diabetes Type 2  Mother      Hypertension Father      Bell's palsy Father      Diabetes Type 2  Father      Thyroid Disease Father      Sleep Apnea Father      Sleep Apnea Sister      Prostate Cancer Brother 57     Sleep Apnea Brother      Bell's palsy Maternal Grandmother      Thyroid Disease Daughter     Social History     Socioeconomic History     Marital status:      Spouse name: Not on file     Number of children: 5     Years of education: Not on file     Highest education level: Not on file   Occupational History     Occupation: [employer]   Tobacco Use     Smoking status: Former     Types: Cigarettes     Quit date: 1985     Years since quittin.5     Smokeless tobacco: Current     Types: Chew     Tobacco comments:     2 tins/week   Substance and Sexual Activity     Alcohol use: Yes     Alcohol/week: 2.0 standard drinks     Drug use: No     Sexual activity: Yes     Partners: Female   Other Topics Concern     Not on file   Social History Narrative    Diet-            Exercise- Not regular. Work is physical, walking.     Social Determinants of Health     Financial Resource Strain: Not on file   Food Insecurity: Not on file   Transportation Needs: Not on file   Physical Activity: Not on file   Stress: Not on file   Social Connections:  Not on file   Intimate Partner Violence: Not on file   Housing Stability: Not on file          Medications  Allergies   Current Outpatient Medications   Medication Sig Dispense Refill     aspirin (ASA) 81 MG EC tablet Take 1 tablet (81 mg) by mouth daily Start tomorrow. 30 tablet 3     atorvastatin (LIPITOR) 80 MG tablet TAKE 1 TABLET BY MOUTH DAILY FOR HIGH CHOLESTEROL 90 tablet 2     B-D U/F insulin pen needle USE TWICE DAILY TO INJECT VICTOZA AND LANTUS 100 each 5     blood glucose (ACCU-CHEK GUIDE) test strip USE 1 TEST TWICE DAILY 200 strip 0     blood glucose meter (GLUCOMETER) [BLOOD GLUCOSE METER (GLUCOMETER)] Use 1 each As Directed as needed. Dispense glucometer brand per patient's insurance at pharmacy discretion. 1 each 0     carvedilol (COREG) 12.5 MG tablet TAKE 1 TABLET(12.5 MG) BY MOUTH TWICE DAILY WITH MEALS 180 tablet 1     generic lancets [GENERIC LANCETS] Use 1 each As Directed 2 (two) times a day. Dispense brand per patient's insurance at pharmacy discretion. 200 each 3     glipiZIDE (GLUCOTROL XL) 10 MG 24 hr tablet TAKE 2 TABLETS BY MOUTH DAILY 180 tablet 3     LANTUS SOLOSTAR 100 UNIT/ML soln ADMINISTER 66 UNITS UNDER THE SKIN AT BEDTIME 30 mL 2     losartan (COZAAR) 100 MG tablet TAKE 1 TABLET(100 MG) BY MOUTH DAILY 90 tablet 3     metFORMIN (GLUCOPHAGE XR) 500 MG 24 hr tablet TAKE 4 TABLETS(2000 MG) BY MOUTH DAILY 360 tablet 3     tamsulosin (FLOMAX) 0.4 MG capsule TAKE 1 CAPSULE BY MOUTH EVERY DAY 90 capsule 3      Allergies   Allergen Reactions     Lisinopril Cough     Dry cough         Lab Results    Chemistry/lipid CBC Cardiac Enzymes/BNP/TSH/INR   Recent Labs   Lab Test 09/20/22  0850   TRIG 242*   LDL 50   BUN 22.8      CO2 24    Recent Labs   Lab Test 08/03/22 0458 08/01/22 2112   WBC  --  8.4   HGB 15.6 16.1   HCT  --  45.1   MCV  --  92   * 186    Recent Labs   Lab Test 08/02/22  0458 08/02/22  0155 08/01/22  2112 10/29/21  1616   TROPONINI 0.16   < > 0.12  --    BNP   --   --  89*  --    TSH  --   --   --  3.18   INR  --   --  0.92  --     < > = values in this interval not displayed.        A total of 42 minutes was spent reviewing patient's medical records, obtaining history and performing examination, as well as discussing diagnoses/ recommendations with patient and answering all questions.

## 2022-11-04 DIAGNOSIS — Z79.4 TYPE 2 DIABETES MELLITUS WITHOUT COMPLICATION, WITH LONG-TERM CURRENT USE OF INSULIN (H): ICD-10-CM

## 2022-11-04 DIAGNOSIS — E11.9 TYPE 2 DIABETES MELLITUS WITHOUT COMPLICATION, WITH LONG-TERM CURRENT USE OF INSULIN (H): ICD-10-CM

## 2022-11-05 RX ORDER — INSULIN GLARGINE 100 [IU]/ML
INJECTION, SOLUTION SUBCUTANEOUS
Qty: 30 ML | Refills: 2 | Status: SHIPPED | OUTPATIENT
Start: 2022-11-05 | End: 2023-04-27

## 2022-11-05 NOTE — TELEPHONE ENCOUNTER
"Last Written Prescription Date:  6/22/22  Last Fill Quantity: 30,  # refills: 0   Last office visit provider:  9/20/22     Requested Prescriptions   Pending Prescriptions Disp Refills     LANTUS SOLOSTAR 100 UNIT/ML soln [Pharmacy Med Name: LANTUS SOLOSTAR PEN INJ 3ML] 30 mL 2     Sig: ADMINISTER 66 UNITS UNDER THE SKIN AT BEDTIME       Long Acting Insulin Protocol Passed - 11/4/2022  3:55 AM        Passed - Serum creatinine on file in past 12 months     Recent Labs   Lab Test 09/20/22  0850   CR 1.20*       Ok to refill medication if creatinine is low          Passed - HgbA1C in past 3 or 6 months     If HgbA1C is 8 or greater, it needs to be on file within the past 3 months.  If less than 8, must be on file within the past 6 months.     Recent Labs   Lab Test 08/01/22 2112   A1C 7.8*             Passed - Medication is active on med list        Passed - Patient is age 18 or older        Passed - Recent (6 mo) or future (30 days) visit within the authorizing provider's specialty     Patient had office visit in the last 6 months or has a visit in the next 30 days with authorizing provider or within the authorizing provider's specialty.  See \"Patient Info\" tab in inbasket, or \"Choose Columns\" in Meds & Orders section of the refill encounter.                 Jonna Gonzalez RN 11/05/22 3:40 PM  "

## 2022-11-22 DIAGNOSIS — I25.83 CORONARY ARTERY DISEASE DUE TO LIPID RICH PLAQUE: Primary | ICD-10-CM

## 2022-11-22 DIAGNOSIS — I25.10 CORONARY ARTERY DISEASE DUE TO LIPID RICH PLAQUE: Primary | ICD-10-CM

## 2022-11-22 RX ORDER — CLOPIDOGREL BISULFATE 75 MG/1
TABLET ORAL
Qty: 90 TABLET | Refills: 3 | Status: ON HOLD | OUTPATIENT
Start: 2022-11-22 | End: 2023-11-20

## 2023-01-30 ENCOUNTER — MYC MEDICAL ADVICE (OUTPATIENT)
Dept: FAMILY MEDICINE | Facility: CLINIC | Age: 68
End: 2023-01-30
Payer: MEDICARE

## 2023-01-30 NOTE — TELEPHONE ENCOUNTER
Called patient regarding my chart message.    Patient states he only checks blood sugars once daily and was 305 today first check in am, prior to medications. Patient states he has noticed his equilibrium being of recently, denies any symptoms of headache or any other symptoms    Advised office visit in clinic with another provider due to Dr. Jeronimo being out of clinic.     Patient scheduled tomorrow am with Dr. Nixon.

## 2023-01-31 ENCOUNTER — LAB (OUTPATIENT)
Dept: FAMILY MEDICINE | Facility: CLINIC | Age: 68
End: 2023-01-31

## 2023-01-31 ENCOUNTER — MYC REFILL (OUTPATIENT)
Dept: FAMILY MEDICINE | Facility: CLINIC | Age: 68
End: 2023-01-31

## 2023-01-31 ENCOUNTER — OFFICE VISIT (OUTPATIENT)
Dept: FAMILY MEDICINE | Facility: CLINIC | Age: 68
End: 2023-01-31
Payer: MEDICARE

## 2023-01-31 VITALS
HEIGHT: 71 IN | BODY MASS INDEX: 34.23 KG/M2 | SYSTOLIC BLOOD PRESSURE: 134 MMHG | WEIGHT: 244.5 LBS | DIASTOLIC BLOOD PRESSURE: 68 MMHG | OXYGEN SATURATION: 96 % | HEART RATE: 80 BPM

## 2023-01-31 DIAGNOSIS — I25.83 CORONARY ARTERY DISEASE DUE TO LIPID RICH PLAQUE: ICD-10-CM

## 2023-01-31 DIAGNOSIS — R35.1 BENIGN PROSTATIC HYPERPLASIA WITH NOCTURIA: ICD-10-CM

## 2023-01-31 DIAGNOSIS — I10 PRIMARY HYPERTENSION: ICD-10-CM

## 2023-01-31 DIAGNOSIS — L97.511 DIABETIC ULCER OF TOE OF RIGHT FOOT ASSOCIATED WITH TYPE 2 DIABETES MELLITUS, LIMITED TO BREAKDOWN OF SKIN (H): ICD-10-CM

## 2023-01-31 DIAGNOSIS — I25.10 CORONARY ARTERY DISEASE DUE TO LIPID RICH PLAQUE: ICD-10-CM

## 2023-01-31 DIAGNOSIS — Z12.11 SCREEN FOR COLON CANCER: ICD-10-CM

## 2023-01-31 DIAGNOSIS — G47.33 OBSTRUCTIVE SLEEP APNEA (ADULT) (PEDIATRIC): ICD-10-CM

## 2023-01-31 DIAGNOSIS — E11.621 DIABETIC ULCER OF TOE OF RIGHT FOOT ASSOCIATED WITH TYPE 2 DIABETES MELLITUS, LIMITED TO BREAKDOWN OF SKIN (H): Primary | ICD-10-CM

## 2023-01-31 DIAGNOSIS — E11.621 DIABETIC ULCER OF TOE OF RIGHT FOOT ASSOCIATED WITH TYPE 2 DIABETES MELLITUS, LIMITED TO BREAKDOWN OF SKIN (H): ICD-10-CM

## 2023-01-31 DIAGNOSIS — E11.9 TYPE 2 DIABETES MELLITUS WITHOUT COMPLICATION, WITH LONG-TERM CURRENT USE OF INSULIN (H): ICD-10-CM

## 2023-01-31 DIAGNOSIS — Z79.4 TYPE 2 DIABETES MELLITUS WITHOUT COMPLICATION, WITH LONG-TERM CURRENT USE OF INSULIN (H): ICD-10-CM

## 2023-01-31 DIAGNOSIS — N40.1 BENIGN PROSTATIC HYPERPLASIA WITH NOCTURIA: ICD-10-CM

## 2023-01-31 DIAGNOSIS — L97.511 DIABETIC ULCER OF TOE OF RIGHT FOOT ASSOCIATED WITH TYPE 2 DIABETES MELLITUS, LIMITED TO BREAKDOWN OF SKIN (H): Primary | ICD-10-CM

## 2023-01-31 LAB
CREAT UR-MCNC: 66.6 MG/DL
HBA1C MFR BLD: 11.9 % (ref 0–5.6)
MICROALBUMIN UR-MCNC: 103 MG/L
MICROALBUMIN/CREAT UR: 154.65 MG/G CR (ref 0–17)

## 2023-01-31 PROCEDURE — 90662 IIV NO PRSV INCREASED AG IM: CPT | Performed by: FAMILY MEDICINE

## 2023-01-31 PROCEDURE — 36415 COLL VENOUS BLD VENIPUNCTURE: CPT | Performed by: FAMILY MEDICINE

## 2023-01-31 PROCEDURE — 83036 HEMOGLOBIN GLYCOSYLATED A1C: CPT | Performed by: FAMILY MEDICINE

## 2023-01-31 PROCEDURE — 82043 UR ALBUMIN QUANTITATIVE: CPT | Performed by: FAMILY MEDICINE

## 2023-01-31 PROCEDURE — 99215 OFFICE O/P EST HI 40 MIN: CPT | Mod: 25 | Performed by: FAMILY MEDICINE

## 2023-01-31 PROCEDURE — 82570 ASSAY OF URINE CREATININE: CPT | Performed by: FAMILY MEDICINE

## 2023-01-31 RX ORDER — TICAGRELOR 90 MG/1
1 TABLET ORAL
COMMUNITY
Start: 2022-12-12 | End: 2023-10-12

## 2023-01-31 NOTE — PROGRESS NOTES
Assessment & Plan     Patient presents with:  Diabetes: Had stint put in August, was having intermittent heart pain the last 3 months. States he feels lightheaded and that his equilibrium is off from all of his medications he is on. Brain fog. Feels that his work is taking a toll on him.        Ezekiel was seen today for diabetes.    Diagnoses and all orders for this visit:    Diabetic ulcer of toe of right foot associated with type 2 diabetes mellitus, limited to breakdown of skin (H)  Comments:  gradual worsening of diabetes today A1c 11.2, 6 month ago 7.8  Orders:  -     Albumin Random Urine Quantitative with Creat Ratio; Future  -     HEMOGLOBIN A1C; Future  -     Albumin Random Urine Quantitative with Creat Ratio  -     HEMOGLOBIN A1C  -     semaglutide (OZEMPIC) 2 MG/1.5ML SOPN pen; Inject 0.25 mg Subcutaneous every 7 days for 90 days  -     Med Therapy Management Referral    Screen for colon cancer  -     KENNY([facility]); Future    Coronary artery disease due to lipid rich plaque  Comments:  taking excedrine migraine 2-3 times per wk ( extra ASA and caffine ), will be switching to plavix after done with brilinta     Benign prostatic hyperplasia with nocturia    Obstructive Sleep Apnea  Comments:  usin CPAP machine     Primary hypertension    Other orders  -     REVIEW OF HEALTH MAINTENANCE PROTOCOL ORDERS  -     ZOSTER VACCINE RECOMBINANT ADJUVANTED (SHINGRIX)  -     Pneumococcal 20 Valent Conjugate (PCV20); Future  -     COVID-19,PF,PFIZER BOOSTER BIVALENT (12+YRS); Future  -     INFLUENZA, QUAD, HIGH DOSE, PF, 65YR + (FLUZONE HD); Future  -     INFLUENZA, QUAD, HIGH DOSE, PF, 65YR + (FLUZONE HD)       Patient Instructions   Waldo Falcon ,    As we discussed during clinic visit your diabetes is gradually getting worse.  Continue 2000 mg of metformin with 20 mg of glipizide as before.  Lantus 66 units in the morning  Also be added at once weekly injection because Ozempic if it is covered by insurance  that will be the best treatment option to help bring the A1c down as well as some weight loss which usually happen with this medication  Also referral to our pharmacist who will help manage your medications.  I did offer you to do diabetic education if you change your mind I am happy to refer you to them.  Continue your heart medicine as before   Also recommend do one-time dose of pneumococcal vaccine to prevent pneumonia  We did give you a flu shot today  Follow-up 3 months for diabetic care    Chelo Nixon MD   [facility].  [phone]          Return in about 3 months (around 4/30/2023) for Follow up.      Subjective   Ezekiel WALTER Pennington 67 year old who presents for the following health issues     HPI   Answers for HPI/ROS submitted by the patient on 1/30/2023  On average, how many sweetened beverages do you drink each day (Examples: soda, juice, sweet tea, etc.  Do NOT count diet or artificially sweetened beverages)?: 0  How many minutes a day do you exercise enough to make your heart beat faster?: 9 or less  How many days a week do you exercise enough to make your heart beat faster?: 3 or less  How many days per week do you miss taking your medication?: 1  What makes it hard for you to take your medication every day?: remembering to take  What is the reason for your visit today?: equilibrium off, high blood sugar count, weakness, semi blurred vision  When did your symptoms begin?: More than a month  What are your symptoms?: same as above  How would you describe these symptoms?: Moderate  Are your symptoms:: Worsening  Have you had these symptoms before?: No      Diabetes Follow-up     How often are you checking your blood sugar? One time daily  What time of day are you checking your blood sugars (select all that apply)?  morning  Have you had any blood sugars above 200?  Yes, have been in 300's the past 2 weeks, normally runs 120-140  Have you had any blood sugars below 70?  No    What  symptoms do you notice when your blood sugar is low?  Wilda    What concerns do you have today about your diabetes? None and Blood sugar is often over 200     Do you have any of these symptoms? (Select all that apply)  No numbness or tingling in feet.  No redness, sores or blisters on feet.  No complaints of excessive thirst.  No reports of blurry vision.  No significant changes to weight., Sore on toe was not related to DM he states.       Hyperlipidemia Follow-Up      Are you regularly taking any medication or supplement to lower your cholesterol?   Yes- Lipitor     Are you having muscle aches or other side effects that you think could be caused by your cholesterol lowering medication?  No    Hypertension Follow-up      Do you check your blood pressure regularly outside of the clinic? No     Are you following a low salt diet? No    Are your blood pressures ever more than 140 on the top number (systolic) OR more   than 90 on the bottom number (diastolic), for example 140/90? No    BP Readings from Last 2 Encounters:   01/31/23 134/68   11/01/22 134/78     Hemoglobin A1C (%)   Date Value   01/31/2023 11.9 (H)   08/01/2022 7.8 (H)     LDL Cholesterol Calculated   Date Value   09/20/2022 50 mg/dL   08/02/2022      Comment:     Cannot estimate LDL when triglyceride exceeds 400 mg/dL     LDL Cholesterol Direct   Date Value   10/29/2021 68 mg/dL   06/28/2021 103 mg/dl       Vascular Disease Follow-up      How often do you take nitroglycerin? Never    Do you take an aspirin every day? Yes      How many servings of fruits and vegetables do you eat daily?  2-3    On average, how many sweetened beverages do you drink each day (Examples: soda, juice, sweet tea, etc.  Do NOT count diet or artificially sweetened beverages)?   1    How many days per week do you exercise enough to make your heart beat faster? 3 or less    How many minutes a day do you exercise enough to make your heart beat faster? 9 or less    How many days per  week do you miss taking your medication? 0        Patient Active Problem List   Diagnosis     Hypertension     Obstructive Sleep Apnea     Male Erectile Disorder     Type 2 diabetes mellitus (H)     Low back pain     BPH (benign prostatic hyperplasia)     Microalbuminuria     Retinopathy     Obesity (BMI 35.0-39.9) with comorbidity (H)     Peripheral neuropathy     Diabetic ulcer of toe of right foot associated with type 2 diabetes mellitus, limited to breakdown of skin (H)     Coronary artery disease        Current Outpatient Medications   Medication     ASPIRIN LOW DOSE 81 MG EC tablet     atorvastatin (LIPITOR) 80 MG tablet     B-D U/F insulin pen needle     blood glucose (ACCU-CHEK GUIDE) test strip     blood glucose meter (GLUCOMETER)     BRILINTA 90 MG tablet     carvedilol (COREG) 12.5 MG tablet     generic lancets     glipiZIDE (GLUCOTROL XL) 10 MG 24 hr tablet     LANTUS SOLOSTAR 100 UNIT/ML soln     losartan (COZAAR) 100 MG tablet     metFORMIN (GLUCOPHAGE XR) 500 MG 24 hr tablet     semaglutide (OZEMPIC) 2 MG/1.5ML SOPN pen     tamsulosin (FLOMAX) 0.4 MG capsule     clopidogrel (PLAVIX) 75 MG tablet     No current facility-administered medications for this visit.          Social Determinants of Health     Tobacco Use: High Risk     Smoking Tobacco Use: Former     Smokeless Tobacco Use: Current     Passive Exposure: Not on file   Alcohol Use: Not on file   Financial Resource Strain: Not on file   Food Insecurity: Not on file   Transportation Needs: Not on file   Physical Activity: Not on file   Stress: Not on file   Social Connections: Not on file   Intimate Partner Violence: Not on file   Depression: Not at risk     PHQ-2 Score: 1   Housing Stability: Not on file        Review of Systems   Constitutional, HEENT, cardiovascular, pulmonary, GI, , musculoskeletal, neuro, skin, endocrine and psych systems are negative, except as otherwise noted.      Objective    /68 (BP Location: Left arm, Patient  "Position: Sitting, Cuff Size: Adult Large)   Pulse 80   Ht 1.803 m (5' 11\")   Wt 110.9 kg (244 lb 8 oz)   SpO2 96%   BMI 34.10 kg/m     LMP 06/01/2011   There is no height or weight on file to calculate BMI.  Physical Exam   GENERAL: healthy, alert and no distress  NECK: no adenopathy, no asymmetry, masses, or scars and thyroid normal to palpation  RESP: lungs clear to auscultation - no rales, rhonchi or wheezes  CV: regular rate and rhythm, normal S1 S2, no S3 or S4, no murmur, click or rub, no peripheral edema and peripheral pulses strong    I spent 40 minutes with the patient, >50% of which was in counseling regarding the patient's medical issues as noted above.  Chelo Nixon MD   [facility].  [phone]   "

## 2023-01-31 NOTE — PATIENT INSTRUCTIONS
Waldo Falcon ,    As we discussed during clinic visit your diabetes is gradually getting worse.  Continue 2000 mg of metformin with 20 mg of glipizide as before.  Lantus 66 units in the morning  Also be added at once weekly injection because Ozempic if it is covered by insurance that will be the best treatment option to help bring the A1c down as well as some weight loss which usually happen with this medication  Also referral to our pharmacist who will help manage your medications.  I did offer you to do diabetic education if you change your mind I am happy to refer you to them.  Also recommend do one-time dose of pneumococcal vaccine to prevent pneumonia  We did give you a flu shot today  Follow-up 3 months for diabetic care    Chelo Nixon MD   [facility].  [phone]

## 2023-02-01 RX ORDER — BLOOD SUGAR DIAGNOSTIC
STRIP MISCELLANEOUS
Qty: 200 STRIP | Refills: 0 | Status: SHIPPED | OUTPATIENT
Start: 2023-02-01 | End: 2023-07-07

## 2023-02-02 ENCOUNTER — TELEPHONE (OUTPATIENT)
Dept: FAMILY MEDICINE | Facility: CLINIC | Age: 68
End: 2023-02-02
Payer: MEDICARE

## 2023-02-02 NOTE — TELEPHONE ENCOUNTER
MTM referral from: [facility] visit (referral by provider) for chronic medical problems    MTM referral outreach attempt #2 on February 2, 2023 at 9:12 AM      Outcome: Patient not reachable after several attempts, will route to [facility] Pharmacist/Provider as an FYI.  [facility] scheduling number is [phone].  Thank you for the referral.    Eloy Jackson, [facility]     Patient has [employer] coverage

## 2023-02-03 NOTE — TELEPHONE ENCOUNTER
See MyChart from Patient needing PCP review.  Please respond directly to patient, if at all able.    ENA Randolph  [facility]

## 2023-02-03 NOTE — TELEPHONE ENCOUNTER
Prescription approved per [facility] Refill Protocol.    ENA Randolph [facility]

## 2023-02-06 NOTE — TELEPHONE ENCOUNTER
[employer] message sent with MTM information and scheduling phone number.       Marci Wang, PharmD, BCACP  Medication Management (MTM) Pharmacist  [facility]

## 2023-02-14 ENCOUNTER — OFFICE VISIT (OUTPATIENT)
Dept: FAMILY MEDICINE | Facility: CLINIC | Age: 68
End: 2023-02-14
Payer: MEDICARE

## 2023-02-14 VITALS
OXYGEN SATURATION: 100 % | TEMPERATURE: 97.7 F | BODY MASS INDEX: 34.44 KG/M2 | HEIGHT: 71 IN | HEART RATE: 81 BPM | SYSTOLIC BLOOD PRESSURE: 132 MMHG | WEIGHT: 246 LBS | DIASTOLIC BLOOD PRESSURE: 80 MMHG | RESPIRATION RATE: 18 BRPM

## 2023-02-14 DIAGNOSIS — Z23 NEED FOR VACCINATION: ICD-10-CM

## 2023-02-14 DIAGNOSIS — E11.69 TYPE 2 DIABETES MELLITUS WITH OTHER SPECIFIED COMPLICATION, WITH LONG-TERM CURRENT USE OF INSULIN (H): Primary | ICD-10-CM

## 2023-02-14 DIAGNOSIS — I10 PRIMARY HYPERTENSION: ICD-10-CM

## 2023-02-14 DIAGNOSIS — Z79.4 TYPE 2 DIABETES MELLITUS WITH OTHER SPECIFIED COMPLICATION, WITH LONG-TERM CURRENT USE OF INSULIN (H): Primary | ICD-10-CM

## 2023-02-14 DIAGNOSIS — I25.83 CORONARY ARTERY DISEASE DUE TO LIPID RICH PLAQUE: ICD-10-CM

## 2023-02-14 DIAGNOSIS — Z12.11 SCREEN FOR COLON CANCER: ICD-10-CM

## 2023-02-14 DIAGNOSIS — I25.10 CORONARY ARTERY DISEASE DUE TO LIPID RICH PLAQUE: ICD-10-CM

## 2023-02-14 PROCEDURE — G0009 ADMIN PNEUMOCOCCAL VACCINE: HCPCS | Performed by: FAMILY MEDICINE

## 2023-02-14 PROCEDURE — 90677 PCV20 VACCINE IM: CPT | Performed by: FAMILY MEDICINE

## 2023-02-14 PROCEDURE — 99214 OFFICE O/P EST MOD 30 MIN: CPT | Mod: 25 | Performed by: FAMILY MEDICINE

## 2023-02-14 NOTE — PROGRESS NOTES
Assessment & Plan     (E11.69,  Z79.4) Type 2 diabetes mellitus  (H)  (primary encounter diagnosis)  Comment: Poorly controlled with A1c 11.9  Plan:      (Z12.11) Screen for colon cancer  Comment: Patient is due for but need to defer while he is on a blood thinner  Plan:      (I10) Hypertension  Comment: Well-controlled  Plan:      (I25.10,  I25.83) Coronary artery disease   Comment: Clinically stable status post stent placement  Plan:      (Z23) Need for vaccination  Comment:    Plan: Pneumococcal 20 Valent Conjugate (PCV20)             PLAN:  1.  Patient is going to continue his medications, the Ozempic appears to be helping lower blood sugars.  2.  Longer term goal is to see if I can get the patient off of both glipizide and insulin  3.  Pneumonia 20 vaccine  4.  3-month follow-up                   Return in about 3 months (around 5/14/2023) for Follow up, with me, in person.    Janes Jeronimo MD  [facility]   Ezekiel is a 67 year old, presenting for the following health issues:    Patient comes in for follow-up of his type 2 diabetes mellitus, I saw the patient in September he was not feeling well he was seen by colleague end of January his A1c had jumped to over 11 he was started on Ozempic, he is also made improvements to his diet and his blood sugars are coming down from 3-4 100s to he had a blood sugar of 130.  Only side effect he can think of is may be some neck pain that he gets a day or 2 after the injection    I told the patient for now I want to keep him on all of his diabetic medications we may be able to make some adjustments in the future but not now    Also has a history of coronary artery disease he had a stent placement last August he is on a blood thinner      DM Follow up and Recheck Medication      History of Present Illness       Reason for visit:  Check up after new medication    He eats 2-3 servings of fruits and vegetables daily.He consumes 1  "sweetened beverage(s) daily.He exercises with enough effort to increase his heart rate 9 or less minutes per day.  He exercises with enough effort to increase his heart rate 3 or less days per week.   He is taking medications regularly.             Review of Systems         Objective    BP (!) 142/72   Pulse 81   Temp 97.7  F (36.5  C) (Temporal)   Resp 18   Ht 1.803 m (5' 11\")   Wt 111.6 kg (246 lb)   SpO2 100%   BMI 34.31 kg/m    Body mass index is 34.31 kg/m .  Physical Exam                       "

## 2023-02-27 ENCOUNTER — MYC REFILL (OUTPATIENT)
Dept: FAMILY MEDICINE | Facility: CLINIC | Age: 68
End: 2023-02-27
Payer: MEDICARE

## 2023-02-27 DIAGNOSIS — L97.511 DIABETIC ULCER OF TOE OF RIGHT FOOT ASSOCIATED WITH TYPE 2 DIABETES MELLITUS, LIMITED TO BREAKDOWN OF SKIN (H): ICD-10-CM

## 2023-02-27 DIAGNOSIS — E11.621 DIABETIC ULCER OF TOE OF RIGHT FOOT ASSOCIATED WITH TYPE 2 DIABETES MELLITUS, LIMITED TO BREAKDOWN OF SKIN (H): ICD-10-CM

## 2023-02-28 NOTE — TELEPHONE ENCOUNTER
"Routing refill request to provider for review/approval because:  Labs out of range:  cr    Last Written Prescription Date:  2/3/23  Last Fill Quantity: 2.25,  # refills: 0   Last office visit provider:  2/14/23     Requested Prescriptions   Pending Prescriptions Disp Refills     semaglutide (OZEMPIC) 2 MG/1.5ML SOPN pen 2.25 mL 0     Sig: Inject 0.25 mg Subcutaneous every 7 days       GLP-1 Agonists Protocol Failed - 2/27/2023  5:35 PM        Failed - Normal serum creatinine on file in past 12 months     Recent Labs   Lab Test 09/20/22  0850   CR 1.20*       Ok to refill medication if creatinine is low          Passed - HgbA1C in past 3 or 6 months     If HgbA1C is 8 or greater, it needs to be on file within the past 3 months.  If less than 8, must be on file within the past 6 months.     Recent Labs   Lab Test 01/31/23  0828   A1C 11.9*             Passed - Medication is active on med list        Passed - Patient is age 18 or older        Passed - Recent (6 mo) or future (30 days) visit within the authorizing provider's specialty     Patient had office visit in the last 6 months or has a visit in the next 30 days with authorizing provider.  See \"Patient Info\" tab in inbasket, or \"Choose Columns\" in Meds & Orders section of the refill encounter.                 Jonna Gonzalez RN 02/28/23 2:32 PM  "

## 2023-03-03 DIAGNOSIS — E11.621 DIABETIC ULCER OF TOE OF RIGHT FOOT ASSOCIATED WITH TYPE 2 DIABETES MELLITUS, LIMITED TO BREAKDOWN OF SKIN (H): ICD-10-CM

## 2023-03-03 DIAGNOSIS — L97.511 DIABETIC ULCER OF TOE OF RIGHT FOOT ASSOCIATED WITH TYPE 2 DIABETES MELLITUS, LIMITED TO BREAKDOWN OF SKIN (H): ICD-10-CM

## 2023-03-04 RX ORDER — SEMAGLUTIDE 1.34 MG/ML
INJECTION, SOLUTION SUBCUTANEOUS
OUTPATIENT
Start: 2023-03-04

## 2023-03-07 ENCOUNTER — TRANSFERRED RECORDS (OUTPATIENT)
Dept: HEALTH INFORMATION MANAGEMENT | Facility: CLINIC | Age: 68
End: 2023-03-07

## 2023-03-07 LAB — RETINOPATHY: NEGATIVE

## 2023-04-18 ENCOUNTER — TRANSFERRED RECORDS (OUTPATIENT)
Dept: HEALTH INFORMATION MANAGEMENT | Facility: CLINIC | Age: 68
End: 2023-04-18
Payer: MEDICARE

## 2023-05-03 DIAGNOSIS — E11.69 TYPE 2 DIABETES MELLITUS WITH OTHER SPECIFIED COMPLICATION, WITHOUT LONG-TERM CURRENT USE OF INSULIN (H): ICD-10-CM

## 2023-05-03 RX ORDER — GLIPIZIDE 10 MG/1
TABLET, FILM COATED, EXTENDED RELEASE ORAL
Qty: 180 TABLET | Refills: 3 | OUTPATIENT
Start: 2023-05-03

## 2023-05-07 DIAGNOSIS — E11.621 DIABETIC ULCER OF TOE OF RIGHT FOOT ASSOCIATED WITH TYPE 2 DIABETES MELLITUS, LIMITED TO BREAKDOWN OF SKIN (H): ICD-10-CM

## 2023-05-07 DIAGNOSIS — L97.511 DIABETIC ULCER OF TOE OF RIGHT FOOT ASSOCIATED WITH TYPE 2 DIABETES MELLITUS, LIMITED TO BREAKDOWN OF SKIN (H): ICD-10-CM

## 2023-05-07 NOTE — TELEPHONE ENCOUNTER
"Routing refill request to provider for review/approval because:  Labs not current    Last Written Prescription Date:  2/28/23  Last Fill Quantity: 2.25 ml,  # refills: 0   Last office visit provider:  2/14/23     Requested Prescriptions   Pending Prescriptions Disp Refills     OZEMPIC (0.25 OR 0.5 MG/DOSE) 2 MG/1.5ML SOPN pen [Pharmacy Med Name: OZEMPIC 0.25-0.5 MG/DOSE PEN]       Sig: INJECT 0.25MG SUBCUTANEOUSLY EVERY 7 DAYS       GLP-1 Agonists Protocol Failed - 5/7/2023 11:45 AM        Failed - HgbA1C in past 3 or 6 months     If HgbA1C is 8 or greater, it needs to be on file within the past 3 months.  If less than 8, must be on file within the past 6 months.     Recent Labs   Lab Test 01/31/23  0828   A1C 11.9*             Failed - Normal serum creatinine on file in past 12 months     Recent Labs   Lab Test 09/20/22  0850   CR 1.20*       Ok to refill medication if creatinine is low          Passed - Medication is active on med list        Passed - Patient is age 18 or older        Passed - Recent (6 mo) or future (30 days) visit within the authorizing provider's specialty     Patient had office visit in the last 6 months or has a visit in the next 30 days with authorizing provider.  See \"Patient Info\" tab in inbasket, or \"Choose Columns\" in Meds & Orders section of the refill encounter.                 HARPAL DE LA ROSA RN 05/07/23 3:34 PM  "

## 2023-05-08 RX ORDER — SEMAGLUTIDE 1.34 MG/ML
INJECTION, SOLUTION SUBCUTANEOUS
Qty: 1.5 ML | Refills: 5 | Status: SHIPPED | OUTPATIENT
Start: 2023-05-08 | End: 2023-12-01 | Stop reason: ALTCHOICE

## 2023-06-22 NOTE — TELEPHONE ENCOUNTER
Pt informed that his sleeping pill has been sent to the pharmacy.   Sarecycline Pregnancy And Lactation Text: This medication is Pregnancy Category D and not consider safe during pregnancy. It is also excreted in breast milk.

## 2023-07-06 ENCOUNTER — TELEPHONE (OUTPATIENT)
Dept: FAMILY MEDICINE | Facility: CLINIC | Age: 68
End: 2023-07-06

## 2023-07-06 NOTE — TELEPHONE ENCOUNTER
Patient Returning Call    Reason for call:  Diabetes Follow up- Physical scheduled in September    Information relayed to patient:  n/a    Patient has additional questions:  Yes    What are your questions/concerns:  Pt has scheduled Physical in [facility] and would like to know if this would suffice following message about Diabetes Follow up?     Who does the patient want to speak with:  Provider    Is an  needed?:  No      Could we send this information to you in [facility] or would you prefer to receive a phone call?:   Patient would prefer a phone call   Okay to leave a detailed message?: Yes at Other phone number:  [phone]

## 2023-07-07 ENCOUNTER — HOSPITAL ENCOUNTER (OUTPATIENT)
Dept: CARDIOLOGY | Facility: CLINIC | Age: 68
Discharge: HOME | End: 2023-07-07
Attending: INTERNAL MEDICINE | Admitting: INTERNAL MEDICINE
Payer: MEDICARE

## 2023-07-07 DIAGNOSIS — E11.9 TYPE 2 DIABETES MELLITUS WITHOUT COMPLICATION, WITH LONG-TERM CURRENT USE OF INSULIN (H): ICD-10-CM

## 2023-07-07 DIAGNOSIS — I25.83 CORONARY ARTERY DISEASE DUE TO LIPID RICH PLAQUE: ICD-10-CM

## 2023-07-07 DIAGNOSIS — I35.0 NONRHEUMATIC AORTIC VALVE STENOSIS: ICD-10-CM

## 2023-07-07 DIAGNOSIS — I25.10 CORONARY ARTERY DISEASE DUE TO LIPID RICH PLAQUE: ICD-10-CM

## 2023-07-07 DIAGNOSIS — Z79.4 TYPE 2 DIABETES MELLITUS WITHOUT COMPLICATION, WITH LONG-TERM CURRENT USE OF INSULIN (H): ICD-10-CM

## 2023-07-07 LAB — LVEF ECHO: NORMAL

## 2023-07-07 PROCEDURE — 999N000208 ECHOCARDIOGRAM COMPLETE

## 2023-07-07 PROCEDURE — 255N000002 HC RX 255 OP 636: Performed by: INTERNAL MEDICINE

## 2023-07-07 PROCEDURE — 93306 TTE W/DOPPLER COMPLETE: CPT | Mod: 26 | Performed by: INTERNAL MEDICINE

## 2023-07-07 RX ORDER — BLOOD SUGAR DIAGNOSTIC
STRIP MISCELLANEOUS
Qty: 200 STRIP | Refills: 0 | Status: SHIPPED | OUTPATIENT
Start: 2023-07-07 | End: 2023-11-28

## 2023-07-07 RX ADMIN — PERFLUTREN 2.5 ML: 6.52 INJECTION, SUSPENSION INTRAVENOUS at 08:53

## 2023-07-07 NOTE — TELEPHONE ENCOUNTER
"Last Written Prescription Date:  2/1/2023  Last Fill Quantity: 200,  # refills: 0   Last office visit provider:  2/14/2023     Requested Prescriptions   Pending Prescriptions Disp Refills     blood glucose (ACCU-CHEK GUIDE) test strip 200 strip 0     Sig: USE 1 TEST TWICE DAILY       Diabetic Supplies Protocol Passed - 7/7/2023  1:48 PM        Passed - Medication is active on med list        Passed - Patient is 18 years of age or older        Passed - Recent (6 mo) or future (30 days) visit within the authorizing provider's specialty     Patient had office visit in the last 6 months or has a visit in the next 30 days with authorizing provider.  See \"Patient Info\" tab in inbasket, or \"Choose Columns\" in Meds & Orders section of the refill encounter.                 Kamille Boyd RN 07/07/23 5:17 PM  "

## 2023-07-18 DIAGNOSIS — I10 ESSENTIAL HYPERTENSION: ICD-10-CM

## 2023-07-18 NOTE — TELEPHONE ENCOUNTER
"Routing refill request to provider for review/approval because:  Labs out of range:  Creatinine  Early refill request    Last Written Prescription Date:  9/2/2022  Last Fill Quantity: 90,  # refills: 3   Last office visit provider:  2/14/2023     Requested Prescriptions   Pending Prescriptions Disp Refills     losartan (COZAAR) 100 MG tablet [Pharmacy Med Name: LOSARTAN 100MG TABLETS] 90 tablet 3     Sig: TAKE 1 TABLET(100 MG) BY MOUTH DAILY       Angiotensin-II Receptors Failed - 7/18/2023  8:07 AM        Failed - Normal serum creatinine on file in past 12 months     Recent Labs   Lab Test 09/20/22  0850   CR 1.20*       Ok to refill medication if creatinine is low          Passed - Last blood pressure under 140/90 in past 12 months     BP Readings from Last 3 Encounters:   02/14/23 132/80   01/31/23 134/68   11/01/22 134/78                 Passed - Recent (12 mo) or future (30 days) visit within the authorizing provider's specialty     Patient has had an office visit with the authorizing provider or a provider within the authorizing providers department within the previous 12 mos or has a future within next 30 days. See \"Patient Info\" tab in inbasket, or \"Choose Columns\" in Meds & Orders section of the refill encounter.              Passed - Medication is active on med list        Passed - Patient is age 18 or older        Passed - Normal serum potassium on file in past 12 months     Recent Labs   Lab Test 09/20/22  0850   POTASSIUM 4.5                         Kamille Boyd RN 07/18/23 3:45 PM  "

## 2023-07-19 RX ORDER — LOSARTAN POTASSIUM 100 MG/1
TABLET ORAL
Qty: 90 TABLET | Refills: 3 | Status: SHIPPED | OUTPATIENT
Start: 2023-07-19 | End: 2024-08-06

## 2023-08-15 ENCOUNTER — TRANSFERRED RECORDS (OUTPATIENT)
Dept: HEALTH INFORMATION MANAGEMENT | Facility: CLINIC | Age: 68
End: 2023-08-15
Payer: MEDICARE

## 2023-08-15 LAB — RETINOPATHY: NEGATIVE

## 2023-08-19 ENCOUNTER — HEALTH MAINTENANCE LETTER (OUTPATIENT)
Age: 68
End: 2023-08-19

## 2023-08-19 DIAGNOSIS — N40.1 BENIGN PROSTATIC HYPERPLASIA WITH LOWER URINARY TRACT SYMPTOMS, SYMPTOM DETAILS UNSPECIFIED: ICD-10-CM

## 2023-08-19 RX ORDER — TAMSULOSIN HYDROCHLORIDE 0.4 MG/1
CAPSULE ORAL
Qty: 90 CAPSULE | Refills: 1 | Status: SHIPPED | OUTPATIENT
Start: 2023-08-19 | End: 2024-02-16

## 2023-08-19 NOTE — TELEPHONE ENCOUNTER
"Last Written Prescription Date:  09/02/2022  Last Fill Quantity: 90,  # refills: 3   Last office visit provider:  02/14/2023     Requested Prescriptions   Pending Prescriptions Disp Refills    tamsulosin (FLOMAX) 0.4 MG capsule [Pharmacy Med Name: TAMSULOSIN 0.4MG CAPSULES] 90 capsule 3     Sig: TAKE 1 CAPSULE BY MOUTH EVERY DAY       Alpha Blockers Passed - 8/19/2023  3:50 PM        Passed - Blood pressure under 140/90 in past 12 months     BP Readings from Last 3 Encounters:   02/14/23 132/80   01/31/23 134/68   11/01/22 134/78                 Passed - Recent (12 mo) or future (30 days) visit within the authorizing provider's specialty     Patient has had an office visit with the authorizing provider or a provider within the authorizing providers department within the previous 12 mos or has a future within next 30 days. See \"Patient Info\" tab in inbasket, or \"Choose Columns\" in Meds & Orders section of the refill encounter.              Passed - Patient does not have Tadalafil, Vardenafil, or Sildenafil on their medication list        Passed - Medication is active on med list        Passed - Patient is 18 years of age or older             Simran Turner RN 08/19/23 3:50 PM  "

## 2023-09-02 DIAGNOSIS — E11.69 TYPE 2 DIABETES MELLITUS WITH OTHER SPECIFIED COMPLICATION, WITHOUT LONG-TERM CURRENT USE OF INSULIN (H): ICD-10-CM

## 2023-09-02 NOTE — TELEPHONE ENCOUNTER
"Routing refill request to provider for review/approval because:  Labs out of range:  A1C    Last Written Prescription Date:  9/2/22  Last Fill Quantity: 360,  # refills: 3   Last office visit provider:  2/14/23     Requested Prescriptions   Pending Prescriptions Disp Refills    metFORMIN (GLUCOPHAGE XR) 500 MG 24 hr tablet [Pharmacy Med Name: METFORMIN ER 500MG 24HR TABS] 360 tablet 3     Sig: TAKE 4 TABLETS(2000 MG) BY MOUTH DAILY       Biguanide Agents Failed - 9/2/2023  8:07 AM        Failed - Patient has documented A1c within the specified period of time.     If HgbA1C is 8 or greater, it needs to be on file within the past 3 months.  If less than 8, must be on file within the past 6 months.     Recent Labs   Lab Test 01/31/23  0828   A1C 11.9*             Passed - Patient is age 10 or older        Passed - Patient's CR is NOT>1.4 OR Patient's EGFR is NOT<45 within past 12 mos.     Recent Labs   Lab Test 09/20/22  0850 10/29/21  1616 06/28/21  0828   GFRESTIMATED 67   < > 56*   GFRESTBLACK  --   --  >60    < > = values in this interval not displayed.       Recent Labs   Lab Test 09/20/22  0850   CR 1.20*             Passed - Patient does NOT have a diagnosis of CHF.        Passed - Medication is active on med list        Passed - Recent (6 mo) or future (30 days) visit within the authorizing provider's specialty     Patient had office visit in the last 6 months or has a visit in the next 30 days with authorizing provider or within the authorizing provider's specialty.  See \"Patient Info\" tab in inbasket, or \"Choose Columns\" in Meds & Orders section of the refill encounter.                 Karmen Falcon 09/02/23 3:23 PM  "

## 2023-09-05 RX ORDER — METFORMIN HCL 500 MG
TABLET, EXTENDED RELEASE 24 HR ORAL
Qty: 360 TABLET | Refills: 3 | Status: SHIPPED | OUTPATIENT
Start: 2023-09-05 | End: 2024-10-03

## 2023-09-22 ENCOUNTER — TRANSFERRED RECORDS (OUTPATIENT)
Dept: HEALTH INFORMATION MANAGEMENT | Facility: CLINIC | Age: 68
End: 2023-09-22
Payer: MEDICARE

## 2023-09-22 LAB — RETINOPATHY: POSITIVE

## 2023-10-12 ENCOUNTER — OFFICE VISIT (OUTPATIENT)
Dept: CARDIOLOGY | Facility: CLINIC | Age: 68
End: 2023-10-12
Attending: INTERNAL MEDICINE
Payer: MEDICARE

## 2023-10-12 VITALS
DIASTOLIC BLOOD PRESSURE: 80 MMHG | SYSTOLIC BLOOD PRESSURE: 142 MMHG | WEIGHT: 249 LBS | HEIGHT: 71 IN | HEART RATE: 85 BPM | RESPIRATION RATE: 16 BRPM | BODY MASS INDEX: 34.86 KG/M2

## 2023-10-12 DIAGNOSIS — E78.2 MIXED HYPERLIPIDEMIA: ICD-10-CM

## 2023-10-12 DIAGNOSIS — Z79.4 TYPE 2 DIABETES MELLITUS WITHOUT COMPLICATION, WITH LONG-TERM CURRENT USE OF INSULIN (H): ICD-10-CM

## 2023-10-12 DIAGNOSIS — I35.0 NONRHEUMATIC AORTIC VALVE STENOSIS: ICD-10-CM

## 2023-10-12 DIAGNOSIS — I25.10 CORONARY ARTERY DISEASE DUE TO LIPID RICH PLAQUE: Primary | ICD-10-CM

## 2023-10-12 DIAGNOSIS — E11.9 TYPE 2 DIABETES MELLITUS WITHOUT COMPLICATION, WITH LONG-TERM CURRENT USE OF INSULIN (H): ICD-10-CM

## 2023-10-12 DIAGNOSIS — I25.83 CORONARY ARTERY DISEASE DUE TO LIPID RICH PLAQUE: Primary | ICD-10-CM

## 2023-10-12 LAB
CHOLEST SERPL-MCNC: 178 MG/DL
HDLC SERPL-MCNC: 49 MG/DL
LDLC SERPL CALC-MCNC: 60 MG/DL
NONHDLC SERPL-MCNC: 129 MG/DL
TRIGL SERPL-MCNC: 344 MG/DL

## 2023-10-12 PROCEDURE — 80061 LIPID PANEL: CPT | Performed by: INTERNAL MEDICINE

## 2023-10-12 PROCEDURE — 36415 COLL VENOUS BLD VENIPUNCTURE: CPT | Performed by: INTERNAL MEDICINE

## 2023-10-12 PROCEDURE — 99214 OFFICE O/P EST MOD 30 MIN: CPT | Performed by: INTERNAL MEDICINE

## 2023-10-12 NOTE — PROGRESS NOTES
Thank you, Dr. Janes Jeronimo, for asking the [facility] team to see Mr. Ezekiel Aldrich to follow-up on coronary artery disease, aortic valve stenosis, mixed hyperlipidemia.    Assessment/Recommendations   Assessment:    1.  Coronary artery disease, status post drug-eluting stenting of the left anterior descending for unstable angina in early August 2022.  He did have moderate disease in both the right coronary artery and left circumflex at the time of his angiogram which were not flow-limiting.  He reports no clear symptoms of exertional chest discomfort but does tell me he feels poorly throughout much of the day.  Denies any significant shortness of breath with activity.  Unclear how much of this may be due to his diabetes.  Did recommend a nuclear stress test to evaluate for ischemia in the other areas.  2.  Aortic valve stenosis, mild by echocardiogram in July 2023.  We will continue to follow with repeat echocardiogram in 2 years.  3.  Mixed hyperlipidemia with excellent LDL control based on previous lipid profile.  As he is fasting today, we will repeat a lipid profile to verify that his LDL is remaining below 70.  4.  Type 2 diabetes mellitus, poorly controlled.  He was started on Ozempic over this past year and will be following up with primary care next week.    Plan:  1.  Continue current medications  2.  Check lipid profile today with further recommendations to follow  3.  Schedule pharmacologic nuclear stress test       History of Present Illness    Mr. Ezekiel Aldrich is a 68 year old male with history of type 2 diabetes mellitus, hypertension, mixed hyperlipidemia and coronary artery disease status post drug-eluting stent placement to the mid LAD for unstable angina in August 2022 who presents back to the office today for follow-up visit.  At the time of his angiogram, he was noted to have moderate disease in both the left circumflex and right coronary arteries which were not  "flow-limiting.  He reports no symptoms of exertional chest discomfort but does occasionally have some mild exertional dyspnea and tells me he just does not feel well.  His hemoglobin A1c was greater than 11 back in January and they have been working on trying to get his diabetes better controlled with the higher dose of Lantus insulin and initiation of Ozempic.  Unclear whether this is contributing.  He does report feelings of lightheadedness and unsteady gait, typically after getting up and walking.  His blood pressure is actually high here today so I am hesitant to back off his vasodilator therapy.  We did talk about the importance of maintaining adequate hydration.    ECG (personally reviewed): No ECG today    Cardiac Imaging Studies (personally reviewed): No new imaging     Physical Examination Review of Systems   BP (!) 142/80 (BP Location: Right arm, Patient Position: Sitting, Cuff Size: Adult Large)   Pulse 85   Resp 16   Ht 1.803 m (5' 11\")   Wt 112.9 kg (249 lb)   BMI 34.73 kg/m    Body mass index is 34.73 kg/m .  Wt Readings from Last 3 Encounters:   10/12/23 112.9 kg (249 lb)   02/14/23 111.6 kg (246 lb)   01/31/23 110.9 kg (244 lb 8 oz)     General Appearance:   Awake, Alert, No acute distress.   HEENT:  No scleral icterus; the mucous membranes were pink and moist.   Neck: No cervical bruits or jugular venous distention    Chest: The spine was straight. The chest was symmetric.   Lungs:   Respirations unlabored; the lungs are clear to auscultation. No wheezing   Cardiovascular:   Regular rate and rhythm.  S1, S2 normal.  2/6 early peaking crescendo decrescendo systolic murmur heard at the left sternal border.   Abdomen:  No organomegaly, masses, bruits, or tenderness. Bowels sounds are present   Extremities: No peripheral edema   Skin: No xanthelasma. Warm, Dry.   Musculoskeletal: No tenderness.   Neurologic: Mood and affect are appropriate.    Enc Vitals  BP: (!) 142/80  Pulse: 85  Resp: " "16  Weight: 112.9 kg (249 lb)  Height: 180.3 cm (5' 11\")                                         Medical History  Surgical History Family History Social History   Past Medical History:   Diagnosis Date    Acute renal failure (H24)     2016, secondary to dehydration, normalized    Past Surgical History:   Procedure Laterality Date    ANKLE FRACTURE SURGERY Right     hardware    CV CORONARY ANGIOGRAM N/A 2022    Procedure: Coronary Angiogram;  Surgeon: Archie Ni MD;  Location: [facility] CATH LAB CV    CV INTRAVASULAR ULTRASOUND N/A 2022    Procedure: Intravascular Ultrasound;  Surgeon: Archie Ni MD;  Location: [facility] CATH LAB CV    CV PCI N/A 2022    Percutaneous Coronary Intervention;  Surgeon: Archie Ni MD;  Location: [facility] CATH LAB CV    KNEE SURGERY      MANDIBLE FRACTURE SURGERY      from fx    SHOULDER SURGERY Right     from fx and separation    Family History   Problem Relation Age of Onset    Colon Cancer Mother     Coronary Artery Disease Mother     Hypertension Mother     Diabetes Type 2  Mother     Hypertension Father     Bell's palsy Father     Diabetes Type 2  Father     Thyroid Disease Father     Sleep Apnea Father     Sleep Apnea Sister     Prostate Cancer Brother 57    Sleep Apnea Brother     Bell's palsy Maternal Grandmother     Thyroid Disease Daughter     Social History     Socioeconomic History    Marital status:      Spouse name: Not on file    Number of children: 5    Years of education: Not on file    Highest education level: Not on file   Occupational History    Occupation: [employer]   Tobacco Use    Smoking status: Former     Types: Cigarettes     Quit date: 1985     Years since quittin.4    Smokeless tobacco: Current     Types: Chew    Tobacco comments:     2 tins/week   Substance and Sexual Activity    Alcohol use: Yes     Alcohol/week: 2.0 standard drinks of alcohol    Drug use: No    Sexual activity: Yes     " Partners: Female   Other Topics Concern    Not on file   Social History Narrative    Diet-            Exercise- Not regular. Work is physical, walking.     Social Determinants of Health     Financial Resource Strain: Low Risk  (9/22/2023)    Financial Resource Strain     Within the past 12 months, have you or your family members you live with been unable to get utilities (heat, electricity) when it was really needed?: No   Food Insecurity: Low Risk  (9/22/2023)    Food Insecurity     Within the past 12 months, did you worry that your food would run out before you got money to buy more?: No     Within the past 12 months, did the food you bought just not last and you didn t have money to get more?: No   Transportation Needs: Low Risk  (9/22/2023)    Transportation Needs     Within the past 12 months, has lack of transportation kept you from medical appointments, getting your medicines, non-medical meetings or appointments, work, or from getting things that you need?: No   Physical Activity: Not on file   Stress: Not on file   Social Connections: Not on file   Interpersonal Safety: Not on file   Housing Stability: Low Risk  (9/22/2023)    Housing Stability     Do you have housing? : Yes     Are you worried about losing your housing?: No          Medications  Allergies   Current Outpatient Medications   Medication Sig Dispense Refill    ASPIRIN LOW DOSE 81 MG EC tablet TAKE 1 TABLET(81 MG) BY MOUTH DAILY. START TOMORROW 30 tablet 9    atorvastatin (LIPITOR) 80 MG tablet TAKE 1 TABLET BY MOUTH DAILY FOR HIGH CHOLESTEROL 90 tablet 2    B-D U/F insulin pen needle USE TWICE DAILY TO INJECT VICTOZA AND LANTUS 100 each 5    blood glucose (ACCU-CHEK GUIDE) test strip USE 1 TEST TWICE DAILY 200 strip 0    blood glucose (NO BRAND SPECIFIED) test strip Use to test blood sugar 1-3 times daily or as directed. 100 strip 3    blood glucose monitoring (NO BRAND SPECIFIED) meter device kit by [employer] route as needed (glucose checks) 1  kit 0    carvedilol (COREG) 12.5 MG tablet TAKE 1 TABLET(12.5 MG) BY MOUTH TWICE DAILY WITH MEALS 180 tablet 3    clopidogrel (PLAVIX) 75 MG tablet Take 4 tablets the first day as a loading dose, then 1 tablet daily thereafter. 90 tablet 3    generic lancets [GENERIC LANCETS] Use 1 each As Directed 2 (two) times a day. Dispense brand per patient's insurance at pharmacy discretion. 200 each 3    glipiZIDE (GLUCOTROL XL) 10 MG 24 hr tablet TAKE 2 TABLETS BY MOUTH DAILY 180 tablet 3    LANTUS SOLOSTAR 100 UNIT/ML soln ADMINISTER 66 UNITS UNDER THE SKIN AT BEDTIME 30 mL 2    losartan (COZAAR) 100 MG tablet TAKE 1 TABLET(100 MG) BY MOUTH DAILY 90 tablet 3    metFORMIN (GLUCOPHAGE XR) 500 MG 24 hr tablet TAKE 4 TABLETS(2000 MG) BY MOUTH DAILY 360 tablet 3    OZEMPIC, 0.25 OR 0.5 MG/DOSE, 2 MG/1.5ML SOPN pen INJECT 0.25MG SUBCUTANEOUSLY EVERY 7 DAYS 1.5 mL 5    OZEMPIC, 0.25 OR 0.5 MG/DOSE, 2 MG/3ML pen Inject 0.25 mg Subcutaneous every 7 days      tamsulosin (FLOMAX) 0.4 MG capsule TAKE 1 CAPSULE BY MOUTH EVERY DAY 90 capsule 1      Allergies   Allergen Reactions    Lisinopril Cough     Dry cough         Lab Results    Chemistry/lipid CBC Cardiac Enzymes/BNP/TSH/INR   Recent Labs   Lab Test 09/20/22  0850   TRIG 242*   LDL 50   BUN 22.8      CO2 24    Recent Labs   Lab Test 08/03/22 0458 08/01/22 2112   WBC  --  8.4   HGB 15.6 16.1   HCT  --  45.1   MCV  --  92   * 186    Recent Labs   Lab Test 08/02/22  0458 08/02/22  0155 08/01/22  2112 10/29/21  1616   TROPONINI 0.16   < > 0.12  --    BNP  --   --  89*  --    TSH  --   --   --  3.18   INR  --   --  0.92  --     < > = values in this interval not displayed.        A total of 33 minutes was spent reviewing patient's medical records, obtaining history and performing examination, as well as discussing diagnoses/ recommendations with patient and answering all questions.

## 2023-10-12 NOTE — PATIENT INSTRUCTIONS
Continue current medications  Check lipids to see if any changes  Set up nuclear stress test with further recommendations to follow

## 2023-10-12 NOTE — LETTER
10/12/2023    Janes Jeronimo MD  [address] [facility] [address]    RE: Ezekiel Aldrich       Dear Colleague,     I had the pleasure of seeing Ezekiel Aldrich in the [facility].      Thank you, Dr. Janes Jeronimo, for asking the [facility] team to see Mr. Ezekiel Aldrich to follow-up on coronary artery disease, aortic valve stenosis, mixed hyperlipidemia.    Assessment/Recommendations   Assessment:    1.  Coronary artery disease, status post drug-eluting stenting of the left anterior descending for unstable angina in early August 2022.  He did have moderate disease in both the right coronary artery and left circumflex at the time of his angiogram which were not flow-limiting.  He reports no clear symptoms of exertional chest discomfort but does tell me he feels poorly throughout much of the day.  Denies any significant shortness of breath with activity.  Unclear how much of this may be due to his diabetes.  Did recommend a nuclear stress test to evaluate for ischemia in the other areas.  2.  Aortic valve stenosis, mild by echocardiogram in July 2023.  We will continue to follow with repeat echocardiogram in 2 years.  3.  Mixed hyperlipidemia with excellent LDL control based on previous lipid profile.  As he is fasting today, we will repeat a lipid profile to verify that his LDL is remaining below 70.  4.  Type 2 diabetes mellitus, poorly controlled.  He was started on Ozempic over this past year and will be following up with primary care next week.    Plan:  1.  Continue current medications  2.  Check lipid profile today with further recommendations to follow  3.  Schedule pharmacologic nuclear stress test       History of Present Illness    Mr. Ezekiel Aldrich is a 68 year old male with history of type 2 diabetes mellitus, hypertension, mixed hyperlipidemia and coronary artery disease status post drug-eluting stent placement to the mid LAD for unstable angina in August  "2022 who presents back to the office today for follow-up visit.  At the time of his angiogram, he was noted to have moderate disease in both the left circumflex and right coronary arteries which were not flow-limiting.  He reports no symptoms of exertional chest discomfort but does occasionally have some mild exertional dyspnea and tells me he just does not feel well.  His hemoglobin A1c was greater than 11 back in January and they have been working on trying to get his diabetes better controlled with the higher dose of Lantus insulin and initiation of Ozempic.  Unclear whether this is contributing.  He does report feelings of lightheadedness and unsteady gait, typically after getting up and walking.  His blood pressure is actually high here today so I am hesitant to back off his vasodilator therapy.  We did talk about the importance of maintaining adequate hydration.    ECG (personally reviewed): No ECG today    Cardiac Imaging Studies (personally reviewed): No new imaging     Physical Examination Review of Systems   BP (!) 142/80 (BP Location: Right arm, Patient Position: Sitting, Cuff Size: Adult Large)   Pulse 85   Resp 16   Ht 1.803 m (5' 11\")   Wt 112.9 kg (249 lb)   BMI 34.73 kg/m    Body mass index is 34.73 kg/m .  Wt Readings from Last 3 Encounters:   10/12/23 112.9 kg (249 lb)   02/14/23 111.6 kg (246 lb)   01/31/23 110.9 kg (244 lb 8 oz)     General Appearance:   Awake, Alert, No acute distress.   HEENT:  No scleral icterus; the mucous membranes were pink and moist.   Neck: No cervical bruits or jugular venous distention    Chest: The spine was straight. The chest was symmetric.   Lungs:   Respirations unlabored; the lungs are clear to auscultation. No wheezing   Cardiovascular:   Regular rate and rhythm.  S1, S2 normal.  2/6 early peaking crescendo decrescendo systolic murmur heard at the left sternal border.   Abdomen:  No organomegaly, masses, bruits, or tenderness. Bowels sounds are present " "  Extremities: No peripheral edema   Skin: No xanthelasma. Warm, Dry.   Musculoskeletal: No tenderness.   Neurologic: Mood and affect are appropriate.    Enc Vitals  BP: (!) 142/80  Pulse: 85  Resp: 16  Weight: 112.9 kg (249 lb)  Height: 180.3 cm (5' 11\")                                         Medical History  Surgical History Family History Social History   Past Medical History:   Diagnosis Date    Acute renal failure (H24)     2016, secondary to dehydration, normalized    Past Surgical History:   Procedure Laterality Date    ANKLE FRACTURE SURGERY Right     hardware    CV CORONARY ANGIOGRAM N/A 2022    Procedure: Coronary Angiogram;  Surgeon: Archie Ni MD;  Location: [facility] CATH LAB CV    CV INTRAVASULAR ULTRASOUND N/A 2022    Procedure: Intravascular Ultrasound;  Surgeon: Archie Ni MD;  Location: [facility] CATH LAB CV    CV PCI N/A 2022    Percutaneous Coronary Intervention;  Surgeon: Archie Ni MD;  Location: [facility] CATH LAB CV    KNEE SURGERY      MANDIBLE FRACTURE SURGERY      from fx    SHOULDER SURGERY Right     from fx and separation    Family History   Problem Relation Age of Onset    Colon Cancer Mother     Coronary Artery Disease Mother     Hypertension Mother     Diabetes Type 2  Mother     Hypertension Father     Bell's palsy Father     Diabetes Type 2  Father     Thyroid Disease Father     Sleep Apnea Father     Sleep Apnea Sister     Prostate Cancer Brother 57    Sleep Apnea Brother     Bell's palsy Maternal Grandmother     Thyroid Disease Daughter     Social History     Socioeconomic History    Marital status:      Spouse name: Not on file    Number of children: 5    Years of education: Not on file    Highest education level: Not on file   Occupational History    Occupation: [email or web address]   Tobacco Use    Smoking status: Former     Types: Cigarettes     Quit date: 1985     Years since quittin.4    Smokeless tobacco: Current     " Types: Chew    Tobacco comments:     2 tins/week   Substance and Sexual Activity    Alcohol use: Yes     Alcohol/week: 2.0 standard drinks of alcohol    Drug use: No    Sexual activity: Yes     Partners: Female   Other Topics Concern    Not on file   Social History Narrative    Diet-            Exercise- Not regular. Work is physical, walking.     Social Determinants of Health     Financial Resource Strain: Low Risk  (9/22/2023)    Financial Resource Strain     Within the past 12 months, have you or your family members you live with been unable to get utilities (heat, electricity) when it was really needed?: No   Food Insecurity: Low Risk  (9/22/2023)    Food Insecurity     Within the past 12 months, did you worry that your food would run out before you got money to buy more?: No     Within the past 12 months, did the food you bought just not last and you didn t have money to get more?: No   Transportation Needs: Low Risk  (9/22/2023)    Transportation Needs     Within the past 12 months, has lack of transportation kept you from medical appointments, getting your medicines, non-medical meetings or appointments, work, or from getting things that you need?: No   Physical Activity: Not on file   Stress: Not on file   Social Connections: Not on file   Interpersonal Safety: Not on file   Housing Stability: Low Risk  (9/22/2023)    Housing Stability     Do you have housing? : Yes     Are you worried about losing your housing?: No          Medications  Allergies   Current Outpatient Medications   Medication Sig Dispense Refill    ASPIRIN LOW DOSE 81 MG EC tablet TAKE 1 TABLET(81 MG) BY MOUTH DAILY. START TOMORROW 30 tablet 9    atorvastatin (LIPITOR) 80 MG tablet TAKE 1 TABLET BY MOUTH DAILY FOR HIGH CHOLESTEROL 90 tablet 2    B-D U/F insulin pen needle USE TWICE DAILY TO INJECT VICTOZA AND LANTUS 100 each 5    blood glucose (ACCU-CHEK GUIDE) test strip USE 1 TEST TWICE DAILY 200 strip 0    blood glucose (NO BRAND  SPECIFIED) test strip Use to test blood sugar 1-3 times daily or as directed. 100 strip 3    blood glucose monitoring (NO BRAND SPECIFIED) meter device kit by In Vitro route as needed (glucose checks) 1 kit 0    carvedilol (COREG) 12.5 MG tablet TAKE 1 TABLET(12.5 MG) BY MOUTH TWICE DAILY WITH MEALS 180 tablet 3    clopidogrel (PLAVIX) 75 MG tablet Take 4 tablets the first day as a loading dose, then 1 tablet daily thereafter. 90 tablet 3    generic lancets [GENERIC LANCETS] Use 1 each As Directed 2 (two) times a day. Dispense brand per patient's insurance at pharmacy discretion. 200 each 3    glipiZIDE (GLUCOTROL XL) 10 MG 24 hr tablet TAKE 2 TABLETS BY MOUTH DAILY 180 tablet 3    LANTUS SOLOSTAR 100 UNIT/ML soln ADMINISTER 66 UNITS UNDER THE SKIN AT BEDTIME 30 mL 2    losartan (COZAAR) 100 MG tablet TAKE 1 TABLET(100 MG) BY MOUTH DAILY 90 tablet 3    metFORMIN (GLUCOPHAGE XR) 500 MG 24 hr tablet TAKE 4 TABLETS(2000 MG) BY MOUTH DAILY 360 tablet 3    OZEMPIC, 0.25 OR 0.5 MG/DOSE, 2 MG/1.5ML SOPN pen INJECT 0.25MG SUBCUTANEOUSLY EVERY 7 DAYS 1.5 mL 5    OZEMPIC, 0.25 OR 0.5 MG/DOSE, 2 MG/3ML pen Inject 0.25 mg Subcutaneous every 7 days      tamsulosin (FLOMAX) 0.4 MG capsule TAKE 1 CAPSULE BY MOUTH EVERY DAY 90 capsule 1      Allergies   Allergen Reactions    Lisinopril Cough     Dry cough         Lab Results    Chemistry/lipid CBC Cardiac Enzymes/BNP/TSH/INR   Recent Labs   Lab Test 09/20/22  0850   TRIG 242*   LDL 50   BUN 22.8      CO2 24    Recent Labs   Lab Test 08/03/22 0458 08/01/22 2112   WBC  --  8.4   HGB 15.6 16.1   HCT  --  45.1   MCV  --  92   * 186    Recent Labs   Lab Test 08/02/22 0458 08/02/22  0155 08/01/22  2112 10/29/21  1616   TROPONINI 0.16   < > 0.12  --    BNP  --   --  89*  --    TSH  --   --   --  3.18   INR  --   --  0.92  --     < > = values in this interval not displayed.        A total of 33 minutes was spent reviewing patient's medical records, obtaining history and  performing examination, as well as discussing diagnoses/ recommendations with patient and answering all questions.                        Thank you for allowing me to participate in the care of your patient.      Sincerely,     Karlene Blevins MD     [facility]  cc:   Karlene Blevins MD  [address], [address]  [place] [address]

## 2023-10-16 DIAGNOSIS — Z79.4 TYPE 2 DIABETES MELLITUS WITHOUT COMPLICATION, WITH LONG-TERM CURRENT USE OF INSULIN (H): ICD-10-CM

## 2023-10-16 DIAGNOSIS — E11.9 TYPE 2 DIABETES MELLITUS WITHOUT COMPLICATION, WITH LONG-TERM CURRENT USE OF INSULIN (H): ICD-10-CM

## 2023-10-16 RX ORDER — INSULIN GLARGINE 100 [IU]/ML
INJECTION, SOLUTION SUBCUTANEOUS
Qty: 45 ML | Refills: 0 | Status: SHIPPED | OUTPATIENT
Start: 2023-10-16 | End: 2023-10-18

## 2023-10-18 ENCOUNTER — OFFICE VISIT (OUTPATIENT)
Dept: FAMILY MEDICINE | Facility: CLINIC | Age: 68
End: 2023-10-18
Payer: MEDICARE

## 2023-10-18 VITALS
SYSTOLIC BLOOD PRESSURE: 132 MMHG | WEIGHT: 251 LBS | BODY MASS INDEX: 35.14 KG/M2 | HEART RATE: 83 BPM | DIASTOLIC BLOOD PRESSURE: 76 MMHG | HEIGHT: 71 IN | OXYGEN SATURATION: 97 %

## 2023-10-18 DIAGNOSIS — I25.10 CORONARY ARTERY DISEASE DUE TO LIPID RICH PLAQUE: ICD-10-CM

## 2023-10-18 DIAGNOSIS — Z79.4 TYPE 2 DIABETES MELLITUS WITHOUT COMPLICATION, WITH LONG-TERM CURRENT USE OF INSULIN (H): ICD-10-CM

## 2023-10-18 DIAGNOSIS — Z00.00 WELLNESS EXAMINATION: ICD-10-CM

## 2023-10-18 DIAGNOSIS — I10 HYPERTENSION, UNSPECIFIED TYPE: ICD-10-CM

## 2023-10-18 DIAGNOSIS — E11.9 TYPE 2 DIABETES MELLITUS WITHOUT COMPLICATION, WITH LONG-TERM CURRENT USE OF INSULIN (H): ICD-10-CM

## 2023-10-18 DIAGNOSIS — I25.83 CORONARY ARTERY DISEASE DUE TO LIPID RICH PLAQUE: ICD-10-CM

## 2023-10-18 DIAGNOSIS — L97.511 DIABETIC ULCER OF TOE OF RIGHT FOOT ASSOCIATED WITH TYPE 2 DIABETES MELLITUS, LIMITED TO BREAKDOWN OF SKIN (H): ICD-10-CM

## 2023-10-18 DIAGNOSIS — Z12.11 SCREEN FOR COLON CANCER: Primary | ICD-10-CM

## 2023-10-18 DIAGNOSIS — Z23 NEED FOR VACCINATION: ICD-10-CM

## 2023-10-18 DIAGNOSIS — E11.621 DIABETIC ULCER OF TOE OF RIGHT FOOT ASSOCIATED WITH TYPE 2 DIABETES MELLITUS, LIMITED TO BREAKDOWN OF SKIN (H): ICD-10-CM

## 2023-10-18 DIAGNOSIS — Z23 NEED FOR PROPHYLACTIC VACCINATION AGAINST HEPATITIS B VIRUS: ICD-10-CM

## 2023-10-18 DIAGNOSIS — E11.69 TYPE 2 DIABETES MELLITUS WITH OTHER SPECIFIED COMPLICATION, WITHOUT LONG-TERM CURRENT USE OF INSULIN (H): ICD-10-CM

## 2023-10-18 DIAGNOSIS — Z29.11 NEED FOR VACCINATION AGAINST RESPIRATORY SYNCYTIAL VIRUS: ICD-10-CM

## 2023-10-18 DIAGNOSIS — Z12.5 SCREENING FOR PROSTATE CANCER: ICD-10-CM

## 2023-10-18 DIAGNOSIS — Z23 NEED FOR SHINGLES VACCINE: ICD-10-CM

## 2023-10-18 LAB
ALBUMIN SERPL BCG-MCNC: 4.7 G/DL (ref 3.5–5.2)
ALP SERPL-CCNC: 65 U/L (ref 40–129)
ALT SERPL W P-5'-P-CCNC: 16 U/L (ref 0–70)
ANION GAP SERPL CALCULATED.3IONS-SCNC: 15 MMOL/L (ref 7–15)
AST SERPL W P-5'-P-CCNC: 28 U/L (ref 0–45)
BILIRUB SERPL-MCNC: 0.7 MG/DL
BUN SERPL-MCNC: 26 MG/DL (ref 8–23)
CALCIUM SERPL-MCNC: 9.8 MG/DL (ref 8.8–10.2)
CHLORIDE SERPL-SCNC: 102 MMOL/L (ref 98–107)
CREAT SERPL-MCNC: 1.5 MG/DL (ref 0.67–1.17)
CREAT UR-MCNC: 145 MG/DL
DEPRECATED HCO3 PLAS-SCNC: 21 MMOL/L (ref 22–29)
EGFRCR SERPLBLD CKD-EPI 2021: 51 ML/MIN/1.73M2
GLUCOSE SERPL-MCNC: 72 MG/DL (ref 70–99)
HBA1C MFR BLD: 7.6 % (ref 0–5.6)
MICROALBUMIN UR-MCNC: 76.7 MG/L
MICROALBUMIN/CREAT UR: 52.9 MG/G CR (ref 0–17)
POTASSIUM SERPL-SCNC: 4.4 MMOL/L (ref 3.4–5.3)
PROT SERPL-MCNC: 7.6 G/DL (ref 6.4–8.3)
PSA SERPL DL<=0.01 NG/ML-MCNC: 0.3 NG/ML (ref 0–4.5)
SODIUM SERPL-SCNC: 138 MMOL/L (ref 135–145)

## 2023-10-18 PROCEDURE — G0103 PSA SCREENING: HCPCS | Performed by: FAMILY MEDICINE

## 2023-10-18 PROCEDURE — 82570 ASSAY OF URINE CREATININE: CPT | Performed by: FAMILY MEDICINE

## 2023-10-18 PROCEDURE — G0008 ADMIN INFLUENZA VIRUS VAC: HCPCS | Performed by: FAMILY MEDICINE

## 2023-10-18 PROCEDURE — 90662 IIV NO PRSV INCREASED AG IM: CPT | Performed by: FAMILY MEDICINE

## 2023-10-18 PROCEDURE — 83036 HEMOGLOBIN GLYCOSYLATED A1C: CPT | Performed by: FAMILY MEDICINE

## 2023-10-18 PROCEDURE — 80053 COMPREHEN METABOLIC PANEL: CPT | Performed by: FAMILY MEDICINE

## 2023-10-18 PROCEDURE — 82043 UR ALBUMIN QUANTITATIVE: CPT | Performed by: FAMILY MEDICINE

## 2023-10-18 PROCEDURE — 99214 OFFICE O/P EST MOD 30 MIN: CPT | Mod: 25 | Performed by: FAMILY MEDICINE

## 2023-10-18 PROCEDURE — G0439 PPPS, SUBSEQ VISIT: HCPCS | Performed by: FAMILY MEDICINE

## 2023-10-18 PROCEDURE — 36415 COLL VENOUS BLD VENIPUNCTURE: CPT | Performed by: FAMILY MEDICINE

## 2023-10-18 RX ORDER — INSULIN GLARGINE 100 [IU]/ML
INJECTION, SOLUTION SUBCUTANEOUS
Qty: 45 ML | Refills: 5 | Status: SHIPPED | OUTPATIENT
Start: 2023-10-18 | End: 2024-09-30

## 2023-10-18 RX ORDER — RESPIRATORY SYNCYTIAL VIRUS VACCINE 120MCG/0.5
0.5 KIT INTRAMUSCULAR ONCE
Qty: 1 EACH | Refills: 0 | Status: CANCELLED | OUTPATIENT
Start: 2023-10-18 | End: 2023-10-18

## 2023-10-18 RX ORDER — GLIPIZIDE 10 MG/1
TABLET, FILM COATED, EXTENDED RELEASE ORAL
Qty: 180 TABLET | Refills: 3 | Status: SHIPPED | OUTPATIENT
Start: 2023-10-18 | End: 2024-10-03

## 2023-10-18 RX ORDER — SEMAGLUTIDE 0.68 MG/ML
0.25 INJECTION, SOLUTION SUBCUTANEOUS
Qty: 3 ML | Status: CANCELLED | OUTPATIENT
Start: 2023-10-18

## 2023-10-18 ASSESSMENT — ENCOUNTER SYMPTOMS
WEAKNESS: 1
FEVER: 0
NERVOUS/ANXIOUS: 1
ABDOMINAL PAIN: 0
HEMATURIA: 0
COUGH: 0
DIARRHEA: 0
FREQUENCY: 0
SORE THROAT: 0
HEMATOCHEZIA: 0
HEADACHES: 0
SHORTNESS OF BREATH: 1
CONSTIPATION: 1
DIZZINESS: 1
ARTHRALGIAS: 1
PALPITATIONS: 0
EYE PAIN: 0
DYSURIA: 0
JOINT SWELLING: 0
NAUSEA: 0
PARESTHESIAS: 0
HEARTBURN: 1
MYALGIAS: 1
CHILLS: 0

## 2023-10-18 ASSESSMENT — ACTIVITIES OF DAILY LIVING (ADL): CURRENT_FUNCTION: NO ASSISTANCE NEEDED

## 2023-10-18 NOTE — LETTER
October 19, 2023      Ezekiel Aldrich  [address] [facility] [address]        Dear ,    We are writing to inform you of your test results.The A1C is high at 7.6  I will work with our pharmacist to see if can get you another medicine for diabetes that is affordable.  There is some excess protein in the urine, from diabetes.  Some kidney tests are elevated, it may indicate that your kidneys are not filtering as well.  Overall the cholesterol is well controlled, though triglycerides are high.  The PSA or prostate test is high.    See me in 3-4 months.          Resulted Orders   Comprehensive metabolic panel   Result Value Ref Range    Sodium 138 135 - 145 mmol/L      Comment:      Reference intervals for this test were updated on 09/26/2023 to more accurately reflect our healthy population. There may be differences in the flagging of prior results with similar values performed with this method. Interpretation of those prior results can be made in the context of the updated reference intervals.     Potassium 4.4 3.4 - 5.3 mmol/L    Carbon Dioxide (CO2) 21 (L) 22 - 29 mmol/L    Anion Gap 15 7 - 15 mmol/L    Urea Nitrogen 26.0 (H) 8.0 - 23.0 mg/dL    Creatinine 1.50 (H) 0.67 - 1.17 mg/dL    GFR Estimate 51 (L) >60 mL/min/1.73m2    Calcium 9.8 8.8 - 10.2 mg/dL    Chloride 102 98 - 107 mmol/L    Glucose 72 70 - 99 mg/dL    Alkaline Phosphatase 65 40 - 129 U/L    AST 28 0 - 45 U/L      Comment:      Reference intervals for this test were updated on 6/12/2023 to more accurately reflect our healthy population. There may be differences in the flagging of prior results with similar values performed with this method. Interpretation of those prior results can be made in the context of the updated reference intervals.    ALT 16 0 - 70 U/L      Comment:      Reference intervals for this test were updated on 6/12/2023 to more accurately reflect our healthy population. There may be differences in the flagging of prior  results with similar values performed with this method. Interpretation of those prior results can be made in the context of the updated reference intervals.      Protein Total 7.6 6.4 - 8.3 g/dL    Albumin 4.7 3.5 - 5.2 g/dL    Bilirubin Total 0.7 <=1.2 mg/dL   Albumin Random Urine Quantitative with Creat Ratio   Result Value Ref Range    Creatinine Urine mg/dL 145.0 mg/dL      Comment:      The reference ranges have not been established in urine creatinine. The results should be integrated into the clinical context for interpretation.    Albumin Urine mg/L 76.7 mg/L      Comment:      The reference ranges have not been established in urine albumin. The results should be integrated into the clinical context for interpretation.    Albumin Urine mg/g Cr 52.90 (H) 0.00 - 17.00 mg/g Cr      Comment:      Microalbuminuria is defined as an albumin:creatinine ratio of 17 to 299 for males and 25 to 299 for females. A ratio of albumin:creatinine of 300 or higher is indicative of overt proteinuria.  Due to biologic variability, positive results should be confirmed by a second, first-morning random or 24-hour timed urine specimen. If there is discrepancy, a third specimen is recommended. When 2 out of 3 results are in the microalbuminuria range, this is evidence for incipient nephropathy and warrants increased efforts at glucose control, blood pressure control, and institution of therapy with an angiotensin-converting-enzyme (ACE) inhibitor (if the patient can tolerate it).     Hemoglobin A1c   Result Value Ref Range    Hemoglobin A1C 7.6 (H) 0.0 - 5.6 %      Comment:      Normal <5.7%   Prediabetes 5.7-6.4%    Diabetes 6.5% or higher     Note: Adopted from ADA consensus guidelines.   PROSTATE SPEC ANTIGEN SCREEN   Result Value Ref Range    Prostate Specific Antigen Screen 0.30 0.00 - 4.50 ng/mL    Narrative    This result is obtained using the Roche Elecsys total PSA method on the baldemar [id number] immunoassay analyzer. Results  obtained with different assay methods or kits cannot be used interchangeably.       If you have any questions or concerns, please call the clinic at the number listed above.       Sincerely,      Janes Jeronimo MD

## 2023-10-18 NOTE — PROGRESS NOTES
"SUBJECTIVE:   Ezekiel is a 68 year old who presents for Preventive Visit.      10/18/2023     3:41 PM   Additional Questions   Roomed by Karrie HILARIO       Are you in the first 12 months of your Medicare coverage?  No  HPI:  Patient comes in for his annual Medicare wellness examination.  Patient has a history of type 2 diabetes mellitus, he is on glipizide as well as insulin he is on Ozempic but this is quite expensive for him and he is wondering if there potentially is a different alternative.    Patient has a history of coronary artery disease he just saw his cardiologist who checked his cholesterol except for triglycerides been elevated it was well controlled he is on atorvastatin he is also on Plavix as well.    Patient is due for a repeat colonoscopy he just had one several months ago but the prep was suboptimal.    Patient declines a COVID-vaccine but he will get the flu shot.    Patient is recently see his eye doctor              Healthy Habits:     In general, how would you rate your overall health?  Good    Frequency of exercise:  None    Do you usually eat at least 4 servings of fruit and vegetables a day, include whole grains    & fiber and avoid regularly eating high fat or \"junk\" foods?  No    Taking medications regularly:  Yes    Medication side effects:  Lightheadedness and Other    Ability to successfully perform activities of daily living:  No assistance needed    Home Safety:  No safety concerns identified    Hearing Impairment:  Difficulty following a conversation in a noisy restaurant or crowded room, feel that people are mumbling or not speaking clearly, need to ask people to speak up or repeat themselves, find that men's voices are easier to understand than woman's and difficulty understanding soft or whispered speech    In the past 6 months, have you been bothered by leaking of urine?  No    In general, how would you rate your overall mental or emotional health?  Fair    Additional concerns " today:  Yes      Today's PHQ-2 Score:       10/18/2023     7:32 AM   PHQ-2 (  [employer])   Q1: Little interest or pleasure in doing things 1   Q2: Feeling down, depressed or hopeless 0   PHQ-2 Score 1   Q1: Little interest or pleasure in doing things Several days   Q2: Feeling down, depressed or hopeless Not at all   PHQ-2 Score 1                Fall risk  Fallen 2 or more times in the past year?: No  Any fall with injury in the past year?: No    Cognitive Screening   1) Repeat 3 items (Leader, Season, Table)    2) Clock draw: NORMAL  3) 3 item recall:   Recalls 3 objects  Results: 3 items recalled: COGNITIVE IMPAIRMENT LESS LIKELY    Mini-CogTM Copyright S Ray. Licensed by the author for use in [facility]; reprinted with permission (socharly@[facility]). All rights reserved.      Do you have sleep apnea, excessive snoring or daytime drowsiness? : yes    Reviewed and updated as needed this visit by clinical staff   Tobacco  Allergies  Meds              Reviewed and updated as needed this visit by Provider                 Social History     Tobacco Use    Smoking status: Former     Types: Cigarettes     Quit date: 1985     Years since quittin.4    Smokeless tobacco: Current     Types: Chew    Tobacco comments:     2 tins/week   Substance Use Topics    Alcohol use: Yes     Alcohol/week: 2.0 standard drinks of alcohol             10/18/2023     7:32 AM   Alcohol Use   Prescreen: >3 drinks/day or >7 drinks/week? No     Do you have a current opioid prescription? No  Do you use any other controlled substances or medications that are not prescribed by a provider? None         Current providers sharing in care for this patient include:     Patient Care Team:  Janes Jeronimo MD as PCP - General  Mignon Wasserman MD as Assigned Pulmonology Provider  Karlene Blevins MD as MD (Cardiovascular Disease)  Karlene Blevins MD as Assigned Heart and Vascular Provider  Marci Wang, José MiguelD as Pharmacist  (Pharmacist)  Janes Jeronimo MD as Assigned PCP    The following health maintenance items are reviewed in Epic and correct as of today:  Health Maintenance   Topic Date Due    HEPATITIS B IMMUNIZATION (2 of 3 - Risk 3-dose series) 03/01/2016    RSV VACCINE 60+ (1 - 1-dose 60+ series) Never done    ZOSTER IMMUNIZATION (2 of 3) 03/20/2017    AORTIC ANEURYSM SCREENING (SYSTEM ASSIGNED)  Never done    DIABETIC FOOT EXAM  10/29/2022    COLORECTAL CANCER SCREENING  07/18/2023    A1C  07/31/2023    INFLUENZA VACCINE (1) 09/01/2023    COVID-19 Vaccine (2 - 2023-24 season) 09/01/2023    BMP  09/20/2023    MEDICARE ANNUAL WELLNESS VISIT  09/20/2023    MICROALBUMIN  01/31/2024    ANNUAL REVIEW OF HM ORDERS  01/31/2024    EYE EXAM  09/22/2024    LIPID  10/12/2024    FALL RISK ASSESSMENT  10/18/2024    DTAP/TDAP/TD IMMUNIZATION (3 - Td or Tdap) 02/02/2026    ADVANCE CARE PLANNING  09/20/2027    PHQ-2 (once per calendar year)  Completed    Pneumococcal Vaccine: 65+ Years  Completed    IPV IMMUNIZATION  Aged Out    HPV IMMUNIZATION  Aged Out    MENINGITIS IMMUNIZATION  Aged Out    HEPATITIS C SCREENING  Discontinued     Lab work is in process          Review of Systems   Constitutional:  Negative for chills and fever.   HENT:  Positive for hearing loss. Negative for congestion, ear pain and sore throat.    Eyes:  Positive for visual disturbance. Negative for pain.   Respiratory:  Positive for shortness of breath. Negative for cough.    Cardiovascular:  Negative for chest pain, palpitations and peripheral edema.   Gastrointestinal:  Positive for constipation and heartburn. Negative for abdominal pain, diarrhea, hematochezia and nausea.   Genitourinary:  Positive for impotence. Negative for dysuria, frequency, genital sores, hematuria, penile discharge and urgency.   Musculoskeletal:  Positive for arthralgias and myalgias. Negative for joint swelling.   Skin:  Negative for rash.   Neurological:  Positive for dizziness and  "weakness. Negative for headaches and paresthesias.   Psychiatric/Behavioral:  Negative for mood changes. The patient is nervous/anxious.      Constitutional, HEENT, cardiovascular, pulmonary, GI, , musculoskeletal, neuro, skin, endocrine and psych systems are negative, except as otherwise noted.    OBJECTIVE:   BP (!) 144/90   Pulse 83   Ht 1.803 m (5' 11\")   Wt 113.9 kg (251 lb)   SpO2 97%   BMI 35.01 kg/m   Estimated body mass index is 35.01 kg/m  as calculated from the following:    Height as of this encounter: 1.803 m (5' 11\").    Weight as of this encounter: 113.9 kg (251 lb).  Physical Exam  GENERAL: healthy, alert and no distress  EYES: Eyes grossly normal to inspection, PERRL and conjunctivae and sclerae normal  HENT: ear canals and TM's normal, nose and mouth without ulcers or lesions  NECK: no adenopathy, no asymmetry, masses, or scars and thyroid normal to palpation  RESP: lungs clear to auscultation - no rales, rhonchi or wheezes  CV: regular rate and rhythm, normal S1 S2, no S3 or S4, no murmur, click or rub, no peripheral edema and peripheral pulses strong  ABDOMEN: soft, nontender, no hepatosplenomegaly, no masses and bowel sounds normal  MS: no gross musculoskeletal defects noted, no edema  SKIN: no suspicious lesions or rashes  NEURO: Normal strength and tone, mentation intact and speech normal  PSYCH: mentation appears normal, affect normal/bright    Diagnostic Test Results:  Labs reviewed in Epic    ASSESSMENT / PLAN:   (Z12.11) Screen for colon cancer  (primary encounter diagnosis)  Comment: Patient is now due for colorectal cancer screening due to his suboptimal bowel prep   Plan: Colonoscopy Screening Critical access hospital Referral             (E11.9,  Z79.4) Type 2 diabetes mellitus  (H)  Comment: Suboptimal control with A1c at 7.6  Plan: insulin glargine (LANTUS SOLOSTAR) 100 UNIT/ML         pen, glipiZIDE (GLUCOTROL XL) 10 MG 24 hr         tablet, Comprehensive metabolic panel, Albumin         " "Random Urine Quantitative with Creat Ratio,         Hemoglobin A1c             (E11.621,  L97.511) Diabetic ulcer of toe of right foot associated with type 2 diabetes mellitus, limited to breakdown of skin (H)  Comment: Patient has been seen previously by podiatry  Plan:      (I10) Hypertension  Comment: Currently well controlled  Plan:      (Z00.00) Wellness examination  Comment: Overall the patient has relatively good health habits her health is compromised by comorbidities  Plan:      (Z12.5) Screening for prostate cancer  Comment: Family history of  Plan: PROSTATE SPEC ANTIGEN SCREEN             (Z23) Need for vaccination  Comment:    Plan: INFLUENZA VACCINE 65+ (FLUZONE HD)             (I25.10,  I25.83) Coronary artery disease   Comment: Clinically stable  Plan:      PLAN:  Influenza vaccine  Laboratory studies as above  I did renew the patient's glipizide and insulin.  Of note the patient is currently on Ozempic but this is relatively unaffordable for him I will  check with pharmacy to see if we have another alternative.  Referral for colonoscopy  Follow-up in 3 to 4 months.          COUNSELING:  Reviewed preventive health counseling, as reflected in patient instructions       Regular exercise       Healthy diet/nutrition      BMI:   Estimated body mass index is 35.01 kg/m  as calculated from the following:    Height as of this encounter: 1.803 m (5' 11\").    Weight as of this encounter: 113.9 kg (251 lb).   Weight management plan: Discussed healthy diet and exercise guidelines      He reports that he quit smoking about 38 years ago. His smoking use included cigarettes. His smokeless tobacco use includes chew.      Appropriate preventive services were discussed with this patient, including applicable screening as appropriate for fall prevention, nutrition, physical activity, Tobacco-use cessation, weight loss and cognition.  Checklist reviewing preventive services available has been given to the " patient.    Reviewed patients plan of care and provided an AVS. The Intermediate Care Plan ( asthma action plan, low back pain action plan, and migraine action plan) for Ezekiel meets the Care Plan requirement. This Care Plan has been established and reviewed with the Patient.          Janes Jeronimo MD  [facility]    Identified Health Risks:  I have reviewed Opioid Use Disorder and Substance Use Disorder risk factors and made any needed referrals.

## 2023-10-20 ENCOUNTER — HOSPITAL ENCOUNTER (EMERGENCY)
Facility: HOSPITAL | Age: 68
Discharge: HOME | End: 2023-10-20
Attending: STUDENT IN AN ORGANIZED HEALTH CARE EDUCATION/TRAINING PROGRAM | Admitting: STUDENT IN AN ORGANIZED HEALTH CARE EDUCATION/TRAINING PROGRAM
Payer: MEDICARE

## 2023-10-20 VITALS
HEART RATE: 90 BPM | RESPIRATION RATE: 18 BRPM | TEMPERATURE: 97.5 F | OXYGEN SATURATION: 98 % | DIASTOLIC BLOOD PRESSURE: 94 MMHG | SYSTOLIC BLOOD PRESSURE: 146 MMHG

## 2023-10-20 DIAGNOSIS — S61.512A WRIST LACERATION, LEFT, INITIAL ENCOUNTER: ICD-10-CM

## 2023-10-20 PROCEDURE — 12002 RPR S/N/AX/GEN/TRNK2.6-7.5CM: CPT

## 2023-10-20 PROCEDURE — 99283 EMERGENCY DEPT VISIT LOW MDM: CPT

## 2023-10-20 ASSESSMENT — ACTIVITIES OF DAILY LIVING (ADL): ADLS_ACUITY_SCORE: 35

## 2023-10-20 NOTE — DISCHARGE INSTRUCTIONS
Follow up with hand surgery in clinic. Stitches will also need to be removed in 5-7 days  Return to the Emergency Room with bleeding that won't stop, numbness/weakness to the hand or other concerns

## 2023-10-20 NOTE — ED NOTES
Pt noted left wrist area post  laceration/ suture care to start swelling and some oozing of blood.  Reassessed by Dr. Whitehead and Dr. Grewal, ED Providers. Pt still has good CMS on left hand/ wrist area.

## 2023-10-20 NOTE — ED NOTES
Cleansed both hands and applied pressure dressing on left wrist area with gauze and kerlix dessing, reinforced with  coban dressing.  Good Cms of left hand no numbness and tingling

## 2023-10-20 NOTE — ED PROVIDER NOTES
NAME: Ezekiel Aldrich  AGE: 68 year old male  YOB: 1955  MRN: [id number]  EVALUATION DATE & TIME: 10/20/2023 11:19 AM    PCP: Janes Jeronimo  ED PROVIDER: Leatha Whitehead MD.    Chief Complaint   Patient presents with    Laceration       FINAL IMPRESSION:  1. Wrist laceration, left, initial encounter        MEDICAL DECISION MAKING:      ED Course as of 10/20/23 1414   Fri Oct 20, 2023   1151 Rechecked patient   1242 Dr. Ricci Mccoy from [facility]     MDM: 67 y/o M with h/o HTN, CAD, T2DM among others who presents with laceration. He cut his left wrist with a razor blade at work today. He had some oozing blood on arrival with intact strong radial pulse, normal capillary refill and no neurologic deficits. His tetanus is up to date. No obvious signs of tendon or nerve injury. Laceration was repaired. On reassessment had developed a hematoma. Discussed with hand surgery and agrees unlikely to be concerning vascular injury needing further emergent management and okay to place pressure dressing and follow up with them in clinic. I resseassed patient after he had the pressure dressing for a period of time, no additional bleeding, continues to have intact pulses and reassuring neurologic exam. Patient feels comfortable with discharge. Strict return precautions discussed and patient is in agreement with plan, endorses understanding and his questions were answered.      Medical Decision Making    History:  Supplemental history from: Documented in chart, if applicable  External Record(s) reviewed: Documented in chart, if applicable.    Work Up:  Chart documentation includes differential considered and any EKGs or imaging interpreted by provider.  In additional to work up documented, I considered the following work up: Documented in chart, if applicable.    External consultation:  Discussion of management with another provider: Documented in chart, if applicable    Complicating factors:  Care impacted  by chronic illness: Diabetes, Heart Disease, and Hypertension  Care affected by social determinants of health: N/A    Disposition considerations: Discharge. No recommendations on prescription strength medication(s). N/A.      MEDICATIONS GIVEN IN THE EMERGENCY:  Medications - No data to display    NEW PRESCRIPTIONS STARTED AT TODAY'S ER VISIT:  Discharge Medication List as of 10/20/2023  1:05 PM           =================================================================  HPI    Patient information was obtained from: patient  Use of : N/A     69 y/o M with h/o HTN, CAD, T2DM among others who presents with laceration.  Patient cut his left wrist with a clean razor blade at work today. No numbness or weakness to the hand. Had quite a bit of bleeding so came to be evaluated. He is right handed. He does not think he is on blood thinners, has plavix and aspirin listed in his chart. No other injuries. Last tetanus 2016.      PAST MEDICAL HISTORY:  Past Medical History:   Diagnosis Date    Acute renal failure (H24)     6/11/2016, secondary to dehydration, normalized       PAST SURGICAL HISTORY:  Past Surgical History:   Procedure Laterality Date    ANKLE FRACTURE SURGERY Right     hardware    CV CORONARY ANGIOGRAM N/A 08/02/2022    Procedure: Coronary Angiogram;  Surgeon: Archie Ni MD;  Location: [facility] CV    CV INTRAVASULAR ULTRASOUND N/A 08/02/2022    Procedure: Intravascular Ultrasound;  Surgeon: Archie Ni MD;  Location: [facility]    CV PCI N/A 08/02/2022    Percutaneous Coronary Intervention;  Surgeon: Archie Ni MD;  Location: [facility] CV    KNEE SURGERY      MANDIBLE FRACTURE SURGERY      from fx    SHOULDER SURGERY Right     from fx and separation       CURRENT MEDICATIONS:    No current facility-administered medications for this encounter.    Current Outpatient Medications:     ASPIRIN LOW DOSE 81 MG EC tablet, TAKE 1 TABLET(81 MG) BY MOUTH DAILY.  START TOMORROW, Disp: 30 tablet, Rfl: 9    atorvastatin (LIPITOR) 80 MG tablet, TAKE 1 TABLET BY MOUTH DAILY FOR HIGH CHOLESTEROL, Disp: 90 tablet, Rfl: 2    B-D U/F insulin pen needle, USE TWICE DAILY TO INJECT VICTOZA AND LANTUS, Disp: 100 each, Rfl: 5    blood glucose (ACCU-CHEK GUIDE) test strip, USE 1 TEST TWICE DAILY, Disp: 200 strip, Rfl: 0    blood glucose (NO BRAND SPECIFIED) test strip, Use to test blood sugar 1-3 times daily or as directed., Disp: 100 strip, Rfl: 3    blood glucose monitoring (NO BRAND SPECIFIED) meter device kit, by In Vitro route as needed (glucose checks), Disp: 1 kit, Rfl: 0    carvedilol (COREG) 12.5 MG tablet, TAKE 1 TABLET(12.5 MG) BY MOUTH TWICE DAILY WITH MEALS, Disp: 180 tablet, Rfl: 3    clopidogrel (PLAVIX) 75 MG tablet, Take 4 tablets the first day as a loading dose, then 1 tablet daily thereafter., Disp: 90 tablet, Rfl: 3    generic lancets, [GENERIC LANCETS] Use 1 each As Directed 2 (two) times a day. Dispense brand per patient's insurance at pharmacy discretion., Disp: 200 each, Rfl: 3    glipiZIDE (GLUCOTROL XL) 10 MG 24 hr tablet, TAKE 2 TABLETS BY MOUTH DAILY, Disp: 180 tablet, Rfl: 3    insulin glargine (LANTUS SOLOSTAR) 100 UNIT/ML pen, ADMINISTER 66 UNITS UNDER THE SKIN AT BEDTIME, Disp: 45 mL, Rfl: 5    losartan (COZAAR) 100 MG tablet, TAKE 1 TABLET(100 MG) BY MOUTH DAILY, Disp: 90 tablet, Rfl: 3    metFORMIN (GLUCOPHAGE XR) 500 MG 24 hr tablet, TAKE 4 TABLETS(2000 MG) BY MOUTH DAILY, Disp: 360 tablet, Rfl: 3    OZEMPIC, 0.25 OR 0.5 MG/DOSE, 2 MG/1.5ML SOPN pen, INJECT 0.25MG SUBCUTANEOUSLY EVERY 7 DAYS, Disp: 1.5 mL, Rfl: 5    OZEMPIC, 0.25 OR 0.5 MG/DOSE, 2 MG/3ML pen, Inject 0.25 mg Subcutaneous every 7 days, Disp: , Rfl:     tamsulosin (FLOMAX) 0.4 MG capsule, TAKE 1 CAPSULE BY MOUTH EVERY DAY, Disp: 90 capsule, Rfl: 1    ALLERGIES:  Allergies   Allergen Reactions    Lisinopril Cough     Dry cough       FAMILY HISTORY:  Family History   Problem Relation Age of  Onset    Colon Cancer Mother     Coronary Artery Disease Mother     Hypertension Mother     Diabetes Type 2  Mother     Hypertension Father     Bell's palsy Father     Diabetes Type 2  Father     Thyroid Disease Father     Sleep Apnea Father     Sleep Apnea Sister     Prostate Cancer Brother 57    Sleep Apnea Brother     Bell's palsy Maternal Grandmother     Thyroid Disease Daughter        SOCIAL HISTORY:   Social History     Socioeconomic History    Marital status:     Number of children: 5   Occupational History    Occupation: [employer]   Tobacco Use    Smoking status: Former     Types: Cigarettes     Quit date: 1985     Years since quittin.4    Smokeless tobacco: Current     Types: Chew    Tobacco comments:     2 tins/week   Substance and Sexual Activity    Alcohol use: Yes     Alcohol/week: 2.0 standard drinks of alcohol    Drug use: No    Sexual activity: Yes     Partners: Female   Social History Narrative    Diet-            Exercise- Not regular. Work is physical, walking.     Social Determinants of Health     Financial Resource Strain: Low Risk  (2023)    Financial Resource Strain     Within the past 12 months, have you or your family members you live with been unable to get utilities (heat, electricity) when it was really needed?: No   Food Insecurity: Low Risk  (2023)    Food Insecurity     Within the past 12 months, did you worry that your food would run out before you got money to buy more?: No     Within the past 12 months, did the food you bought just not last and you didn t have money to get more?: No   Transportation Needs: Low Risk  (2023)    Transportation Needs     Within the past 12 months, has lack of transportation kept you from medical appointments, getting your medicines, non-medical meetings or appointments, work, or from getting things that you need?: No   Interpersonal Safety: Low Risk  (10/18/2023)    Interpersonal Safety     Do you feel physically and  emotionally safe where you currently live?: Yes     Within the past 12 months, have you been hit, slapped, kicked or otherwise physically hurt by someone?: No     Within the past 12 months, have you been humiliated or emotionally abused in other ways by your partner or ex-partner?: No   Housing Stability: Low Risk  (9/22/2023)    Housing Stability     Do you have housing? : Yes     Are you worried about losing your housing?: No       PHYSICAL EXAM:    Vitals: BP (!) 146/94   Pulse 90   Temp 97.5  F (36.4  C) (Oral)   Resp 18   SpO2 98%    Constitutional: Well developed, left wrist wrapped  HENT: Normocephalic, atraumatic, mucous membranes moist, nose normal  Eyes: Pupils mid range, sclera white, no discharge  Neck- Gross ROM intact.   Respiratory: Normal work of breathing, normal rate, speaks in full sentences  Cardiovascular: Normal heart rate. Strong left radial pulse proximal and distal to the wound. Normal capillary refill to the left fingers  Musculoskeletal: Laceration to the left proximal wrist with some slow non pulsatile bleeding  Neurologic: Alert & oriented, cranial nerves grossly intact. Speech clear. Strength 5/5 and sensation to light touch intact in the left wrist and hand    LAB:  All pertinent labs reviewed and interpreted.  Labs Ordered and Resulted from Time of ED Arrival to Time of ED Departure - No data to display    RADIOLOGY:  No orders to display       EKG:   N/A    PROCEDURES:   Procedures   PROCEDURE: Laceration Repair   INDICATIONS: Laceration   PROCEDURE PROVIDER: Dr Leatha Whitehead   SITE: Left wrist   TYPE/SIZE: simple, subcutaneous, clean, and no foreign body visualized  3 cm (total length)   FUNCTIONAL ASSESSMENT: Distal sensation, circulation, motor, and tendon function intact   MEDICATION: 4 mLs of 2% Lidocaine without epinephrine   PREPARATION: irrigation with Normal saline   DEBRIDEMENT: no debridement and wound explored, no foreign body found   CLOSURE:  Superficial layer closed  with 5 stitches of 5-0 Ethilon simple interrupted    Total number of sutures/staples placed: 5     Leatha Whitehead M.D.  Emergency Medicine  [facility] EMERGENCY DEPARTMENT  [address] [place] [id number] [phone]  Dept: [phone]       Leatha Whitehead MD  10/20/23 0898

## 2023-10-20 NOTE — ED TRIAGE NOTES
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

## 2023-10-25 DIAGNOSIS — Z79.4 TYPE 2 DIABETES MELLITUS WITH OTHER SPECIFIED COMPLICATION, WITH LONG-TERM CURRENT USE OF INSULIN (H): Primary | ICD-10-CM

## 2023-10-25 DIAGNOSIS — E11.69 TYPE 2 DIABETES MELLITUS WITH OTHER SPECIFIED COMPLICATION, WITH LONG-TERM CURRENT USE OF INSULIN (H): Primary | ICD-10-CM

## 2023-11-01 ENCOUNTER — HOSPITAL ENCOUNTER (OUTPATIENT)
Dept: CARDIOLOGY | Facility: HOSPITAL | Age: 68
Discharge: HOME | End: 2023-11-01
Attending: INTERNAL MEDICINE
Payer: MEDICARE

## 2023-11-01 ENCOUNTER — HOSPITAL ENCOUNTER (OUTPATIENT)
Dept: NUCLEAR MEDICINE | Facility: HOSPITAL | Age: 68
Discharge: HOME | End: 2023-11-01
Attending: INTERNAL MEDICINE
Payer: MEDICARE

## 2023-11-01 DIAGNOSIS — I25.10 CORONARY ARTERY DISEASE DUE TO LIPID RICH PLAQUE: ICD-10-CM

## 2023-11-01 DIAGNOSIS — I25.83 CORONARY ARTERY DISEASE DUE TO LIPID RICH PLAQUE: ICD-10-CM

## 2023-11-01 PROCEDURE — 343N000001 HC RX 343: Performed by: INTERNAL MEDICINE

## 2023-11-01 PROCEDURE — A9500 TC99M SESTAMIBI: HCPCS | Performed by: INTERNAL MEDICINE

## 2023-11-01 PROCEDURE — 93017 CV STRESS TEST TRACING ONLY: CPT

## 2023-11-01 PROCEDURE — 93018 CV STRESS TEST I&R ONLY: CPT | Performed by: INTERNAL MEDICINE

## 2023-11-01 PROCEDURE — 78452 HT MUSCLE IMAGE SPECT MULT: CPT | Mod: 26 | Performed by: INTERNAL MEDICINE

## 2023-11-01 PROCEDURE — G1010 CDSM STANSON: HCPCS | Performed by: INTERNAL MEDICINE

## 2023-11-01 PROCEDURE — 250N000011 HC RX IP 250 OP 636: Performed by: INTERNAL MEDICINE

## 2023-11-01 PROCEDURE — 93016 CV STRESS TEST SUPVJ ONLY: CPT | Performed by: INTERNAL MEDICINE

## 2023-11-01 PROCEDURE — G1010 CDSM STANSON: HCPCS

## 2023-11-01 RX ORDER — REGADENOSON 0.08 MG/ML
0.4 INJECTION, SOLUTION INTRAVENOUS ONCE
Status: COMPLETED | OUTPATIENT
Start: 2023-11-01 | End: 2023-11-01

## 2023-11-01 RX ORDER — AMINOPHYLLINE 25 MG/ML
50 INJECTION, SOLUTION INTRAVENOUS
Status: DISCONTINUED | OUTPATIENT
Start: 2023-11-01 | End: 2023-11-01 | Stop reason: HOSPADM

## 2023-11-01 RX ADMIN — Medication 9.8 MILLICURIE: at 07:01

## 2023-11-01 RX ADMIN — REGADENOSON 0.4 MG: 0.08 INJECTION, SOLUTION INTRAVENOUS at 08:02

## 2023-11-01 RX ADMIN — Medication 42.7 MILLICURIE: at 08:55

## 2023-11-06 DIAGNOSIS — I25.83 CORONARY ARTERY DISEASE DUE TO LIPID RICH PLAQUE: Primary | ICD-10-CM

## 2023-11-06 DIAGNOSIS — I25.10 CORONARY ARTERY DISEASE DUE TO LIPID RICH PLAQUE: Primary | ICD-10-CM

## 2023-11-06 DIAGNOSIS — R94.39 ABNORMAL NUCLEAR STRESS TEST: ICD-10-CM

## 2023-11-13 ENCOUNTER — DOCUMENTATION ONLY (OUTPATIENT)
Dept: CARDIOLOGY | Facility: CLINIC | Age: 68
End: 2023-11-13

## 2023-11-13 ENCOUNTER — PREP FOR PROCEDURE (OUTPATIENT)
Dept: CARDIOLOGY | Facility: CLINIC | Age: 68
End: 2023-11-13

## 2023-11-13 ENCOUNTER — APPOINTMENT (OUTPATIENT)
Dept: CARDIOLOGY | Facility: CLINIC | Age: 68
End: 2023-11-13
Payer: MEDICARE

## 2023-11-13 ENCOUNTER — OFFICE VISIT (OUTPATIENT)
Dept: CARDIOLOGY | Facility: CLINIC | Age: 68
End: 2023-11-13
Payer: MEDICARE

## 2023-11-13 VITALS
HEART RATE: 82 BPM | RESPIRATION RATE: 16 BRPM | WEIGHT: 248 LBS | SYSTOLIC BLOOD PRESSURE: 166 MMHG | BODY MASS INDEX: 34.59 KG/M2 | OXYGEN SATURATION: 97 % | DIASTOLIC BLOOD PRESSURE: 88 MMHG

## 2023-11-13 DIAGNOSIS — I25.118 CORONARY ARTERY DISEASE INVOLVING NATIVE CORONARY ARTERY OF NATIVE HEART WITH OTHER FORM OF ANGINA PECTORIS (H): Primary | ICD-10-CM

## 2023-11-13 DIAGNOSIS — G47.33 OBSTRUCTIVE SLEEP APNEA (ADULT) (PEDIATRIC): ICD-10-CM

## 2023-11-13 DIAGNOSIS — Z79.4 TYPE 2 DIABETES MELLITUS WITH OTHER SPECIFIED COMPLICATION, WITH LONG-TERM CURRENT USE OF INSULIN (H): ICD-10-CM

## 2023-11-13 DIAGNOSIS — R94.39 ABNORMAL NUCLEAR STRESS TEST: ICD-10-CM

## 2023-11-13 DIAGNOSIS — I10 PRIMARY HYPERTENSION: ICD-10-CM

## 2023-11-13 DIAGNOSIS — E11.69 TYPE 2 DIABETES MELLITUS WITH OTHER SPECIFIED COMPLICATION, WITH LONG-TERM CURRENT USE OF INSULIN (H): ICD-10-CM

## 2023-11-13 PROCEDURE — 99215 OFFICE O/P EST HI 40 MIN: CPT

## 2023-11-13 RX ORDER — SODIUM CHLORIDE 9 MG/ML
INJECTION, SOLUTION INTRAVENOUS CONTINUOUS
Status: CANCELLED | OUTPATIENT
Start: 2023-11-20

## 2023-11-13 RX ORDER — NICOTINE POLACRILEX 4 MG
15-30 LOZENGE BUCCAL
Status: CANCELLED | OUTPATIENT
Start: 2023-11-20

## 2023-11-13 RX ORDER — DEXTROSE MONOHYDRATE 25 G/50ML
25-50 INJECTION, SOLUTION INTRAVENOUS
Status: CANCELLED | OUTPATIENT
Start: 2023-11-20

## 2023-11-13 RX ORDER — LIDOCAINE 40 MG/G
CREAM TOPICAL
Status: CANCELLED | OUTPATIENT
Start: 2023-11-13

## 2023-11-13 RX ORDER — ASPIRIN 325 MG
325 TABLET ORAL ONCE
Status: CANCELLED | OUTPATIENT
Start: 2023-11-20 | End: 2023-11-13

## 2023-11-13 RX ORDER — FENTANYL CITRATE 50 UG/ML
25 INJECTION, SOLUTION INTRAMUSCULAR; INTRAVENOUS
Status: CANCELLED | OUTPATIENT
Start: 2023-11-20

## 2023-11-13 RX ORDER — ASPIRIN 81 MG/1
243 TABLET, CHEWABLE ORAL ONCE
Status: CANCELLED | OUTPATIENT
Start: 2023-11-20

## 2023-11-13 NOTE — LETTER
11/13/2023    Janes Jeronimo MD  [address] [facility] [address]    RE: Ezekiel Aldrich       Dear Colleague,     I had the pleasure of seeing Ezekiel Aldrich in the [facility].          Assessment/Recommendations   Assessment/Plan:  1.  Coronary artery disease: Lexiscan stress test done on 11/1/2023 showed a medium sized area of mild ischemia in the entire inferior segment(s) of the left ventricle. PCI of LAD with DESx1 8/2022.  - Clopidogrel 75 mg daily, aspirin 81 mg   - Pre angio teach by Aiyln Schmidt RN today  Discussed about the indication for coronary angiogram/possible PCI and the risks associated with angiogram including <0.1% of MI and CVA or death or any of the following to the degree that it could threaten her life: allergic reaction, arrhythmia, renal failure, hemorrhage, thrombosis and infection.  Risk associated with therapeutic intervention includes 2% or less risk of MI, less than 1% risk of CVA, emergency her surgery, death, less than 4% risk of vascular injury requiring surgical repair or blood transfusion with 20-30% risk of restenosis with a bare-metal stent and less than 10% risk of stenosis with a drug-eluting stent.              2.  Dyslipidemia: Ezekiel Aldrich is on high intensity statin therapy with atorvastatin 80 mg. His most recent LDL is 60.      3.  Hypertension: Elevated on losartan, carvedilol.  Patient feels he is anxious and did not sleep well, will reevaluate following angiogram.  Discussed possible intensification of therapy following the angiogram.    4.  Diabetes: Most recent HbA1c is 7.6. Patient is on oral hypoglycemic agents - Metformin, Insulin glargine, glipizide, Ozempic.    5. Obstructive Sleep Apnea: CPAP    Plan:  - Continue ASA, Clopidogrel  - Avoid strenuous exercise or lifting heavy objects until further direction  - Please seek medical attention if persistent worsening chest pain/tightness/pressure, shortness of breath, lightheaded  or dizziness  - Continue atorvastatin  -Continue current hypertension regimen  - Continue CPAP use  - Defer to primary care provider for diabetes management.     History of Present Illness/Subjective    Ezekiel Aldrich is a 68 year old years old  with a significant PMH of HTN, CAD w/ stent 2022, HLD, CHALO  is here at [facility] for pre procedure history and physical examination for coronary angiogram. His recent NM lexiscan stress test done on 11/1/2023 showed a medium sized area of mild ischemia in the entire inferior segment(s) of the left ventricle. Dr. Blevins has recommended coronary angiogram with possible coronary intervention.     Ezekiel continues to have lightheadedness/dizziness with bending over/standing and feeling more poorly than usual. He does not take his blood pressures at home, states he did not sleep well last night and is anxious for this appointment.  His wife usually manages his medications. He denies fatigue, shortness of breath, dyspnea on exertion, chest pain, and lower extremity edema.  He/she further denies fever, chills, N/V, diarrhea, vomiting, constipation, hematochezia, hematemesis, hemoptysis. He/she also denies malignant hyperthermia, stroke/TIA, bleeding or clotting disorders, COPD/Asthma/CHALO, anaphylaxis reaction to medications, IV contrast or sea food.       NM Lexiscan stress test 11/1/2023:    The nuclear stress test is abnormal.    There is a medium sized area of mild ischemia in the entire inferior segment(s) of the left ventricle.    The left ventricular ejection fraction at rest is 60%.    There is no prior study for comparison    Echocardiogram 7/2023:  Mild valvular aortic stenosis.  Left ventricular size, wall motion and function are normal. The ejection  fraction is > 65%.  Normal right ventricle size and systolic function    Coronary Angiogram done on 8/2022-reviewed:  Prox RCA to Mid RCA lesion is 50% stenosed.  Dist RCA lesion is 50%  stenosed.  Ost Cx to Mid Cx lesion is 60% stenosed.  Mid LAD lesion is 90% stenosed.  Ultrasound (IVUS) was performed on the LAD lesion. Images show the stent to be fully deployed.  3.0 Synergy DARRIN placed into the proximal LAD. Stent extends up to but does not cross the second diagonal artery. Postdilatation with 3.5 noncompliant balloon high-pressure.  Difficulty reaching left coronary from right radial approach. Required right femoral access to perform PCI of LCA       Physical Examination Review of Systems   BP (!) 166/88 (BP Location: Right arm, Patient Position: Sitting, Cuff Size: Adult Large)   Pulse 82   Resp 16   Wt 112.5 kg (248 lb)   SpO2 97%   BMI 34.59 kg/m    Body mass index is 34.59 kg/m .  Wt Readings from Last 3 Encounters:   11/13/23 112.5 kg (248 lb)   10/18/23 113.9 kg (251 lb)   10/12/23 112.9 kg (249 lb)     General Appearance:   no distress, normal body habitus   ENT/Mouth: membranes moist, no oral lesions or bleeding gums.      EYES:  no scleral icterus, normal conjunctivae   Neck: no thyromegaly   Chest/Lungs:   lungs are clear to auscultation, no rales or wheezing, equal chest wall expansion    Cardiovascular:   Regular. Normal first and second heart sounds with no murmurs, rubs, or gallops; the carotid, radial and posterior tibial pulses are intact, absent edema bilaterally        Extremities   no cyanosis or clubbing.  Radial pulses and Pedal pulses intact and symmetrical.  CMS intact.   Skin: no xanthelasma, warm.    Neurologic: no tremors     Psychiatric: alert and oriented x3, calm                                                        Negative unless noted in HPI     Medical History  Surgical History Family History Social History   Past Medical History:   Diagnosis Date    Acute renal failure (H24)     6/11/2016, secondary to dehydration, normalized    Past Surgical History:   Procedure Laterality Date    ANKLE FRACTURE SURGERY Right     hardware    CV CORONARY ANGIOGRAM N/A  2022    Procedure: Coronary Angiogram;  Surgeon: Archie Ni MD;  Location: [facility] CATH LAB CV    CV INTRAVASULAR ULTRASOUND N/A 2022    Procedure: Intravascular Ultrasound;  Surgeon: Archie Ni MD;  Location: [facility] CATH LAB CV    CV PCI N/A 2022    Percutaneous Coronary Intervention;  Surgeon: Archie Ni MD;  Location: [facility] CATH LAB CV    KNEE SURGERY      MANDIBLE FRACTURE SURGERY      from fx    SHOULDER SURGERY Right     from fx and separation    Family History   Problem Relation Age of Onset    Colon Cancer Mother     Coronary Artery Disease Mother     Hypertension Mother     Diabetes Type 2  Mother     Hypertension Father     Bell's palsy Father     Diabetes Type 2  Father     Thyroid Disease Father     Sleep Apnea Father     Sleep Apnea Sister     Prostate Cancer Brother 57    Sleep Apnea Brother     Bell's palsy Maternal Grandmother     Thyroid Disease Daughter     Social History     Socioeconomic History    Marital status:      Spouse name: Not on file    Number of children: 5    Years of education: Not on file    Highest education level: Not on file   Occupational History    Occupation: to be   Tobacco Use    Smoking status: Former     Types: Cigarettes     Quit date: 1985     Years since quittin.5    Smokeless tobacco: Current     Types: Chew    Tobacco comments:     2 tins/week   Substance and Sexual Activity    Alcohol use: Yes     Alcohol/week: 2.0 standard drinks of alcohol    Drug use: No    Sexual activity: Yes     Partners: Female   Other Topics Concern    Not on file   Social History Narrative    Diet-            Exercise- Not regular. Work is physical, walking.     Social Determinants of Health     Financial Resource Strain: Low Risk  (2023)    Financial Resource Strain     Within the past 12 months, have you or your family members you live with been unable to get utilities (heat, electricity) when it was really  needed?: No   Food Insecurity: Low Risk  (9/22/2023)    Food Insecurity     Within the past 12 months, did you worry that your food would run out before you got money to buy more?: No     Within the past 12 months, did the food you bought just not last and you didn t have money to get more?: No   Transportation Needs: Low Risk  (9/22/2023)    Transportation Needs     Within the past 12 months, has lack of transportation kept you from medical appointments, getting your medicines, non-medical meetings or appointments, work, or from getting things that you need?: No   Physical Activity: Not on file   Stress: Not on file   Social Connections: Not on file   Interpersonal Safety: Low Risk  (10/18/2023)    Interpersonal Safety     Do you feel physically and emotionally safe where you currently live?: Yes     Within the past 12 months, have you been hit, slapped, kicked or otherwise physically hurt by someone?: No     Within the past 12 months, have you been humiliated or emotionally abused in other ways by your partner or ex-partner?: No   Housing Stability: Low Risk  (9/22/2023)    Housing Stability     Do you have housing? : Yes     Are you worried about losing your housing?: No          Medications  Allergies   Current Outpatient Medications   Medication Sig Dispense Refill    ASPIRIN LOW DOSE 81 MG EC tablet TAKE 1 TABLET(81 MG) BY MOUTH DAILY. START TOMORROW 30 tablet 9    atorvastatin (LIPITOR) 80 MG tablet TAKE 1 TABLET BY MOUTH DAILY FOR HIGH CHOLESTEROL 90 tablet 2    B-D U/F insulin pen needle USE TWICE DAILY TO INJECT VICTOZA AND LANTUS 100 each 5    blood glucose (ACCU-CHEK GUIDE) test strip USE 1 TEST TWICE DAILY 200 strip 0    blood glucose (NO BRAND SPECIFIED) test strip Use to test blood sugar 1-3 times daily or as directed. 100 strip 3    blood glucose monitoring (NO BRAND SPECIFIED) meter device kit by In Vitro route as needed (glucose checks) 1 kit 0    carvedilol (COREG) 12.5 MG tablet TAKE 1  TABLET(12.5 MG) BY MOUTH TWICE DAILY WITH MEALS 180 tablet 3    clopidogrel (PLAVIX) 75 MG tablet Take 4 tablets the first day as a loading dose, then 1 tablet daily thereafter. 90 tablet 3    empagliflozin (JARDIANCE) 10 MG TABS tablet Take one pill daily for diabetes 30 tablet 3    generic lancets [GENERIC LANCETS] Use 1 each As Directed 2 (two) times a day. Dispense brand per patient's insurance at pharmacy discretion. 200 each 3    glipiZIDE (GLUCOTROL XL) 10 MG 24 hr tablet TAKE 2 TABLETS BY MOUTH DAILY 180 tablet 3    insulin glargine (LANTUS SOLOSTAR) 100 UNIT/ML pen ADMINISTER 66 UNITS UNDER THE SKIN AT BEDTIME 45 mL 5    losartan (COZAAR) 100 MG tablet TAKE 1 TABLET(100 MG) BY MOUTH DAILY 90 tablet 3    metFORMIN (GLUCOPHAGE XR) 500 MG 24 hr tablet TAKE 4 TABLETS(2000 MG) BY MOUTH DAILY 360 tablet 3    OZEMPIC, 0.25 OR 0.5 MG/DOSE, 2 MG/1.5ML SOPN pen INJECT 0.25MG SUBCUTANEOUSLY EVERY 7 DAYS 1.5 mL 5    OZEMPIC, 0.25 OR 0.5 MG/DOSE, 2 MG/3ML pen Inject 0.25 mg Subcutaneous every 7 days      tamsulosin (FLOMAX) 0.4 MG capsule TAKE 1 CAPSULE BY MOUTH EVERY DAY 90 capsule 1    Allergies   Allergen Reactions    Lisinopril Cough     Dry cough         Lab Results    Chemistry/lipid CBC Cardiac Enzymes/BNP/TSH/INR   Lab Results   Component Value Date    CHOL 178 10/12/2023    HDL 49 10/12/2023    TRIG 344 (H) 10/12/2023    BUN 26.0 (H) 10/18/2023     10/18/2023    CO2 21 (L) 10/18/2023    Lab Results   Component Value Date    WBC 8.4 08/01/2022    HGB 15.6 08/03/2022    HCT 45.1 08/01/2022    MCV 92 08/01/2022     (L) 08/03/2022    Lab Results   Component Value Date    TROPONINI 0.16 08/02/2022    BNP 89 (H) 08/01/2022    TSH 3.18 10/29/2021    INR 0.92 08/01/2022          This note has been dictated using voice recognition software. Any grammatical, typographical, or context distortions are unintentional and inherent to the software        Thank you for allowing me to participate in the care of your  patient.      Sincerely,     DAVID [facility]  cc:   No referring provider defined for this encounter.

## 2023-11-13 NOTE — PROGRESS NOTES
Ezekiel Aldrich  [address] [facility] [address]  [phone] (home)     Procedure cardiologist:  Dr. Boyd or Dr. Rubin  PCP:  Janes Jeronimo  H&P completed by:  Mary GILLETTE PA-C 11-13-23  Admit date Monday 11-20-23  Arrival time:  0630  Anticoagulation:  NA  CPAP: Yes  Previous PCI: Yes - 8-2-22  Bypass Grafts: No  Renal Issues: No  Diabetic?: Yes  Device?: No  Type:  NA  Ambulation status: independent    Reason for Visit:  Patient seen for pre-procedure education in preparation for: Coronary Angiogram with Possible PCI    Procedure Prep:  Primary Cardiologist note dated: 10-12-23  EKG results obtained, dated: To be completed on day of cath lab procedure  Hemogram results obtained: To be completed on day of cath lab procedure  Basic Metabolic Panel results obtained: To be completed on day of cath lab procedure  Lipid Profile results obtained: 10-12-23  Pertinent cardiac test results: 11-1-23 Lexiscan nuc, 7-7-23 echo  Orders placed per cosign required protocol on admission.    COVID-19 test: NA    Patient Education  Your arrival time is 0630.  [place] [address] - Main Entrance of the Hospital  Please plan on being at the hospital all day.  At any time, emergencies and/or urgent cases may come up which could delay the start of your procedure.    COVID Testing Instructions  *Mandatory COVID Testing: ALL Patients will need to complete a COVID test no sooner than 4 days prior to their procedure (regardless of vaccination status).    To schedule COVID testing Please call [phone]  If you want to complete this at an outside facility please call them to find out if they will have the results within the appropriate time frame and their fax number.  You will need to provide us with that information so we can send the order.  The facility completing the test will need to fax the results to [phone]  If you are running into and  issues please call us.     Pre-procedure instructions  Patient instructed to not Eat or Drink after midnight.  Patient instructed to shower the evening before or the morning of the procedure.  Patient instructed to arrange for transportation home following procedure from a responsible family member of friend (wife will be ).  No driving for at least 24 hours.  Patient instructed to have a responsible adult with them for 24 hours post-procedure.  Post-procedure follow up process.  Conscious sedation discussed.  Patient instructed on antiplatelet medication.  Received procedure education handouts in person.    Pre-procedure medication instructions.  Continue medications as scheduled, with a small amount of water on the day of the procedure unless indicated. (NO Diabetic Medications or Blood Thinners)  Pt instructed not to consume Alcohol, Tobacco, Caffeine 12 hours prior to procedure.  Patient instructed to take 4-81 mg of Aspirin, in addition to Lipitor, Coreg, Plavix, Losartan, Flomax and 33u. Lantus morning of procedure.              Diabetic Medication Instructions  DO NOT take any oral diabetic medication, short-acting diabetes medications/insulin, humalog or regular insulin the morning of your test  Take   dose of long-acting insulin (Lantus, Levemir) the day of your test  Hold Oral Diabetic on the day of the procedure and for 48 hours after IV contrast given  Remember to  bring your glucometer and insulin with you to take after your test if needed    Patient states understanding of procedure and agrees to proceed.    *PATIENTS RECORDS AVAILABLE IN [facility] UNLESS OTHERWISE INDICATED*      Patient Active Problem List   Diagnosis    Hypertension    Obstructive Sleep Apnea    Male Erectile Disorder    Type 2 diabetes mellitus (H)    Low back pain    BPH (benign prostatic hyperplasia)    Microalbuminuria    Retinopathy    Obesity (BMI 35.0-39.9) with comorbidity (H)    Peripheral neuropathy    Diabetic ulcer of  toe of right foot associated with type 2 diabetes mellitus, limited to breakdown of skin (H)    Coronary artery disease       Current Outpatient Medications   Medication Sig Dispense Refill    ASPIRIN LOW DOSE 81 MG EC tablet TAKE 1 TABLET(81 MG) BY MOUTH DAILY. START TOMORROW 30 tablet 9    atorvastatin (LIPITOR) 80 MG tablet TAKE 1 TABLET BY MOUTH DAILY FOR HIGH CHOLESTEROL 90 tablet 2    B-D U/F insulin pen needle USE TWICE DAILY TO INJECT VICTOZA AND LANTUS 100 each 5    blood glucose (ACCU-CHEK GUIDE) test strip USE 1 TEST TWICE DAILY 200 strip 0    blood glucose (NO BRAND SPECIFIED) test strip Use to test blood sugar 1-3 times daily or as directed. 100 strip 3    blood glucose monitoring (NO BRAND SPECIFIED) meter device kit by In Vitro route as needed (glucose checks) 1 kit 0    carvedilol (COREG) 12.5 MG tablet TAKE 1 TABLET(12.5 MG) BY MOUTH TWICE DAILY WITH MEALS 180 tablet 3    clopidogrel (PLAVIX) 75 MG tablet Take 4 tablets the first day as a loading dose, then 1 tablet daily thereafter. 90 tablet 3    empagliflozin (JARDIANCE) 10 MG TABS tablet Take one pill daily for diabetes 30 tablet 3    generic lancets [GENERIC LANCETS] Use 1 each As Directed 2 (two) times a day. Dispense brand per patient's insurance at pharmacy discretion. 200 each 3    glipiZIDE (GLUCOTROL XL) 10 MG 24 hr tablet TAKE 2 TABLETS BY MOUTH DAILY 180 tablet 3    insulin glargine (LANTUS SOLOSTAR) 100 UNIT/ML pen ADMINISTER 66 UNITS UNDER THE SKIN AT BEDTIME 45 mL 5    losartan (COZAAR) 100 MG tablet TAKE 1 TABLET(100 MG) BY MOUTH DAILY 90 tablet 3    metFORMIN (GLUCOPHAGE XR) 500 MG 24 hr tablet TAKE 4 TABLETS(2000 MG) BY MOUTH DAILY 360 tablet 3    OZEMPIC, 0.25 OR 0.5 MG/DOSE, 2 MG/1.5ML SOPN pen INJECT 0.25MG SUBCUTANEOUSLY EVERY 7 DAYS 1.5 mL 5    OZEMPIC, 0.25 OR 0.5 MG/DOSE, 2 MG/3ML pen Inject 0.25 mg Subcutaneous every 7 days      tamsulosin (FLOMAX) 0.4 MG capsule TAKE 1 CAPSULE BY MOUTH EVERY DAY 90 capsule 1        Allergies   Allergen Reactions    Lisinopril Cough     Dry cough       Ailyn Schmidt RN on 11/13/2023 at 8:38 AM

## 2023-11-13 NOTE — PROGRESS NOTES
RE: KML pt  Received: 11-10-23  1100  Jasmin Calloway  Ailyn Schmidt, RN  Case type: CORS POSS PCI  Procedure Physician(s): SHILA/MELANI  Procedure Date and Patient Arrival Time: Monday 11/20, with arrival time of 630 AM (date preferred per wife)  H&P: Pt scheduled on 11/13 WITH ALEXANDREA (H&P expires, no openings with Dorita)  Pre-Procedure Lab Appt: on admission  Alerts/Important Info: IDDM, CHALO    Pts spouse states last time the patient had a stent placed, he spent two nights in the hospital. I did let her know that most of the time patients do go home same day, but I wanted to run that past you in case you know if there are plans for him to stay overnight.    Thanks,  Sirisha     ----- Message -----  From: Ailyn Schmidt, RN  Sent: 11/6/2023  11:15 AM CST  To: [facility] Procedure  Pool - [facility]  Subject: KML pt                                          Case Type: CA poss PCI  Ordering Provider: Dr. Blevins  H&P: PCP 10-18-23 visit note  Alerts: IDDM, CHALO  Additional Info/Urgency: pt prefers 11-13 or 11-14 - please call his wife Maria Eugenia to arrange  Orders for Pre-Procedure Labs? On admission

## 2023-11-13 NOTE — H&P (VIEW-ONLY)
Assessment/Recommendations   Assessment/Plan:  1.  Coronary artery disease: Lexiscan stress test done on 11/1/2023 showed a medium sized area of mild ischemia in the entire inferior segment(s) of the left ventricle. PCI of LAD with DESx1 8/2022.  - Clopidogrel 75 mg daily, aspirin 81 mg   - Pre angio teach by Aiyln Schmidt RN today  Discussed about the indication for coronary angiogram/possible PCI and the risks associated with angiogram including <0.1% of MI and CVA or death or any of the following to the degree that it could threaten her life: allergic reaction, arrhythmia, renal failure, hemorrhage, thrombosis and infection.  Risk associated with therapeutic intervention includes 2% or less risk of MI, less than 1% risk of CVA, emergency her surgery, death, less than 4% risk of vascular injury requiring surgical repair or blood transfusion with 20-30% risk of restenosis with a bare-metal stent and less than 10% risk of stenosis with a drug-eluting stent.              2.  Dyslipidemia: Ezekiel Aldrich is on high intensity statin therapy with atorvastatin 80 mg. His most recent LDL is 60.      3.  Hypertension: Elevated on losartan, carvedilol.  Patient feels he is anxious and did not sleep well, will reevaluate following angiogram.  Discussed possible intensification of therapy following the angiogram.    4.  Diabetes: Most recent HbA1c is 7.6. Patient is on oral hypoglycemic agents - Metformin, Insulin glargine, glipizide, Ozempic.    5. Obstructive Sleep Apnea: CPAP    Plan:  - Continue ASA, Clopidogrel  - Avoid strenuous exercise or lifting heavy objects until further direction  - Please seek medical attention if persistent worsening chest pain/tightness/pressure, shortness of breath, lightheaded or dizziness  - Continue atorvastatin  -Continue current hypertension regimen  - Continue CPAP use  - Defer to primary care provider for diabetes management.     History of Present Illness/Subjective     Ezekiel Aldrich is a 68 year old years old  with a significant PMH of HTN, CAD w/ stent 2022, HLD, CHALO  is here at [facility] for pre procedure history and physical examination for coronary angiogram. His recent NM lexiscan stress test done on 11/1/2023 showed a medium sized area of mild ischemia in the entire inferior segment(s) of the left ventricle. Dr. Blevins has recommended coronary angiogram with possible coronary intervention.     Ezekiel continues to have lightheadedness/dizziness with bending over/standing and feeling more poorly than usual. He does not take his blood pressures at home, states he did not sleep well last night and is anxious for this appointment.  His wife usually manages his medications. He denies fatigue, shortness of breath, dyspnea on exertion, chest pain, and lower extremity edema.  He/she further denies fever, chills, N/V, diarrhea, vomiting, constipation, hematochezia, hematemesis, hemoptysis. He/she also denies malignant hyperthermia, stroke/TIA, bleeding or clotting disorders, COPD/Asthma/CHALO, anaphylaxis reaction to medications, IV contrast or sea food.       NM Lexiscan stress test 11/1/2023:    The nuclear stress test is abnormal.    There is a medium sized area of mild ischemia in the entire inferior segment(s) of the left ventricle.    The left ventricular ejection fraction at rest is 60%.    There is no prior study for comparison    Echocardiogram 7/2023:  Mild valvular aortic stenosis.  Left ventricular size, wall motion and function are normal. The ejection  fraction is > 65%.  Normal right ventricle size and systolic function    Coronary Angiogram done on 8/2022-reviewed:  Prox RCA to Mid RCA lesion is 50% stenosed.  Dist RCA lesion is 50% stenosed.  Ost Cx to Mid Cx lesion is 60% stenosed.  Mid LAD lesion is 90% stenosed.  Ultrasound (IVUS) was performed on the LAD lesion. Images show the stent to be fully deployed.  3.0 Synergy DARRIN placed into the  proximal LAD. Stent extends up to but does not cross the second diagonal artery. Postdilatation with 3.5 noncompliant balloon high-pressure.  Difficulty reaching left coronary from right radial approach. Required right femoral access to perform PCI of LCA       Physical Examination Review of Systems   BP (!) 166/88 (BP Location: Right arm, Patient Position: Sitting, Cuff Size: Adult Large)   Pulse 82   Resp 16   Wt 112.5 kg (248 lb)   SpO2 97%   BMI 34.59 kg/m    Body mass index is 34.59 kg/m .  Wt Readings from Last 3 Encounters:   11/13/23 112.5 kg (248 lb)   10/18/23 113.9 kg (251 lb)   10/12/23 112.9 kg (249 lb)     General Appearance:   no distress, normal body habitus   ENT/Mouth: membranes moist, no oral lesions or bleeding gums.      EYES:  no scleral icterus, normal conjunctivae   Neck: no thyromegaly   Chest/Lungs:   lungs are clear to auscultation, no rales or wheezing, equal chest wall expansion    Cardiovascular:   Regular. Normal first and second heart sounds with no murmurs, rubs, or gallops; the carotid, radial and posterior tibial pulses are intact, absent edema bilaterally        Extremities   no cyanosis or clubbing.  Radial pulses and Pedal pulses intact and symmetrical.  CMS intact.   Skin: no xanthelasma, warm.    Neurologic: no tremors     Psychiatric: alert and oriented x3, calm                                                        Negative unless noted in HPI     Medical History  Surgical History Family History Social History   Past Medical History:   Diagnosis Date    Acute renal failure (H24)     6/11/2016, secondary to dehydration, normalized    Past Surgical History:   Procedure Laterality Date    ANKLE FRACTURE SURGERY Right     hardware    CV CORONARY ANGIOGRAM N/A 08/02/2022    Procedure: Coronary Angiogram;  Surgeon: Archie Ni MD;  Location: [facility] CATH LAB CV    CV INTRAVASULAR ULTRASOUND N/A 08/02/2022    Procedure: Intravascular Ultrasound;  Surgeon: Raine  Archie ESCUDERO MD;  Location: [facility] CV    CV PCI N/A 2022    Percutaneous Coronary Intervention;  Surgeon: Archie Ni MD;  Location: [facility] CV    KNEE SURGERY      MANDIBLE FRACTURE SURGERY      from fx    SHOULDER SURGERY Right     from fx and separation    Family History   Problem Relation Age of Onset    Colon Cancer Mother     Coronary Artery Disease Mother     Hypertension Mother     Diabetes Type 2  Mother     Hypertension Father     Bell's palsy Father     Diabetes Type 2  Father     Thyroid Disease Father     Sleep Apnea Father     Sleep Apnea Sister     Prostate Cancer Brother 57    Sleep Apnea Brother     Bell's palsy Maternal Grandmother     Thyroid Disease Daughter     Social History     Socioeconomic History    Marital status:      Spouse name: Not on file    Number of children: 5    Years of education: Not on file    Highest education level: Not on file   Occupational History    Occupation: [employer]   Tobacco Use    Smoking status: Former     Types: Cigarettes     Quit date: 1985     Years since quittin.5    Smokeless tobacco: Current     Types: Chew    Tobacco comments:     2 tins/week   Substance and Sexual Activity    Alcohol use: Yes     Alcohol/week: 2.0 standard drinks of alcohol    Drug use: No    Sexual activity: Yes     Partners: Female   Other Topics Concern    Not on file   Social History Narrative    Diet-            Exercise- Not regular. Work is physical, walking.     Social Determinants of Health     Financial Resource Strain: Low Risk  (2023)    Financial Resource Strain     Within the past 12 months, have you or your family members you live with been unable to get utilities (heat, electricity) when it was really needed?: No   Food Insecurity: Low Risk  (2023)    Food Insecurity     Within the past 12 months, did you worry that your food would run out before you got money to buy more?: No     Within the past 12 months,  did the food you bought just not last and you didn t have money to get more?: No   Transportation Needs: Low Risk  (9/22/2023)    Transportation Needs     Within the past 12 months, has lack of transportation kept you from medical appointments, getting your medicines, non-medical meetings or appointments, work, or from getting things that you need?: No   Physical Activity: Not on file   Stress: Not on file   Social Connections: Not on file   Interpersonal Safety: Low Risk  (10/18/2023)    Interpersonal Safety     Do you feel physically and emotionally safe where you currently live?: Yes     Within the past 12 months, have you been hit, slapped, kicked or otherwise physically hurt by someone?: No     Within the past 12 months, have you been humiliated or emotionally abused in other ways by your partner or ex-partner?: No   Housing Stability: Low Risk  (9/22/2023)    Housing Stability     Do you have housing? : Yes     Are you worried about losing your housing?: No          Medications  Allergies   Current Outpatient Medications   Medication Sig Dispense Refill    ASPIRIN LOW DOSE 81 MG EC tablet TAKE 1 TABLET(81 MG) BY MOUTH DAILY. START TOMORROW 30 tablet 9    atorvastatin (LIPITOR) 80 MG tablet TAKE 1 TABLET BY MOUTH DAILY FOR HIGH CHOLESTEROL 90 tablet 2    B-D U/F insulin pen needle USE TWICE DAILY TO INJECT VICTOZA AND LANTUS 100 each 5    blood glucose (ACCU-CHEK GUIDE) test strip USE 1 TEST TWICE DAILY 200 strip 0    blood glucose (NO BRAND SPECIFIED) test strip Use to test blood sugar 1-3 times daily or as directed. 100 strip 3    blood glucose monitoring (NO BRAND SPECIFIED) meter device kit by In Vitro route as needed (glucose checks) 1 kit 0    carvedilol (COREG) 12.5 MG tablet TAKE 1 TABLET(12.5 MG) BY MOUTH TWICE DAILY WITH MEALS 180 tablet 3    clopidogrel (PLAVIX) 75 MG tablet Take 4 tablets the first day as a loading dose, then 1 tablet daily thereafter. 90 tablet 3    empagliflozin (JARDIANCE) 10 MG  TABS tablet Take one pill daily for diabetes 30 tablet 3    generic lancets [GENERIC LANCETS] Use 1 each As Directed 2 (two) times a day. Dispense brand per patient's insurance at pharmacy discretion. 200 each 3    glipiZIDE (GLUCOTROL XL) 10 MG 24 hr tablet TAKE 2 TABLETS BY MOUTH DAILY 180 tablet 3    insulin glargine (LANTUS SOLOSTAR) 100 UNIT/ML pen ADMINISTER 66 UNITS UNDER THE SKIN AT BEDTIME 45 mL 5    losartan (COZAAR) 100 MG tablet TAKE 1 TABLET(100 MG) BY MOUTH DAILY 90 tablet 3    metFORMIN (GLUCOPHAGE XR) 500 MG 24 hr tablet TAKE 4 TABLETS(2000 MG) BY MOUTH DAILY 360 tablet 3    OZEMPIC, 0.25 OR 0.5 MG/DOSE, 2 MG/1.5ML SOPN pen INJECT 0.25MG SUBCUTANEOUSLY EVERY 7 DAYS 1.5 mL 5    OZEMPIC, 0.25 OR 0.5 MG/DOSE, 2 MG/3ML pen Inject 0.25 mg Subcutaneous every 7 days      tamsulosin (FLOMAX) 0.4 MG capsule TAKE 1 CAPSULE BY MOUTH EVERY DAY 90 capsule 1    Allergies   Allergen Reactions    Lisinopril Cough     Dry cough         Lab Results    Chemistry/lipid CBC Cardiac Enzymes/BNP/TSH/INR   Lab Results   Component Value Date    CHOL 178 10/12/2023    HDL 49 10/12/2023    TRIG 344 (H) 10/12/2023    BUN 26.0 (H) 10/18/2023     10/18/2023    CO2 21 (L) 10/18/2023    Lab Results   Component Value Date    WBC 8.4 08/01/2022    HGB 15.6 08/03/2022    HCT 45.1 08/01/2022    MCV 92 08/01/2022     (L) 08/03/2022    Lab Results   Component Value Date    TROPONINI 0.16 08/02/2022    BNP 89 (H) 08/01/2022    TSH 3.18 10/29/2021    INR 0.92 08/01/2022          This note has been dictated using voice recognition software. Any grammatical, typographical, or context distortions are unintentional and inherent to the software    Mary Brown PA-C

## 2023-11-17 ENCOUNTER — MYC MEDICAL ADVICE (OUTPATIENT)
Dept: FAMILY MEDICINE | Facility: CLINIC | Age: 68
End: 2023-11-17
Payer: MEDICARE

## 2023-11-17 DIAGNOSIS — Z79.4 TYPE 2 DIABETES MELLITUS WITHOUT COMPLICATION, WITH LONG-TERM CURRENT USE OF INSULIN (H): ICD-10-CM

## 2023-11-17 DIAGNOSIS — E11.9 TYPE 2 DIABETES MELLITUS WITHOUT COMPLICATION, WITH LONG-TERM CURRENT USE OF INSULIN (H): ICD-10-CM

## 2023-11-18 PROCEDURE — 93010 ELECTROCARDIOGRAM REPORT: CPT | Mod: HOP | Performed by: INTERNAL MEDICINE

## 2023-11-20 ENCOUNTER — HOSPITAL ENCOUNTER (OUTPATIENT)
Facility: HOSPITAL | Age: 68
Discharge: HOME | End: 2023-11-20
Attending: INTERNAL MEDICINE | Admitting: INTERNAL MEDICINE
Payer: MEDICARE

## 2023-11-20 VITALS
SYSTOLIC BLOOD PRESSURE: 152 MMHG | HEART RATE: 86 BPM | DIASTOLIC BLOOD PRESSURE: 84 MMHG | BODY MASS INDEX: 33.6 KG/M2 | RESPIRATION RATE: 34 BRPM | HEIGHT: 71 IN | OXYGEN SATURATION: 97 % | TEMPERATURE: 97.6 F | WEIGHT: 240 LBS

## 2023-11-20 DIAGNOSIS — I25.118 CORONARY ARTERY DISEASE INVOLVING NATIVE CORONARY ARTERY OF NATIVE HEART WITH OTHER FORM OF ANGINA PECTORIS (H): ICD-10-CM

## 2023-11-20 DIAGNOSIS — I25.83 CORONARY ARTERY DISEASE DUE TO LIPID RICH PLAQUE: ICD-10-CM

## 2023-11-20 DIAGNOSIS — I25.10 CORONARY ARTERY DISEASE DUE TO LIPID RICH PLAQUE: ICD-10-CM

## 2023-11-20 DIAGNOSIS — R94.39 ABNORMAL NUCLEAR STRESS TEST: ICD-10-CM

## 2023-11-20 DIAGNOSIS — Z95.5 STATUS POST CORONARY ARTERY STENT PLACEMENT: Primary | ICD-10-CM

## 2023-11-20 PROBLEM — Z98.890 STATUS POST CORONARY ANGIOGRAM: Status: ACTIVE | Noted: 2023-11-20

## 2023-11-20 LAB
ABO/RH(D): NORMAL
ACT BLD: 297 SECONDS (ref 74–150)
ANION GAP SERPL CALCULATED.3IONS-SCNC: 9 MMOL/L (ref 7–15)
ANTIBODY SCREEN: NEGATIVE
ATRIAL RATE - MUSE: 79 BPM
BUN SERPL-MCNC: 12.9 MG/DL (ref 8–23)
CALCIUM SERPL-MCNC: 9.3 MG/DL (ref 8.8–10.2)
CHLORIDE SERPL-SCNC: 104 MMOL/L (ref 98–107)
CREAT SERPL-MCNC: 1.23 MG/DL (ref 0.67–1.17)
DEPRECATED HCO3 PLAS-SCNC: 23 MMOL/L (ref 22–29)
DIASTOLIC BLOOD PRESSURE - MUSE: NORMAL MMHG
EGFRCR SERPLBLD CKD-EPI 2021: 64 ML/MIN/1.73M2
ERYTHROCYTE [DISTWIDTH] IN BLOOD BY AUTOMATED COUNT: 12.9 % (ref 10–15)
GLUCOSE BLDC GLUCOMTR-MCNC: 179 MG/DL (ref 70–99)
GLUCOSE SERPL-MCNC: 271 MG/DL (ref 70–99)
HCT VFR BLD AUTO: 43.9 % (ref 40–53)
HGB BLD-MCNC: 15.4 G/DL (ref 13.3–17.7)
INTERPRETATION ECG - MUSE: NORMAL
MCH RBC QN AUTO: 32.2 PG (ref 26.5–33)
MCHC RBC AUTO-ENTMCNC: 35.1 G/DL (ref 31.5–36.5)
MCV RBC AUTO: 92 FL (ref 78–100)
P AXIS - MUSE: 46 DEGREES
PLATELET # BLD AUTO: 171 10E3/UL (ref 150–450)
POTASSIUM SERPL-SCNC: 4.2 MMOL/L (ref 3.4–5.3)
PR INTERVAL - MUSE: 282 MS
QRS DURATION - MUSE: 96 MS
QT - MUSE: 402 MS
QTC - MUSE: 460 MS
R AXIS - MUSE: 22 DEGREES
RBC # BLD AUTO: 4.78 10E6/UL (ref 4.4–5.9)
SODIUM SERPL-SCNC: 136 MMOL/L (ref 135–145)
SPECIMEN EXPIRATION DATE: NORMAL
SYSTOLIC BLOOD PRESSURE - MUSE: NORMAL MMHG
T AXIS - MUSE: 10 DEGREES
VENTRICULAR RATE- MUSE: 79 BPM
WBC # BLD AUTO: 6.5 10E3/UL (ref 4–11)

## 2023-11-20 PROCEDURE — 93005 ELECTROCARDIOGRAM TRACING: CPT

## 2023-11-20 PROCEDURE — C1769 GUIDE WIRE: HCPCS | Performed by: INTERNAL MEDICINE

## 2023-11-20 PROCEDURE — 250N000009 HC RX 250: Performed by: INTERNAL MEDICINE

## 2023-11-20 PROCEDURE — C9600 PERC DRUG-EL COR STENT SING: HCPCS | Performed by: INTERNAL MEDICINE

## 2023-11-20 PROCEDURE — 99152 MOD SED SAME PHYS/QHP 5/>YRS: CPT | Performed by: INTERNAL MEDICINE

## 2023-11-20 PROCEDURE — C1874 STENT, COATED/COV W/DEL SYS: HCPCS | Performed by: INTERNAL MEDICINE

## 2023-11-20 PROCEDURE — 80048 BASIC METABOLIC PNL TOTAL CA: CPT | Performed by: INTERNAL MEDICINE

## 2023-11-20 PROCEDURE — 255N000002 HC RX 255 OP 636: Performed by: INTERNAL MEDICINE

## 2023-11-20 PROCEDURE — 99153 MOD SED SAME PHYS/QHP EA: CPT | Performed by: INTERNAL MEDICINE

## 2023-11-20 PROCEDURE — 92928 PRQ TCAT PLMT NTRAC ST 1 LES: CPT | Mod: RC | Performed by: INTERNAL MEDICINE

## 2023-11-20 PROCEDURE — C1760 CLOSURE DEV, VASC: HCPCS | Performed by: INTERNAL MEDICINE

## 2023-11-20 PROCEDURE — 85347 COAGULATION TIME ACTIVATED: CPT

## 2023-11-20 PROCEDURE — 272N000001 HC OR GENERAL SUPPLY STERILE: Performed by: INTERNAL MEDICINE

## 2023-11-20 PROCEDURE — 82962 GLUCOSE BLOOD TEST: CPT

## 2023-11-20 PROCEDURE — 93458 L HRT ARTERY/VENTRICLE ANGIO: CPT | Performed by: INTERNAL MEDICINE

## 2023-11-20 PROCEDURE — 250N000011 HC RX IP 250 OP 636: Performed by: INTERNAL MEDICINE

## 2023-11-20 PROCEDURE — C1725 CATH, TRANSLUMIN NON-LASER: HCPCS | Performed by: INTERNAL MEDICINE

## 2023-11-20 PROCEDURE — 258N000003 HC RX IP 258 OP 636: Performed by: INTERNAL MEDICINE

## 2023-11-20 PROCEDURE — 250N000013 HC RX MED GY IP 250 OP 250 PS 637: Performed by: INTERNAL MEDICINE

## 2023-11-20 PROCEDURE — 999N000054 HC STATISTIC EKG NON-CHARGEABLE

## 2023-11-20 PROCEDURE — 93010 ELECTROCARDIOGRAM REPORT: CPT | Mod: HOP | Performed by: INTERNAL MEDICINE

## 2023-11-20 PROCEDURE — C1887 CATHETER, GUIDING: HCPCS | Performed by: INTERNAL MEDICINE

## 2023-11-20 PROCEDURE — 36415 COLL VENOUS BLD VENIPUNCTURE: CPT | Performed by: INTERNAL MEDICINE

## 2023-11-20 PROCEDURE — 85014 HEMATOCRIT: CPT | Performed by: INTERNAL MEDICINE

## 2023-11-20 PROCEDURE — 250N000013 HC RX MED GY IP 250 OP 250 PS 637: Performed by: NURSE PRACTITIONER

## 2023-11-20 PROCEDURE — 86901 BLOOD TYPING SEROLOGIC RH(D): CPT | Performed by: INTERNAL MEDICINE

## 2023-11-20 PROCEDURE — 86850 RBC ANTIBODY SCREEN: CPT | Performed by: INTERNAL MEDICINE

## 2023-11-20 PROCEDURE — 93458 L HRT ARTERY/VENTRICLE ANGIO: CPT | Mod: 26 | Performed by: INTERNAL MEDICINE

## 2023-11-20 PROCEDURE — 250N000011 HC RX IP 250 OP 636: Mod: JZ | Performed by: NURSE PRACTITIONER

## 2023-11-20 DEVICE — STENT CORONARY DES SYNERGY XD MR US 3.50X32MM H7493941832350: Type: IMPLANTABLE DEVICE | Status: FUNCTIONAL

## 2023-11-20 DEVICE — CLOSURE ANGIOSEAL 6FR 610130: Type: IMPLANTABLE DEVICE | Status: FUNCTIONAL

## 2023-11-20 RX ORDER — CLOPIDOGREL BISULFATE 75 MG/1
75 TABLET ORAL DAILY
Status: DISCONTINUED | OUTPATIENT
Start: 2023-11-21 | End: 2023-11-20 | Stop reason: HOSPADM

## 2023-11-20 RX ORDER — OXYCODONE HYDROCHLORIDE 5 MG/1
10 TABLET ORAL EVERY 4 HOURS PRN
Status: DISCONTINUED | OUTPATIENT
Start: 2023-11-20 | End: 2023-11-20 | Stop reason: HOSPADM

## 2023-11-20 RX ORDER — ONDANSETRON 4 MG/1
4 TABLET, ORALLY DISINTEGRATING ORAL EVERY 6 HOURS PRN
Status: DISCONTINUED | OUTPATIENT
Start: 2023-11-20 | End: 2023-11-20 | Stop reason: HOSPADM

## 2023-11-20 RX ORDER — NALOXONE HYDROCHLORIDE 0.4 MG/ML
0.4 INJECTION, SOLUTION INTRAMUSCULAR; INTRAVENOUS; SUBCUTANEOUS
Status: DISCONTINUED | OUTPATIENT
Start: 2023-11-20 | End: 2023-11-20 | Stop reason: HOSPADM

## 2023-11-20 RX ORDER — CLOPIDOGREL BISULFATE 75 MG/1
TABLET ORAL
Status: DISCONTINUED | OUTPATIENT
Start: 2023-11-20 | End: 2023-11-20 | Stop reason: HOSPADM

## 2023-11-20 RX ORDER — FENTANYL CITRATE 50 UG/ML
25 INJECTION, SOLUTION INTRAMUSCULAR; INTRAVENOUS
Status: DISCONTINUED | OUTPATIENT
Start: 2023-11-20 | End: 2023-11-20 | Stop reason: HOSPADM

## 2023-11-20 RX ORDER — ATROPINE SULFATE 0.1 MG/ML
0.5 INJECTION INTRAVENOUS
Status: DISCONTINUED | OUTPATIENT
Start: 2023-11-20 | End: 2023-11-20 | Stop reason: HOSPADM

## 2023-11-20 RX ORDER — NALOXONE HYDROCHLORIDE 0.4 MG/ML
0.2 INJECTION, SOLUTION INTRAMUSCULAR; INTRAVENOUS; SUBCUTANEOUS
Status: DISCONTINUED | OUTPATIENT
Start: 2023-11-20 | End: 2023-11-20 | Stop reason: HOSPADM

## 2023-11-20 RX ORDER — FLUMAZENIL 0.1 MG/ML
0.2 INJECTION, SOLUTION INTRAVENOUS
Status: DISCONTINUED | OUTPATIENT
Start: 2023-11-20 | End: 2023-11-20 | Stop reason: HOSPADM

## 2023-11-20 RX ORDER — NITROGLYCERIN 0.4 MG/1
0.4 TABLET SUBLINGUAL EVERY 5 MIN PRN
Status: DISCONTINUED | OUTPATIENT
Start: 2023-11-20 | End: 2023-11-20 | Stop reason: HOSPADM

## 2023-11-20 RX ORDER — LIDOCAINE 40 MG/G
CREAM TOPICAL
Status: DISCONTINUED | OUTPATIENT
Start: 2023-11-20 | End: 2023-11-20 | Stop reason: HOSPADM

## 2023-11-20 RX ORDER — HYDRALAZINE HYDROCHLORIDE 20 MG/ML
10 INJECTION INTRAMUSCULAR; INTRAVENOUS EVERY 4 HOURS PRN
Status: DISCONTINUED | OUTPATIENT
Start: 2023-11-20 | End: 2023-11-20 | Stop reason: HOSPADM

## 2023-11-20 RX ORDER — CLOPIDOGREL BISULFATE 75 MG/1
75 TABLET ORAL DAILY
Qty: 90 TABLET | Refills: 3 | Status: SHIPPED | OUTPATIENT
Start: 2023-11-21

## 2023-11-20 RX ORDER — DEXTROSE MONOHYDRATE 25 G/50ML
25-50 INJECTION, SOLUTION INTRAVENOUS
Status: DISCONTINUED | OUTPATIENT
Start: 2023-11-20 | End: 2023-11-20 | Stop reason: HOSPADM

## 2023-11-20 RX ORDER — ONDANSETRON 2 MG/ML
4 INJECTION INTRAMUSCULAR; INTRAVENOUS EVERY 6 HOURS PRN
Status: DISCONTINUED | OUTPATIENT
Start: 2023-11-20 | End: 2023-11-20 | Stop reason: HOSPADM

## 2023-11-20 RX ORDER — SODIUM CHLORIDE 9 MG/ML
INJECTION, SOLUTION INTRAVENOUS CONTINUOUS
Status: DISCONTINUED | OUTPATIENT
Start: 2023-11-20 | End: 2023-11-20 | Stop reason: HOSPADM

## 2023-11-20 RX ORDER — ACETAMINOPHEN 325 MG/1
650 TABLET ORAL EVERY 4 HOURS PRN
Status: DISCONTINUED | OUTPATIENT
Start: 2023-11-20 | End: 2023-11-20 | Stop reason: HOSPADM

## 2023-11-20 RX ORDER — NICOTINE POLACRILEX 4 MG
15-30 LOZENGE BUCCAL
Status: DISCONTINUED | OUTPATIENT
Start: 2023-11-20 | End: 2023-11-20 | Stop reason: HOSPADM

## 2023-11-20 RX ORDER — ASPIRIN 81 MG/1
243 TABLET, CHEWABLE ORAL ONCE
Status: COMPLETED | OUTPATIENT
Start: 2023-11-20 | End: 2023-11-20

## 2023-11-20 RX ORDER — IODIXANOL 320 MG/ML
INJECTION, SOLUTION INTRAVASCULAR
Status: DISCONTINUED | OUTPATIENT
Start: 2023-11-20 | End: 2023-11-20 | Stop reason: HOSPADM

## 2023-11-20 RX ORDER — HEPARIN SODIUM 1000 [USP'U]/ML
INJECTION, SOLUTION INTRAVENOUS; SUBCUTANEOUS
Status: DISCONTINUED | OUTPATIENT
Start: 2023-11-20 | End: 2023-11-20 | Stop reason: HOSPADM

## 2023-11-20 RX ORDER — OXYCODONE HYDROCHLORIDE 5 MG/1
5 TABLET ORAL EVERY 4 HOURS PRN
Status: DISCONTINUED | OUTPATIENT
Start: 2023-11-20 | End: 2023-11-20 | Stop reason: HOSPADM

## 2023-11-20 RX ORDER — ASPIRIN 81 MG/1
81 TABLET ORAL DAILY
Status: DISCONTINUED | OUTPATIENT
Start: 2023-11-21 | End: 2023-11-20 | Stop reason: HOSPADM

## 2023-11-20 RX ORDER — METOPROLOL TARTRATE 1 MG/ML
5 INJECTION, SOLUTION INTRAVENOUS
Status: DISCONTINUED | OUTPATIENT
Start: 2023-11-20 | End: 2023-11-20 | Stop reason: HOSPADM

## 2023-11-20 RX ORDER — ASPIRIN 325 MG
325 TABLET ORAL ONCE
Status: COMPLETED | OUTPATIENT
Start: 2023-11-20 | End: 2023-11-20

## 2023-11-20 RX ORDER — DIAZEPAM 5 MG
5 TABLET ORAL ONCE
Status: COMPLETED | OUTPATIENT
Start: 2023-11-20 | End: 2023-11-20

## 2023-11-20 RX ORDER — FENTANYL CITRATE 50 UG/ML
INJECTION, SOLUTION INTRAMUSCULAR; INTRAVENOUS
Status: DISCONTINUED | OUTPATIENT
Start: 2023-11-20 | End: 2023-11-20 | Stop reason: HOSPADM

## 2023-11-20 RX ADMIN — DIAZEPAM 5 MG: 5 TABLET ORAL at 07:53

## 2023-11-20 RX ADMIN — ASPIRIN 81 MG CHEWABLE TABLET 243 MG: 81 TABLET CHEWABLE at 07:53

## 2023-11-20 RX ADMIN — HYDRALAZINE HYDROCHLORIDE 10 MG: 20 INJECTION, SOLUTION INTRAMUSCULAR; INTRAVENOUS at 11:40

## 2023-11-20 RX ADMIN — SODIUM CHLORIDE: 9 INJECTION, SOLUTION INTRAVENOUS at 07:29

## 2023-11-20 ASSESSMENT — ACTIVITIES OF DAILY LIVING (ADL)
ADLS_ACUITY_SCORE: 35

## 2023-11-20 NOTE — Clinical Note
The first balloon was inserted into the right coronary artery and proximal right coronary artery.Max pressure = 6 donovan. Total duration = 20 seconds.

## 2023-11-20 NOTE — PRE-PROCEDURE
GENERAL PRE-PROCEDURE:   Procedure:  Coronary angiogram with possible PCI  Date/Time:  11/20/2023 6:54 AM    Written consent obtained?: Yes    Risks and benefits: Risks, benefits and alternatives were discussed    Consent given by:  Patient  Patient states understanding of procedure being performed: Yes    Patient's understanding of procedure matches consent: Yes    Procedure consent matches procedure scheduled: Yes    Expected level of sedation:  Moderate  Appropriately NPO:  Yes  ASA Class:  3 (CAD; s/p DARRIN 8/22, mild aortic stenosis, HLD, DM Type II, Class I obesity; BMI 34.59kg/m2, CKD Stage IIIa)  Mallampati  :  Grade 2- soft palate, base of uvula, tonsillar pillars, and portion of posterior pharyngeal wall visible  Lungs:  Lungs clear with good breath sounds bilaterally  Heart:  Systolic murmur  History & Physical reviewed:  History and physical reviewed and updates made (see comment)  H&P Comments:  Clinically Significant Risk Factors Present on Admission    Cardiovascular : CAD; s/p LAD PCI 8/22, mild aortic stenosis, HLD, DM Type II, Class I obesity; BMI 34.59kg/m2    Fluid & Electrolyte Disorders : Not present on admission    Gastroenterology : Not present on admission    Hematology/Oncology : Not present on admission    Nephrology : CKD Stage IIIa    Neurology : Not present on admission    Pulmonology : Not present on admission    Systemic : Class I obesity; BMI 34.59kg/m2      Statement of review:  I have reviewed the lab findings, diagnostic data, medications, and the plan for sedation

## 2023-11-20 NOTE — DISCHARGE INSTRUCTIONS
Interventional Cardiology  Coronary Angiogram/Angioplasty/Stent/Atherectomy Discharge Instructions -   Femoral Approach    The instructions below are to help you understand how to take care of yourself. There is also information about when to call the doctor or emergency services    **Do not stop your aspirin or platelet inhibitor unless directed by your Cardiologist.  These medications help to prevent platelets in your blood from sticking together and forming a clot.  Examples of these medications are:  Ticagrelor (Brilinta), Clopidigrel (Plavix), Prasugrel (Effient)          For the first 72 hours after your procedure:  No driving for 24 hours  Do not lift more than 15 pounds.  If you usually lift 50 pounds or more daily, please contact your cardiologist  Avoid any hard work or tiring activities, this includes, yard work, jogging, biking, sexual activity  During the day get up and walk around every 2 hours  You may return to work after 72 hours if you are feeling well and your job does not involve heavy lifting          Groin Site / Wound Care / Bleeding    You may take off the dressing on your groin the day after your procedure  Keep the area dry and clean  It is ok to shower with regular soap.  Pat dry, do not rub  You do not need to use a bandage  No tub/pool or hot tub for 1 week  If your groin starts to bleed or begins to swell suddenly after leaving the hospital, lie flat and apply firm pressure above the site for 15 minutes.  If bleeding continues, call 9-1-1  Bruising around the groin area is normal.  It may take 2-3 weeks for this to go away.  It is normal for the bruised area to turn green and/or yellow as it is healing.  A small lump may also be present and may last 2-3 months.  Your leg may be sore or stiff for a few days.  You may take Tylenol or a pain medicine recommended by your doctor  If you have a fever over 100.4, that lasts more than one day - call your cardiologist.      Our Cardiac Rehab  staff may visit briefly with you while your in the hospital.  If they miss you, someone will contact you after you are home.  You are encouraged to enroll in an Outpatient Cardiac Rehab Program     No elective dental work for 6 weeks after having a stent.     No driving for 24 hours      Your Procedural Physician was: Dr Boyd  the phone number is: [phone]      [facility]:  [phone]  If you are calling after hours, please listen to the entire voicemail, a live  will answer at the end of the message

## 2023-11-20 NOTE — INTERVAL H&P NOTE
"I have reviewed the surgical (or preoperative) H&P that is linked to this encounter, and examined the patient. There are no significant changes    Clinical Conditions Present on Arrival:  Clinically Significant Risk Factors Present on Admission                 # Drug Induced Platelet Defect: home medication list includes an antiplatelet medication  # DMII: A1C = 7.6 % (Ref range: 0.0 - 5.6 %) within past 6 months  # Obesity: Estimated body mass index is 33.47 kg/m  as calculated from the following:    Height as of this encounter: 1.803 m (5' 11\").    Weight as of this encounter: 108.9 kg (240 lb).       "

## 2023-11-20 NOTE — Clinical Note
The first balloon was inserted into the right coronary artery and proximal right coronary artery.Max pressure = 12 donovan. Total duration = 15 seconds.     Max pressure = 14 donovan. Total duration = 14 seconds.    Balloon reinflated a second time: Max pressure = 14 donovan. Total duration = 14 seconds. Max pressure = 16 donovan. Total duration = 16 seconds.  Balloon reinflated a fourth time: Max pressure = 16 donovan. Total duration = 10 seconds.

## 2023-11-20 NOTE — PLAN OF CARE
Dizziness from kneeling to standing.  Same symptoms as prior except no chest pain.

## 2023-11-20 NOTE — PROGRESS NOTES
Systolic blood pressure high since arriving to [facility] post stent placement.  Hydralazine given IV.

## 2023-11-20 NOTE — Clinical Note
Stent deployed in the proximal right coronary artery. Max pressure = 14 donovan. Total duration = 20 seconds.

## 2023-11-21 DIAGNOSIS — Z95.5 S/P CORONARY ARTERY STENT PLACEMENT: Primary | ICD-10-CM

## 2023-11-21 NOTE — PLAN OF CARE
Patient was kept comfortable during post-procedure stay.VSS. Denies pain. Right femoral access site remains dry & free from signs of bleeding. Tolerated food and fluids. Ambulated without issues. Appointments made & included in AVS. Dr. Boyd was able to speak with patient post procedure. Post-op instructions reviewed and packet given to patient & spouse. Able to ask questions. Verbalized no concerns. Belongings returned. Discharged in stable condition.  Yudelka Neely RN

## 2023-11-26 DIAGNOSIS — I24.9 ACS (ACUTE CORONARY SYNDROME) (H): ICD-10-CM

## 2023-11-28 DIAGNOSIS — E11.9 TYPE 2 DIABETES MELLITUS WITHOUT COMPLICATION, WITH LONG-TERM CURRENT USE OF INSULIN (H): ICD-10-CM

## 2023-11-28 DIAGNOSIS — Z79.4 TYPE 2 DIABETES MELLITUS WITHOUT COMPLICATION, WITH LONG-TERM CURRENT USE OF INSULIN (H): ICD-10-CM

## 2023-11-28 RX ORDER — BLOOD SUGAR DIAGNOSTIC
STRIP MISCELLANEOUS
Qty: 200 STRIP | Refills: 1 | Status: SHIPPED | OUTPATIENT
Start: 2023-11-28 | End: 2023-12-07

## 2023-11-28 RX ORDER — ASPIRIN 81 MG/1
TABLET, COATED ORAL
Qty: 30 TABLET | Refills: 11 | Status: SHIPPED | OUTPATIENT
Start: 2023-11-28

## 2023-11-30 ENCOUNTER — HOSPITAL ENCOUNTER (OUTPATIENT)
Dept: CARDIAC REHAB | Facility: HOSPITAL | Age: 68
Discharge: HOME | End: 2023-11-30
Attending: NURSE PRACTITIONER
Payer: MEDICARE

## 2023-11-30 DIAGNOSIS — Z95.5 S/P CORONARY ARTERY STENT PLACEMENT: ICD-10-CM

## 2023-11-30 LAB
GLUCOSE BLDC GLUCOMTR-MCNC: 139 MG/DL (ref 70–99)
GLUCOSE BLDC GLUCOMTR-MCNC: 188 MG/DL (ref 70–99)

## 2023-11-30 PROCEDURE — 93798 PHYS/QHP OP CAR RHAB W/ECG: CPT

## 2023-11-30 PROCEDURE — 93797 PHYS/QHP OP CAR RHAB WO ECG: CPT

## 2023-11-30 NOTE — PROGRESS NOTES
Assessment/Recommendations   Assessment:    1.  Coronary artery disease: Patient previously had PCI of the LAD with DARRIN x 1 8/2022.  Recently had nuclear stress test 11/1/2023 that showed a medium sized area of mild ischemia in the entire inferior segment of the left ventricle.  Patient underwent angiogram that showed 80% stenosis of the proximal to mid RCA that underwent PCI with DARRIN x 1.  Stent in proximal LAD remains widely patent.  - On dual antiplatelet therapy with ASA 81 mg indefinitely and Clopidogrel (Plavix) 75 mg daily for 12 months.  Patient denies any angina.  - Cardiac rehab has been scheduled  - Reviewed most recent BMP, Hgb, platelet- stable.    2.  Dyslipidemia with LDL goal <70/Obesity with a BMI of 34.31: Ezekiel Aldrich is on high intensity statin therapy with atorvastatin 80 mg daily. Most recent LDL is 60.  Most recent AST/ALT are within normal limits.    3.  Hypertension: His blood pressure is 127/67 in clinic, well-controlled. Currently on losartan 100 mg daily, carvedilol 12.5 mg twice daily    4.  Diabetes: Insulin-dependent.  Most recent A1C is 7.6.      5.  Obstructive sleep apnea: On CPAP    Plan:  - We discussed the importance of antiplatelet therapy and talking with his cardiologist prior to stopping these medications for any reason.  We discussed about utilization of as needed nitroglycerin.   - Encouraged to seek medical attention if recurrent chest pain or shortness of breath.    - Continue current hypertension regimen    - Cardiac rehab as scheduled    - We discussed the importance of tightly controlled blood sugars to minimize risk of worsening coronary artery disease. Defer to PCP for diabetes managment.    - We discussed a diet low in saturated fat, weight loss, and exercise along with medication for better control of cholesterol.  Highly encouraged to participate in nutrition class in cardiac rehab.    - Risk factor modification and lifestyle management topics were  discussed including managing comorbidities, weight loss, heart healthy diet, exercise, stress reduction, and alcohol use.        Follow up with Dr. Blevins as scheduled on 3/19/2024     History of Present Illness/Subjective    Mr. Ezekiel Aldrich is a 68 year old male with a past medical history of coronary artery disease with previous DARRIN x 1 to LAD August 2022, hyperlipidemia, hypertension, insulin-dependent diabetes, obstructive sleep apnea on CPAP who is seen at [facility] for post coronary intervention follow up.  Patient had been noting some mild exertional dyspnea and overall not feeling well.  Recently had nuclear stress test 11/1/2023 that showed a medium sized area of mild ischemia in the entire inferior segment of the left ventricle.  Patient underwent angiogram that showed 80% stenosis of the proximal to mid RCA that underwent PCI with DARRIN x 1.  Stent in proximal LAD remains widely patent.    Most recent ECHO/Stress test showed an EF of 60%.     Since recent stent placement patient still notes getting dizzy/lightheaded first thing in the morning.  Patient tries to go slow but notes it still happens but lasts less than 30 seconds.  Patient denies any anginal or exertional dyspnea.  Patient denies any bleeding issues on Plavix and aspirin.  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  Patient does not often go for walks or run due to a previous ankle injury.  Patient does note drinking 2-3 drinks a day typically of wine or beer.  He denies fatigue, shortness of breath, dyspnea on exertion, orthopnea, PND, palpitations, chest pain, abdominal fullness/bloating, and lower extremity edema.        Coronary Angiogram 11/20/2023 reviewed:    Dist RCA lesion is 45% stenosed.    Prox RCA to Mid RCA lesion is 80% stenosed.    Left ventricular filling pressures are normal.     1.  Stent in proximal LAD remains widely patent  2.  Minimal circumflex lumen  irregularity, no stenoses  3.  Moderately severe proximal LAD calcified stenosis with focal area of 80% stenosis in mid segment, progressive since previous angio.  Mild to moderate distal RCA narrowing appears unchanged compared to previous study.  4.  Successful PCI proximal to mid RCA with drug-eluting stent implant x1.  0% residual, RAY-3 flow.     Coronary angiogram 8/2/2022:  Prox RCA to Mid RCA lesion is 50% stenosed.  Dist RCA lesion is 50% stenosed.  Ost Cx to Mid Cx lesion is 60% stenosed.  Mid LAD lesion is 90% stenosed.  Ultrasound (IVUS) was performed on the LAD lesion. Images show the stent to be fully deployed.  3.0 Synergy DARRIN placed into the proximal LAD. Stent extends up to but does not cross the second diagonal artery. Postdilatation with 3.5 noncompliant balloon high-pressure.  Difficulty reaching left coronary from right radial approach. Required right femoral access to perform PCI of LCA.      ECHO 7/7/2023 reviewed:   Mild valvular aortic stenosis.  Left ventricular size, wall motion and function are normal. The ejection  fraction is > 65%.  Normal right ventricle size and systolic function.      Stress test 11/1/2023 reviewed    The nuclear stress test is abnormal.    There is a medium sized area of mild ischemia in the entire inferior segment(s) of the left ventricle.    The left ventricular ejection fraction at rest is 60%.    There is no prior study for comparison.           Physical Examination Review of Systems   /67   Pulse 71   Resp 12   Wt 111.6 kg (246 lb)   SpO2 98%   BMI 34.31 kg/m    Body mass index is 34.31 kg/m .  Wt Readings from Last 3 Encounters:   12/01/23 111.6 kg (246 lb)   11/20/23 108.9 kg (240 lb)   11/13/23 112.5 kg (248 lb)     General Appearance:   no distress, normal body habitus   ENT/Mouth: membranes moist, no oral lesions or bleeding gums.      EYES:  no scleral icterus, normal conjunctivae   Neck: no carotid bruits or thyromegaly   Chest/Lungs:    lungs are clear to auscultation, no rales or wheezing, equal chest wall expansion    Cardiovascular:   Regular. Normal first and second heart sounds with no murmurs, rubs, or gallops; the carotid, radial and posterior tibial pulses are intact, Jugular venous pressure normal, no edema bilaterally        Extremities  Puncture Site: no cyanosis or clubbing  Right femoral site is soft with minimal bruising.  Radial pulses intact and symmetrical.  CMS intact.   Skin: no xanthelasma, warm.    Neurologic: no tremors     Psychiatric: alert and oriented x3, calm                                                        Negative unless noted in HPI     Medical History  Surgical History Family History Social History   Past Medical History:   Diagnosis Date    Acute renal failure (H24)     6/11/2016, secondary to dehydration, normalized    Coronary artery disease     Diabetes mellitus, type 2 (H)     Past Surgical History:   Procedure Laterality Date    ANKLE FRACTURE SURGERY Right     hardware    CV CORONARY ANGIOGRAM N/A 08/02/2022    Procedure: Coronary Angiogram;  Surgeon: Archie Ni MD;  Location: [facility] CV    CV CORONARY ANGIOGRAM N/A 11/20/2023    Procedure: Coronary Angiogram;  Surgeon: Chino Boyd MD;  Location: [facility] CV    CV INTRAVASULAR ULTRASOUND N/A 08/02/2022    Procedure: Intravascular Ultrasound;  Surgeon: Archie Ni MD;  Location: [facility] CV    CV LEFT HEART CATH N/A 11/20/2023    Procedure: Left Heart Catheterization;  Surgeon: Chino oByd MD;  Location: [facility] CV    CV PCI N/A 08/02/2022    Percutaneous Coronary Intervention;  Surgeon: Archie Ni MD;  Location: [facility]    CV PCI STENT DRUG ELUTING N/A 11/20/2023    Procedure: Percutaneous Coronary Intervention Stent;  Surgeon: Chino Boyd MD;  Location: [facility] CATH LAB CV    KNEE SURGERY      MANDIBLE FRACTURE SURGERY      from fx    SHOULDER SURGERY Right      from fx and separation    Family History   Problem Relation Age of Onset    Colon Cancer Mother     Coronary Artery Disease Mother     Hypertension Mother     Diabetes Type 2  Mother     Hypertension Father     Bell's palsy Father     Diabetes Type 2  Father     Thyroid Disease Father     Sleep Apnea Father     Sleep Apnea Sister     Prostate Cancer Brother 57    Sleep Apnea Brother     Bell's palsy Maternal Grandmother     Thyroid Disease Daughter     Social History     Socioeconomic History    Marital status:      Spouse name: Not on file    Number of children: 5    Years of education: Not on file    Highest education level: Not on file   Occupational History    Occupation: [employer]   Tobacco Use    Smoking status: Former     Types: Cigarettes     Quit date: 1985     Years since quittin.6    Smokeless tobacco: Current     Types: Snuff    Tobacco comments:     2 tins/week   Substance and Sexual Activity    Alcohol use: Yes     Alcohol/week: 2.0 standard drinks of alcohol     Types: 2 Standard drinks or equivalent per week     Comment: 10 drink per week    Drug use: No    Sexual activity: Yes     Partners: Female   Other Topics Concern    Not on file   Social History Narrative    Diet-            Exercise- Not regular. Work is physical, walking.     Social Determinants of Health     Financial Resource Strain: Low Risk  (2023)    Financial Resource Strain     Within the past 12 months, have you or your family members you live with been unable to get utilities (heat, electricity) when it was really needed?: No   Food Insecurity: Low Risk  (2023)    Food Insecurity     Within the past 12 months, did you worry that your food would run out before you got money to buy more?: No     Within the past 12 months, did the food you bought just not last and you didn t have money to get more?: No   Transportation Needs: Low Risk  (2023)    Transportation Needs     Within the past 12 months,  has lack of transportation kept you from medical appointments, getting your medicines, non-medical meetings or appointments, work, or from getting things that you need?: No   Physical Activity: Not on file   Stress: Not on file   Social Connections: Not on file   Interpersonal Safety: Low Risk  (10/18/2023)    Interpersonal Safety     Do you feel physically and emotionally safe where you currently live?: Yes     Within the past 12 months, have you been hit, slapped, kicked or otherwise physically hurt by someone?: No     Within the past 12 months, have you been humiliated or emotionally abused in other ways by your partner or ex-partner?: No   Housing Stability: Low Risk  (9/22/2023)    Housing Stability     Do you have housing? : Yes     Are you worried about losing your housing?: No          Medications  Allergies   Current Outpatient Medications   Medication Sig Dispense Refill    aspirin (ASPIRIN LOW DOSE) 81 MG EC tablet TAKE 1 TABLET(81 MG) BY MOUTH DAILY. START TOMORROW 30 tablet 11    atorvastatin (LIPITOR) 80 MG tablet TAKE 1 TABLET BY MOUTH DAILY FOR HIGH CHOLESTEROL 90 tablet 2    B-D U/F insulin pen needle USE TWICE DAILY TO INJECT VICTOZA AND LANTUS 100 each 5    blood glucose (ACCU-CHEK GUIDE) test strip USE 1 TEST TWICE DAILY 200 strip 1    blood glucose (NO BRAND SPECIFIED) test strip Use to test blood sugar daily for diabetes 100 strip 3    blood glucose monitoring (NO BRAND SPECIFIED) meter device kit by In Vitro route as needed (glucose checks) 1 kit 0    carvedilol (COREG) 12.5 MG tablet TAKE 1 TABLET(12.5 MG) BY MOUTH TWICE DAILY WITH MEALS 180 tablet 3    clopidogrel (PLAVIX) 75 MG tablet Take 1 tablet (75 mg) by mouth daily 90 tablet 3    empagliflozin (JARDIANCE) 10 MG TABS tablet Take one pill daily for diabetes (Patient taking differently: Take 1 tablet by mouth daily Take one pill daily for diabetes) 30 tablet 3    generic lancets [GENERIC LANCETS] Use 1 each As Directed 2 (two) times a  day. Dispense brand per patient's insurance at pharmacy discretion. 200 each 3    insulin glargine (LANTUS SOLOSTAR) 100 UNIT/ML pen ADMINISTER 66 UNITS UNDER THE SKIN AT BEDTIME 45 mL 5    losartan (COZAAR) 100 MG tablet TAKE 1 TABLET(100 MG) BY MOUTH DAILY 90 tablet 3    metFORMIN (GLUCOPHAGE XR) 500 MG 24 hr tablet TAKE 4 TABLETS(2000 MG) BY MOUTH DAILY 360 tablet 3    tamsulosin (FLOMAX) 0.4 MG capsule TAKE 1 CAPSULE BY MOUTH EVERY DAY 90 capsule 1    glipiZIDE (GLUCOTROL XL) 10 MG 24 hr tablet TAKE 2 TABLETS BY MOUTH DAILY 180 tablet 3    Allergies   Allergen Reactions    Lisinopril Cough     Dry cough         Lab Results    Chemistry/lipid CBC Cardiac Enzymes/BNP/TSH/INR   Lab Results   Component Value Date    CHOL 178 10/12/2023    HDL 49 10/12/2023    TRIG 344 (H) 10/12/2023    BUN 12.9 11/20/2023     11/20/2023    CO2 23 11/20/2023    Lab Results   Component Value Date    WBC 6.5 11/20/2023    HGB 15.4 11/20/2023    HCT 43.9 11/20/2023    MCV 92 11/20/2023     11/20/2023    Lab Results   Component Value Date    TROPONINI 0.16 08/02/2022    BNP 89 (H) 08/01/2022    TSH 3.18 10/29/2021    INR 0.92 08/01/2022              This note has been dictated using voice recognition software. Any grammatical, typographical, or context distortions are unintentional and inherent to the software    Dorita Joseph PA-C

## 2023-12-01 ENCOUNTER — OFFICE VISIT (OUTPATIENT)
Dept: CARDIOLOGY | Facility: CLINIC | Age: 68
End: 2023-12-01
Payer: MEDICARE

## 2023-12-01 ENCOUNTER — TELEPHONE (OUTPATIENT)
Dept: SLEEP MEDICINE | Facility: CLINIC | Age: 68
End: 2023-12-01

## 2023-12-01 VITALS
SYSTOLIC BLOOD PRESSURE: 127 MMHG | WEIGHT: 246 LBS | OXYGEN SATURATION: 98 % | BODY MASS INDEX: 34.31 KG/M2 | RESPIRATION RATE: 12 BRPM | DIASTOLIC BLOOD PRESSURE: 67 MMHG | HEART RATE: 71 BPM

## 2023-12-01 DIAGNOSIS — I24.9 ACS (ACUTE CORONARY SYNDROME) (H): ICD-10-CM

## 2023-12-01 DIAGNOSIS — Z95.5 STATUS POST CORONARY ARTERY STENT PLACEMENT: ICD-10-CM

## 2023-12-01 DIAGNOSIS — Z79.4 DIABETES MELLITUS TYPE 2, INSULIN DEPENDENT (H): ICD-10-CM

## 2023-12-01 DIAGNOSIS — E11.9 DIABETES MELLITUS TYPE 2, INSULIN DEPENDENT (H): ICD-10-CM

## 2023-12-01 DIAGNOSIS — I10 BENIGN ESSENTIAL HYPERTENSION: ICD-10-CM

## 2023-12-01 DIAGNOSIS — G47.33 OBSTRUCTIVE SLEEP APNEA: ICD-10-CM

## 2023-12-01 DIAGNOSIS — I25.10 CORONARY ARTERY DISEASE INVOLVING NATIVE CORONARY ARTERY OF NATIVE HEART WITHOUT ANGINA PECTORIS: Primary | ICD-10-CM

## 2023-12-01 PROCEDURE — 99214 OFFICE O/P EST MOD 30 MIN: CPT | Performed by: STUDENT IN AN ORGANIZED HEALTH CARE EDUCATION/TRAINING PROGRAM

## 2023-12-01 NOTE — LETTER
12/1/2023    Janes Jeronimo MD  [address] [facility] [address]    RE: Ezekiel Aldrich       Dear Colleague,     I had the pleasure of seeing Ezekiel Aldrich in the [facility].          Assessment/Recommendations   Assessment:    1.  Coronary artery disease: Patient previously had PCI of the LAD with DARRIN x 1 8/2022.  Recently had nuclear stress test 11/1/2023 that showed a medium sized area of mild ischemia in the entire inferior segment of the left ventricle.  Patient underwent angiogram that showed 80% stenosis of the proximal to mid RCA that underwent PCI with DARRIN x 1.  Stent in proximal LAD remains widely patent.  - On dual antiplatelet therapy with ASA 81 mg indefinitely and Clopidogrel (Plavix) 75 mg daily for 12 months.  Patient denies any angina.  - Cardiac rehab has been scheduled  - Reviewed most recent BMP, Hgb, platelet- stable.    2.  Dyslipidemia with LDL goal <70/Obesity with a BMI of 34.31: Ezekiel Aldrich is on high intensity statin therapy with atorvastatin 80 mg daily. Most recent LDL is 60.  Most recent AST/ALT are within normal limits.    3.  Hypertension: His blood pressure is 127/67 in clinic, well-controlled. Currently on losartan 100 mg daily, carvedilol 12.5 mg twice daily    4.  Diabetes: Insulin-dependent.  Most recent A1C is 7.6.      5.  Obstructive sleep apnea: On CPAP    Plan:  - We discussed the importance of antiplatelet therapy and talking with his cardiologist prior to stopping these medications for any reason.  We discussed about utilization of as needed nitroglycerin.   - Encouraged to seek medical attention if recurrent chest pain or shortness of breath.    - Continue current hypertension regimen    - Cardiac rehab as scheduled    - We discussed the importance of tightly controlled blood sugars to minimize risk of worsening coronary artery disease. Defer to PCP for diabetes managment.    - We discussed a diet low in saturated fat, weight loss, and  exercise along with medication for better control of cholesterol.  Highly encouraged to participate in nutrition class in cardiac rehab.    - Risk factor modification and lifestyle management topics were discussed including managing comorbidities, weight loss, heart healthy diet, exercise, stress reduction, and alcohol use.        Follow up with Dr. Blevins as scheduled on 3/19/2024     History of Present Illness/Subjective    Mr. Ezekiel Aldrich is a 68 year old male with a past medical history of coronary artery disease with previous DARRIN x 1 to LAD August 2022, hyperlipidemia, hypertension, insulin-dependent diabetes, obstructive sleep apnea on CPAP who is seen at [facility] for post coronary intervention follow up.  Patient had been noting some mild exertional dyspnea and overall not feeling well.  Recently had nuclear stress test 11/1/2023 that showed a medium sized area of mild ischemia in the entire inferior segment of the left ventricle.  Patient underwent angiogram that showed 80% stenosis of the proximal to mid RCA that underwent PCI with DARRIN x 1.  Stent in proximal LAD remains widely patent.    Most recent ECHO/Stress test showed an EF of 60%.     Since recent stent placement patient still notes getting dizzy/lightheaded first thing in the morning.  Patient tries to go slow but notes it still happens but lasts less than 30 seconds.  Patient denies any anginal or exertional dyspnea.  Patient denies any bleeding issues on Plavix and aspirin.  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  Patient does not often go for walks or run due to a previous ankle injury.  Patient does note drinking 2-3 drinks a day typically of wine or beer.  He denies fatigue, shortness of breath, dyspnea on exertion, orthopnea, PND, palpitations, chest pain, abdominal fullness/bloating, and lower extremity edema.        Coronary Angiogram 11/20/2023 reviewed:    Dist RCA  lesion is 45% stenosed.    Prox RCA to Mid RCA lesion is 80% stenosed.    Left ventricular filling pressures are normal.     1.  Stent in proximal LAD remains widely patent  2.  Minimal circumflex lumen irregularity, no stenoses  3.  Moderately severe proximal LAD calcified stenosis with focal area of 80% stenosis in mid segment, progressive since previous angio.  Mild to moderate distal RCA narrowing appears unchanged compared to previous study.  4.  Successful PCI proximal to mid RCA with drug-eluting stent implant x1.  0% residual, RAY-3 flow.     Coronary angiogram 8/2/2022:  Prox RCA to Mid RCA lesion is 50% stenosed.  Dist RCA lesion is 50% stenosed.  Ost Cx to Mid Cx lesion is 60% stenosed.  Mid LAD lesion is 90% stenosed.  Ultrasound (IVUS) was performed on the LAD lesion. Images show the stent to be fully deployed.  3.0 Synergy DARRIN placed into the proximal LAD. Stent extends up to but does not cross the second diagonal artery. Postdilatation with 3.5 noncompliant balloon high-pressure.  Difficulty reaching left coronary from right radial approach. Required right femoral access to perform PCI of LCA.      ECHO 7/7/2023 reviewed:   Mild valvular aortic stenosis.  Left ventricular size, wall motion and function are normal. The ejection  fraction is > 65%.  Normal right ventricle size and systolic function.      Stress test 11/1/2023 reviewed    The nuclear stress test is abnormal.    There is a medium sized area of mild ischemia in the entire inferior segment(s) of the left ventricle.    The left ventricular ejection fraction at rest is 60%.    There is no prior study for comparison.           Physical Examination Review of Systems   /67   Pulse 71   Resp 12   Wt 111.6 kg (246 lb)   SpO2 98%   BMI 34.31 kg/m    Body mass index is 34.31 kg/m .  Wt Readings from Last 3 Encounters:   12/01/23 111.6 kg (246 lb)   11/20/23 108.9 kg (240 lb)   11/13/23 112.5 kg (248 lb)     General Appearance:   no  distress, normal body habitus   ENT/Mouth: membranes moist, no oral lesions or bleeding gums.      EYES:  no scleral icterus, normal conjunctivae   Neck: no carotid bruits or thyromegaly   Chest/Lungs:   lungs are clear to auscultation, no rales or wheezing, equal chest wall expansion    Cardiovascular:   Regular. Normal first and second heart sounds with no murmurs, rubs, or gallops; the carotid, radial and posterior tibial pulses are intact, Jugular venous pressure normal, no edema bilaterally        Extremities  Puncture Site: no cyanosis or clubbing  Right femoral site is soft with minimal bruising.  Radial pulses intact and symmetrical.  CMS intact.   Skin: no xanthelasma, warm.    Neurologic: no tremors     Psychiatric: alert and oriented x3, calm                                                        Negative unless noted in HPI     Medical History  Surgical History Family History Social History   Past Medical History:   Diagnosis Date    Acute renal failure (H24)     6/11/2016, secondary to dehydration, normalized    Coronary artery disease     Diabetes mellitus, type 2 (H)     Past Surgical History:   Procedure Laterality Date    ANKLE FRACTURE SURGERY Right     hardware    CV CORONARY ANGIOGRAM N/A 08/02/2022    Procedure: Coronary Angiogram;  Surgeon: Archie Ni MD;  Location: [facility] CV    CV CORONARY ANGIOGRAM N/A 11/20/2023    Procedure: Coronary Angiogram;  Surgeon: Chino Boyd MD;  Location: [facility] CV    CV INTRAVASULAR ULTRASOUND N/A 08/02/2022    Procedure: Intravascular Ultrasound;  Surgeon: Archie Ni MD;  Location: [facility] CV    CV LEFT HEART CATH N/A 11/20/2023    Procedure: Left Heart Catheterization;  Surgeon: Chino Boyd MD;  Location: [facility] CV    CV PCI N/A 08/02/2022    Percutaneous Coronary Intervention;  Surgeon: Archie Ni MD;  Location: [facility]    CV PCI STENT DRUG ELUTING N/A 11/20/2023     Procedure: Percutaneous Coronary Intervention Stent;  Surgeon: Chino Boyd MD;  Location: [facility] LAB CV    KNEE SURGERY      MANDIBLE FRACTURE SURGERY      from fx    SHOULDER SURGERY Right     from fx and separation    Family History   Problem Relation Age of Onset    Colon Cancer Mother     Coronary Artery Disease Mother     Hypertension Mother     Diabetes Type 2  Mother     Hypertension Father     Bell's palsy Father     Diabetes Type 2  Father     Thyroid Disease Father     Sleep Apnea Father     Sleep Apnea Sister     Prostate Cancer Brother 57    Sleep Apnea Brother     Bell's palsy Maternal Grandmother     Thyroid Disease Daughter     Social History     Socioeconomic History    Marital status:      Spouse name: Not on file    Number of children: 5    Years of education: Not on file    Highest education level: Not on file   Occupational History    Occupation: Contractors_[employer]   Tobacco Use    Smoking status: Former     Types: Cigarettes     Quit date: 1985     Years since quittin.6    Smokeless tobacco: Current     Types: Snuff    Tobacco comments:     2 tins/week   Substance and Sexual Activity    Alcohol use: Yes     Alcohol/week: 2.0 standard drinks of alcohol     Types: 2 Standard drinks or equivalent per week     Comment: 10 drink per week    Drug use: No    Sexual activity: Yes     Partners: Female   Other Topics Concern    Not on file   Social History Narrative    Diet-            Exercise- Not regular. Work is physical, walking.     Social Determinants of Health     Financial Resource Strain: Low Risk  (2023)    Financial Resource Strain     Within the past 12 months, have you or your family members you live with been unable to get utilities (heat, electricity) when it was really needed?: No   Food Insecurity: Low Risk  (2023)    Food Insecurity     Within the past 12 months, did you worry that your food would run out before you got money to buy more?: No      Within the past 12 months, did the food you bought just not last and you didn t have money to get more?: No   Transportation Needs: Low Risk  (9/22/2023)    Transportation Needs     Within the past 12 months, has lack of transportation kept you from medical appointments, getting your medicines, non-medical meetings or appointments, work, or from getting things that you need?: No   Physical Activity: Not on file   Stress: Not on file   Social Connections: Not on file   Interpersonal Safety: Low Risk  (10/18/2023)    Interpersonal Safety     Do you feel physically and emotionally safe where you currently live?: Yes     Within the past 12 months, have you been hit, slapped, kicked or otherwise physically hurt by someone?: No     Within the past 12 months, have you been humiliated or emotionally abused in other ways by your partner or ex-partner?: No   Housing Stability: Low Risk  (9/22/2023)    Housing Stability     Do you have housing? : Yes     Are you worried about losing your housing?: No          Medications  Allergies   Current Outpatient Medications   Medication Sig Dispense Refill    aspirin (ASPIRIN LOW DOSE) 81 MG EC tablet TAKE 1 TABLET(81 MG) BY MOUTH DAILY. START TOMORROW 30 tablet 11    atorvastatin (LIPITOR) 80 MG tablet TAKE 1 TABLET BY MOUTH DAILY FOR HIGH CHOLESTEROL 90 tablet 2    B-D U/F insulin pen needle USE TWICE DAILY TO INJECT VICTOZA AND LANTUS 100 each 5    blood glucose (ACCU-CHEK GUIDE) test strip USE 1 TEST TWICE DAILY 200 strip 1    blood glucose (NO BRAND SPECIFIED) test strip Use to test blood sugar daily for diabetes 100 strip 3    blood glucose monitoring (NO BRAND SPECIFIED) meter device kit by In Vitro route as needed (glucose checks) 1 kit 0    carvedilol (COREG) 12.5 MG tablet TAKE 1 TABLET(12.5 MG) BY MOUTH TWICE DAILY WITH MEALS 180 tablet 3    clopidogrel (PLAVIX) 75 MG tablet Take 1 tablet (75 mg) by mouth daily 90 tablet 3    empagliflozin (JARDIANCE) 10 MG TABS tablet Take  one pill daily for diabetes (Patient taking differently: Take 1 tablet by mouth daily Take one pill daily for diabetes) 30 tablet 3    generic lancets [GENERIC LANCETS] Use 1 each As Directed 2 (two) times a day. Dispense brand per patient's insurance at pharmacy discretion. 200 each 3    insulin glargine (LANTUS SOLOSTAR) 100 UNIT/ML pen ADMINISTER 66 UNITS UNDER THE SKIN AT BEDTIME 45 mL 5    losartan (COZAAR) 100 MG tablet TAKE 1 TABLET(100 MG) BY MOUTH DAILY 90 tablet 3    metFORMIN (GLUCOPHAGE XR) 500 MG 24 hr tablet TAKE 4 TABLETS(2000 MG) BY MOUTH DAILY 360 tablet 3    tamsulosin (FLOMAX) 0.4 MG capsule TAKE 1 CAPSULE BY MOUTH EVERY DAY 90 capsule 1    glipiZIDE (GLUCOTROL XL) 10 MG 24 hr tablet TAKE 2 TABLETS BY MOUTH DAILY 180 tablet 3    Allergies   Allergen Reactions    Lisinopril Cough     Dry cough         Lab Results    Chemistry/lipid CBC Cardiac Enzymes/BNP/TSH/INR   Lab Results   Component Value Date    CHOL 178 10/12/2023    HDL 49 10/12/2023    TRIG 344 (H) 10/12/2023    BUN 12.9 11/20/2023     11/20/2023    CO2 23 11/20/2023    Lab Results   Component Value Date    WBC 6.5 11/20/2023    HGB 15.4 11/20/2023    HCT 43.9 11/20/2023    MCV 92 11/20/2023     11/20/2023    Lab Results   Component Value Date    TROPONINI 0.16 08/02/2022    BNP 89 (H) 08/01/2022    TSH 3.18 10/29/2021    INR 0.92 08/01/2022              This note has been dictated using voice recognition software. Any grammatical, typographical, or context distortions are unintentional and inherent to the software    Dorita Joseph PA-C      Thank you for allowing me to participate in the care of your patient.      Sincerely,     Dorita Joseph PA-C     [facility]  cc:   Destinee Adame, CNP  [address] [place] [address]

## 2023-12-01 NOTE — PATIENT INSTRUCTIONS
Ezekiel Aldrich,    It was a pleasure to see you today at the [facility].     My recommendations after this visit include:    - No medications changes made today    - Please seek medical attention if you develop recurrent chest pain or shortness of breath or similar symptoms you experienced prior to recent cardiac event    - Cardiac rehab as scheduled    - Focus on trying to eat heart healthy foods    - Try to monitor BP and let me know if elevated >140/90 consistently or high 130s.     - Follow up with Dr. Blevins as scheduled 3/19/24    - Please call [phone], if you have any questions or concerns    Dorita Joseph PA-C    Medication     Take all your medications as prescribed  Do not stop any medications without talking with a healthcare provider    Exercise      Physical activity is important for overall health  Set a goal of 150 minutes of exercise each week  For example, 30 minutes of exercise 5 days each week.    These 30 minutes can be broken into shorter periods of 15 minutes twice daily or 10 minutes three times daily  Start any exercise program slowly and work towards the goal of 150 minutes each week  For example, you may start with 10 minutes and plan to add a few minutes each week as you get stronger   Examples of exercise include walking, swimming, or biking  Remember to stretch and stay hydrated with exercise    Diet     A heart healthy diet includes:  A variety of fruits and vegetables  Whole grains  Low-fat dairy (fat-free, 1% fat, and low-fat)  Lean meats and poultry without skin   Fish (eat fish 2 times each week)  Nuts  Limit saturated fat to about 13 grams each day (based on a 2000 calorie diet)  Limit red meat  Limit sugars (sweets and sugary beverages)  Limit your portion sizes  Do not add salt to your food when cooking or at the table  Limit alcohol intake (no more than 1 drink each day for women or 2 drinks each day for men)    Weight Loss     Work on losing weight  with diet and exercise  You BMI (body mass index) should be between 18.5-24.9  This is a calculation of your weight and height  Please ask your healthcare provider for your BMI    Manage Other Chronic Health Conditions     Control cholesterol  Eat a diet low in saturated fat  Exercise   Take a statin medication as prescribed  Manage blood pressure  Eat a diet low in sodium  Exercise  Reduce stress  Lose weight   Take blood pressure medications as prescribed  Control blood sugars if diabetic  Monitor sugars and carbohydrates in your diet  Lose weight   Take diabetes medications as prescribed  Follow-up with your primary care provider to make sure your blood sugars are well controlled    Stress Reduction     Find time each day to relax  Reading, listening to music, yoga, meditation, exercise, spending time with friends and family, volunteering   Get 6-8 hours of sleep each night        Information from the American Heart Association.  Please visit their website at www.heart.org

## 2023-12-01 NOTE — TELEPHONE ENCOUNTER
Reason for Call:  Other call back    Detailed comments: patients spouse called and wants a callback on where patient has been received his cpap supplies at?    Thank you.    Phone Number Patient can be reached at: Other phone number:  [phone] *    Best Time: any    Can we leave a detailed message on this number? YES    Call taken on 12/1/2023 at 2:52 PM by Rere Mirelse

## 2023-12-04 RX ORDER — ATORVASTATIN CALCIUM 80 MG/1
TABLET, FILM COATED ORAL
Qty: 90 TABLET | Refills: 2 | Status: SHIPPED | OUTPATIENT
Start: 2023-12-04 | End: 2024-03-19

## 2023-12-05 NOTE — TELEPHONE ENCOUNTER
[facility] on file. Message left on Maria Eugenia's voicemail letting her know DME is [facility].    Hattie CHAWLA RN  [facility]

## 2023-12-06 ENCOUNTER — HOSPITAL ENCOUNTER (OUTPATIENT)
Dept: CARDIAC REHAB | Facility: HOSPITAL | Age: 68
Discharge: HOME | End: 2023-12-06
Attending: INTERNAL MEDICINE
Payer: MEDICARE

## 2023-12-06 ENCOUNTER — MYC MEDICAL ADVICE (OUTPATIENT)
Dept: FAMILY MEDICINE | Facility: CLINIC | Age: 68
End: 2023-12-06
Payer: MEDICARE

## 2023-12-06 LAB — GLUCOSE BLDC GLUCOMTR-MCNC: 198 MG/DL (ref 70–99)

## 2023-12-06 PROCEDURE — 93798 PHYS/QHP OP CAR RHAB W/ECG: CPT

## 2023-12-07 ENCOUNTER — TELEPHONE (OUTPATIENT)
Dept: CARDIOLOGY | Facility: CLINIC | Age: 68
End: 2023-12-07
Payer: MEDICARE

## 2023-12-07 DIAGNOSIS — E11.9 TYPE 2 DIABETES MELLITUS WITHOUT COMPLICATION, WITH LONG-TERM CURRENT USE OF INSULIN (H): ICD-10-CM

## 2023-12-07 DIAGNOSIS — Z79.4 TYPE 2 DIABETES MELLITUS WITHOUT COMPLICATION, WITH LONG-TERM CURRENT USE OF INSULIN (H): ICD-10-CM

## 2023-12-07 RX ORDER — BLOOD SUGAR DIAGNOSTIC
STRIP MISCELLANEOUS
Qty: 200 STRIP | Refills: 1 | Status: SHIPPED | OUTPATIENT
Start: 2023-12-07 | End: 2024-10-03

## 2023-12-07 RX ORDER — LANCING DEVICE
EACH MISCELLANEOUS
Qty: 100 EACH | Refills: 1 | Status: SHIPPED | OUTPATIENT
Start: 2023-12-07

## 2023-12-07 NOTE — TELEPHONE ENCOUNTER
Called patient to review recent elevated blood pressure reading.  Per [place] Community Measures guidelines, patients blood pressure is out of parameters and recheck blood pressure is recommended.    Last Blood Pressure: 166/88  Last Heart Rate: 82  Date: 11/13/23  Location: [facility]    Today's Blood Pressure: 127/67  Today's Heart Rate: none recorded  Location: PCP    Patient reported blood pressure updated in Epic. Blood pressure falls within [place] Community Measures guidelines.  Patient will follow up as previously advised.    LAKSHMI Elliott

## 2023-12-08 ENCOUNTER — HOSPITAL ENCOUNTER (OUTPATIENT)
Dept: CARDIAC REHAB | Facility: HOSPITAL | Age: 68
Discharge: HOME | End: 2023-12-08
Attending: INTERNAL MEDICINE
Payer: MEDICARE

## 2023-12-08 LAB
GLUCOSE BLDC GLUCOMTR-MCNC: 219 MG/DL (ref 70–99)
GLUCOSE BLDC GLUCOMTR-MCNC: 232 MG/DL (ref 70–99)

## 2023-12-08 PROCEDURE — 93798 PHYS/QHP OP CAR RHAB W/ECG: CPT

## 2023-12-08 NOTE — TELEPHONE ENCOUNTER
Sending to PCP and Care Team for review.  Please review and advise on patient MyChart message.    Mark Nazario RN  [facility]

## 2023-12-12 NOTE — TELEPHONE ENCOUNTER
[facility] form faxed to Fax# [phone] at 4:00 PM  Also faxed to Fax# [phone] at 3:42 PM      Karrie LEE

## 2023-12-13 ENCOUNTER — HOSPITAL ENCOUNTER (OUTPATIENT)
Dept: CARDIAC REHAB | Facility: HOSPITAL | Age: 68
Discharge: HOME | End: 2023-12-13
Attending: INTERNAL MEDICINE
Payer: MEDICARE

## 2023-12-13 LAB
GLUCOSE BLDC GLUCOMTR-MCNC: 180 MG/DL (ref 70–99)
GLUCOSE BLDC GLUCOMTR-MCNC: 220 MG/DL (ref 70–99)

## 2023-12-13 PROCEDURE — 93798 PHYS/QHP OP CAR RHAB W/ECG: CPT

## 2023-12-20 ENCOUNTER — HOSPITAL ENCOUNTER (OUTPATIENT)
Dept: CARDIAC REHAB | Facility: HOSPITAL | Age: 68
Discharge: HOME | End: 2023-12-20
Attending: INTERNAL MEDICINE
Payer: MEDICARE

## 2023-12-20 PROCEDURE — 93798 PHYS/QHP OP CAR RHAB W/ECG: CPT

## 2023-12-21 DIAGNOSIS — E11.69 TYPE 2 DIABETES MELLITUS WITH OTHER SPECIFIED COMPLICATION, WITH LONG-TERM CURRENT USE OF INSULIN (H): ICD-10-CM

## 2023-12-21 DIAGNOSIS — Z79.4 TYPE 2 DIABETES MELLITUS WITH OTHER SPECIFIED COMPLICATION, WITH LONG-TERM CURRENT USE OF INSULIN (H): ICD-10-CM

## 2023-12-21 RX ORDER — FLURBIPROFEN SODIUM 0.3 MG/ML
SOLUTION/ DROPS OPHTHALMIC
Qty: 100 EACH | Refills: 5 | Status: SHIPPED | OUTPATIENT
Start: 2023-12-21

## 2024-01-10 ENCOUNTER — HOSPITAL ENCOUNTER (OUTPATIENT)
Dept: CARDIAC REHAB | Facility: HOSPITAL | Age: 69
Discharge: HOME | End: 2024-01-10
Attending: INTERNAL MEDICINE
Payer: MEDICARE

## 2024-01-10 LAB
CV BLOOD PRESSURE: 60 MMHG
CV STRESS CURRENT BP HE: NORMAL
CV STRESS CURRENT HR HE: 101
CV STRESS CURRENT HR HE: 105
CV STRESS CURRENT HR HE: 105
CV STRESS CURRENT HR HE: 106
CV STRESS CURRENT HR HE: 106
CV STRESS CURRENT HR HE: 107
CV STRESS CURRENT HR HE: 108
CV STRESS CURRENT HR HE: 110
CV STRESS CURRENT HR HE: 96
CV STRESS CURRENT HR HE: 98
CV STRESS DEVIATION TIME HE: NORMAL
CV STRESS ECHO PERCENT HR HE: NORMAL
CV STRESS EXERCISE STAGE HE: NORMAL
CV STRESS FINAL RESTING BP HE: NORMAL
CV STRESS FINAL RESTING HR HE: 107
CV STRESS MAX HR HE: 112
CV STRESS MAX TREADMILL GRADE HE: 0
CV STRESS MAX TREADMILL SPEED HE: 0
CV STRESS PEAK DIA BP HE: NORMAL
CV STRESS PEAK SYS BP HE: NORMAL
CV STRESS PHASE HE: NORMAL
CV STRESS PROTOCOL HE: NORMAL
CV STRESS RESTING PT POSITION HE: NORMAL
CV STRESS ST DEVIATION AMOUNT HE: NORMAL
CV STRESS ST DEVIATION ELEVATION HE: NORMAL
CV STRESS ST EVELATION AMOUNT HE: NORMAL
CV STRESS TEST TYPE HE: NORMAL
CV STRESS TOTAL STAGE TIME MIN 1 HE: NORMAL
RATE PRESSURE PRODUCT: NORMAL
STRESS ECHO BASELINE DIASTOLIC HE: 74
STRESS ECHO BASELINE HR: 96
STRESS ECHO BASELINE SYSTOLIC BP: 157
STRESS ECHO CALCULATED PERCENT HR: 73 %
STRESS ECHO LAST STRESS DIASTOLIC BP: 77
STRESS ECHO LAST STRESS HR: 108
STRESS ECHO LAST STRESS SYSTOLIC BP: 169
STRESS ECHO TARGET HR: 153

## 2024-01-10 PROCEDURE — 93798 PHYS/QHP OP CAR RHAB W/ECG: CPT

## 2024-01-19 ENCOUNTER — HOSPITAL ENCOUNTER (OUTPATIENT)
Dept: CARDIAC REHAB | Facility: HOSPITAL | Age: 69
Discharge: HOME | End: 2024-01-19
Attending: INTERNAL MEDICINE
Payer: MEDICARE

## 2024-01-19 LAB — GLUCOSE BLDC GLUCOMTR-MCNC: 155 MG/DL (ref 70–99)

## 2024-01-19 PROCEDURE — 93798 PHYS/QHP OP CAR RHAB W/ECG: CPT

## 2024-01-25 ENCOUNTER — OFFICE VISIT (OUTPATIENT)
Dept: FAMILY MEDICINE | Facility: CLINIC | Age: 69
End: 2024-01-25
Payer: MEDICARE

## 2024-01-25 VITALS
WEIGHT: 245 LBS | HEART RATE: 86 BPM | TEMPERATURE: 97.1 F | OXYGEN SATURATION: 99 % | SYSTOLIC BLOOD PRESSURE: 132 MMHG | DIASTOLIC BLOOD PRESSURE: 70 MMHG | BODY MASS INDEX: 34.17 KG/M2

## 2024-01-25 DIAGNOSIS — Z79.4 TYPE 2 DIABETES MELLITUS WITH OTHER SPECIFIED COMPLICATION, WITH LONG-TERM CURRENT USE OF INSULIN (H): ICD-10-CM

## 2024-01-25 DIAGNOSIS — Z23 NEED FOR SHINGLES VACCINE: ICD-10-CM

## 2024-01-25 DIAGNOSIS — E11.69 TYPE 2 DIABETES MELLITUS WITH OTHER SPECIFIED COMPLICATION, WITH LONG-TERM CURRENT USE OF INSULIN (H): ICD-10-CM

## 2024-01-25 DIAGNOSIS — R42 LIGHTHEADEDNESS: ICD-10-CM

## 2024-01-25 DIAGNOSIS — I35.0 NONRHEUMATIC AORTIC VALVE STENOSIS: ICD-10-CM

## 2024-01-25 DIAGNOSIS — Z12.11 SCREEN FOR COLON CANCER: Primary | ICD-10-CM

## 2024-01-25 DIAGNOSIS — N52.9 MALE ERECTILE DISORDER: ICD-10-CM

## 2024-01-25 DIAGNOSIS — I10 PRIMARY HYPERTENSION: ICD-10-CM

## 2024-01-25 DIAGNOSIS — Z29.11 NEED FOR VACCINATION AGAINST RESPIRATORY SYNCYTIAL VIRUS: ICD-10-CM

## 2024-01-25 DIAGNOSIS — I25.118 CORONARY ARTERY DISEASE INVOLVING NATIVE CORONARY ARTERY OF NATIVE HEART WITH OTHER FORM OF ANGINA PECTORIS (H): ICD-10-CM

## 2024-01-25 PROBLEM — R94.39 ABNORMAL NUCLEAR STRESS TEST: Status: RESOLVED | Noted: 2023-11-20 | Resolved: 2024-01-25

## 2024-01-25 PROBLEM — I25.83 CORONARY ARTERY DISEASE DUE TO LIPID RICH PLAQUE: Status: RESOLVED | Noted: 2023-11-20 | Resolved: 2024-01-25

## 2024-01-25 PROBLEM — I25.10 CORONARY ARTERY DISEASE DUE TO LIPID RICH PLAQUE: Status: RESOLVED | Noted: 2023-11-20 | Resolved: 2024-01-25

## 2024-01-25 PROBLEM — Z98.890 STATUS POST CORONARY ANGIOGRAM: Status: RESOLVED | Noted: 2023-11-20 | Resolved: 2024-01-25

## 2024-01-25 PROBLEM — E66.01 MORBID OBESITY (H): Status: RESOLVED | Noted: 2021-06-28 | Resolved: 2024-01-25

## 2024-01-25 PROBLEM — I25.10 CORONARY ARTERY DISEASE: Status: ACTIVE | Noted: 2022-09-20

## 2024-01-25 LAB — HBA1C MFR BLD: 8.8 % (ref 0–5.6)

## 2024-01-25 PROCEDURE — 36415 COLL VENOUS BLD VENIPUNCTURE: CPT | Performed by: FAMILY MEDICINE

## 2024-01-25 PROCEDURE — 83036 HEMOGLOBIN GLYCOSYLATED A1C: CPT | Performed by: FAMILY MEDICINE

## 2024-01-25 PROCEDURE — 80061 LIPID PANEL: CPT | Performed by: FAMILY MEDICINE

## 2024-01-25 PROCEDURE — 80053 COMPREHEN METABOLIC PANEL: CPT | Performed by: FAMILY MEDICINE

## 2024-01-25 PROCEDURE — 99215 OFFICE O/P EST HI 40 MIN: CPT | Performed by: FAMILY MEDICINE

## 2024-01-25 RX ORDER — SILDENAFIL CITRATE 20 MG/1
TABLET ORAL
Qty: 90 TABLET | Refills: 1 | Status: SHIPPED | OUTPATIENT
Start: 2024-01-25

## 2024-01-25 RX ORDER — RESPIRATORY SYNCYTIAL VIRUS VACCINE 120MCG/0.5
0.5 KIT INTRAMUSCULAR ONCE
Qty: 1 EACH | Refills: 0 | Status: CANCELLED | OUTPATIENT
Start: 2024-01-25 | End: 2024-01-25

## 2024-01-25 NOTE — LETTER
January 29, 2024      Ezekiel Aldrich  [address] [facility] [facility] [address]        Dear ,    We are writing to inform you of your test results.  The A1c is high at 8.8, we want this closer to 7 that is why I am increasing your Jardiance from 10 mg to 25 mg  Creatinine the kidney test is slightly elevated has been for some time, this could be secondary to some medications you are on, diabetes can also affect kidney function.  Overall the cholesterol is well-controlled the triglycerides are a bit high    See us again in 3 months        Resulted Orders   Comprehensive metabolic panel   Result Value Ref Range    Sodium 137 135 - 145 mmol/L      Comment:      Reference intervals for this test were updated on 09/26/2023 to more accurately reflect our healthy population. There may be differences in the flagging of prior results with similar values performed with this method. Interpretation of those prior results can be made in the context of the updated reference intervals.     Potassium 4.7 3.4 - 5.3 mmol/L    Carbon Dioxide (CO2) 25 22 - 29 mmol/L    Anion Gap 8 7 - 15 mmol/L    Urea Nitrogen 17.8 8.0 - 23.0 mg/dL    Creatinine 1.27 (H) 0.67 - 1.17 mg/dL    GFR Estimate 62 >60 mL/min/1.73m2    Calcium 9.5 8.8 - 10.2 mg/dL    Chloride 104 98 - 107 mmol/L    Glucose 227 (H) 70 - 99 mg/dL    Alkaline Phosphatase 77 40 - 150 U/L      Comment:      Reference intervals for this test were updated on 11/14/2023 to more accurately reflect our healthy population. There may be differences in the flagging of prior results with similar values performed with this method. Interpretation of those prior results can be made in the context of the updated reference intervals.    AST 19 0 - 45 U/L      Comment:      Reference intervals for this test were updated on 6/12/2023 to more accurately reflect our healthy population. There may be differences in the flagging of prior results with similar values performed with this method.  Interpretation of those prior results can be made in the context of the updated reference intervals.    ALT 21 0 - 70 U/L      Comment:      Reference intervals for this test were updated on 6/12/2023 to more accurately reflect our healthy population. There may be differences in the flagging of prior results with similar values performed with this method. Interpretation of those prior results can be made in the context of the updated reference intervals.      Protein Total 7.1 6.4 - 8.3 g/dL    Albumin 4.3 3.5 - 5.2 g/dL    Bilirubin Total 0.6 <=1.2 mg/dL   Lipid panel reflex to direct LDL Fasting   Result Value Ref Range    Cholesterol 170 <200 mg/dL    Triglycerides 290 (H) <150 mg/dL    Direct Measure HDL 40 >=40 mg/dL    LDL Cholesterol Calculated 72 <=100 mg/dL    Non HDL Cholesterol 130 (H) <130 mg/dL    Patient Fasting > 8hrs? Yes     Narrative    Cholesterol  Desirable:  <200 mg/dL    Triglycerides  Normal:  Less than 150 mg/dL  Borderline High:  150-199 mg/dL  High:  200-499 mg/dL  Very High:  Greater than or equal to 500 mg/dL    Direct Measure HDL  Female:  Greater than or equal to 50 mg/dL   Male:  Greater than or equal to 40 mg/dL    LDL Cholesterol  Desirable:  <100mg/dL  Above Desirable:  100-129 mg/dL   Borderline High:  130-159 mg/dL   High:  160-189 mg/dL   Very High:  >= 190 mg/dL    Non HDL Cholesterol  Desirable:  130 mg/dL  Above Desirable:  130-159 mg/dL  Borderline High:  160-189 mg/dL  High:  190-219 mg/dL  Very High:  Greater than or equal to 220 mg/dL   Hemoglobin A1c   Result Value Ref Range    Hemoglobin A1C 8.8 (H) 0.0 - 5.6 %      Comment:      Normal <5.7%   Prediabetes 5.7-6.4%    Diabetes 6.5% or higher     Note: Adopted from ADA consensus guidelines.       If you have any questions or concerns, please call the clinic at the number listed above.       Sincerely,      Janes Jeronimo MD

## 2024-01-25 NOTE — PROGRESS NOTES
Assessment & Plan     (Z12.11) Screen for colon cancer  (primary encounter diagnosis)  Comment: Patient reports he has an upcoming colonoscopy.  Plan:      (Z23) Need for shingles vaccine  Comment: Recommended as local pharmacy  Plan:      (Z29.11) Need for vaccination against respiratory syncytial virus  Comment: Recommend at his local pharmacy  Plan:      (E11.69,  Z79.4) Type 2 diabetes mellitus (H)  Comment: Suboptimal control with A1c of 8.8  Plan: Hemoglobin A1c, empagliflozin (JARDIANCE) 25 MG        TABS tablet, PRIMARY CARE FOLLOW-UP SCHEDULING             (I10) Hypertension  Comment: Adequately controlled  Plan:      (I25.118) Coronary artery disease (H24)  Comment: Patient known history of heart disease, stent placement again in November.  Plan: Comprehensive metabolic panel, Lipid panel         reflex to direct LDL Fasting             (R42) Lightheadedness  Comment: Subjective sensation of lightheadedness with position change, this could be related to blood pressure changes or vestibular issues.  Plan:      (N52.9) Male erectile disorder  Comment: Chronic longstanding issue  Plan: sildenafil (REVATIO) 20 MG tablet             PLAN:  1.  Laboratory studies as above  2.  Increase Jardiance from 10 mg to 25 mg daily  3.  In terms of the lightheadedness, I told the patient I want him to check his blood pressure at home several times weekly to see if by chance he is getting low blood pressure values in which case possibly we could reduce the dose of losartan but I certainly would not until he had more numbers.  4.  Patient has an upcoming colonoscopy but I did advise him that he needs to stay on the Plavix they will not be able to do biopsies.  5.  Prescription for generic Viagra  6.  3-month follow-up  7.  Of note, 41 minutes were spent with the patient, reviewing particularly his cardiac history diabetic history medications potential changes to medications and the like.                  Subjective   Ezekiel  is a 68 year old, presenting for the following health issues:  Patient comes in with a number of concerns.  Patient has a known underlying history of coronary artery disease he had another stent placed in November though he cannot state that he actually feels different or better at this time.    Patient has a history of type 2 diabetes mellitus his last A1c was 7.6, of note he was previously on Ozempic he discontinued this because of cost he is now on Jardiance.    Patient has a history of high blood pressure he is on losartan, often though not always well-controlled here in the office.    Patient reports that since his initial stent back in 2022 he gets a sensation of feeling lightheaded when he stands up which lasts maybe 10 to 15 seconds or so he is worried he is going to fall.  I told the patient that it is possible that he is having low blood pressure, however I want the patient to check his blood pressure at home to see perhaps his blood pressure numbers are much better at home and perhaps we could reduce the dose but I do not want to do that until I know what his blood pressure values at home are.    Patient has had male erectile difficulties for some time she has been on various medications she would be willing to retry generic Viagra.          DM Follow up        1/25/2024     7:45 AM   Additional Questions   Roomed by Karrie Saravia     History of Present Illness       Reason for visit:  Chec up    He eats 2-3 servings of fruits and vegetables daily.He consumes 2 sweetened beverage(s) daily.He exercises with enough effort to increase his heart rate 9 or less minutes per day.  He exercises with enough effort to increase his heart rate 3 or less days per week.   He is taking medications regularly.                     Objective    /70   Pulse 86   Temp 97.1  F (36.2  C) (Temporal)   Wt 111.1 kg (245 lb)   SpO2 99%   BMI 34.17 kg/m    Body mass index is 34.17 kg/m .  Physical Exam               Signed  Electronically by: Janes Jeronimo MD

## 2024-01-26 ENCOUNTER — HOSPITAL ENCOUNTER (OUTPATIENT)
Dept: CARDIAC REHAB | Facility: HOSPITAL | Age: 69
Discharge: HOME | End: 2024-01-26
Attending: INTERNAL MEDICINE
Payer: MEDICARE

## 2024-01-26 LAB
ALBUMIN SERPL BCG-MCNC: 4.3 G/DL (ref 3.5–5.2)
ALP SERPL-CCNC: 77 U/L (ref 40–150)
ALT SERPL W P-5'-P-CCNC: 21 U/L (ref 0–70)
ANION GAP SERPL CALCULATED.3IONS-SCNC: 8 MMOL/L (ref 7–15)
AST SERPL W P-5'-P-CCNC: 19 U/L (ref 0–45)
BILIRUB SERPL-MCNC: 0.6 MG/DL
BUN SERPL-MCNC: 17.8 MG/DL (ref 8–23)
CALCIUM SERPL-MCNC: 9.5 MG/DL (ref 8.8–10.2)
CHLORIDE SERPL-SCNC: 104 MMOL/L (ref 98–107)
CHOLEST SERPL-MCNC: 170 MG/DL
CREAT SERPL-MCNC: 1.27 MG/DL (ref 0.67–1.17)
DEPRECATED HCO3 PLAS-SCNC: 25 MMOL/L (ref 22–29)
EGFRCR SERPLBLD CKD-EPI 2021: 62 ML/MIN/1.73M2
FASTING STATUS PATIENT QL REPORTED: YES
GLUCOSE BLDC GLUCOMTR-MCNC: 185 MG/DL (ref 70–99)
GLUCOSE SERPL-MCNC: 227 MG/DL (ref 70–99)
HDLC SERPL-MCNC: 40 MG/DL
LDLC SERPL CALC-MCNC: 72 MG/DL
NONHDLC SERPL-MCNC: 130 MG/DL
POTASSIUM SERPL-SCNC: 4.7 MMOL/L (ref 3.4–5.3)
PROT SERPL-MCNC: 7.1 G/DL (ref 6.4–8.3)
SODIUM SERPL-SCNC: 137 MMOL/L (ref 135–145)
TRIGL SERPL-MCNC: 290 MG/DL

## 2024-01-26 PROCEDURE — 93798 PHYS/QHP OP CAR RHAB W/ECG: CPT

## 2024-01-29 ASSESSMENT — SLEEP AND FATIGUE QUESTIONNAIRES
HOW LIKELY ARE YOU TO NOD OFF OR FALL ASLEEP WHILE SITTING AND TALKING TO SOMEONE: WOULD NEVER DOZE
HOW LIKELY ARE YOU TO NOD OFF OR FALL ASLEEP WHILE SITTING INACTIVE IN A PUBLIC PLACE: WOULD NEVER DOZE
HOW LIKELY ARE YOU TO NOD OFF OR FALL ASLEEP IN A CAR, WHILE STOPPED FOR A FEW MINUTES IN TRAFFIC: WOULD NEVER DOZE
HOW LIKELY ARE YOU TO NOD OFF OR FALL ASLEEP WHILE SITTING AND READING: WOULD NEVER DOZE
HOW LIKELY ARE YOU TO NOD OFF OR FALL ASLEEP WHILE LYING DOWN TO REST IN THE AFTERNOON WHEN CIRCUMSTANCES PERMIT: SLIGHT CHANCE OF DOZING
HOW LIKELY ARE YOU TO NOD OFF OR FALL ASLEEP WHILE SITTING QUIETLY AFTER LUNCH WITHOUT ALCOHOL: WOULD NEVER DOZE
HOW LIKELY ARE YOU TO NOD OFF OR FALL ASLEEP WHEN YOU ARE A PASSENGER IN A CAR FOR AN HOUR WITHOUT A BREAK: WOULD NEVER DOZE
HOW LIKELY ARE YOU TO NOD OFF OR FALL ASLEEP WHILE WATCHING TV: WOULD NEVER DOZE

## 2024-01-30 ENCOUNTER — VIRTUAL VISIT (OUTPATIENT)
Dept: SLEEP MEDICINE | Facility: CLINIC | Age: 69
End: 2024-01-30
Payer: MEDICARE

## 2024-01-30 VITALS — WEIGHT: 245 LBS | BODY MASS INDEX: 34.3 KG/M2 | HEIGHT: 71 IN

## 2024-01-30 DIAGNOSIS — G47.37 CENTRAL SLEEP APNEA IN CONDITIONS CLASSIFIED ELSEWHERE: ICD-10-CM

## 2024-01-30 DIAGNOSIS — G47.33 OBSTRUCTIVE SLEEP APNEA (ADULT) (PEDIATRIC): Primary | ICD-10-CM

## 2024-01-30 PROCEDURE — 99213 OFFICE O/P EST LOW 20 MIN: CPT | Mod: 95 | Performed by: INTERNAL MEDICINE

## 2024-01-30 ASSESSMENT — PAIN SCALES - GENERAL: PAINLEVEL: NO PAIN (0)

## 2024-01-30 NOTE — Clinical Note
Pt needs new mask style -ideally more comfortable and leaks less-side sleeper. Can you someone please reach out to him--some alarm keeps going off I think for leak-that keeps him up at night-he stops using.

## 2024-01-30 NOTE — NURSING NOTE
Is the patient currently in the state of [place]? YES    Visit mode:VIDEO    If the visit is dropped, the patient can be reconnected by: VIDEO VISIT: Text to cell phone:   Telephone Information:   Mobile [phone]       Will anyone else be joining the visit? NO  (If patient encounters technical issues they should call [phone]    How would you like to obtain your AVS? MyChart    Are changes needed to the allergy or medication list? Pt stated no med changes    Reason for visit: RECHECK    Dyana LE  Has patient had flu shot for current/most recent flu season? If so, when? Yes: 10.18.23

## 2024-01-30 NOTE — PATIENT INSTRUCTIONS
For general sleep health questions:   https://www.thensf.org/sleep-health-topics/  http://sleepeducation.org    Medicare and Medicaid patients starting on CPAP or biPAP on or after 5/12/2023  Medicare patients should schedule an IN PERSON CLINIC appointment to provide your CPAP/biPAP usage data to a provider within 90 days of starting CPAP    For new ResMed devices, sign up for device znader to monitor your device for your followup visits  We encourage you to utilize the [employer] zander or website ([employer] web ([employer]) to monitor your therapy progress and share the data with your healthcare team when you discuss your sleep apnea.                                                      Know your equipment:  CPAP is continuous positive airway pressure that prevents obstructive sleep apnea by keeping the throat from collapsing while you are sleeping. In most cases, the device is  smart  and can slowly self-adjusts if your throat collapses and keeps a record every day of how well you are treated-this information is available to you and your care team.  BPAP is bilevel positive airway pressure that keeps your throat open and also assists each breath with a pressure boost to maintain adequate breathing.  Special kinds of BPAP are used in patients who have inadequate breathing from lung or heart disease. In most cases, the device is  smart  and can slowly self-adjusts to assist breathing. Like CPAP, the device keeps a record of how well you are treated.  Your mask is your connection to the device. You get to choose what feels most comfortable and the staff will help to make sure if fits.     *Masks with magnets:  Updated Contraindications  Masks with magnetic components are contraindicated for use by patients where they, or anyone in close physical contact while using the mask, have the following:   Active medical implants that interact with magnets (i.e., pacemakers, implantable cardioverter defibrillators (ICD),  neurostimulators, cerebrospinal fluid (CSF) shunts, insulin/infusion pumps)   Metallic implants/objects containing ferromagnetic material (i.e., aneurysm clips/flow disruption devices, embolic coils, stents, valves, electrodes, implants to restore hearing or balance with implanted magnets, ocular implants, metallic splinters in the eye)  Updated Warning  Keep the mask magnets at a safe distance of at least 6 inches (150 mm) away from implants or medical devices that may be adversely affected by magnetic interference. This warning applies to you or anyone in close physical contact with your mask. The magnets are in the frame and lower headgear clips, with a magnetic field strength of up to 400mT. When worn, they connect to secure the mask but may inadvertently detach while asleep.  Implants/medical devices, including those listed within contraindications, may be adversely affected if they change function under external magnetic fields or contain ferromagnetic materials that attract/repel to magnetic fields (some metallic implants, e.g., contact lenses with metal, dental implants, metallic cranial plates, screws, toni hole covers, and bone substitute devices). Consult your physician and  of your implant / other medical device for information on the potential adverse effects of magnetic fields.      Continue PAP therapy every night, for all hours that you are sleeping (including naps.)  As always, try to get at least 8 hours of sleep or more each day, keep a regular sleep schedule, and avoid sleep deprivation. Avoid alcohol.  Reasons that you might need a change to your pressure therapy would be weight gain or loss, waking having inadvertently removed your PAP overnight, having previously felt refreshed by sleep with CPAP use and now waking un-refreshed, and return of daytime sleepiness. Also, the development of new medical problems  (such as heart failure, stroke, medications such as narcotics) can  sometimes affect breathing at night and change your PAP therapy needs.  Please bring PAP with you if you are hospitalized.  If anticipating surgery be sure to discuss with your surgeon that you have sleep apnea and use PAP therapy.    Maintain your equipment as recommended which includes routine cleaning and replacement of supplies.      Call DME for any questions regarding supplies or maintenance.    [facility], [facility] [phone]    Do not drive on engage in potentially dangerous activities if feeling sleepy.    Please follow up in sleep clinic again in 3 months.        Tips for your PAP use-    Mask fitting tips  Mask fitting exercise:    To improve your mask seal and your mobility at night, put mask on and secure in place.  Lie down in bed with full pressure and roll to one side, adjust headgear while in that position to eliminate any leaks. Repeat process rolling to other side.     The mask seal does not have to be perfect:   CPAP machines are designed to make up for small leaks. However, you will not tolerate leaks blowing in your eyes so you will need to adjust.   Any leak should only be near or at the bottom of the mask.  We expect your mask to leak slightly at night.    Do not over-tighten the headgear straps, tighter IS NOT better, we expect minimal leak.    First try re-positioning the mask or headgear before tightening the headgear straps.  Mask leaks are expected due to changing sleeping positions. Try pulling the mask away from your skin allowing the cushion to re-inflate will minimize the leak.  If you struggle for a good fit, try turning the CPAP off and then readjust the mask by pulling it away from your face and then turning back on the CPAP.        Humidifier tips  Humidifiers can be adjusted to increase or decrease the amount of moisture according to your comfort level. You may need to adjust this frequently at first, but then might only change it  with seasonal weather changes.     Try INCREASING the humidity if:  You experience a dry, irritated nasal passage or throat.  You have a runny, drippy nose or sneezing fits after using CPAP.  You experience nasal congestion during or after CPAP use.    Try DECREASING the humidity if:  You have excessive condensation or  rain out  in the tubing or mask.  Otherwise keep the tubing warm during the night by running it underneath the blankets or pillow.      Clinic visit after initial PAP set-up   Bring your equipment with you to your 5-8 week follow up clinic visit.  We will be extracting your data from the machine if not available from the cloud based [employer].        Travel  Always take your equipment with you when you travel.  If you fly with your equipment bring it on with you as a carry on.  Medical equipment does not count as a carry on.    If you travel international the machines take 110-240v.  The only adapter needed is the adapter that will fit into the receptacle (outlet).    You may also want to bring an extension cord as many hotel rooms have limited outlets at the bedside.  Do not travel with water in your humidifier chamber.     Cleaning and Maintenance Guidelines    Equipment Frequency Cleaning Method   Mask First Day    Daily      Weekly Soak mask in hot soapy water for 30 minutes, rinse and air dry.  Wipe nasal cushion with a hot soapy (Ivory, baby shampoo) cloth and rinse.  Baby wipes may also be used.  Do not use anti-bacterial soaps,Hazel  liquid soap, rubbing alcohol, bleach or ammonia.  Wash frame in hot soapy water (Ivory, baby shampoo) rinse and let air dry   Headgear Biweekly Wash in hot soapy water, rinse and air dry   Reusable Gray Filter Weekly Wash in hot soapy water, rinse, put in towel squeeze moisture out, let air dry   Disposable White Filter Check Weekly Replace when brown or gray in color; at least every 2 to 3 months   Humidifier Chamber Daily    Weekly Empty distilled water from  humidifier and let air dry    Hand wash in hot soapy water, rinse and air dry   Tubing Weekly Wash in hot soapy water, rinse and let air dry   Mask, Tubing and Humidifier Chamber As needed Disinfect: Soak in 1 part distilled white vinegar to 3 parts hot water for 30 minutes, rinse well and air dry  Not the material headgear        MASK AND SUPPLY REORDERING and EQUIPMENT NEEDS through your DME and per your insurance  Reminder: Most insurance companies will allow for a new mask, headgear, tubing, and reusable gray filter every six months.  Disposable white ultra-fine filters are covered monthly.      HOME AND SAFETY INSTRUCTIONS  Do not use frayed or cracked electrical cords, multi plug adaptors, or switched receptacles  Do not immerse electrical equipment into water  Assure that electrical cords do not become a tripping hazard

## 2024-01-30 NOTE — PROGRESS NOTES
Video-Visit Details    Type of service:  Video Visit    Video Visit Start Time:9:06 AM    Video End Time:9:20 AM    Originating Location (pt. Location): Home      Distant Location (provider location):  Off-site     Platform used for Video Visit: George    Chief complaint: Follow-up complex sleep apnea    Last office visit 9/6/2022    History of Present Illness:68-year-old gentleman with history of ischemic heart disease, hypertension, type 2 diabetes, severe sleep apnea complicated by central apnea.  He has been having difficulty using the machine.  It sleeps frequently throughout the night.  He assumes this is because of mask leak.  The mask does not sit well on his face when he changes positions.  It has been quite frustrating.  He actually sleeps better when he does not use it because of this.  He typically sleeps on his sides.  He is using using a nasal oral mask.  Denies other sleep concerns.    [place] Sleepiness Scale  Total score - [place]: 1 (1/29/2024  1:32 PM)   (Less than 10 normal)    Insomnia Severity Scale  SHIRAZ Total Score: 7  (normal 0-7, mild 8-14, moderate 15-21, severe 22-28)    Past Medical History:   Diagnosis Date    Acute renal failure (H24)     6/11/2016, secondary to dehydration, normalized       Allergies   Allergen Reactions    Lisinopril Cough     Dry cough       Current Outpatient Medications   Medication    aspirin (ASPIRIN LOW DOSE) 81 MG EC tablet    atorvastatin (LIPITOR) 80 MG tablet    blood glucose (ACCU-CHEK GUIDE) test strip    blood glucose (NO BRAND SPECIFIED) lancing device    blood glucose (NO BRAND SPECIFIED) test strip    blood glucose monitoring (NO BRAND SPECIFIED) meter device kit    carvedilol (COREG) 12.5 MG tablet    clopidogrel (PLAVIX) 75 MG tablet    empagliflozin (JARDIANCE) 25 MG TABS tablet    generic lancets    glipiZIDE (GLUCOTROL XL) 10 MG 24 hr tablet    insulin glargine (LANTUS SOLOSTAR) 100 UNIT/ML pen    insulin pen needle (B-D U/F) 31G X 5 MM  miscellaneous    losartan (COZAAR) 100 MG tablet    metFORMIN (GLUCOPHAGE XR) 500 MG 24 hr tablet    sildenafil (REVATIO) 20 MG tablet    tamsulosin (FLOMAX) 0.4 MG capsule     No current facility-administered medications for this visit.       Social History     Socioeconomic History    Marital status:      Spouse name: Not on file    Number of children: 5    Years of education: Not on file    Highest education level: Not on file   Occupational History    Occupation: [employer]   Tobacco Use    Smoking status: Former     Types: Cigarettes     Quit date: 1985     Years since quittin.7    Smokeless tobacco: Current     Types: Snuff    Tobacco comments:     2 tins/week   Substance and Sexual Activity    Alcohol use: Yes     Alcohol/week: 2.0 standard drinks of alcohol     Types: 2 Standard drinks or equivalent per week     Comment: 10 drink per week    Drug use: No    Sexual activity: Yes     Partners: Female   Other Topics Concern    Not on file   Social History Narrative    Diet-            Exercise- Not regular. Work is physical, walking.     Social Determinants of Health     Financial Resource Strain: Low Risk  (2024)    Financial Resource Strain     Within the past 12 months, have you or your family members you live with been unable to get utilities (heat, electricity) when it was really needed?: No   Food Insecurity: Low Risk  (2024)    Food Insecurity     Within the past 12 months, did you worry that your food would run out before you got money to buy more?: No     Within the past 12 months, did the food you bought just not last and you didn t have money to get more?: No   Transportation Needs: Low Risk  (2024)    Transportation Needs     Within the past 12 months, has lack of transportation kept you from medical appointments, getting your medicines, non-medical meetings or appointments, work, or from getting things that you need?: No   Physical Activity: Not on file   Stress:  "Not on file   Social Connections: Not on file   Interpersonal Safety: Low Risk  (10/18/2023)    Interpersonal Safety     Do you feel physically and emotionally safe where you currently live?: Yes     Within the past 12 months, have you been hit, slapped, kicked or otherwise physically hurt by someone?: No     Within the past 12 months, have you been humiliated or emotionally abused in other ways by your partner or ex-partner?: No   Housing Stability: Low Risk  (1/24/2024)    Housing Stability     Do you have housing? : Yes     Are you worried about losing your housing?: No       Family History   Problem Relation Age of Onset    Colon Cancer Mother     Coronary Artery Disease Mother     Hypertension Mother     Diabetes Type 2  Mother     Hypertension Father     Bell's palsy Father     Diabetes Type 2  Father     Thyroid Disease Father     Sleep Apnea Father     Sleep Apnea Sister     Prostate Cancer Brother 57    Sleep Apnea Brother     Bell's palsy Maternal Grandmother     Thyroid Disease Daughter            EXAM:  Ht 1.803 m (5' 11\")   Wt 111.1 kg (245 lb)   BMI 34.17 kg/m    GENERAL: Alert and no distress, trimmed beard  EYES: Eyes grossly normal to inspection.  No discharge or erythema, or obvious scleral/conjunctival abnormalities.  RESP: No audible wheeze, cough, or visible cyanosis.    SKIN: Visible skin clear. No significant rash, abnormal pigmentation or lesions.  NEURO: Cranial nerves grossly intact.  Mentation and speech appropriate for age.  PSYCH: Appropriate affect, tone, and pace of words       PSG 3/18/2022  Weight 240 lbs BMI 33.6  AHI 39.7, RDI 40.7  Lowest oxygen saturation was 76.0%. Time spent less than or equal to 88% was 30.0 minutes.      PSG Titration 5/15/2022  Weight 240 lbs BMI 33.6  Bilevel 15/5 Back up rate 12    ResMed air curve 10 ST PAP download from 12/31/2023 to 1/29/2024 reviewed:  Per cent of days used greater than 4 Hours 3% (minimum goal greater than 70%)  Average use on days " used: 7 hours 42 min  Settings: EPAP 5 cmH2O    IPAP 11 cmH2O  Average AHI 9.7 events per hour (goal less than 5)  Leak excessive    TSH   Date Value Ref Range Status   10/29/2021 3.18 0.30 - 5.00 uIU/mL Final       ASSESSMENT:  68-year-old gentleman with ischemic heart disease, hypertension, diabetes, severe obstructive sleep apnea with central sleep apnea.  He is unable to meet compliance goals at this time due to excessive mask leak and machine noise disrupting sleep.  Ongoing treatment of obstructive sleep apnea is medically indicated.    PLAN:  Orders generated for updated CPAP supplies.  I sent a message to his medical equipment provider asking them to work with him to make sure that he has the best fitting mask as well as to ensure that there is no issues with his machine.  He should work closely with them to troubleshoot any issues as they arise.  Follow-up in 3 months.  Could consider repeat titration study as well.      25 minutes spent by me on the date of the encounter doing chart review, history and exam, documentation and further activities per the note    Mignon Wasserman M.D.  Pulmonary/Critical Care/Sleep Medicine    [facility]   Floor 1, [address]   [address] [place], [place] [address]. [place] [address]   Appointments: [phone]    The above note was dictated using voice recognition software and may include typographical errors. Please contact the author for any clarifications.

## 2024-01-31 ENCOUNTER — HOSPITAL ENCOUNTER (OUTPATIENT)
Dept: CARDIAC REHAB | Facility: HOSPITAL | Age: 69
Discharge: HOME | End: 2024-01-31
Attending: INTERNAL MEDICINE
Payer: MEDICARE

## 2024-01-31 LAB — GLUCOSE BLDC GLUCOMTR-MCNC: 177 MG/DL (ref 70–99)

## 2024-01-31 PROCEDURE — 93798 PHYS/QHP OP CAR RHAB W/ECG: CPT

## 2024-02-02 ENCOUNTER — HOSPITAL ENCOUNTER (OUTPATIENT)
Dept: CARDIAC REHAB | Facility: HOSPITAL | Age: 69
Discharge: HOME | End: 2024-02-02
Attending: INTERNAL MEDICINE
Payer: MEDICARE

## 2024-02-02 LAB — GLUCOSE BLDC GLUCOMTR-MCNC: 232 MG/DL (ref 70–99)

## 2024-02-02 PROCEDURE — 93798 PHYS/QHP OP CAR RHAB W/ECG: CPT

## 2024-02-07 ENCOUNTER — HOSPITAL ENCOUNTER (OUTPATIENT)
Dept: CARDIAC REHAB | Facility: HOSPITAL | Age: 69
Discharge: HOME | End: 2024-02-07
Attending: INTERNAL MEDICINE
Payer: MEDICARE

## 2024-02-07 PROCEDURE — 93798 PHYS/QHP OP CAR RHAB W/ECG: CPT

## 2024-02-09 ENCOUNTER — HOSPITAL ENCOUNTER (OUTPATIENT)
Dept: CARDIAC REHAB | Facility: HOSPITAL | Age: 69
Discharge: HOME | End: 2024-02-09
Attending: INTERNAL MEDICINE
Payer: MEDICARE

## 2024-02-09 PROCEDURE — 93798 PHYS/QHP OP CAR RHAB W/ECG: CPT

## 2024-02-14 ENCOUNTER — HOSPITAL ENCOUNTER (OUTPATIENT)
Dept: CARDIAC REHAB | Facility: HOSPITAL | Age: 69
Discharge: HOME | End: 2024-02-14
Attending: INTERNAL MEDICINE
Payer: MEDICARE

## 2024-02-14 PROCEDURE — 93798 PHYS/QHP OP CAR RHAB W/ECG: CPT

## 2024-02-16 ENCOUNTER — HOSPITAL ENCOUNTER (OUTPATIENT)
Dept: CARDIAC REHAB | Facility: HOSPITAL | Age: 69
Discharge: HOME | End: 2024-02-16
Attending: INTERNAL MEDICINE
Payer: MEDICARE

## 2024-02-16 DIAGNOSIS — N40.1 BENIGN PROSTATIC HYPERPLASIA WITH LOWER URINARY TRACT SYMPTOMS, SYMPTOM DETAILS UNSPECIFIED: ICD-10-CM

## 2024-02-16 PROCEDURE — 93798 PHYS/QHP OP CAR RHAB W/ECG: CPT

## 2024-02-16 RX ORDER — TAMSULOSIN HYDROCHLORIDE 0.4 MG/1
CAPSULE ORAL
Qty: 90 CAPSULE | Refills: 1 | Status: SHIPPED | OUTPATIENT
Start: 2024-02-16 | End: 2024-08-26

## 2024-03-06 ENCOUNTER — HOSPITAL ENCOUNTER (OUTPATIENT)
Dept: CARDIAC REHAB | Facility: HOSPITAL | Age: 69
Discharge: HOME | End: 2024-03-06
Attending: INTERNAL MEDICINE
Payer: MEDICARE

## 2024-03-06 DIAGNOSIS — E11.9 TYPE 2 DIABETES MELLITUS WITHOUT COMPLICATION, WITH LONG-TERM CURRENT USE OF INSULIN (H): ICD-10-CM

## 2024-03-06 DIAGNOSIS — Z79.4 TYPE 2 DIABETES MELLITUS WITHOUT COMPLICATION, WITH LONG-TERM CURRENT USE OF INSULIN (H): ICD-10-CM

## 2024-03-06 PROCEDURE — 93798 PHYS/QHP OP CAR RHAB W/ECG: CPT

## 2024-03-06 NOTE — TELEPHONE ENCOUNTER
I faxed in more test strips, if it is unable to find does not can go through to the pharmacy is that way.

## 2024-03-06 NOTE — TELEPHONE ENCOUNTER
Refill request for the following medication(s):  Pending Prescriptions:                       Disp   Refills    UNABLE TO FIND                            200 ea*3            Si each 2 times daily      Pharmacy:    [id number] - [place], [place] - [address] AT [facility] [address]

## 2024-03-08 DIAGNOSIS — E11.69 TYPE 2 DIABETES MELLITUS WITH OTHER SPECIFIED COMPLICATION, WITH LONG-TERM CURRENT USE OF INSULIN (H): Primary | ICD-10-CM

## 2024-03-08 DIAGNOSIS — Z79.4 TYPE 2 DIABETES MELLITUS WITH OTHER SPECIFIED COMPLICATION, WITH LONG-TERM CURRENT USE OF INSULIN (H): Primary | ICD-10-CM

## 2024-03-08 RX ORDER — LANCETS
EACH MISCELLANEOUS
Qty: 100 EACH | Refills: 5 | Status: SHIPPED | OUTPATIENT
Start: 2024-03-08

## 2024-03-13 ENCOUNTER — HOSPITAL ENCOUNTER (OUTPATIENT)
Dept: CARDIAC REHAB | Facility: HOSPITAL | Age: 69
Discharge: HOME | End: 2024-03-13
Attending: INTERNAL MEDICINE
Payer: MEDICARE

## 2024-03-13 PROCEDURE — 93798 PHYS/QHP OP CAR RHAB W/ECG: CPT

## 2024-03-15 ENCOUNTER — HOSPITAL ENCOUNTER (OUTPATIENT)
Dept: CARDIAC REHAB | Facility: HOSPITAL | Age: 69
Discharge: HOME | End: 2024-03-15
Attending: INTERNAL MEDICINE
Payer: MEDICARE

## 2024-03-15 PROCEDURE — 93798 PHYS/QHP OP CAR RHAB W/ECG: CPT

## 2024-03-19 ENCOUNTER — OFFICE VISIT (OUTPATIENT)
Dept: CARDIOLOGY | Facility: CLINIC | Age: 69
End: 2024-03-19
Payer: MEDICARE

## 2024-03-19 VITALS
DIASTOLIC BLOOD PRESSURE: 80 MMHG | RESPIRATION RATE: 16 BRPM | WEIGHT: 246 LBS | HEIGHT: 71 IN | BODY MASS INDEX: 34.44 KG/M2 | SYSTOLIC BLOOD PRESSURE: 152 MMHG | HEART RATE: 68 BPM

## 2024-03-19 DIAGNOSIS — I10 PRIMARY HYPERTENSION: ICD-10-CM

## 2024-03-19 DIAGNOSIS — E78.2 MIXED HYPERLIPIDEMIA: ICD-10-CM

## 2024-03-19 DIAGNOSIS — Z95.5 STATUS POST CORONARY ARTERY STENT PLACEMENT: Primary | ICD-10-CM

## 2024-03-19 DIAGNOSIS — I35.0 NONRHEUMATIC AORTIC VALVE STENOSIS: ICD-10-CM

## 2024-03-19 DIAGNOSIS — I25.10 CORONARY ARTERY DISEASE INVOLVING NATIVE CORONARY ARTERY OF NATIVE HEART WITHOUT ANGINA PECTORIS: ICD-10-CM

## 2024-03-19 PROCEDURE — 99214 OFFICE O/P EST MOD 30 MIN: CPT | Performed by: INTERNAL MEDICINE

## 2024-03-19 RX ORDER — ROSUVASTATIN CALCIUM 40 MG/1
40 TABLET, COATED ORAL DAILY
Qty: 90 TABLET | Refills: 3 | Status: SHIPPED | OUTPATIENT
Start: 2024-03-19

## 2024-03-19 NOTE — LETTER
3/19/2024    Janes Jeronimo MD  [address] [facility] [address]    RE: Ezekiel Aldrich       Dear Colleague,     I had the pleasure of seeing Ezekiel Aldrich in the [facility].      Thank you, Dr. Janes Jeronimo, for asking the [facility] team to see Mr. Ezekiel Aldrich to follow-up on coronary artery disease, mixed hyperlipidemia, essential hypertension, aortic valve stenosis.    Assessment/Recommendations   Assessment:    1.  Coronary artery disease, status post previous stent placement to the left anterior descending in August 2022 with recent nuclear stress test in November 2023 suggesting medium sized area of ischemia involving the inferior wall.  Subsequently underwent coronary angiography with drug-eluting stent placement to the proximal and mid right coronary artery.  Has done well without any anginal symptoms but informs me he stopped both aspirin and Plavix 5 days ago in anticipation of a colonoscopy tomorrow.  I told him we do not tend to interrupt dual antiplatelet therapy for minimum of 6 months after drug-eluting stent placement to minimize risks of acute stent thrombosis.  I did strongly encourage him to take the aspirin and Plavix following his colonoscopy tomorrow.  2.  Mixed hyperlipidemia, inadequately controlled.  His most recent lipid profile demonstrated an LDL of 72.  I will have him discontinue atorvastatin and replace it with rosuvastatin 40 mg daily.  Ideally we should be aiming for an LDL closer to 55.  3.  Essential hypertension, poorly controlled.  He is compliant with all of his medications and for the most part has been wearing his CPAP mask although tells me 4 weeks ago he stopped wearing it for short period of time.  He will be following up with primary care within the next week.  If his blood pressures continue to run high, I would consider adding direct vasodilators such as Hytrin as he is on current maximal therapy with his other  "medications.  4.  Aortic valve stenosis, mild by previous echocardiogram in July 2023.  Will plan to repeat an echocardiogram in next year prior to his visit.    Plan:  1.  Discontinue atorvastatin and replace with rosuvastatin 40 mg daily.  I will plan to check lipid profile in our office in 2 to 3 months.  2.  Patient to restart aspirin and Plavix shortly after his colonoscopy to minimize acute stent thrombosis.  3.  Follow-up in primary care as planned.  If blood pressure remains greater than 130 systolic, would consider adding Hytrin 2.5 mg daily to his current regimen.  4.  Follow-up with me in 1 year with echocardiogram prior to visit       History of Present Illness    Mr. Ezekiel Aldrich is a 68 year old male with history of coronary artery disease status post DARRIN to the LAD in August 2022 and to the right coronary artery November 2023, mild aortic valve stenosis, essential hypertension, mixed hyperlipidemia, type 2 diabetes mellitus, CHALO on CPAP therapy who presents today for a follow-up visit.  Reports no symptoms of exertional chest discomfort or dyspnea since undergoing his stent placement in November.  Informs me today that he is undergoing colonoscopy tomorrow and stop taking both aspirin and Plavix approximately 5 days ago.  I told him that he should not have stopped the antiplatelet agents for at least another several months as we tend to wait at least 6 months before interrupting them.  Hopefully he has no issues prior to resumption of those medications tomorrow.    ECG (personally reviewed): No ECG today    Cardiac Imaging Studies (personally reviewed): No new imaging     Physical Examination Review of Systems   BP (!) 152/80 (BP Location: Left arm, Patient Position: Sitting, Cuff Size: Adult Large)   Pulse 68   Resp 16   Ht 1.803 m (5' 11\")   Wt 111.6 kg (246 lb)   BMI 34.31 kg/m    Body mass index is 34.31 kg/m .  Wt Readings from Last 3 Encounters:   03/19/24 111.6 kg (246 lb)   01/30/24 " "111.1 kg (245 lb)   01/25/24 111.1 kg (245 lb)     General Appearance:   Awake, Alert, No acute distress.   HEENT:  No scleral icterus; the mucous membranes were pink and moist.   Neck: No cervical bruits or jugular venous distention    Chest: The spine was straight. The chest was symmetric.   Lungs:   Respirations unlabored; the lungs are clear to auscultation. No wheezing   Cardiovascular:   Regular rate and rhythm.  S1 normal, S2 reduced.  2/6 mid peaking crescendo decrescendo systolic murmur heard at the left upper sternal border radiating to the apex   Abdomen:  No organomegaly, masses, bruits, or tenderness. Bowels sounds are present   Extremities: No peripheral edema bilaterally   Skin: No xanthelasma. Warm, Dry.   Musculoskeletal: No tenderness.   Neurologic: Mood and affect are appropriate.    Enc Vitals  BP: (!) 152/80  Pulse: 68  Resp: 16  Weight: 111.6 kg (246 lb)  Height: 180.3 cm (5' 11\")                                         Medical History  Surgical History Family History Social History   Past Medical History:   Diagnosis Date    Acute renal failure (H24)     6/11/2016, secondary to dehydration, normalized    Aortic stenosis 01/25/2024    Diagnosed 2022  Mild to moderate  Followed by cardiology    Coronary artery disease 09/20/2022    status post drug-eluting stenting of the left anterior descending for unstable angina in early August 2022.   Stent, Nov 2023          Hyperlipidemia     Trig to 336;   Atorvastatin       Hypertension     Losartan  Chlorthalidone, April 2019-took only briefly felt dehydrated.       Obstructive Sleep Apnea     3/18/2022 [place] Diagnostic Sleep Study (240.0 lbs) - AHI 39.7, RDI 40.7, Supine AHI 73.2, REM AHI 47.2, Low O2 76.0%, Time Spent =88% 30.0 minutes / Time Spent =89% 38.8 minutes.  5/15/2022 [place] Titration Sleep Study (240.0 lbs) - Treatment was titrated to a pressure of BiLevel 11/5/12 with an AHI of 8.9. Time Spent in REM supine at this pressure was 36.0. "  Bilevel    Type 2 diabetes mellitus (H) 05/01/2015    Basaglar  Metformin  Glipizide  Actos, November 2021  Ozempic, Feb 2023-cost is an issue  Jardiance, October 2023       Past Surgical History:   Procedure Laterality Date    ANKLE FRACTURE SURGERY Right     hardware    CV CORONARY ANGIOGRAM N/A 08/02/2022    Procedure: Coronary Angiogram;  Surgeon: Archie Ni MD;  Location: [facility]    CV CORONARY ANGIOGRAM N/A 11/20/2023    Procedure: Coronary Angiogram;  Surgeon: Chino Boyd MD;  Location: [facility]    CV INTRAVASULAR ULTRASOUND N/A 08/02/2022    Procedure: Intravascular Ultrasound;  Surgeon: Archie Ni MD;  Location: [facility]    CV LEFT HEART CATH N/A 11/20/2023    Procedure: Left Heart Catheterization;  Surgeon: Chino Boyd MD;  Location: [facility]    CV PCI N/A 08/02/2022    Percutaneous Coronary Intervention;  Surgeon: Archie Ni MD;  Location: [facility]    CV PCI STENT DRUG ELUTING N/A 11/20/2023    Procedure: Percutaneous Coronary Intervention Stent;  Surgeon: Chino Boyd MD;  Location: [facility]    KNEE SURGERY      MANDIBLE FRACTURE SURGERY      from fx    SHOULDER SURGERY Right     from fx and separation    Family History   Problem Relation Age of Onset    Colon Cancer Mother     Coronary Artery Disease Mother     Hypertension Mother     Diabetes Type 2  Mother     Hypertension Father     Bell's palsy Father     Diabetes Type 2  Father     Thyroid Disease Father     Sleep Apnea Father     Sleep Apnea Sister     Prostate Cancer Brother 57    Sleep Apnea Brother     Bell's palsy Maternal Grandmother     Thyroid Disease Daughter     Social History     Socioeconomic History    Marital status:      Spouse name: Not on file    Number of children: 5    Years of education: Not on file    Highest education level: Not on file   Occupational History    Occupation: [employer]   Tobacco Use     Smoking status: Former     Types: Cigarettes     Quit date: 1985     Years since quittin.9    Smokeless tobacco: Current     Types: Snuff    Tobacco comments:     2 tins/week   Substance and Sexual Activity    Alcohol use: Yes     Alcohol/week: 2.0 standard drinks of alcohol     Types: 2 Standard drinks or equivalent per week     Comment: 10 drink per week    Drug use: No    Sexual activity: Yes     Partners: Female   Other Topics Concern    Not on file   Social History Narrative    Diet-            Exercise- Not regular. Work is physical, walking.     Social Determinants of Health     Financial Resource Strain: Low Risk  (2024)    Financial Resource Strain     Within the past 12 months, have you or your family members you live with been unable to get utilities (heat, electricity) when it was really needed?: No   Food Insecurity: Low Risk  (2024)    Food Insecurity     Within the past 12 months, did you worry that your food would run out before you got money to buy more?: No     Within the past 12 months, did the food you bought just not last and you didn t have money to get more?: No   Transportation Needs: Low Risk  (2024)    Transportation Needs     Within the past 12 months, has lack of transportation kept you from medical appointments, getting your medicines, non-medical meetings or appointments, work, or from getting things that you need?: No   Physical Activity: Not on file   Stress: Not on file   Social Connections: Not on file   Interpersonal Safety: Low Risk  (10/18/2023)    Interpersonal Safety     Do you feel physically and emotionally safe where you currently live?: Yes     Within the past 12 months, have you been hit, slapped, kicked or otherwise physically hurt by someone?: No     Within the past 12 months, have you been humiliated or emotionally abused in other ways by your partner or ex-partner?: No   Housing Stability: Low Risk  (2024)    Housing Stability     Do you  have housing? : Yes     Are you worried about losing your housing?: No          Medications  Allergies   Current Outpatient Medications   Medication Sig Dispense Refill    aspirin (ASPIRIN LOW DOSE) 81 MG EC tablet TAKE 1 TABLET(81 MG) BY MOUTH DAILY. START TOMORROW 30 tablet 11    blood glucose (ACCU-CHEK GUIDE) test strip USE 1 TEST TWICE DAILY 200 strip 1    blood glucose (NO BRAND SPECIFIED) lancing device Device to be used with lancets. 100 each 1    blood glucose (NO BRAND SPECIFIED) test strip Use to test blood sugar daily for diabetes 100 strip 3    blood glucose monitoring (NO BRAND SPECIFIED) meter device kit by [employer] route as needed (glucose checks) 1 kit 0    blood glucose monitoring (SOFTCLIX) lancets TEST LANCETS TEST TWICE DAILY 100 each 5    carvedilol (COREG) 12.5 MG tablet TAKE 1 TABLET(12.5 MG) BY MOUTH TWICE DAILY WITH MEALS 180 tablet 3    clopidogrel (PLAVIX) 75 MG tablet Take 1 tablet (75 mg) by mouth daily 90 tablet 3    empagliflozin (JARDIANCE) 25 MG TABS tablet Take 1 tablet (25 mg) by mouth daily 90 tablet 3    generic lancets [GENERIC LANCETS] Use 1 each As Directed 2 (two) times a day. Dispense brand per patient's insurance at pharmacy discretion. 200 each 3    glipiZIDE (GLUCOTROL XL) 10 MG 24 hr tablet TAKE 2 TABLETS BY MOUTH DAILY 180 tablet 3    insulin glargine (LANTUS SOLOSTAR) 100 UNIT/ML pen ADMINISTER 66 UNITS UNDER THE SKIN AT BEDTIME 45 mL 5    insulin pen needle (B-D U/F) 31G X 5 MM miscellaneous TWICE DAILY TO INJECT VICTOZA AND LANTUS 100 each 5    losartan (COZAAR) 100 MG tablet TAKE 1 TABLET(100 MG) BY MOUTH DAILY 90 tablet 3    metFORMIN (GLUCOPHAGE XR) 500 MG 24 hr tablet TAKE 4 TABLETS(2000 MG) BY MOUTH DAILY 360 tablet 3    rosuvastatin (CRESTOR) 40 MG tablet Take 1 tablet (40 mg) by mouth daily 90 tablet 3    sildenafil (REVATIO) 20 MG tablet Take 3 to 5 pills daily as needed for 90 tablet 1    tamsulosin (FLOMAX) 0.4 MG capsule TAKE 1 CAPSULE BY MOUTH EVERY DAY  90 capsule 1      Allergies   Allergen Reactions    Lisinopril Cough     Dry cough         Lab Results    Chemistry/lipid CBC Cardiac Enzymes/BNP/TSH/INR   Recent Labs   Lab Test 01/25/24  0827   TRIG 290*   LDL 72   BUN 17.8      CO2 25    Recent Labs   Lab Test 11/20/23  0715   WBC 6.5   HGB 15.4   HCT 43.9   MCV 92       Recent Labs   Lab Test 08/02/22  0458 08/02/22  0155 08/01/22  2112 10/29/21  1616   TROPONINI 0.16   < > 0.12  --    BNP  --   --  89*  --    TSH  --   --   --  3.18   INR  --   --  0.92  --     < > = values in this interval not displayed.        A total of 31 minutes was spent reviewing patient's medical records, obtaining history and performing examination, as well as discussing diagnoses/ recommendations with patient and answering all questions.                        Thank you for allowing me to participate in the care of your patient.      Sincerely,     Karlene Blevins MD     [facility]  cc:   Destinee Adame, CNP  [address] [place] [address]

## 2024-03-19 NOTE — PATIENT INSTRUCTIONS
Discontinue atorvastatin and replace with rosuvastatin 40 mg daily. Will plan to check lipids in 3 months.  Restart your clopidogrel and aspirin after the colonoscopy tomorrow.  Follow up in 1 year with echo prior to visit

## 2024-03-19 NOTE — PROGRESS NOTES
Thank you, Dr. Janes Jeronimo, for asking the [facility] team to see . Ezekiel Aldrich to follow-up on coronary artery disease, mixed hyperlipidemia, essential hypertension, aortic valve stenosis.    Assessment/Recommendations   Assessment:    1.  Coronary artery disease, status post previous stent placement to the left anterior descending in August 2022 with recent nuclear stress test in November 2023 suggesting medium sized area of ischemia involving the inferior wall.  Subsequently underwent coronary angiography with drug-eluting stent placement to the proximal and mid right coronary artery.  Has done well without any anginal symptoms but informs me he stopped both aspirin and Plavix 5 days ago in anticipation of a colonoscopy tomorrow.  I told him we do not tend to interrupt dual antiplatelet therapy for minimum of 6 months after drug-eluting stent placement to minimize risks of acute stent thrombosis.  I did strongly encourage him to take the aspirin and Plavix following his colonoscopy tomorrow.  2.  Mixed hyperlipidemia, inadequately controlled.  His most recent lipid profile demonstrated an LDL of 72.  I will have him discontinue atorvastatin and replace it with rosuvastatin 40 mg daily.  Ideally we should be aiming for an LDL closer to 55.  3.  Essential hypertension, poorly controlled.  He is compliant with all of his medications and for the most part has been wearing his CPAP mask although tells me 4 weeks ago he stopped wearing it for short period of time.  He will be following up with primary care within the next week.  If his blood pressures continue to run high, I would consider adding direct vasodilators such as Hytrin as he is on current maximal therapy with his other medications.  4.  Aortic valve stenosis, mild by previous echocardiogram in July 2023.  Will plan to repeat an echocardiogram in next year prior to his visit.    Plan:  1.  Discontinue atorvastatin and replace  "with rosuvastatin 40 mg daily.  I will plan to check lipid profile in our office in 2 to 3 months.  2.  Patient to restart aspirin and Plavix shortly after his colonoscopy to minimize acute stent thrombosis.  3.  Follow-up in primary care as planned.  If blood pressure remains greater than 130 systolic, would consider adding Hytrin 2.5 mg daily to his current regimen.  4.  Follow-up with me in 1 year with echocardiogram prior to visit       History of Present Illness    Mr. Ezekiel Aldrich is a 68 year old male with history of coronary artery disease status post DARRIN to the LAD in August 2022 and to the right coronary artery November 2023, mild aortic valve stenosis, essential hypertension, mixed hyperlipidemia, type 2 diabetes mellitus, CHALO on CPAP therapy who presents today for a follow-up visit.  Reports no symptoms of exertional chest discomfort or dyspnea since undergoing his stent placement in November.  Informs me today that he is undergoing colonoscopy tomorrow and stop taking both aspirin and Plavix approximately 5 days ago.  I told him that he should not have stopped the antiplatelet agents for at least another several months as we tend to wait at least 6 months before interrupting them.  Hopefully he has no issues prior to resumption of those medications tomorrow.    ECG (personally reviewed): No ECG today    Cardiac Imaging Studies (personally reviewed): No new imaging     Physical Examination Review of Systems   BP (!) 152/80 (BP Location: Left arm, Patient Position: Sitting, Cuff Size: Adult Large)   Pulse 68   Resp 16   Ht 1.803 m (5' 11\")   Wt 111.6 kg (246 lb)   BMI 34.31 kg/m    Body mass index is 34.31 kg/m .  Wt Readings from Last 3 Encounters:   03/19/24 111.6 kg (246 lb)   01/30/24 111.1 kg (245 lb)   01/25/24 111.1 kg (245 lb)     General Appearance:   Awake, Alert, No acute distress.   HEENT:  No scleral icterus; the mucous membranes were pink and moist.   Neck: No cervical bruits or " "jugular venous distention    Chest: The spine was straight. The chest was symmetric.   Lungs:   Respirations unlabored; the lungs are clear to auscultation. No wheezing   Cardiovascular:   Regular rate and rhythm.  S1 normal, S2 reduced.  2/6 mid peaking crescendo decrescendo systolic murmur heard at the left upper sternal border radiating to the apex   Abdomen:  No organomegaly, masses, bruits, or tenderness. Bowels sounds are present   Extremities: No peripheral edema bilaterally   Skin: No xanthelasma. Warm, Dry.   Musculoskeletal: No tenderness.   Neurologic: Mood and affect are appropriate.    Enc Vitals  BP: (!) 152/80  Pulse: 68  Resp: 16  Weight: 111.6 kg (246 lb)  Height: 180.3 cm (5' 11\")                                         Medical History  Surgical History Family History Social History   Past Medical History:   Diagnosis Date    Acute renal failure (H24)     6/11/2016, secondary to dehydration, normalized    Aortic stenosis 01/25/2024    Diagnosed 2022  Mild to moderate  Followed by cardiology    Coronary artery disease 09/20/2022    status post drug-eluting stenting of the left anterior descending for unstable angina in early August 2022.   Stent, Nov 2023          Hyperlipidemia     Trig to 336;   Atorvastatin       Hypertension     Losartan  Chlorthalidone, April 2019-took only briefly felt dehydrated.       Obstructive Sleep Apnea     3/18/2022 [place] Diagnostic Sleep Study (240.0 lbs) - AHI 39.7, RDI 40.7, Supine AHI 73.2, REM AHI 47.2, Low O2 76.0%, Time Spent =88% 30.0 minutes / Time Spent =89% 38.8 minutes.  5/15/2022 [facility] Titration Sleep Study (240.0 lbs) - Treatment was titrated to a pressure of BiLevel 11/5/12 with an AHI of 8.9. Time Spent in REM supine at this pressure was 36.0.  Bilevel    Type 2 diabetes mellitus (H) 05/01/2015    Basaglar  Metformin  Glipizide  Actos, November 2021  Ozempic, Feb 2023-cost is an issue  Jardiance, October 2023       Past Surgical History: "   Procedure Laterality Date    ANKLE FRACTURE SURGERY Right     hardware    CV CORONARY ANGIOGRAM N/A 2022    Procedure: Coronary Angiogram;  Surgeon: Archie Ni MD;  Location: [facility] CATH LAB CV    CV CORONARY ANGIOGRAM N/A 2023    Procedure: Coronary Angiogram;  Surgeon: Chino Boyd MD;  Location: [facility] CATH LAB CV    CV INTRAVASULAR ULTRASOUND N/A 2022    Procedure: Intravascular Ultrasound;  Surgeon: Archie Ni MD;  Location: [facility] CATH LAB CV    CV LEFT HEART CATH N/A 2023    Procedure: Left Heart Catheterization;  Surgeon: Chino Boyd MD;  Location: [facility] CATH LAB CV    CV PCI N/A 2022    Percutaneous Coronary Intervention;  Surgeon: Archie Ni MD;  Location: [facility] CATH LAB CV    CV PCI STENT DRUG ELUTING N/A 2023    Procedure: Percutaneous Coronary Intervention Stent;  Surgeon: Chino Boyd MD;  Location: [facility] CATH LAB CV    KNEE SURGERY      MANDIBLE FRACTURE SURGERY      from fx    SHOULDER SURGERY Right     from fx and separation    Family History   Problem Relation Age of Onset    Colon Cancer Mother     Coronary Artery Disease Mother     Hypertension Mother     Diabetes Type 2  Mother     Hypertension Father     Bell's palsy Father     Diabetes Type 2  Father     Thyroid Disease Father     Sleep Apnea Father     Sleep Apnea Sister     Prostate Cancer Brother 57    Sleep Apnea Brother     Bell's palsy Maternal Grandmother     Thyroid Disease Daughter     Social History     Socioeconomic History    Marital status:      Spouse name: Not on file    Number of children: 5    Years of education: Not on file    Highest education level: Not on file   Occupational History    Occupation: [employer]   Tobacco Use    Smoking status: Former     Types: Cigarettes     Quit date: 1985     Years since quittin.9    Smokeless tobacco: Current     Types: Snuff    Tobacco comments:     2 tins/week   Substance  and Sexual Activity    Alcohol use: Yes     Alcohol/week: 2.0 standard drinks of alcohol     Types: 2 Standard drinks or equivalent per week     Comment: 10 drink per week    Drug use: No    Sexual activity: Yes     Partners: Female   Other Topics Concern    Not on file   Social History Narrative    Diet-            Exercise- Not regular. Work is physical, walking.     Social Determinants of Health     Financial Resource Strain: Low Risk  (1/24/2024)    Financial Resource Strain     Within the past 12 months, have you or your family members you live with been unable to get utilities (heat, electricity) when it was really needed?: No   Food Insecurity: Low Risk  (1/24/2024)    Food Insecurity     Within the past 12 months, did you worry that your food would run out before you got money to buy more?: No     Within the past 12 months, did the food you bought just not last and you didn t have money to get more?: No   Transportation Needs: Low Risk  (1/24/2024)    Transportation Needs     Within the past 12 months, has lack of transportation kept you from medical appointments, getting your medicines, non-medical meetings or appointments, work, or from getting things that you need?: No   Physical Activity: Not on file   Stress: Not on file   Social Connections: Not on file   Interpersonal Safety: Low Risk  (10/18/2023)    Interpersonal Safety     Do you feel physically and emotionally safe where you currently live?: Yes     Within the past 12 months, have you been hit, slapped, kicked or otherwise physically hurt by someone?: No     Within the past 12 months, have you been humiliated or emotionally abused in other ways by your partner or ex-partner?: No   Housing Stability: Low Risk  (1/24/2024)    Housing Stability     Do you have housing? : Yes     Are you worried about losing your housing?: No          Medications  Allergies   Current Outpatient Medications   Medication Sig Dispense Refill    aspirin (ASPIRIN LOW DOSE)  81 MG EC tablet TAKE 1 TABLET(81 MG) BY MOUTH DAILY. START TOMORROW 30 tablet 11    blood glucose (ACCU-CHEK GUIDE) test strip USE 1 TEST TWICE DAILY 200 strip 1    blood glucose (NO BRAND SPECIFIED) lancing device Device to be used with lancets. 100 each 1    blood glucose (NO BRAND SPECIFIED) test strip Use to test blood sugar daily for diabetes 100 strip 3    blood glucose monitoring (NO BRAND SPECIFIED) meter device kit by In [employer] route as needed (glucose checks) 1 kit 0    blood glucose monitoring (SOFTCLIX) lancets TEST LANCETS TEST TWICE DAILY 100 each 5    carvedilol (COREG) 12.5 MG tablet TAKE 1 TABLET(12.5 MG) BY MOUTH TWICE DAILY WITH MEALS 180 tablet 3    clopidogrel (PLAVIX) 75 MG tablet Take 1 tablet (75 mg) by mouth daily 90 tablet 3    empagliflozin (JARDIANCE) 25 MG TABS tablet Take 1 tablet (25 mg) by mouth daily 90 tablet 3    generic lancets [GENERIC LANCETS] Use 1 each As Directed 2 (two) times a day. Dispense brand per patient's insurance at pharmacy discretion. 200 each 3    glipiZIDE (GLUCOTROL XL) 10 MG 24 hr tablet TAKE 2 TABLETS BY MOUTH DAILY 180 tablet 3    insulin glargine (LANTUS SOLOSTAR) 100 UNIT/ML pen ADMINISTER 66 UNITS UNDER THE SKIN AT BEDTIME 45 mL 5    insulin pen needle (B-D U/F) 31G X 5 MM miscellaneous TWICE DAILY TO INJECT VICTOZA AND LANTUS 100 each 5    losartan (COZAAR) 100 MG tablet TAKE 1 TABLET(100 MG) BY MOUTH DAILY 90 tablet 3    metFORMIN (GLUCOPHAGE XR) 500 MG 24 hr tablet TAKE 4 TABLETS(2000 MG) BY MOUTH DAILY 360 tablet 3    rosuvastatin (CRESTOR) 40 MG tablet Take 1 tablet (40 mg) by mouth daily 90 tablet 3    sildenafil (REVATIO) 20 MG tablet Take 3 to 5 pills daily as needed for 90 tablet 1    tamsulosin (FLOMAX) 0.4 MG capsule TAKE 1 CAPSULE BY MOUTH EVERY DAY 90 capsule 1      Allergies   Allergen Reactions    Lisinopril Cough     Dry cough         Lab Results    Chemistry/lipid CBC Cardiac Enzymes/BNP/TSH/INR   Recent Labs   Lab Test 01/25/24  7424    TRIG 290*   LDL 72   BUN 17.8      CO2 25    Recent Labs   Lab Test 11/20/23  0715   WBC 6.5   HGB 15.4   HCT 43.9   MCV 92       Recent Labs   Lab Test 08/02/22  0458 08/02/22  0155 08/01/22  2112 10/29/21  1616   TROPONINI 0.16   < > 0.12  --    BNP  --   --  89*  --    TSH  --   --   --  3.18   INR  --   --  0.92  --     < > = values in this interval not displayed.        A total of 31 minutes was spent reviewing patient's medical records, obtaining history and performing examination, as well as discussing diagnoses/ recommendations with patient and answering all questions.

## 2024-03-27 ENCOUNTER — HOSPITAL ENCOUNTER (OUTPATIENT)
Dept: CARDIAC REHAB | Facility: HOSPITAL | Age: 69
Discharge: HOME | End: 2024-03-27
Attending: INTERNAL MEDICINE
Payer: MEDICARE

## 2024-03-27 PROCEDURE — 93798 PHYS/QHP OP CAR RHAB W/ECG: CPT

## 2024-04-17 ENCOUNTER — HOSPITAL ENCOUNTER (OUTPATIENT)
Dept: CARDIAC REHAB | Facility: HOSPITAL | Age: 69
Discharge: HOME | End: 2024-04-17
Attending: INTERNAL MEDICINE
Payer: MEDICARE

## 2024-04-17 ENCOUNTER — TELEPHONE (OUTPATIENT)
Dept: CARDIOLOGY | Facility: CLINIC | Age: 69
End: 2024-04-17
Payer: MEDICARE

## 2024-04-17 PROCEDURE — 93798 PHYS/QHP OP CAR RHAB W/ECG: CPT

## 2024-04-17 NOTE — TELEPHONE ENCOUNTER
Called patient to review recent elevated blood pressure reading.  Per [place] Community Measures guidelines, patients blood pressure is out of parameters and recheck blood pressure is recommended.    Last Blood Pressure: 152/80  Last Heart Rate: 68  Date: 4/17/24  Location: [facility]    Today's Blood Pressure: 124/60  Today's Heart Rate: 85  Location: Other Specialty    Patient reported blood pressure updated in Epic. Blood pressure falls within [place] Community Measures guidelines.  Patient will follow up as previously advised.    DELILAH Bird

## 2024-04-19 ENCOUNTER — HOSPITAL ENCOUNTER (OUTPATIENT)
Dept: CARDIAC REHAB | Facility: HOSPITAL | Age: 69
Discharge: HOME | End: 2024-04-19
Attending: INTERNAL MEDICINE
Payer: MEDICARE

## 2024-04-19 PROCEDURE — 93798 PHYS/QHP OP CAR RHAB W/ECG: CPT

## 2024-05-01 ENCOUNTER — HOSPITAL ENCOUNTER (OUTPATIENT)
Dept: CARDIAC REHAB | Facility: HOSPITAL | Age: 69
Discharge: HOME | End: 2024-05-01
Attending: INTERNAL MEDICINE
Payer: MEDICARE

## 2024-05-01 ENCOUNTER — MEDICAL CORRESPONDENCE (OUTPATIENT)
Dept: HEALTH INFORMATION MANAGEMENT | Facility: CLINIC | Age: 69
End: 2024-05-01

## 2024-06-07 ENCOUNTER — LAB (OUTPATIENT)
Dept: CARDIOLOGY | Facility: CLINIC | Age: 69
End: 2024-06-07
Payer: MEDICARE

## 2024-06-07 DIAGNOSIS — Z95.5 STATUS POST CORONARY ARTERY STENT PLACEMENT: Primary | ICD-10-CM

## 2024-06-07 DIAGNOSIS — E78.2 MIXED HYPERLIPIDEMIA: ICD-10-CM

## 2024-06-07 DIAGNOSIS — Z95.5 STATUS POST CORONARY ARTERY STENT PLACEMENT: ICD-10-CM

## 2024-06-07 LAB
CHOLEST SERPL-MCNC: 133 MG/DL
FASTING STATUS PATIENT QL REPORTED: YES
HDLC SERPL-MCNC: 51 MG/DL
LDLC SERPL CALC-MCNC: 40 MG/DL
NONHDLC SERPL-MCNC: 82 MG/DL
TRIGL SERPL-MCNC: 209 MG/DL

## 2024-06-07 PROCEDURE — 36415 COLL VENOUS BLD VENIPUNCTURE: CPT

## 2024-06-07 PROCEDURE — 80061 LIPID PANEL: CPT

## 2024-06-08 DIAGNOSIS — I10 BENIGN ESSENTIAL HYPERTENSION: ICD-10-CM

## 2024-06-08 DIAGNOSIS — I24.9 ACS (ACUTE CORONARY SYNDROME) (H): ICD-10-CM

## 2024-08-03 ENCOUNTER — HEALTH MAINTENANCE LETTER (OUTPATIENT)
Age: 69
End: 2024-08-03

## 2024-08-06 DIAGNOSIS — I10 ESSENTIAL HYPERTENSION: ICD-10-CM

## 2024-08-06 RX ORDER — LOSARTAN POTASSIUM 100 MG/1
TABLET ORAL
Qty: 90 TABLET | Refills: 3 | Status: SHIPPED | OUTPATIENT
Start: 2024-08-06

## 2024-08-19 DIAGNOSIS — Z79.4 TYPE 2 DIABETES MELLITUS WITHOUT COMPLICATION, WITH LONG-TERM CURRENT USE OF INSULIN (H): ICD-10-CM

## 2024-08-19 DIAGNOSIS — E11.9 TYPE 2 DIABETES MELLITUS WITHOUT COMPLICATION, WITH LONG-TERM CURRENT USE OF INSULIN (H): ICD-10-CM

## 2024-08-19 RX ORDER — GLIPIZIDE 10 MG/1
TABLET, FILM COATED, EXTENDED RELEASE ORAL
Qty: 180 TABLET | Refills: 3 | OUTPATIENT
Start: 2024-08-19

## 2024-08-20 DIAGNOSIS — I10 BENIGN ESSENTIAL HYPERTENSION: ICD-10-CM

## 2024-08-20 DIAGNOSIS — I24.9 ACS (ACUTE CORONARY SYNDROME) (H): ICD-10-CM

## 2024-08-20 NOTE — TELEPHONE ENCOUNTER
Left message for patient requesting callback to address Rx refill request for carvedilol. Noted per chart review, prescription last filled 12/13/2022 for one year, and per 5/6/24 urgency room visit note carvedilol was not listed on current medication list. Yearly follow-up pending 3/2025. AJ

## 2024-08-21 ENCOUNTER — TELEPHONE (OUTPATIENT)
Dept: FAMILY MEDICINE | Facility: CLINIC | Age: 69
End: 2024-08-21
Payer: MEDICARE

## 2024-08-21 DIAGNOSIS — E11.69 TYPE 2 DIABETES MELLITUS WITH OTHER SPECIFIED COMPLICATION, WITH LONG-TERM CURRENT USE OF INSULIN (H): Primary | ICD-10-CM

## 2024-08-21 DIAGNOSIS — Z79.4 TYPE 2 DIABETES MELLITUS WITH OTHER SPECIFIED COMPLICATION, WITH LONG-TERM CURRENT USE OF INSULIN (H): Primary | ICD-10-CM

## 2024-08-21 RX ORDER — CARVEDILOL 12.5 MG/1
TABLET ORAL
Qty: 180 TABLET | Refills: 1 | Status: SHIPPED | OUTPATIENT
Start: 2024-08-21

## 2024-08-21 RX ORDER — BEXAGLIFLOZIN 20 MG
20 TABLET ORAL DAILY
Qty: 90 TABLET | Refills: 1 | Status: SHIPPED | OUTPATIENT
Start: 2024-08-21

## 2024-08-21 NOTE — TELEPHONE ENCOUNTER
Let the patient know that [facility] new medication similar to Jardiance which should be much less expensive.

## 2024-08-21 NOTE — TELEPHONE ENCOUNTER
Medication: empagliflozin (JARDIANCE) 25 MG TABS tablet     Pharmacy Comments: This drug is to much money, can we get something cheaper? Thank you.    Pharmacy:    [id number] - [place], [place] - [address] AT [facility] [address]

## 2024-08-22 ENCOUNTER — TELEPHONE (OUTPATIENT)
Dept: FAMILY MEDICINE | Facility: CLINIC | Age: 69
End: 2024-08-22
Payer: MEDICARE

## 2024-08-22 NOTE — TELEPHONE ENCOUNTER
TC attempted to transfer patient to RN.  Patient hung up before transferred.  RN attempted to contact patient, LM      See provider message from 8/21/2024.      Please reach out to patient and assist with providing information on this pharmacy and how to sign up/contact/ receive medications.

## 2024-08-23 NOTE — TELEPHONE ENCOUNTER
Pt responded to 08/22/2024 [employer] message.    Lani Villasenor RN

## 2024-08-23 NOTE — TELEPHONE ENCOUNTER
Left a message for Ezekiel that a new medication was sent to [facility] that is similar to the Jardiance that should be less expensive. AUGUSTO THOMAS on 8/23/2024 at 2:05 PM

## 2024-08-24 DIAGNOSIS — N40.1 BENIGN PROSTATIC HYPERPLASIA WITH LOWER URINARY TRACT SYMPTOMS, SYMPTOM DETAILS UNSPECIFIED: ICD-10-CM

## 2024-08-26 RX ORDER — TAMSULOSIN HYDROCHLORIDE 0.4 MG/1
CAPSULE ORAL
Qty: 90 CAPSULE | Refills: 1 | Status: SHIPPED | OUTPATIENT
Start: 2024-08-26

## 2024-09-06 RX ORDER — CARVEDILOL 12.5 MG/1
TABLET ORAL
Qty: 180 TABLET | Refills: 3 | OUTPATIENT
Start: 2024-09-06

## 2024-09-17 ENCOUNTER — TRANSFERRED RECORDS (OUTPATIENT)
Dept: MULTI SPECIALTY CLINIC | Facility: CLINIC | Age: 69
End: 2024-09-17
Payer: MEDICARE

## 2024-09-17 LAB — RETINOPATHY: NORMAL

## 2024-09-28 DIAGNOSIS — Z79.4 TYPE 2 DIABETES MELLITUS WITHOUT COMPLICATION, WITH LONG-TERM CURRENT USE OF INSULIN (H): ICD-10-CM

## 2024-09-28 DIAGNOSIS — E11.9 TYPE 2 DIABETES MELLITUS WITHOUT COMPLICATION, WITH LONG-TERM CURRENT USE OF INSULIN (H): ICD-10-CM

## 2024-09-30 RX ORDER — INSULIN GLARGINE 100 [IU]/ML
INJECTION, SOLUTION SUBCUTANEOUS
Qty: 45 ML | Refills: 5 | Status: SHIPPED | OUTPATIENT
Start: 2024-09-30

## 2024-10-03 ENCOUNTER — OFFICE VISIT (OUTPATIENT)
Dept: FAMILY MEDICINE | Facility: CLINIC | Age: 69
End: 2024-10-03
Payer: MEDICARE

## 2024-10-03 VITALS
BODY MASS INDEX: 33.93 KG/M2 | OXYGEN SATURATION: 98 % | HEIGHT: 70 IN | SYSTOLIC BLOOD PRESSURE: 128 MMHG | DIASTOLIC BLOOD PRESSURE: 70 MMHG | WEIGHT: 237 LBS | HEART RATE: 77 BPM

## 2024-10-03 DIAGNOSIS — Z23 NEED FOR VACCINATION: ICD-10-CM

## 2024-10-03 DIAGNOSIS — Z12.5 SCREENING FOR PROSTATE CANCER: ICD-10-CM

## 2024-10-03 DIAGNOSIS — E11.621 DIABETIC ULCER OF TOE OF RIGHT FOOT ASSOCIATED WITH TYPE 2 DIABETES MELLITUS, LIMITED TO BREAKDOWN OF SKIN (H): ICD-10-CM

## 2024-10-03 DIAGNOSIS — L97.511 DIABETIC ULCER OF TOE OF RIGHT FOOT ASSOCIATED WITH TYPE 2 DIABETES MELLITUS, LIMITED TO BREAKDOWN OF SKIN (H): ICD-10-CM

## 2024-10-03 DIAGNOSIS — E11.9 TYPE 2 DIABETES MELLITUS WITHOUT COMPLICATION, WITH LONG-TERM CURRENT USE OF INSULIN (H): ICD-10-CM

## 2024-10-03 DIAGNOSIS — Z00.00 WELLNESS EXAMINATION: Primary | ICD-10-CM

## 2024-10-03 DIAGNOSIS — I25.118 CORONARY ARTERY DISEASE INVOLVING NATIVE CORONARY ARTERY OF NATIVE HEART WITH OTHER FORM OF ANGINA PECTORIS (H): ICD-10-CM

## 2024-10-03 DIAGNOSIS — E11.69 TYPE 2 DIABETES MELLITUS WITH OTHER SPECIFIED COMPLICATION, WITHOUT LONG-TERM CURRENT USE OF INSULIN (H): ICD-10-CM

## 2024-10-03 DIAGNOSIS — Z79.4 TYPE 2 DIABETES MELLITUS WITHOUT COMPLICATION, WITH LONG-TERM CURRENT USE OF INSULIN (H): ICD-10-CM

## 2024-10-03 DIAGNOSIS — E78.2 MIXED HYPERLIPIDEMIA: ICD-10-CM

## 2024-10-03 PROBLEM — N52.9 MALE ERECTILE DISORDER: Status: RESOLVED | Noted: 2024-01-25 | Resolved: 2024-10-03

## 2024-10-03 LAB
ALBUMIN SERPL BCG-MCNC: 4.4 G/DL (ref 3.5–5.2)
ALP SERPL-CCNC: 74 U/L (ref 40–150)
ALT SERPL W P-5'-P-CCNC: 36 U/L (ref 0–70)
ANION GAP SERPL CALCULATED.3IONS-SCNC: 14 MMOL/L (ref 7–15)
AST SERPL W P-5'-P-CCNC: 41 U/L (ref 0–45)
BILIRUB SERPL-MCNC: 0.5 MG/DL
BUN SERPL-MCNC: 27.9 MG/DL (ref 8–23)
CALCIUM SERPL-MCNC: 9.2 MG/DL (ref 8.8–10.4)
CHLORIDE SERPL-SCNC: 102 MMOL/L (ref 98–107)
CHOLEST SERPL-MCNC: 156 MG/DL
CREAT SERPL-MCNC: 1.36 MG/DL (ref 0.67–1.17)
CREAT UR-MCNC: 126 MG/DL
EGFRCR SERPLBLD CKD-EPI 2021: 57 ML/MIN/1.73M2
EST. AVERAGE GLUCOSE BLD GHB EST-MCNC: 194 MG/DL
FASTING STATUS PATIENT QL REPORTED: YES
FASTING STATUS PATIENT QL REPORTED: YES
GLUCOSE SERPL-MCNC: 147 MG/DL (ref 70–99)
HBA1C MFR BLD: 8.4 % (ref 0–5.6)
HCO3 SERPL-SCNC: 20 MMOL/L (ref 22–29)
HDLC SERPL-MCNC: 39 MG/DL
LDLC SERPL CALC-MCNC: 51 MG/DL
MICROALBUMIN UR-MCNC: 57.8 MG/L
MICROALBUMIN/CREAT UR: 45.87 MG/G CR (ref 0–17)
NONHDLC SERPL-MCNC: 117 MG/DL
POTASSIUM SERPL-SCNC: 5 MMOL/L (ref 3.4–5.3)
PROT SERPL-MCNC: 7.5 G/DL (ref 6.4–8.3)
PSA SERPL DL<=0.01 NG/ML-MCNC: 0.31 NG/ML (ref 0–4.5)
SODIUM SERPL-SCNC: 136 MMOL/L (ref 135–145)
TRIGL SERPL-MCNC: 329 MG/DL

## 2024-10-03 PROCEDURE — 80061 LIPID PANEL: CPT | Performed by: FAMILY MEDICINE

## 2024-10-03 PROCEDURE — G0008 ADMIN INFLUENZA VIRUS VAC: HCPCS | Performed by: FAMILY MEDICINE

## 2024-10-03 PROCEDURE — 82043 UR ALBUMIN QUANTITATIVE: CPT | Performed by: FAMILY MEDICINE

## 2024-10-03 PROCEDURE — 82570 ASSAY OF URINE CREATININE: CPT | Performed by: FAMILY MEDICINE

## 2024-10-03 PROCEDURE — 36415 COLL VENOUS BLD VENIPUNCTURE: CPT | Performed by: FAMILY MEDICINE

## 2024-10-03 PROCEDURE — 99214 OFFICE O/P EST MOD 30 MIN: CPT | Mod: 25 | Performed by: FAMILY MEDICINE

## 2024-10-03 PROCEDURE — 90662 IIV NO PRSV INCREASED AG IM: CPT | Performed by: FAMILY MEDICINE

## 2024-10-03 PROCEDURE — G0439 PPPS, SUBSEQ VISIT: HCPCS | Performed by: FAMILY MEDICINE

## 2024-10-03 PROCEDURE — G0103 PSA SCREENING: HCPCS | Performed by: FAMILY MEDICINE

## 2024-10-03 PROCEDURE — 83036 HEMOGLOBIN GLYCOSYLATED A1C: CPT | Performed by: FAMILY MEDICINE

## 2024-10-03 PROCEDURE — 80053 COMPREHEN METABOLIC PANEL: CPT | Performed by: FAMILY MEDICINE

## 2024-10-03 RX ORDER — METFORMIN HYDROCHLORIDE 500 MG/1
TABLET, EXTENDED RELEASE ORAL
Qty: 360 TABLET | Refills: 3 | Status: SHIPPED | OUTPATIENT
Start: 2024-10-03

## 2024-10-03 RX ORDER — GLIPIZIDE 10 MG/1
TABLET, FILM COATED, EXTENDED RELEASE ORAL
Qty: 180 TABLET | Refills: 3 | Status: SHIPPED | OUTPATIENT
Start: 2024-10-03

## 2024-10-03 SDOH — HEALTH STABILITY: PHYSICAL HEALTH: ON AVERAGE, HOW MANY MINUTES DO YOU ENGAGE IN EXERCISE AT THIS LEVEL?: 0 MIN

## 2024-10-03 SDOH — HEALTH STABILITY: PHYSICAL HEALTH: ON AVERAGE, HOW MANY DAYS PER WEEK DO YOU ENGAGE IN MODERATE TO STRENUOUS EXERCISE (LIKE A BRISK WALK)?: 0 DAYS

## 2024-10-03 ASSESSMENT — SOCIAL DETERMINANTS OF HEALTH (SDOH)
HOW OFTEN DO YOU GET TOGETHER WITH FRIENDS OR RELATIVES?: ONCE A WEEK
HOW OFTEN DO YOU GET TOGETHER WITH FRIENDS OR RELATIVES?: ONCE A WEEK

## 2024-10-03 NOTE — LETTER
October 4, 2024      Ezekiel Aldrich  [address] [address]        Dear ,    We are writing to inform you of your test results.The A1c is still high at 8.4, you're already on insulin +3 other medication's, in the past you mentioned that a GOP was expensive, it should be covered by your insurance though, but let me know if you want to try that or focus on diet and exercise to help lower your blood sugar  Cholesterol is overall well controlled, though the triglycerides are high.    Creatinine the kidney test is just slightly elevated but stable.  There is some excess protein in the urine, from diabetes.  The PSA or prostate test is normal.    See me in three to six months.         Resulted Orders   Comprehensive metabolic panel   Result Value Ref Range    Sodium 136 135 - 145 mmol/L    Potassium 5.0 3.4 - 5.3 mmol/L    Carbon Dioxide (CO2) 20 (L) 22 - 29 mmol/L    Anion Gap 14 7 - 15 mmol/L    Urea Nitrogen 27.9 (H) 8.0 - 23.0 mg/dL    Creatinine 1.36 (H) 0.67 - 1.17 mg/dL    GFR Estimate 57 (L) >60 mL/min/1.73m2      Comment:      eGFR calculated using 2021 CKD-EPI equation.    Calcium 9.2 8.8 - 10.4 mg/dL      Comment:      Reference intervals for this test were updated on 7/16/2024 to reflect our healthy population more accurately. There may be differences in the flagging of prior results with similar values performed with this method. Those prior results can be interpreted in the context of the updated reference intervals.    Chloride 102 98 - 107 mmol/L    Glucose 147 (H) 70 - 99 mg/dL    Alkaline Phosphatase 74 40 - 150 U/L    AST 41 0 - 45 U/L    ALT 36 0 - 70 U/L    Protein Total 7.5 6.4 - 8.3 g/dL    Albumin 4.4 3.5 - 5.2 g/dL    Bilirubin Total 0.5 <=1.2 mg/dL    Patient Fasting > 8hrs? Yes    Lipid panel reflex to direct LDL Fasting   Result Value Ref Range    Cholesterol 156 <200 mg/dL    Triglycerides 329 (H) <150 mg/dL    Direct Measure HDL 39 (L) >=40 mg/dL    LDL Cholesterol  Calculated 51 <100 mg/dL    Non HDL Cholesterol 117 <130 mg/dL    Patient Fasting > 8hrs? Yes     Narrative    Cholesterol  Desirable: < 200 mg/dL  Borderline High: 200 - 239 mg/dL  High: >= 240 mg/dL    Triglycerides  Normal: < 150 mg/dL  Borderline High: 150 - 199 mg/dL  High: 200-499 mg/dL  Very High: >= 500 mg/dL    Direct Measure HDL  Female: >= 50 mg/dL   Male: >= 40 mg/dL    LDL Cholesterol  Desirable: < 100 mg/dL  Above Desirable: 100 - 129 mg/dL   Borderline High: 130 - 159 mg/dL   High:  160 - 189 mg/dL   Very High: >= 190 mg/dL    Non HDL Cholesterol  Desirable: < 130 mg/dL  Above Desirable: 130 - 159 mg/dL  Borderline High: 160 - 189 mg/dL  High: 190 - 219 mg/dL  Very High: >= 220 mg/dL   Albumin Random Urine Quantitative with Creat Ratio   Result Value Ref Range    Creatinine Urine mg/dL 126.0 mg/dL      Comment:      The reference ranges have not been established in urine creatinine. The results should be integrated into the clinical context for interpretation.    Albumin Urine mg/L 57.8 mg/L      Comment:      The reference ranges have not been established in urine albumin. The results should be integrated into the clinical context for interpretation.    Albumin Urine mg/g Cr 45.87 (H) 0.00 - 17.00 mg/g Cr      Comment:      Microalbuminuria is defined as an albumin:creatinine ratio of 17 to 299 for males and 25 to 299 for females. A ratio of albumin:creatinine of 300 or higher is indicative of overt proteinuria.  Due to biologic variability, positive results should be confirmed by a second, first-morning random or 24-hour timed urine specimen. If there is discrepancy, a third specimen is recommended. When 2 out of 3 results are in the microalbuminuria range, this is evidence for incipient nephropathy and warrants increased efforts at glucose control, blood pressure control, and institution of therapy with an angiotensin-converting-enzyme (ACE) inhibitor (if the patient can tolerate it).      Hemoglobin A1c   Result Value Ref Range    Estimated Average Glucose 194 (H) <117 mg/dL    Hemoglobin A1C 8.4 (H) 0.0 - 5.6 %      Comment:      Normal <5.7%   Prediabetes 5.7-6.4%    Diabetes 6.5% or higher     Note: Adopted from ADA consensus guidelines.   PROSTATE SPEC ANTIGEN SCREEN   Result Value Ref Range    Prostate Specific Antigen Screen 0.31 0.00 - 4.50 ng/mL    Narrative    This result is obtained using the Roche Elecsys total PSA method on the baldemar e801 immunoassay analyzer, which is an ultrasensitive method. Results obtained with different assay methods or kits cannot be used interchangeably.  This test is intended for initial prostate cancer screening. PSA values exceeding the age-specific limits are suspicious for prostate disease, but additional testing, such as prostate biopsy, is needed to diagnose prostate pathology. The American Cancer Society recommends annual examination with digital rectal examination and serum PSA beginning at age 50 and for men with a life expectancy of at least 10 years after detection of prostate cancer. For men in high-risk groups, such as  Americans or men with a first-degree relative diagnosed at a younger age, testing should begin at a younger age. It is generally recommended that information be provided to patients about the benefits and limitations of testing and treatment so they can make informed decisions.       If you have any questions or concerns, please call the clinic at the number listed above.       Sincerely,      Janes Jeronimo MD

## 2024-10-03 NOTE — PROGRESS NOTES
"Preventive Care Visit  [facility] ROCÍO Jeronimo MD, Family Medicine  Oct 3, 2024      Assessment & Plan     (Z00.00) Wellness examination  (primary encounter diagnosis)  Comment: Patient's health is compromised by significant comorbidity  Plan:      (E11.9,  Z79.4) Type 2 diabetes mellitus (H)  Comment: Suboptimal control with A1c at 8.4  Plan: blood glucose (ACCU-CHEK GUIDE) test strip,         Albumin Random Urine Quantitative with Creat         Ratio, Hemoglobin A1c, glipiZIDE (GLUCOTROL XL)        10 MG 24 hr tablet, metFORMIN (GLUCOPHAGE XR)         500 MG 24 hr tablet             (E78.2) Hyperlipidemia  Comment: Controlled with Crestor  Plan: Comprehensive metabolic panel, Lipid panel         reflex to direct LDL Fasting             (I25.118) Coronary artery disease (H)  Comment: Clinically stable  Plan:      (Z12.5) Screening for prostate cancer  Comment: Radha history of  Plan: PROSTATE SPEC ANTIGEN SCREEN               (Z23) Need for vaccination  Comment:    Plan: INFLUENZA HIGH DOSE, TRIVALENT, PF (FLUZONE)             (E11.621,  L97.511) Diabetic ulcer of toe of right foot associated with type 2 diabetes mellitus, limited to breakdown of skin (H)  Comment:    Plan: I will    PLAN:  1.  High-dose influenza vaccine  2.  Laboratory studies as above  3.  Patient is also followed by cardiology  4.  Patient will finish up with Plavix in November, after that he then can undergo colonoscopy.  5.  Will have to discuss with the patient and possibly his pharmacist ongoing treatment for his diabetes, also to check to see whether the patient can be on an affordable GLP-1 agonist.  6.  Patient is going to need follow-up in 3 to 4 months or so.                    BMI  Estimated body mass index is 33.76 kg/m  as calculated from the following:    Height as of this encounter: 1.784 m (5' 10.25\").    Weight as of this encounter: 107.5 kg (237 lb).   Weight management plan: Discussed healthy diet " and exercise guidelines    Counseling  Appropriate preventive services were addressed with this patient via screening, questionnaire, or discussion as appropriate for fall prevention, nutrition, physical activity, Tobacco-use cessation, social engagement, weight loss and cognition.  Checklist reviewing preventive services available has been given to the patient.  Reviewed patient's diet, addressing concerns and/or questions.   The patient was instructed to see the dentist every 6 months.   He is at risk for psychosocial distress and has been provided with information to reduce risk.   The patient reports drinking more than 3 alcoholic drinks per day and/or more than 7 drhnks per week. The patient was counseled and given information about possible harmful effects of excessive alcohol intake.        Luis Falcon is a 69 year old, presenting for the following:  Medicare Visit (Pt is fasting)        10/3/2024     7:30 AM   Additional Questions   Roomed by Luz Maria   Accompanied by self         Via the Health Maintenance questionnaire, the patient has reported the following services have been completed -Eye Exam: Dr melchor 2024-09-17, this information has been sent to the abstraction team.  Health Care Directive  Patient does not have a Health Care Directive or Living Will: Discussed advance care planning with patient; information given to patient to review.    HPI  Patient comes in for his annual Medicare wellness examination.  Patient has a known underlying history of coronary artery disease, he had a stent placement last November he is on Plavix until November and then can discontinue and then be back on aspirin alone.    Of note the patient is due for colorectal cancer screening because of family history of but will have to defer that until he is off of the Plavix    Patient has a history of type 2 diabetes mellitus that has not been optimally controlled he is on insulin Jardiance metformin as well as  glipizide.    Patient's blood pressure remains well-controlled of note he does tend to get lightheaded or dizzy particularly with position change which I do believe is at least in part to blood pressure medication but I also emphasized that there is a real value to these medications.    Patient is on high-dose Crestor, previously on atorvastatin.    Patient declines COVID vaccination but will get the flu shot of note he is very active physically at his work.                      10/3/2024   General Health   How would you rate your overall physical health? Good   Feel stress (tense, anxious, or unable to sleep) Only a little      (!) STRESS CONCERN      10/3/2024   Nutrition   Diet: Regular (no restrictions)            10/3/2024   Exercise   Days per week of moderate/strenous exercise 0 days   Average minutes spent exercising at this level 0 min      (!) EXERCISE CONCERN      10/3/2024   Social Factors   Frequency of gathering with friends or relatives Once a week   Worry food won't last until get money to buy more No   Food not last or not have enough money for food? No   Do you have housing? (Housing is defined as stable permanent housing and does not include staying ouside in a car, in a tent, in an abandoned building, in an overnight shelter, or couch-surfing.) Yes   Are you worried about losing your housing? No   Lack of transportation? No   Unable to get utilities (heat,electricity)? No            10/3/2024   Fall Risk   Fallen 2 or more times in the past year? Yes    Yes   Trouble with walking or balance? Yes    Yes   Gait Speed Test Interpretation Greater than 5.01 seconds - ABNORMAL    Greater than 5.01 seconds - ABNORMAL       Multiple values from one day are sorted in reverse-chronological order           10/3/2024   Activities of Daily Living- Home Safety   Needs help with the following daily activites None of the above   Safety concerns in the home None of the above            10/3/2024   Dental    Dentist two times every year? (!) NO            10/3/2024   Hearing Screening   Hearing concerns? None of the above            10/3/2024   Driving Risk Screening   Patient/family members have concerns about driving No            10/3/2024   General Alertness/Fatigue Screening   Have you been more tired than usual lately? No            10/3/2024   Urinary Incontinence Screening   Bothered by leaking urine in past 6 months No               Today's PHQ-2 Score:       10/3/2024     7:28 AM   PHQ-2 (  [employer])   Q1: Little interest or pleasure in doing things 0   Q2: Feeling down, depressed or hopeless 0   PHQ-2 Score 0   Q1: Little interest or pleasure in doing things Not at all   Q2: Feeling down, depressed or hopeless Not at all   PHQ-2 Score 0           10/3/2024   Substance Use   Alcohol more than 3/day or more than 7/wk Yes   How often do you have a drink containing alcohol 4 or more times a week   How many alcohol drinks on typical day 1 or 2   How often do you have 5+ drinks at one occasion Never   Audit 2/3 Score 0   How often not able to stop drinking once started Never   How often failed to do what normally expected Never   How often needed first drink in am after a heavy drinking session Never   How often feeling of guilt or remorse after drinking Never   How often unable to remember what happened the night before Never   Have you or someone else been injured because of your drinking No   Has anyone been concerned or suggested you cut down on drinking No   TOTAL SCORE - AUDIT 4   Do you have a current opioid prescription? No   How severe/bad is pain from 1 to 10? 2/10   Do you use any other substances recreationally? No        Social History     Tobacco Use    Smoking status: Former     Current packs/day: 0.00     Types: Cigarettes     Quit date: 1985     Years since quittin.4    Smokeless tobacco: Current     Types: Snuff    Tobacco comments:     2 tins/week   Vaping Use    Vaping status: Never  Used   Substance Use Topics    Alcohol use: Yes     Alcohol/week: 2.0 standard drinks of alcohol     Types: 2 Standard drinks or equivalent per week     Comment: 10 drink per week    Drug use: No       Last PSA:   Prostate Specific Antigen Screen   Date Value Ref Range Status   10/18/2023 0.30 0.00 - 4.50 ng/mL Final   10/29/2021 0.32 0.00 - 4.50 ug/L Final     ASCVD Risk   The 10-year ASCVD risk score (Rajeev GOMEZ, et al., 2019) is: 25.9%    Values used to calculate the score:      Age: 69 years      Sex: Male      Is Non- : No      Diabetic: Yes      Tobacco smoker: No      Systolic Blood Pressure: 128 mmHg      Is BP treated: Yes      HDL Cholesterol: 51 mg/dL      Total Cholesterol: 133 mg/dL            Reviewed and updated as needed this visit by Provider                    Past Medical History:   Diagnosis Date    Acute renal failure (H)     6/11/2016, secondary to dehydration, normalized     Past Surgical History:   Procedure Laterality Date    ANKLE FRACTURE SURGERY Right     hardware    CV CORONARY ANGIOGRAM N/A 08/02/2022    Procedure: Coronary Angiogram;  Surgeon: Archie Ni MD;  Location: [facility] CV    CV CORONARY ANGIOGRAM N/A 11/20/2023    Procedure: Coronary Angiogram;  Surgeon: Chino Boyd MD;  Location: [facility] LAB CV    CV INTRAVASULAR ULTRASOUND N/A 08/02/2022    Procedure: Intravascular Ultrasound;  Surgeon: Archie Ni MD;  Location: [facility] LAB CV    CV LEFT HEART CATH N/A 11/20/2023    Procedure: Left Heart Catheterization;  Surgeon: Chino Boyd MD;  Location: [facility] LAB CV    CV PCI N/A 08/02/2022    Percutaneous Coronary Intervention;  Surgeon: Archie Ni MD;  Location: [facility] CV    CV PCI STENT DRUG ELUTING N/A 11/20/2023    Procedure: Percutaneous Coronary Intervention Stent;  Surgeon: Chino Boyd MD;  Location: [facility] CV    KNEE SURGERY      MANDIBLE FRACTURE SURGERY       from fx    SHOULDER SURGERY Right     from fx and separation     Current Outpatient Medications   Medication Sig Dispense Refill    aspirin (ASPIRIN LOW DOSE) 81 MG EC tablet TAKE 1 TABLET(81 MG) BY MOUTH DAILY. START TOMORROW 30 tablet 11    Bexagliflozin 20 MG TABS Take 20 mg by mouth daily. 90 tablet 1    blood glucose (ACCU-CHEK GUIDE) test strip USE 1 TEST TWICE DAILY 200 strip 1    blood glucose (NO BRAND SPECIFIED) lancing device Device to be used with lancets. 100 each 1    blood glucose (NO BRAND SPECIFIED) test strip Use to test blood sugar daily for diabetes 100 strip 3    carvedilol (COREG) 12.5 MG tablet TAKE 1 TABLET(12.5 MG) BY MOUTH TWICE DAILY WITH MEALS 180 tablet 1    clopidogrel (PLAVIX) 75 MG tablet Take 1 tablet (75 mg) by mouth daily 90 tablet 3    generic lancets [GENERIC LANCETS] Use 1 each As Directed 2 (two) times a day. Dispense brand per patient's insurance at pharmacy discretion. 200 each 3    glipiZIDE (GLUCOTROL XL) 10 MG 24 hr tablet TAKE 2 TABLETS BY MOUTH DAILY 180 tablet 3    insulin pen needle (B-D U/F) 31G X 5 MM miscellaneous TWICE DAILY TO INJECT VICTOZA AND LANTUS 100 each 5    LANTUS SOLOSTAR 100 UNIT/ML soln ADMINISTER 66 UNITS UNDER THE SKIN AT BEDTIME 45 mL 5    losartan (COZAAR) 100 MG tablet TAKE 1 TABLET(100 MG) BY MOUTH DAILY 90 tablet 3    metFORMIN (GLUCOPHAGE XR) 500 MG 24 hr tablet Take 4 tablets daily for diabetes 360 tablet 3    rosuvastatin (CRESTOR) 40 MG tablet Take 1 tablet (40 mg) by mouth daily 90 tablet 3    sildenafil (REVATIO) 20 MG tablet Take 3 to 5 pills daily as needed for 90 tablet 1    tamsulosin (FLOMAX) 0.4 MG capsule TAKE 1 CAPSULE BY MOUTH EVERY DAY 90 capsule 1    blood glucose monitoring (NO BRAND SPECIFIED) meter device kit by In Vitro route as needed (glucose checks) (Patient not taking: Reported on 10/3/2024) 1 kit 0    blood glucose monitoring (SOFTCLIX) lancets TEST LANCETS TEST TWICE DAILY (Patient not taking: Reported on  "10/3/2024) 100 each 5     Allergies   Allergen Reactions    Lisinopril Cough     Dry cough     Current providers sharing in care for this patient include:  Patient Care Team:  Janes Jeronimo MD as PCP - Mignon Peterson MD as Assigned Pulmonology Provider  Karlene Blevins MD as MD (Cardiovascular Disease)  Karlene Blevins MD as Assigned Heart and Vascular Provider  Marci Wang PharmD as Pharmacist (Pharmacist)  Janes Jeronimo MD as Assigned PCP    The following health maintenance items are reviewed in Epic and correct as of today:  Health Maintenance   Topic Date Due    RSV VACCINE (1 - Risk 60-74 years 1-dose series) Never done    ZOSTER IMMUNIZATION (2 of 3) 03/20/2017    AORTIC ANEURYSM SCREENING (SYSTEM ASSIGNED)  Never done    DIABETIC FOOT EXAM  10/29/2022    COLORECTAL CANCER SCREENING  07/18/2023    ANNUAL REVIEW OF HM ORDERS  01/31/2024    A1C  07/25/2024    INFLUENZA VACCINE (1) 09/01/2024    EYE EXAM  09/22/2024    MEDICARE ANNUAL WELLNESS VISIT  10/18/2024    MICROALBUMIN  10/18/2024    BMP  01/25/2025    LIPID  06/07/2025    FALL RISK ASSESSMENT  10/03/2025    DTAP/TDAP/TD IMMUNIZATION (3 - Td or Tdap) 02/02/2026    ADVANCE CARE PLANNING  09/20/2027    PHQ-2 (once per calendar year)  Completed    Pneumococcal Vaccine: 65+ Years  Completed    HPV IMMUNIZATION  Aged Out    MENINGITIS IMMUNIZATION  Aged Out    RSV MONOCLONAL ANTIBODY  Aged Out    HEPATITIS C SCREENING  Discontinued    COVID-19 Vaccine  Discontinued         Review of Systems  Constitutional, HEENT, cardiovascular, pulmonary, GI, , musculoskeletal, neuro, skin, endocrine and psych systems are negative, except as otherwise noted.     Objective    Exam  /70 (BP Location: Left arm, Patient Position: Sitting, Cuff Size: Adult Large)   Pulse 77   Ht 1.784 m (5' 10.25\")   Wt 107.5 kg (237 lb)   SpO2 98%   BMI 33.76 kg/m     Estimated body mass index is 33.76 kg/m  as calculated from the following:    Height as of " "this encounter: 1.784 m (5' 10.25\").    Weight as of this encounter: 107.5 kg (237 lb).    Physical Exam  GENERAL: alert and no distress  EYES: Eyes grossly normal to inspection, PERRL and conjunctivae and sclerae normal  HENT: ear canals and TM's normal, nose and mouth without ulcers or lesions  NECK: no adenopathy, no asymmetry, masses, or scars  RESP: lungs clear to auscultation - no rales, rhonchi or wheezes  CV: regular rate and rhythm, normal S1 S2, no S3 or S4, no murmur, click or rub, no peripheral edema  ABDOMEN: soft, nontender, no hepatosplenomegaly, no masses and bowel sounds normal  MS: no gross musculoskeletal defects noted, no edema  SKIN: no suspicious lesions or rashes  NEURO: Normal strength and tone, mentation intact and speech normal  PSYCH: mentation appears normal, affect normal/bright         10/3/2024   Mini Cog   Clock Draw Score 2 Normal   3 Item Recall 2 objects recalled   Mini Cog Total Score 4                 Signed Electronically by: Janes Jeronimo MD    "

## 2024-10-09 DIAGNOSIS — E11.69 TYPE 2 DIABETES MELLITUS WITH OTHER SPECIFIED COMPLICATION, WITH LONG-TERM CURRENT USE OF INSULIN (H): Primary | ICD-10-CM

## 2024-10-09 DIAGNOSIS — Z79.4 TYPE 2 DIABETES MELLITUS WITH OTHER SPECIFIED COMPLICATION, WITH LONG-TERM CURRENT USE OF INSULIN (H): Primary | ICD-10-CM

## 2024-11-26 ENCOUNTER — MYC REFILL (OUTPATIENT)
Dept: FAMILY MEDICINE | Facility: CLINIC | Age: 69
End: 2024-11-26
Payer: MEDICARE

## 2024-11-26 DIAGNOSIS — Z79.4 TYPE 2 DIABETES MELLITUS WITH OTHER SPECIFIED COMPLICATION, WITH LONG-TERM CURRENT USE OF INSULIN (H): ICD-10-CM

## 2024-11-26 DIAGNOSIS — E11.69 TYPE 2 DIABETES MELLITUS WITH OTHER SPECIFIED COMPLICATION, WITH LONG-TERM CURRENT USE OF INSULIN (H): ICD-10-CM

## 2024-11-26 RX ORDER — BEXAGLIFLOZIN 20 MG
20 TABLET ORAL DAILY
Qty: 90 TABLET | Refills: 2 | Status: SHIPPED | OUTPATIENT
Start: 2024-11-26 | End: 2024-11-27

## 2024-11-27 ENCOUNTER — MYC REFILL (OUTPATIENT)
Dept: FAMILY MEDICINE | Facility: CLINIC | Age: 69
End: 2024-11-27
Payer: MEDICARE

## 2024-11-27 ENCOUNTER — TELEPHONE (OUTPATIENT)
Dept: FAMILY MEDICINE | Facility: CLINIC | Age: 69
End: 2024-11-27
Payer: MEDICARE

## 2024-11-27 DIAGNOSIS — E11.69 TYPE 2 DIABETES MELLITUS WITH OTHER SPECIFIED COMPLICATION, WITH LONG-TERM CURRENT USE OF INSULIN (H): ICD-10-CM

## 2024-11-27 DIAGNOSIS — Z79.4 TYPE 2 DIABETES MELLITUS WITH OTHER SPECIFIED COMPLICATION, WITH LONG-TERM CURRENT USE OF INSULIN (H): ICD-10-CM

## 2024-11-27 RX ORDER — BEXAGLIFLOZIN 20 MG
20 TABLET ORAL DAILY
Qty: 90 TABLET | Refills: 2 | Status: SHIPPED | OUTPATIENT
Start: 2024-11-27

## 2024-11-27 NOTE — TELEPHONE ENCOUNTER
RN called and left voice message for patient to callback or respond to Mintera message that has also been sent.    Please see if patient requested prescription for Amoxicillin 500MG Capsules  From:   [id number] - [place], [place] - [address] AT [facility] [address]    Is there a reason for request? Does patient need for dental procedure or is patient having symptoms of an infection?

## 2024-11-27 NOTE — TELEPHONE ENCOUNTER
Medication:  Amoxicillin 500MG Capsules    Pharmacy Comments: Pt is requesting an RX for Amoxicillin 500MG Capsules    Pharmacy:    [id number] - [place], [place] - [address] AT [facility] [address]

## 2024-12-01 DIAGNOSIS — N40.1 BENIGN PROSTATIC HYPERPLASIA WITH LOWER URINARY TRACT SYMPTOMS, SYMPTOM DETAILS UNSPECIFIED: ICD-10-CM

## 2024-12-02 DIAGNOSIS — E11.9 TYPE 2 DIABETES MELLITUS WITHOUT COMPLICATION, WITH LONG-TERM CURRENT USE OF INSULIN (H): ICD-10-CM

## 2024-12-02 DIAGNOSIS — Z79.4 TYPE 2 DIABETES MELLITUS WITHOUT COMPLICATION, WITH LONG-TERM CURRENT USE OF INSULIN (H): ICD-10-CM

## 2024-12-02 RX ORDER — METFORMIN HYDROCHLORIDE 500 MG/1
TABLET, EXTENDED RELEASE ORAL
Qty: 360 TABLET | Refills: 3 | Status: SHIPPED | OUTPATIENT
Start: 2024-12-02

## 2024-12-02 RX ORDER — TAMSULOSIN HYDROCHLORIDE 0.4 MG/1
CAPSULE ORAL
Qty: 90 CAPSULE | Refills: 1 | OUTPATIENT
Start: 2024-12-02

## 2024-12-23 DIAGNOSIS — N52.9 MALE ERECTILE DISORDER: ICD-10-CM

## 2024-12-23 RX ORDER — SILDENAFIL CITRATE 20 MG/1
TABLET ORAL
Qty: 90 TABLET | Refills: 1 | Status: SHIPPED | OUTPATIENT
Start: 2024-12-23

## 2025-01-06 ENCOUNTER — TELEPHONE (OUTPATIENT)
Dept: FAMILY MEDICINE | Facility: CLINIC | Age: 70
End: 2025-01-06

## 2025-01-06 ENCOUNTER — OFFICE VISIT (OUTPATIENT)
Dept: FAMILY MEDICINE | Facility: CLINIC | Age: 70
End: 2025-01-06
Attending: FAMILY MEDICINE
Payer: MEDICARE

## 2025-01-06 VITALS
BODY MASS INDEX: 34.36 KG/M2 | DIASTOLIC BLOOD PRESSURE: 80 MMHG | SYSTOLIC BLOOD PRESSURE: 138 MMHG | RESPIRATION RATE: 11 BRPM | HEIGHT: 70 IN | HEART RATE: 89 BPM | WEIGHT: 240 LBS | OXYGEN SATURATION: 93 % | TEMPERATURE: 97.2 F

## 2025-01-06 DIAGNOSIS — N18.31 CHRONIC KIDNEY DISEASE, STAGE 3A (H): ICD-10-CM

## 2025-01-06 DIAGNOSIS — E11.9 TYPE 2 DIABETES MELLITUS WITHOUT COMPLICATION, WITH LONG-TERM CURRENT USE OF INSULIN (H): ICD-10-CM

## 2025-01-06 DIAGNOSIS — Z23 NEED FOR SHINGLES VACCINE: ICD-10-CM

## 2025-01-06 DIAGNOSIS — Z12.11 SCREEN FOR COLON CANCER: Primary | ICD-10-CM

## 2025-01-06 DIAGNOSIS — I10 PRIMARY HYPERTENSION: ICD-10-CM

## 2025-01-06 DIAGNOSIS — Z79.4 TYPE 2 DIABETES MELLITUS WITHOUT COMPLICATION, WITH LONG-TERM CURRENT USE OF INSULIN (H): ICD-10-CM

## 2025-01-06 DIAGNOSIS — I25.118 CORONARY ARTERY DISEASE INVOLVING NATIVE CORONARY ARTERY OF NATIVE HEART WITH OTHER FORM OF ANGINA PECTORIS: ICD-10-CM

## 2025-01-06 DIAGNOSIS — Z29.11 NEED FOR VACCINATION AGAINST RESPIRATORY SYNCYTIAL VIRUS: ICD-10-CM

## 2025-01-06 DIAGNOSIS — E78.2 MIXED HYPERLIPIDEMIA: ICD-10-CM

## 2025-01-06 PROBLEM — E11.621 DIABETIC ULCER OF TOE OF RIGHT FOOT ASSOCIATED WITH TYPE 2 DIABETES MELLITUS, LIMITED TO BREAKDOWN OF SKIN (H): Status: RESOLVED | Noted: 2021-11-29 | Resolved: 2025-01-06

## 2025-01-06 PROBLEM — L97.511 DIABETIC ULCER OF TOE OF RIGHT FOOT ASSOCIATED WITH TYPE 2 DIABETES MELLITUS, LIMITED TO BREAKDOWN OF SKIN (H): Status: RESOLVED | Noted: 2021-11-29 | Resolved: 2025-01-06

## 2025-01-06 LAB
ALBUMIN SERPL BCG-MCNC: 4.3 G/DL (ref 3.5–5.2)
ALP SERPL-CCNC: 94 U/L (ref 40–150)
ALT SERPL W P-5'-P-CCNC: 18 U/L (ref 0–70)
ANION GAP SERPL CALCULATED.3IONS-SCNC: 10 MMOL/L (ref 7–15)
AST SERPL W P-5'-P-CCNC: 22 U/L (ref 0–45)
BILIRUB SERPL-MCNC: 0.6 MG/DL
BUN SERPL-MCNC: 19.2 MG/DL (ref 8–23)
CALCIUM SERPL-MCNC: 9.8 MG/DL (ref 8.8–10.4)
CHLORIDE SERPL-SCNC: 102 MMOL/L (ref 98–107)
CHOLEST SERPL-MCNC: 163 MG/DL
CREAT SERPL-MCNC: 1.25 MG/DL (ref 0.67–1.17)
EGFRCR SERPLBLD CKD-EPI 2021: 63 ML/MIN/1.73M2
EST. AVERAGE GLUCOSE BLD GHB EST-MCNC: 200 MG/DL
FASTING STATUS PATIENT QL REPORTED: YES
FASTING STATUS PATIENT QL REPORTED: YES
GLUCOSE SERPL-MCNC: 281 MG/DL (ref 70–99)
HBA1C MFR BLD: 8.6 % (ref 0–5.6)
HCO3 SERPL-SCNC: 25 MMOL/L (ref 22–29)
HDLC SERPL-MCNC: 53 MG/DL
LDLC SERPL CALC-MCNC: 56 MG/DL
NONHDLC SERPL-MCNC: 110 MG/DL
POTASSIUM SERPL-SCNC: 4.8 MMOL/L (ref 3.4–5.3)
PROT SERPL-MCNC: 7.4 G/DL (ref 6.4–8.3)
SODIUM SERPL-SCNC: 137 MMOL/L (ref 135–145)
TRIGL SERPL-MCNC: 269 MG/DL

## 2025-01-06 PROCEDURE — 83036 HEMOGLOBIN GLYCOSYLATED A1C: CPT | Performed by: FAMILY MEDICINE

## 2025-01-06 PROCEDURE — G2211 COMPLEX E/M VISIT ADD ON: HCPCS | Performed by: FAMILY MEDICINE

## 2025-01-06 PROCEDURE — 36415 COLL VENOUS BLD VENIPUNCTURE: CPT | Performed by: FAMILY MEDICINE

## 2025-01-06 PROCEDURE — 80061 LIPID PANEL: CPT | Performed by: FAMILY MEDICINE

## 2025-01-06 PROCEDURE — 99214 OFFICE O/P EST MOD 30 MIN: CPT | Performed by: FAMILY MEDICINE

## 2025-01-06 PROCEDURE — 80053 COMPREHEN METABOLIC PANEL: CPT | Performed by: FAMILY MEDICINE

## 2025-01-06 NOTE — PROGRESS NOTES
Assessment & Plan     (Z12.11) Screen for colon cancer  (primary encounter diagnosis)  Comment: Patient is now due for  Plan: Colonoscopy Screening  Referral             (E11.9,  Z79.4) Type 2 diabetes mellitus (H)  Comment: Suboptimal control with A1c at 8.6  Plan: Hemoglobin A1c, PRIMARY CARE FOLLOW-UP         SCHEDULING, Med Therapy Management Referral             (Z23) Need for shingles vaccine  Comment: Recommended a local pharmacy  Plan: zoster vaccine recombinant adjuvanted         (SHINGRIX) injection             (Z29.11) Need for vaccination against respiratory syncytial virus  Comment: Recommended a local pharmacy  Plan: RSV vaccine, bivalent, ABRYSVO, injection             (I10) Hypertension  Comment: Well-controlled on losartan  Plan:      (E78.2) Hyperlipidemia  Comment: Well-controlled on Crestor  Plan: Comprehensive metabolic panel, Lipid panel         reflex to direct LDL Fasting             (I25.118) Coronary artery disease  (H)  Comment: Clinically stable followed by cardiology  Plan:      (N18.31) Chronic kidney disease, stage 3a (H)  Comment: H3 a  Plan:      PLAN:  1.  Laboratory studies as above  2.  Referral to our pharmacist to see if we can both clarify medications, potentially get the patient on one of the weekly injectable medications.  3.  I will have staff see if they can help the patient get scheduled for both an echocardiogram and a cardiology follow-up  4.   Referral for colonoscopy  5.  3-month follow-up  6.  The longitudinal plan of care for the diagnosis(es)/condition(s) as documented were addressed during this visit. Due to the added complexity in care, I will continue to support Ezekiel in the subsequent management and with ongoing continuity of care.                  Subjective   Ezekiel is a 69 year old, presenting for the following health issues:  Diabetes (Follow Up) and Recheck Medication      1/6/2025     7:16 AM   Additional Questions   Roomed by Karrie ERIC  "    History of Present Illness       Diabetes:   He presents for follow up of diabetes.  He is checking home blood glucose one time daily.   He checks blood glucose before meals and at bedtime.  Blood glucose is sometimes over 200 and sometimes under 70. He is aware of hypoglycemia symptoms including shakiness, dizziness, weakness and lethargy.   He is concerned about low blood sugar, several less than 70 in the past few weeks.   He is having numbness in feet, burning in feet, redness, sores, or blisters on feet and excessive thirst.              Patient comes in for follow-up of a number of his comorbidities.  Patient has a history of suboptimally controlled type 2 diabetes mellitus of note he is not very aware of what he is on his wife fills his medication though he knows he is on insulin.  He can tell if his blood sugars get too low which does not happen recently.  In the past he was on Ozempic but there were cost and insurance issues.    Patient has a known history of coronary artery disease of note he has finished Plavix cardiology wants him to get an echocardiogram and to schedule a visit before March but he has not heard anything from scheduling about this.    Patient has a history of hyperlipidemia well-controlled on Crestor.    Patient's blood pressure is very well-controlled on losartan    Patient is now off of Plavix therefore he can get a colonoscopy and he is past due because of a history of colon polyp                            Objective    Ht 1.784 m (5' 10.25\")   BMI 33.76 kg/m    Body mass index is 33.76 kg/m .  Physical Exam               Signed Electronically by: Janes Jeronimo MD    "

## 2025-01-06 NOTE — LETTER
January 8, 2025      Ezekiel Aldrich  [address] [facility] [address]        Dear ,    We are writing to inform you of your test results.  As discussed the A1c is still high at 8.6 we want this closer to 7, I want you to meet with our pharmacist to see if we can get you on one of the injectable medications.  Kidney tests are slightly elevated but overall stable  Cholesterol is well-controlled    I would want to see you in the next 3 to 6 months.          Resulted Orders   Comprehensive metabolic panel   Result Value Ref Range    Sodium 137 135 - 145 mmol/L    Potassium 4.8 3.4 - 5.3 mmol/L    Carbon Dioxide (CO2) 25 22 - 29 mmol/L    Anion Gap 10 7 - 15 mmol/L    Urea Nitrogen 19.2 8.0 - 23.0 mg/dL    Creatinine 1.25 (H) 0.67 - 1.17 mg/dL    GFR Estimate 63 >60 mL/min/1.73m2      Comment:      eGFR calculated using 2021 CKD-EPI equation.    Calcium 9.8 8.8 - 10.4 mg/dL      Comment:      Reference intervals for this test were updated on 7/16/2024 to reflect our healthy population more accurately. There may be differences in the flagging of prior results with similar values performed with this method. Those prior results can be interpreted in the context of the updated reference intervals.    Chloride 102 98 - 107 mmol/L    Glucose 281 (H) 70 - 99 mg/dL    Alkaline Phosphatase 94 40 - 150 U/L    AST 22 0 - 45 U/L    ALT 18 0 - 70 U/L    Protein Total 7.4 6.4 - 8.3 g/dL    Albumin 4.3 3.5 - 5.2 g/dL    Bilirubin Total 0.6 <=1.2 mg/dL    Patient Fasting > 8hrs? Yes    Lipid panel reflex to direct LDL Fasting   Result Value Ref Range    Cholesterol 163 <200 mg/dL    Triglycerides 269 (H) <150 mg/dL    Direct Measure HDL 53 >=40 mg/dL    LDL Cholesterol Calculated 56 <100 mg/dL    Non HDL Cholesterol 110 <130 mg/dL    Patient Fasting > 8hrs? Yes     Narrative    Cholesterol  Desirable: < 200 mg/dL  Borderline High: 200 - 239 mg/dL  High: >= 240 mg/dL    Triglycerides  Normal: < 150 mg/dL  Borderline  High: 150 - 199 mg/dL  High: 200-499 mg/dL  Very High: >= 500 mg/dL    Direct Measure HDL  Female: >= 50 mg/dL   Male: >= 40 mg/dL    LDL Cholesterol  Desirable: < 100 mg/dL  Above Desirable: 100 - 129 mg/dL   Borderline High: 130 - 159 mg/dL   High:  160 - 189 mg/dL   Very High: >= 190 mg/dL    Non HDL Cholesterol  Desirable: < 130 mg/dL  Above Desirable: 130 - 159 mg/dL  Borderline High: 160 - 189 mg/dL  High: 190 - 219 mg/dL  Very High: >= 220 mg/dL   Hemoglobin A1c   Result Value Ref Range    Estimated Average Glucose 200 (H) <117 mg/dL    Hemoglobin A1C 8.6 (H) 0.0 - 5.6 %      Comment:      Normal <5.7%   Prediabetes 5.7-6.4%    Diabetes 6.5% or higher     Note: Adopted from ADA consensus guidelines.       If you have any questions or concerns, please call the clinic at the number listed above.       Sincerely,      Janes Jeronimo MD    Electronically signed           fall

## 2025-01-06 NOTE — TELEPHONE ENCOUNTER
1-6-25  Per Dr vigil, I called pt LM to call Cardiology to schedule an appt @ [phone]  Janes Ray MD  [facility]  The patient got a message recently from cardiology that he should schedule an appointment with Dr. Karlene Blevins and also schedule an echocardiogram the patient does not seem to have any knowledge of this please help him get hooked up again with cardiology as well as getting an echocardiogram scheduled.

## 2025-01-08 ENCOUNTER — TELEPHONE (OUTPATIENT)
Dept: FAMILY MEDICINE | Facility: CLINIC | Age: 70
End: 2025-01-08
Payer: MEDICARE

## 2025-01-08 NOTE — TELEPHONE ENCOUNTER
MTM referral from: [facility] visit (referral by provider)    MTM referral outreach attempt #2 on January 8, 2025 at 12:09 PM      Outcome: Patient not reachable after several attempts, sent Vizy message    Use vbc for the carrier/Plan on the flowsheet      MyChart Message Sent    See Patricio COVINGTON   [phone]

## 2025-01-09 ENCOUNTER — APPOINTMENT (OUTPATIENT)
Dept: CT IMAGING | Facility: HOSPITAL | Age: 70
End: 2025-01-09
Attending: EMERGENCY MEDICINE
Payer: MEDICARE

## 2025-01-09 ENCOUNTER — HOSPITAL ENCOUNTER (OUTPATIENT)
Facility: HOSPITAL | Age: 70
Setting detail: OBSERVATION
End: 2025-01-09
Attending: EMERGENCY MEDICINE
Payer: MEDICARE

## 2025-01-09 VITALS
HEART RATE: 95 BPM | TEMPERATURE: 97.6 F | RESPIRATION RATE: 17 BRPM | BODY MASS INDEX: 34.19 KG/M2 | OXYGEN SATURATION: 96 % | DIASTOLIC BLOOD PRESSURE: 63 MMHG | WEIGHT: 240 LBS | SYSTOLIC BLOOD PRESSURE: 106 MMHG

## 2025-01-09 DIAGNOSIS — E86.0 DEHYDRATION: ICD-10-CM

## 2025-01-09 DIAGNOSIS — R55 SYNCOPE, UNSPECIFIED SYNCOPE TYPE: ICD-10-CM

## 2025-01-09 DIAGNOSIS — R94.31 NONSPECIFIC ABNORMAL ELECTROCARDIOGRAM (ECG) (EKG): ICD-10-CM

## 2025-01-09 DIAGNOSIS — N28.9 RENAL INSUFFICIENCY: ICD-10-CM

## 2025-01-09 LAB
ALBUMIN SERPL BCG-MCNC: 4.2 G/DL (ref 3.5–5.2)
ALP SERPL-CCNC: 66 U/L (ref 40–150)
ALT SERPL W P-5'-P-CCNC: 24 U/L (ref 0–70)
ANION GAP SERPL CALCULATED.3IONS-SCNC: 18 MMOL/L (ref 7–15)
AST SERPL W P-5'-P-CCNC: 24 U/L (ref 0–45)
BASOPHILS # BLD AUTO: 0.1 10E3/UL (ref 0–0.2)
BASOPHILS NFR BLD AUTO: 1 %
BILIRUB DIRECT SERPL-MCNC: <0.2 MG/DL (ref 0–0.3)
BILIRUB SERPL-MCNC: 0.4 MG/DL
BUN SERPL-MCNC: 24 MG/DL (ref 8–23)
CALCIUM SERPL-MCNC: 9 MG/DL (ref 8.8–10.4)
CHLORIDE SERPL-SCNC: 103 MMOL/L (ref 98–107)
CREAT SERPL-MCNC: 1.52 MG/DL (ref 0.67–1.17)
EGFRCR SERPLBLD CKD-EPI 2021: 50 ML/MIN/1.73M2
EOSINOPHIL # BLD AUTO: 0.8 10E3/UL (ref 0–0.7)
EOSINOPHIL NFR BLD AUTO: 7 %
ERYTHROCYTE [DISTWIDTH] IN BLOOD BY AUTOMATED COUNT: 13 % (ref 10–15)
ETHANOL SERPL-MCNC: 0.19 G/DL
GLUCOSE BLDC GLUCOMTR-MCNC: 104 MG/DL (ref 70–99)
GLUCOSE BLDC GLUCOMTR-MCNC: 71 MG/DL (ref 70–99)
GLUCOSE BLDC GLUCOMTR-MCNC: 74 MG/DL (ref 70–99)
GLUCOSE BLDC GLUCOMTR-MCNC: 81 MG/DL (ref 70–99)
GLUCOSE SERPL-MCNC: 127 MG/DL (ref 70–99)
HCO3 SERPL-SCNC: 17 MMOL/L (ref 22–29)
HCT VFR BLD AUTO: 50.8 % (ref 40–53)
HGB BLD-MCNC: 17.7 G/DL (ref 13.3–17.7)
HOLD SPECIMEN: NORMAL
HOLD SPECIMEN: NORMAL
IMM GRANULOCYTES # BLD: 0.1 10E3/UL
IMM GRANULOCYTES NFR BLD: 1 %
LYMPHOCYTES # BLD AUTO: 2.6 10E3/UL (ref 0.8–5.3)
LYMPHOCYTES NFR BLD AUTO: 24 %
MAGNESIUM SERPL-MCNC: 2.1 MG/DL (ref 1.7–2.3)
MCH RBC QN AUTO: 31.1 PG (ref 26.5–33)
MCHC RBC AUTO-ENTMCNC: 34.8 G/DL (ref 31.5–36.5)
MCV RBC AUTO: 89 FL (ref 78–100)
MONOCYTES # BLD AUTO: 0.9 10E3/UL (ref 0–1.3)
MONOCYTES NFR BLD AUTO: 8 %
NEUTROPHILS # BLD AUTO: 6.5 10E3/UL (ref 1.6–8.3)
NEUTROPHILS NFR BLD AUTO: 59 %
NRBC # BLD AUTO: 0 10E3/UL
NRBC BLD AUTO-RTO: 0 /100
PHOSPHATE SERPL-MCNC: 3.6 MG/DL (ref 2.5–4.5)
PLATELET # BLD AUTO: 178 10E3/UL (ref 150–450)
POTASSIUM SERPL-SCNC: 4.2 MMOL/L (ref 3.4–5.3)
PROT SERPL-MCNC: 6.9 G/DL (ref 6.4–8.3)
RBC # BLD AUTO: 5.69 10E6/UL (ref 4.4–5.9)
SODIUM SERPL-SCNC: 138 MMOL/L (ref 135–145)
TROPONIN T SERPL HS-MCNC: 37 NG/L
TROPONIN T SERPL HS-MCNC: 40 NG/L
TROPONIN T SERPL HS-MCNC: 44 NG/L
TROPONIN T SERPL HS-MCNC: 46 NG/L
WBC # BLD AUTO: 11 10E3/UL (ref 4–11)

## 2025-01-09 PROCEDURE — 80048 BASIC METABOLIC PNL TOTAL CA: CPT | Performed by: EMERGENCY MEDICINE

## 2025-01-09 PROCEDURE — 99285 EMERGENCY DEPT VISIT HI MDM: CPT | Mod: 25

## 2025-01-09 PROCEDURE — 99207 PR APP CREDIT; MD BILLING SHARED VISIT: CPT | Mod: FS | Performed by: INTERNAL MEDICINE

## 2025-01-09 PROCEDURE — 99223 1ST HOSP IP/OBS HIGH 75: CPT | Mod: FS

## 2025-01-09 PROCEDURE — 84484 ASSAY OF TROPONIN QUANT: CPT | Performed by: EMERGENCY MEDICINE

## 2025-01-09 PROCEDURE — 83735 ASSAY OF MAGNESIUM: CPT

## 2025-01-09 PROCEDURE — 84484 ASSAY OF TROPONIN QUANT: CPT

## 2025-01-09 PROCEDURE — 70450 CT HEAD/BRAIN W/O DYE: CPT

## 2025-01-09 PROCEDURE — G0378 HOSPITAL OBSERVATION PER HR: HCPCS

## 2025-01-09 PROCEDURE — 36415 COLL VENOUS BLD VENIPUNCTURE: CPT

## 2025-01-09 PROCEDURE — 258N000003 HC RX IP 258 OP 636: Performed by: EMERGENCY MEDICINE

## 2025-01-09 PROCEDURE — 96360 HYDRATION IV INFUSION INIT: CPT

## 2025-01-09 PROCEDURE — 82077 ASSAY SPEC XCP UR&BREATH IA: CPT | Performed by: EMERGENCY MEDICINE

## 2025-01-09 PROCEDURE — 93005 ELECTROCARDIOGRAM TRACING: CPT | Performed by: EMERGENCY MEDICINE

## 2025-01-09 PROCEDURE — 36415 COLL VENOUS BLD VENIPUNCTURE: CPT | Performed by: EMERGENCY MEDICINE

## 2025-01-09 PROCEDURE — 99418 PROLNG IP/OBS E/M EA 15 MIN: CPT | Mod: FS

## 2025-01-09 PROCEDURE — 84100 ASSAY OF PHOSPHORUS: CPT

## 2025-01-09 PROCEDURE — 72125 CT NECK SPINE W/O DYE: CPT

## 2025-01-09 PROCEDURE — 82310 ASSAY OF CALCIUM: CPT | Performed by: EMERGENCY MEDICINE

## 2025-01-09 PROCEDURE — 82248 BILIRUBIN DIRECT: CPT | Performed by: EMERGENCY MEDICINE

## 2025-01-09 PROCEDURE — 82962 GLUCOSE BLOOD TEST: CPT

## 2025-01-09 PROCEDURE — 85025 COMPLETE CBC W/AUTO DIFF WBC: CPT | Performed by: EMERGENCY MEDICINE

## 2025-01-09 RX ORDER — LOSARTAN POTASSIUM 50 MG/1
100 TABLET ORAL DAILY
Status: DISCONTINUED | OUTPATIENT
Start: 2025-01-10 | End: 2025-01-10 | Stop reason: HOSPADM

## 2025-01-09 RX ORDER — CARVEDILOL 12.5 MG/1
25 TABLET ORAL DAILY
COMMUNITY

## 2025-01-09 RX ORDER — NICOTINE POLACRILEX 4 MG
15-30 LOZENGE BUCCAL
Status: DISCONTINUED | OUTPATIENT
Start: 2025-01-09 | End: 2025-01-10 | Stop reason: HOSPADM

## 2025-01-09 RX ORDER — CALCIUM CARBONATE 500 MG/1
1000 TABLET, CHEWABLE ORAL 4 TIMES DAILY PRN
Status: DISCONTINUED | OUTPATIENT
Start: 2025-01-09 | End: 2025-01-10 | Stop reason: HOSPADM

## 2025-01-09 RX ORDER — AMOXICILLIN 250 MG
2 CAPSULE ORAL 2 TIMES DAILY PRN
Status: DISCONTINUED | OUTPATIENT
Start: 2025-01-09 | End: 2025-01-10 | Stop reason: HOSPADM

## 2025-01-09 RX ORDER — AMOXICILLIN 250 MG
1 CAPSULE ORAL 2 TIMES DAILY PRN
Status: DISCONTINUED | OUTPATIENT
Start: 2025-01-09 | End: 2025-01-10 | Stop reason: HOSPADM

## 2025-01-09 RX ORDER — HALOPERIDOL 5 MG/ML
2.5-5 INJECTION INTRAMUSCULAR EVERY 6 HOURS PRN
Status: DISCONTINUED | OUTPATIENT
Start: 2025-01-09 | End: 2025-01-10 | Stop reason: HOSPADM

## 2025-01-09 RX ORDER — FLUMAZENIL 0.1 MG/ML
0.2 INJECTION, SOLUTION INTRAVENOUS
Status: DISCONTINUED | OUTPATIENT
Start: 2025-01-09 | End: 2025-01-10 | Stop reason: HOSPADM

## 2025-01-09 RX ORDER — LIDOCAINE 40 MG/G
CREAM TOPICAL
Status: DISCONTINUED | OUTPATIENT
Start: 2025-01-09 | End: 2025-01-10 | Stop reason: HOSPADM

## 2025-01-09 RX ORDER — TAMSULOSIN HYDROCHLORIDE 0.4 MG/1
0.4 CAPSULE ORAL DAILY
Status: DISCONTINUED | OUTPATIENT
Start: 2025-01-10 | End: 2025-01-10 | Stop reason: HOSPADM

## 2025-01-09 RX ORDER — ACETAMINOPHEN 650 MG/1
650 SUPPOSITORY RECTAL EVERY 4 HOURS PRN
Status: DISCONTINUED | OUTPATIENT
Start: 2025-01-09 | End: 2025-01-10 | Stop reason: HOSPADM

## 2025-01-09 RX ORDER — ROSUVASTATIN CALCIUM 40 MG/1
40 TABLET, COATED ORAL DAILY
Status: DISCONTINUED | OUTPATIENT
Start: 2025-01-10 | End: 2025-01-10 | Stop reason: HOSPADM

## 2025-01-09 RX ORDER — ASPIRIN 81 MG/1
81 TABLET ORAL DAILY
Status: DISCONTINUED | OUTPATIENT
Start: 2025-01-10 | End: 2025-01-10 | Stop reason: HOSPADM

## 2025-01-09 RX ORDER — DEXTROSE MONOHYDRATE 25 G/50ML
25-50 INJECTION, SOLUTION INTRAVENOUS
Status: DISCONTINUED | OUTPATIENT
Start: 2025-01-09 | End: 2025-01-10 | Stop reason: HOSPADM

## 2025-01-09 RX ORDER — CARVEDILOL 12.5 MG/1
25 TABLET ORAL DAILY
Status: DISCONTINUED | OUTPATIENT
Start: 2025-01-10 | End: 2025-01-10 | Stop reason: HOSPADM

## 2025-01-09 RX ORDER — OLANZAPINE 5 MG/1
5-10 TABLET, ORALLY DISINTEGRATING ORAL EVERY 6 HOURS PRN
Status: DISCONTINUED | OUTPATIENT
Start: 2025-01-09 | End: 2025-01-10 | Stop reason: HOSPADM

## 2025-01-09 RX ORDER — ACETAMINOPHEN 325 MG/1
650 TABLET ORAL EVERY 4 HOURS PRN
Status: DISCONTINUED | OUTPATIENT
Start: 2025-01-09 | End: 2025-01-10 | Stop reason: HOSPADM

## 2025-01-09 RX ADMIN — SODIUM CHLORIDE 1000 ML: 9 INJECTION, SOLUTION INTRAVENOUS at 16:10

## 2025-01-09 ASSESSMENT — ACTIVITIES OF DAILY LIVING (ADL)
ADLS_ACUITY_SCORE: 42

## 2025-01-09 ASSESSMENT — COLUMBIA-SUICIDE SEVERITY RATING SCALE - C-SSRS
6. HAVE YOU EVER DONE ANYTHING, STARTED TO DO ANYTHING, OR PREPARED TO DO ANYTHING TO END YOUR LIFE?: NO
2. HAVE YOU ACTUALLY HAD ANY THOUGHTS OF KILLING YOURSELF IN THE PAST MONTH?: NO
1. IN THE PAST MONTH, HAVE YOU WISHED YOU WERE DEAD OR WISHED YOU COULD GO TO SLEEP AND NOT WAKE UP?: NO

## 2025-01-09 NOTE — ED NOTES
[facility]  Expected date:   Expected time:   Means of arrival:   Comments:  [facility] PT ED 01 S.R.

## 2025-01-09 NOTE — PHARMACY-ADMISSION MEDICATION HISTORY
Pharmacist Admission Medication History    Admission medication history is complete. The information provided in this note is only as accurate as the sources available at the time of the update.    Information Source(s): Patient, Family member, and CareEverywhere/SureScripts via in-person    Pertinent Information: Carvedilol prescribed as BID but the patient takes both doses in the morning. He confirmed that this was ok with his PCP.    Changes made to PTA medication list:  Added: None  Deleted: None  Changed: carvedilol    Allergies reviewed with patient and updates made in EHR: yes    Medication History Completed By: Marleni Field RPH 1/9/2025 2:25 PM    PTA Med List   Medication Sig Note Last Dose/Taking    aspirin (ASPIRIN LOW DOSE) 81 MG EC tablet TAKE 1 TABLET(81 MG) BY MOUTH DAILY. START TOMORROW  1/9/2025 Morning    Bexagliflozin 20 MG TABS Take 20 mg by mouth daily.  1/9/2025 Morning    carvedilol (COREG) 12.5 MG tablet Take 25 mg by mouth daily. 1/9/2025: Prescribed as 12.5mg BID but the patient takes both doses in the morning as he forgets the evening dose. His PCP said that was ok 1/9/2025 Morning    glipiZIDE (GLUCOTROL XL) 10 MG 24 hr tablet TAKE 2 TABLETS BY MOUTH DAILY  1/9/2025 Morning    LANTUS SOLOSTAR 100 UNIT/ML soln ADMINISTER 66 UNITS UNDER THE SKIN AT BEDTIME  1/8/2025 Morning    losartan (COZAAR) 100 MG tablet TAKE 1 TABLET(100 MG) BY MOUTH DAILY  1/9/2025 Morning    metFORMIN (GLUCOPHAGE XR) 500 MG 24 hr tablet TAKE 4 TABLETS(2000 MG) BY MOUTH DAILY  1/9/2025 Morning    rosuvastatin (CRESTOR) 40 MG tablet TAKE 1 TABLET(40 MG) BY MOUTH DAILY  1/9/2025 Morning    sildenafil (REVATIO) 20 MG tablet Take 3 to 5 pills daily as needed for  Unknown    tamsulosin (FLOMAX) 0.4 MG capsule TAKE 1 CAPSULE BY MOUTH EVERY DAY  1/9/2025 Morning

## 2025-01-09 NOTE — ED NOTES
Bed: JNED-01  Expected date:   Expected time:   Means of arrival: Ambulance  Comments:   Doreen Beckham/TONA  Stroke

## 2025-01-09 NOTE — ED PROVIDER NOTES
EMERGENCY DEPARTMENT ENCOUNTER      NAME: Ezekiel Aldrich  AGE: 69 year old male  YOB: 1955  MRN: [id number]  EVALUATION DATE & TIME: 1/9/2025  1:38 PM    PCP: Janes Jeronimo    ED PROVIDER: Eli Brito MD    Chief Complaint   Patient presents with    Hypotension    Syncope         FINAL IMPRESSION:  1. Syncope, unspecified syncope type    2. Nonspecific abnormal electrocardiogram (ECG) (EKG)    3. Renal insufficiency    4. Dehydration          ED COURSE & MEDICAL DECISION MAKING:    Pertinent Labs & Imaging studies reviewed. (See chart for details)  69 year old male with history of CAD with previous PCI to the LAD in August 2022, RCA November 2023, HTN, HLD, aortic valve stenosis, DM, CHALO on CPAP who presents to the Emergency Department for evaluation of syncope.  Patient was at a bar/restaurant eating and drinking with family.  Had sudden onset of where he slumped over.  Unfortunately this was for quite prolonged period of time of approximately 10 minutes.  When EMS arrived patient was initially hypotensive, intoxicated and slightly cantankerous.  Patient is normotensive 110s over 50s on exam.  Heart rate is normal.  Somewhat emotionally labile consistent with his alcohol intoxication and slurred speech.  He denies however any complaints.  Concern for arrhythmia given sudden onset syncope without any perceived prodromal symptoms.  Differential clued symptomatic anemia, electrolyte abnormality, ACS.  Unlikely PE, patient does not have any risk factors for same.    Patient met by myself on EMS arrival, placed on monitor, IV established and blood obtained.  Twelve-lead EKG shows sinus rhythm with LVH with strain type pattern but more scooping ST depression in inferior leads that he has had previously.  CBC unremarkable.  BMP notable for mild renal insufficiency with creatinine of 1.5 up from 1.3 baseline.  Initial troponin elevated in indeterminate range at 44.  On repeat this is up minimally to  46.  Blood alcohol level 0.19.  Unclear whether patient had any sort of head trauma as there is not any but he who witnessed the syncopal episode so CT head and cervical spine was obtained, unremarkable for acute abnormalities.  Patient was initially normotensive, now here dipped down into the 80s but rapidly response to IV fluid bolus.  Given his prolonged syncopal episode, and EKG changes compared to previous recommend hospital admission for cardiac enzymes, telemetry and further fluid resuscitation.  Patient and family at bedside agreeable to same and admitted to medicine for further management.      ED Course as of 01/09/25 1642   Thu Jan 09, 2025   1343 I met with the patient, obtained history, performed an initial exam, and discussed options and plan for diagnostics and treatment here in the ED.    1415 Troponin T, High Sensitivity(!): 44   1415 Alcohol ethyl(!): 0.19   1415 Creatinine(!): 1.52  1.2-1.3 baseline   1415 Basic metabolic panel(!)  Mild AGMA suspect etoh   1605 Nursing telling me pt SBP in 80s   1628 Admitted to Dr. Daily   1629 Troponin T, High Sensitivity(!): 46  Up minimally from 44       Medical Decision Making  Obtained supplemental history:Supplemental history obtained?: EMS  Reviewed external records: External records reviewed?:  2023 cardiology visit note  Care impacted by chronic illness:Chronic Kidney Disease, Diabetes, Hyperlipidemia, and Hypertension  Did you consider but not order tests?: Work up considered but not performed and documented in chart, if applicable  Did you interpret images independently?: Independent interpretation of ECG and images noted in documentation, when applicable.  Consultation discussion with other provider:Did you involve another provider (consultant, , pharmacy, etc.)?: I discussed the care with another health care provider, see documentation for details.  Admit.    MIPS: Not Applicable      At the conclusion of the encounter I discussed the results of all  "of the tests and the disposition. The questions were answered. The patient or family acknowledged understanding and was agreeable with the care plan.      MEDICATIONS GIVEN IN THE EMERGENCY:  Medications   sodium chloride 0.9% BOLUS 1,000 mL (1,000 mLs Intravenous $New Bag 1/9/25 1610)       NEW PRESCRIPTIONS STARTED AT TODAY'S ER VISIT  New Prescriptions    No medications on file          =================================================================    HPI    Patient information was obtained from: EMS and patient     Use of Intrepreter: N/A      Ezekiel Aldrich is a 69 year old male with pertinent medical history of hyperlipidemia, hypertension, T2DM, coronary artery disease, and CKD stage 3a who presents with syncope.     Per EMS,  The patient was drinking beers with his friends/brothers at a bar and then suddenly \"slumped\" onto the table. He was unresponsive for 10 minutes. By the time EMS arrived he was awake, responsive, but intoxicated. . BP initially 110/60 but en route it was 180/50.     Per patient,  Here, he is awake, interactive, and moves all extremities. He is intoxicated stating \"I had a couple beers\". He denies any abdominal pain. His brother-in-law is a former [facility] ED provider. He denies abdominal pain.       PAST MEDICAL HISTORY:  Past Medical History:   Diagnosis Date    Acute renal failure     6/11/2016, secondary to dehydration, normalized       PAST SURGICAL HISTORY:  Past Surgical History:   Procedure Laterality Date    ANKLE FRACTURE SURGERY Right     hardware    CV CORONARY ANGIOGRAM N/A 08/02/2022    Procedure: Coronary Angiogram;  Surgeon: Archie Ni MD;  Location: [facility] CV    CV CORONARY ANGIOGRAM N/A 11/20/2023    Procedure: Coronary Angiogram;  Surgeon: Chino Boyd MD;  Location: [facility] CV    CV INTRAVASULAR ULTRASOUND N/A 08/02/2022    Procedure: Intravascular Ultrasound;  Surgeon: Archie Ni MD;  Location: [facility] " CV    CV LEFT HEART CATH N/A 11/20/2023    Procedure: Left Heart Catheterization;  Surgeon: Chino Boyd MD;  Location: [facility] CV    CV PCI N/A 08/02/2022    Percutaneous Coronary Intervention;  Surgeon: Archie Ni MD;  Location: [facility] CV    CV PCI STENT DRUG ELUTING N/A 11/20/2023    Procedure: Percutaneous Coronary Intervention Stent;  Surgeon: Chino Boyd MD;  Location: [facility] CV    KNEE SURGERY      MANDIBLE FRACTURE SURGERY      from fx    SHOULDER SURGERY Right     from fx and separation       CURRENT MEDICATIONS:    Prior to Admission Medications   Prescriptions Last Dose Informant Patient Reported? Taking?   Bexagliflozin 20 MG TABS 1/9/2025 Morning  No Yes   Sig: Take 20 mg by mouth daily.   LANTUS SOLOSTAR 100 UNIT/ML soln 1/8/2025 Morning  No Yes   Sig: ADMINISTER 66 UNITS UNDER THE SKIN AT BEDTIME   aspirin (ASPIRIN LOW DOSE) 81 MG EC tablet 1/9/2025 Morning  No Yes   Sig: TAKE 1 TABLET(81 MG) BY MOUTH DAILY. START TOMORROW   blood glucose (ACCU-CHEK GUIDE) test strip   No No   Sig: USE 1 TEST TWICE DAILY   blood glucose (NO BRAND SPECIFIED) lancing device   No No   Sig: Device to be used with lancets.   blood glucose (NO BRAND SPECIFIED) test strip   No No   Sig: Use to test blood sugar daily for diabetes   blood glucose monitoring (NO BRAND SPECIFIED) meter device kit   No No   Sig: by In Vitro route as needed (glucose checks)   Patient not taking: Reported on 1/6/2025   blood glucose monitoring (SOFTCLIX) lancets   No No   Sig: TEST LANCETS TEST TWICE DAILY   Patient not taking: Reported on 1/6/2025   carvedilol (COREG) 12.5 MG tablet 1/9/2025 Morning  Yes Yes   Sig: Take 25 mg by mouth daily.   generic lancets   No No   Sig: [GENERIC LANCETS] Use 1 each As Directed 2 (two) times a day. Dispense brand per patient's insurance at pharmacy discretion.   glipiZIDE (GLUCOTROL XL) 10 MG 24 hr tablet 1/9/2025 Morning  No Yes   Sig: TAKE 2 TABLETS BY MOUTH  DAILY   insulin pen needle (B-D U/F) 31G X 5 MM miscellaneous   No No   Sig: TWICE DAILY TO INJECT VICTOZA AND LANTUS   losartan (COZAAR) 100 MG tablet 2025 Morning  No Yes   Sig: TAKE 1 TABLET(100 MG) BY MOUTH DAILY   metFORMIN (GLUCOPHAGE XR) 500 MG 24 hr tablet 2025 Morning  No Yes   Sig: TAKE 4 TABLETS(2000 MG) BY MOUTH DAILY   rosuvastatin (CRESTOR) 40 MG tablet 2025 Morning  No Yes   Sig: TAKE 1 TABLET(40 MG) BY MOUTH DAILY   sildenafil (REVATIO) 20 MG tablet Unknown  No Yes   Sig: Take 3 to 5 pills daily as needed for   tamsulosin (FLOMAX) 0.4 MG capsule 2025 Morning  No Yes   Sig: TAKE 1 CAPSULE BY MOUTH EVERY DAY      Facility-Administered Medications: None       ALLERGIES:  Allergies   Allergen Reactions    Lisinopril Cough     Dry cough       FAMILY HISTORY:  Family History   Problem Relation Age of Onset    Colon Cancer Mother     Coronary Artery Disease Mother     Hypertension Mother     Diabetes Type 2  Mother     Hypertension Father     Bell's palsy Father     Diabetes Type 2  Father     Thyroid Disease Father     Sleep Apnea Father     Sleep Apnea Sister     Prostate Cancer Brother 57    Sleep Apnea Brother     Bell's palsy Maternal Grandmother     Thyroid Disease Daughter        SOCIAL HISTORY:  Social History     Tobacco Use    Smoking status: Former     Current packs/day: 0.00     Types: Cigarettes     Quit date: 1985     Years since quittin.7    Smokeless tobacco: Current     Types: Snuff    Tobacco comments:     2 tins/week   Vaping Use    Vaping status: Never Used   Substance Use Topics    Alcohol use: Yes     Alcohol/week: 2.0 standard drinks of alcohol     Types: 2 Standard drinks or equivalent per week     Comment: 10 drink per week    Drug use: No        VITALS:  Patient Vitals for the past 24 hrs:   BP Temp Temp src Pulse Resp SpO2 Weight   25 1631 127/61 -- -- 92 16 96 % --   25 1616 90/55 -- -- 89 (!) 41 97 % --   25 1600 (!) 89/50 -- -- 84 22  96 % --   01/09/25 1545 (!) 82/48 -- -- 83 24 96 % --   01/09/25 1531 90/43 -- -- 83 22 95 % --   01/09/25 1515 99/56 -- -- 82 26 93 % --   01/09/25 1500 101/59 -- -- 87 18 97 % --   01/09/25 1445 98/54 -- -- 85 -- 96 % --   01/09/25 1430 93/71 -- -- 84 24 97 % --   01/09/25 1345 114/56 -- -- 85 23 -- --   01/09/25 1343 -- -- -- 87 -- -- --   01/09/25 1340 107/57 -- -- 84 19 -- --   01/09/25 1338 111/55 97.6  F (36.4  C) Oral -- 18 -- 108.9 kg (240 lb)       PHYSICAL EXAM    General Appearance: Well-appearing, well-nourished, no acute distress. Clinically intoxicated. No signs of external trauma.   Head:  Normocephalic, atraumatic  Eyes:  PERRL, conjunctiva/corneas clear, EOM's intact  ENT:  Lips, mucosa, and tongue normal; membranes are moist without pallor  Neck:  Supple, no midline tenderness to palpation  Cardio:  Regular rate and rhythm, no murmur/gallop/rub, 2+ pulses symmetric in all extremities  Pulm:  No respiratory distress, clear to auscultation bilaterally  Back:  No midline tenderness to palpation, no paraspinal tenderness  Abdomen:  Soft, non-tender, non distended,no rebound or guarding.  Extremities: Moves extremities normally, normal gait  Skin:  Skin warm, dry, no rashes  Neuro:  Alert and oriented ×3, moving all extremities, no gross sensory defects. Emotionally labile. Speech slurred. Extremities equal, very strong, follows commands.      RADIOLOGY/LABS:  Reviewed all pertinent imaging. Please see official radiology report. All pertinent labs reviewed and interpreted.    Results for orders placed or performed during the hospital encounter of 01/09/25   CT Head w/o Contrast    Impression    IMPRESSION:  HEAD CT:  1.  No evidence of acute intracranial abnormality.    CERVICAL SPINE CT:  1.  No fracture or traumatic subluxation of the cervical spine.  2.  Cervical spine degenerative changes resulting in moderate to severe spinal canal stenosis at C6-C7 and multilevel high-grade neural foraminal  stenoses as detailed above.   CT Cervical Spine w/o Contrast    Impression    IMPRESSION:  HEAD CT:  1.  No evidence of acute intracranial abnormality.    CERVICAL SPINE CT:  1.  No fracture or traumatic subluxation of the cervical spine.  2.  Cervical spine degenerative changes resulting in moderate to severe spinal canal stenosis at C6-C7 and multilevel high-grade neural foraminal stenoses as detailed above.   Basic metabolic panel   Result Value Ref Range    Sodium 138 135 - 145 mmol/L    Potassium 4.2 3.4 - 5.3 mmol/L    Chloride 103 98 - 107 mmol/L    Carbon Dioxide (CO2) 17 (L) 22 - 29 mmol/L    Anion Gap 18 (H) 7 - 15 mmol/L    Urea Nitrogen 24.0 (H) 8.0 - 23.0 mg/dL    Creatinine 1.52 (H) 0.67 - 1.17 mg/dL    GFR Estimate 50 (L) >60 mL/min/1.73m2    Calcium 9.0 8.8 - 10.4 mg/dL    Glucose 127 (H) 70 - 99 mg/dL   Hepatic panel   Result Value Ref Range    Protein Total 6.9 6.4 - 8.3 g/dL    Albumin 4.2 3.5 - 5.2 g/dL    Bilirubin Total 0.4 <=1.2 mg/dL    Alkaline Phosphatase 66 40 - 150 U/L    AST 24 0 - 45 U/L    ALT 24 0 - 70 U/L    Bilirubin Direct <0.20 0.00 - 0.30 mg/dL   Alcohol level blood   Result Value Ref Range    Alcohol ethyl 0.19 (H) <=0.01 g/dL   Result Value Ref Range    Troponin T, High Sensitivity 44 (H) <=22 ng/L   CBC with platelets and differential   Result Value Ref Range    WBC Count 11.0 4.0 - 11.0 10e3/uL    RBC Count 5.69 4.40 - 5.90 10e6/uL    Hemoglobin 17.7 13.3 - 17.7 g/dL    Hematocrit 50.8 40.0 - 53.0 %    MCV 89 78 - 100 fL    MCH 31.1 26.5 - 33.0 pg    MCHC 34.8 31.5 - 36.5 g/dL    RDW 13.0 10.0 - 15.0 %    Platelet Count 178 150 - 450 10e3/uL    % Neutrophils 59 %    % Lymphocytes 24 %    % Monocytes 8 %    % Eosinophils 7 %    % Basophils 1 %    % Immature Granulocytes 1 %    NRBCs per 100 WBC 0 <1 /100    Absolute Neutrophils 6.5 1.6 - 8.3 10e3/uL    Absolute Lymphocytes 2.6 0.8 - 5.3 10e3/uL    Absolute Monocytes 0.9 0.0 - 1.3 10e3/uL    Absolute Eosinophils 0.8 (H) 0.0 -  0.7 10e3/uL    Absolute Basophils 0.1 0.0 - 0.2 10e3/uL    Absolute Immature Granulocytes 0.1 <=0.4 10e3/uL    Absolute NRBCs 0.0 10e3/uL   Extra Blue Top Tube   Result Value Ref Range    Hold Specimen JIC    Extra Red Top Tube   Result Value Ref Range    Hold Specimen JIC    Result Value Ref Range    Troponin T, High Sensitivity 46 (H) <=22 ng/L       EKG:  Performed at: 13:41    Impression: Sinus rhythm with 1st degree A-V block ST & T wave abnormality, consider inferolateral ischemia. Prolonged QT. Abnormal ECG. When compared with ECG of 20-Nov-2023 10:48, Sinus rhythm is no longer with 2nd degree A-V block (Mobitz I).    Rate: 83 BPM  Rhythm: Sinus rhythm  Axis: 59 52 219   MA Interval: 256 ms   QRS Interval: 94 ms  QTc Interval: 410/481  ST Changes: ST & T wave abnormality, consider inferolateral ischemia. Prolonged QT.  Comparison: When compared with ECG of 20-Nov-2023 10:48, Sinus rhythm is no longer with 2nd degree A-V block (Mobitz I).      Performed at: 14:30    Impression: Sinus rhythm with 1st degree A-V block ST & T wave abnormality, consider inferior ischemia Prolonged QT. Abnormal ECG. When compared with ECG of 09-Jan-2025, no significant change, no evolving change.    Rate: 83 BPM  Rhythm: Sinus rhythm  Axis: 70 64 59   MA Interval: 256 ms  QRS Interval: 96 ms  QTc Interval: 430/505 ms  ST Changes: None  Comparison: When compared with ECG of 09-Jan-2025, no significant change, no evolving change.  I have independently reviewed and interpreted the EKG(s) documented above.    The creation of this record is based on the scribe s observations of the work being performed by Eli Brito MD and the provider s statements to them. It was created on her behalf by Benedict Chen, a trained medical scribe. This document has been checked and approved by the attending provider.    Eli Brito MD  Emergency Medicine  [facility] EMERGENCY DEPARTMENT  [id number]  [facility] [address]  [phone]  Dept: [phone]     Eli Brito MD  01/09/25 9588

## 2025-01-09 NOTE — ED TRIAGE NOTES
Patient here from lunch where he was with family.  Seated in a chair and then had a syncopal episode.  Minimal response x 10 minutes.  Patient noted to be hypotensive for EMS.  Upon arrival to ED, patient appears slight intoxicated but is awake and alert.  He follows commands and moves all extremities spontaneously.       Triage Assessment (Adult)       Row Name 01/09/25 1342          Triage Assessment    Airway WDL WDL        Respiratory WDL    Respiratory WDL WDL        Skin Circulation/Temperature WDL    Skin Circulation/Temperature WDL WDL        Cardiac WDL    Cardiac WDL WDL        Peripheral/Neurovascular WDL    Peripheral Neurovascular WDL WDL        Cognitive/Neuro/Behavioral WDL    Cognitive/Neuro/Behavioral WDL WDL

## 2025-01-10 ENCOUNTER — APPOINTMENT (OUTPATIENT)
Dept: CARDIOLOGY | Facility: HOSPITAL | Age: 70
End: 2025-01-10
Payer: MEDICARE

## 2025-01-10 VITALS
RESPIRATION RATE: 18 BRPM | BODY MASS INDEX: 34.48 KG/M2 | WEIGHT: 242 LBS | SYSTOLIC BLOOD PRESSURE: 150 MMHG | HEART RATE: 77 BPM | TEMPERATURE: 97.6 F | DIASTOLIC BLOOD PRESSURE: 72 MMHG | OXYGEN SATURATION: 97 %

## 2025-01-10 PROBLEM — I44.0 AV BLOCK, 1ST DEGREE: Status: ACTIVE | Noted: 2025-01-10

## 2025-01-10 PROBLEM — N40.0 BPH (BENIGN PROSTATIC HYPERPLASIA): Status: ACTIVE | Noted: 2018-02-02

## 2025-01-10 PROBLEM — I25.10 CORONARY ARTERY DISEASE: Status: ACTIVE | Noted: 2022-09-20

## 2025-01-10 LAB
ALBUMIN UR-MCNC: 10 MG/DL
AMPHETAMINES UR QL SCN: NORMAL
ANION GAP SERPL CALCULATED.3IONS-SCNC: 9 MMOL/L (ref 7–15)
APPEARANCE UR: CLEAR
ATRIAL RATE - MUSE: 83 BPM
ATRIAL RATE - MUSE: 83 BPM
ATRIAL RATE - MUSE: 84 BPM
BARBITURATES UR QL SCN: NORMAL
BENZODIAZ UR QL SCN: NORMAL
BILIRUB UR QL STRIP: NEGATIVE
BUN SERPL-MCNC: 27.2 MG/DL (ref 8–23)
BZE UR QL SCN: NORMAL
CALCIUM SERPL-MCNC: 9.1 MG/DL (ref 8.8–10.4)
CANNABINOIDS UR QL SCN: NORMAL
CHLORIDE SERPL-SCNC: 105 MMOL/L (ref 98–107)
COLOR UR AUTO: ABNORMAL
CREAT SERPL-MCNC: 1.41 MG/DL (ref 0.67–1.17)
DIASTOLIC BLOOD PRESSURE - MUSE: 57 MMHG
DIASTOLIC BLOOD PRESSURE - MUSE: 71 MMHG
DIASTOLIC BLOOD PRESSURE - MUSE: NORMAL MMHG
EGFRCR SERPLBLD CKD-EPI 2021: 54 ML/MIN/1.73M2
ERYTHROCYTE [DISTWIDTH] IN BLOOD BY AUTOMATED COUNT: 12.9 % (ref 10–15)
FENTANYL UR QL: NORMAL
GLUCOSE BLDC GLUCOMTR-MCNC: 154 MG/DL (ref 70–99)
GLUCOSE BLDC GLUCOMTR-MCNC: 156 MG/DL (ref 70–99)
GLUCOSE SERPL-MCNC: 166 MG/DL (ref 70–99)
GLUCOSE UR STRIP-MCNC: >1000 MG/DL
HCO3 SERPL-SCNC: 22 MMOL/L (ref 22–29)
HCT VFR BLD AUTO: 51.6 % (ref 40–53)
HGB BLD-MCNC: 17.7 G/DL (ref 13.3–17.7)
HGB UR QL STRIP: NEGATIVE
INTERPRETATION ECG - MUSE: NORMAL
KETONES UR STRIP-MCNC: ABNORMAL MG/DL
LEUKOCYTE ESTERASE UR QL STRIP: NEGATIVE
LVEF ECHO: NORMAL
MAGNESIUM SERPL-MCNC: 2.1 MG/DL (ref 1.7–2.3)
MCH RBC QN AUTO: 30.9 PG (ref 26.5–33)
MCHC RBC AUTO-ENTMCNC: 34.3 G/DL (ref 31.5–36.5)
MCV RBC AUTO: 90 FL (ref 78–100)
NITRATE UR QL: NEGATIVE
OPIATES UR QL SCN: NORMAL
P AXIS - MUSE: 47 DEGREES
P AXIS - MUSE: 59 DEGREES
P AXIS - MUSE: 70 DEGREES
PCP QUAL URINE (ROCHE): NORMAL
PH UR STRIP: 5.5 [PH] (ref 5–7)
PLATELET # BLD AUTO: 162 10E3/UL (ref 150–450)
POTASSIUM SERPL-SCNC: 4.7 MMOL/L (ref 3.4–5.3)
PR INTERVAL - MUSE: 256 MS
PR INTERVAL - MUSE: 256 MS
PR INTERVAL - MUSE: 276 MS
PSA SERPL DL<=0.01 NG/ML-MCNC: 0.28 NG/ML (ref 0–4.5)
QRS DURATION - MUSE: 92 MS
QRS DURATION - MUSE: 94 MS
QRS DURATION - MUSE: 96 MS
QT - MUSE: 374 MS
QT - MUSE: 410 MS
QT - MUSE: 430 MS
QTC - MUSE: 441 MS
QTC - MUSE: 481 MS
QTC - MUSE: 505 MS
R AXIS - MUSE: 23 DEGREES
R AXIS - MUSE: 52 DEGREES
R AXIS - MUSE: 64 DEGREES
RBC # BLD AUTO: 5.73 10E6/UL (ref 4.4–5.9)
RBC URINE: <1 /HPF
SODIUM SERPL-SCNC: 136 MMOL/L (ref 135–145)
SP GR UR STRIP: 1.02 (ref 1–1.03)
SYSTOLIC BLOOD PRESSURE - MUSE: 107 MMHG
SYSTOLIC BLOOD PRESSURE - MUSE: 93 MMHG
SYSTOLIC BLOOD PRESSURE - MUSE: NORMAL MMHG
T AXIS - MUSE: 219 DEGREES
T AXIS - MUSE: 220 DEGREES
T AXIS - MUSE: 59 DEGREES
UROBILINOGEN UR STRIP-MCNC: <2 MG/DL
VENTRICULAR RATE- MUSE: 83 BPM
VENTRICULAR RATE- MUSE: 83 BPM
VENTRICULAR RATE- MUSE: 84 BPM
WBC # BLD AUTO: 7.4 10E3/UL (ref 4–11)
WBC URINE: <1 /HPF

## 2025-01-10 PROCEDURE — 93005 ELECTROCARDIOGRAM TRACING: CPT

## 2025-01-10 PROCEDURE — G0378 HOSPITAL OBSERVATION PER HR: HCPCS

## 2025-01-10 PROCEDURE — 84132 ASSAY OF SERUM POTASSIUM: CPT

## 2025-01-10 PROCEDURE — 93010 ELECTROCARDIOGRAM REPORT: CPT | Mod: HOP | Performed by: INTERNAL MEDICINE

## 2025-01-10 PROCEDURE — 36415 COLL VENOUS BLD VENIPUNCTURE: CPT

## 2025-01-10 PROCEDURE — 82962 GLUCOSE BLOOD TEST: CPT

## 2025-01-10 PROCEDURE — 93306 TTE W/DOPPLER COMPLETE: CPT | Mod: 26 | Performed by: GENERAL ACUTE CARE HOSPITAL

## 2025-01-10 PROCEDURE — 93005 ELECTROCARDIOGRAM TRACING: CPT | Performed by: INTERNAL MEDICINE

## 2025-01-10 PROCEDURE — 255N000002 HC RX 255 OP 636

## 2025-01-10 PROCEDURE — 99222 1ST HOSP IP/OBS MODERATE 55: CPT | Performed by: GENERAL ACUTE CARE HOSPITAL

## 2025-01-10 PROCEDURE — C8929 TTE W OR WO FOL WCON,DOPPLER: HCPCS

## 2025-01-10 PROCEDURE — 250N000013 HC RX MED GY IP 250 OP 250 PS 637

## 2025-01-10 PROCEDURE — 80307 DRUG TEST PRSMV CHEM ANLYZR: CPT | Performed by: INTERNAL MEDICINE

## 2025-01-10 PROCEDURE — 99239 HOSP IP/OBS DSCHRG MGMT >30: CPT | Performed by: INTERNAL MEDICINE

## 2025-01-10 PROCEDURE — 84153 ASSAY OF PSA TOTAL: CPT | Performed by: INTERNAL MEDICINE

## 2025-01-10 PROCEDURE — 250N000012 HC RX MED GY IP 250 OP 636 PS 637

## 2025-01-10 PROCEDURE — 85027 COMPLETE CBC AUTOMATED: CPT

## 2025-01-10 PROCEDURE — 82374 ASSAY BLOOD CARBON DIOXIDE: CPT

## 2025-01-10 PROCEDURE — 999N000208 ECHOCARDIOGRAM COMPLETE

## 2025-01-10 PROCEDURE — 80048 BASIC METABOLIC PNL TOTAL CA: CPT

## 2025-01-10 PROCEDURE — 83735 ASSAY OF MAGNESIUM: CPT | Performed by: INTERNAL MEDICINE

## 2025-01-10 PROCEDURE — 81001 URINALYSIS AUTO W/SCOPE: CPT | Performed by: INTERNAL MEDICINE

## 2025-01-10 RX ADMIN — PERFLUTREN 2 ML: 6.52 INJECTION, SUSPENSION INTRAVENOUS at 08:55

## 2025-01-10 RX ADMIN — ASPIRIN 81 MG: 81 TABLET, COATED ORAL at 09:23

## 2025-01-10 RX ADMIN — INSULIN ASPART 1 UNITS: 100 INJECTION, SOLUTION INTRAVENOUS; SUBCUTANEOUS at 09:22

## 2025-01-10 RX ADMIN — ROSUVASTATIN 40 MG: 40 TABLET, FILM COATED ORAL at 09:23

## 2025-01-10 RX ADMIN — CARVEDILOL 25 MG: 12.5 TABLET, FILM COATED ORAL at 09:23

## 2025-01-10 RX ADMIN — TAMSULOSIN HYDROCHLORIDE 0.4 MG: 0.4 CAPSULE ORAL at 09:23

## 2025-01-10 ASSESSMENT — ACTIVITIES OF DAILY LIVING (ADL)
ADLS_ACUITY_SCORE: 42
ADLS_ACUITY_SCORE: 22
ADLS_ACUITY_SCORE: 42
ADLS_ACUITY_SCORE: 42
ADLS_ACUITY_SCORE: 22
ADLS_ACUITY_SCORE: 22
ADLS_ACUITY_SCORE: 42
ADLS_ACUITY_SCORE: 42
ADLS_ACUITY_SCORE: 22
ADLS_ACUITY_SCORE: 42

## 2025-01-10 NOTE — CONSULTS
"     We have been asked to see Ezekiel Aldrich at [facility] by Dr. Esme Braga for evaluation of elevated troponin and syncope.    Impression and Plan     Assessment:  Syncope likely a result of hypotension from intravascular hypovolemia and acute alcohol intoxication.  Mildly elevated stable high-sensitivity troponin. This is not consistent with myocardial ischemia.  Coronary artery disease status post drug-eluting stenting x1 to the [id number] left anterior descending artery 8/2/2022 then drug-eluting stenting x1 to the ibucvywm-[id number] right coronary artery 11/20/2023. He denies angina.  Mild-to-moderate aortic stenosis last assessed on transthoracic echocardiogram 1/10/2025. This is not significant enough to cause symptoms.  Essential hypertension.  Hyperlipidemia. Last LDL 56 mg/dL.  Insulin-dependent diabetes mellitus type 2. Last hemoglobin A1c 8.6%.  Chronic kidney disease stage III.  Obesity with body mass index 34.48 kg/m .    Plan:  No further cardiac evaluation is needed.  Continue home cardiac medications.   He has cardiology follow-up with Dr. Blevins scheduled for 3/25/2025  Okay to discharge home from a cardiac perspective    Primary Cardiologist: Dr. Karlene Blevins    Clinically Significant Risk Factors Present on Admission                 # Drug Induced Platelet Defect: home medication list includes an antiplatelet medication   # Hypertension: Noted on problem list          # DMII: A1C = 8.6 % (Ref range: 0.0 - 5.6 %) within past 6 months    # Obesity: Estimated body mass index is 34.48 kg/m  as calculated from the following:    Height as of 1/6/25: 1.784 m (5' 10.25\").    Weight as of this encounter: 109.8 kg (242 lb).             History of Present Illness      Mr. Ezekiel Aldrich is a 69 year old male with a history of CAD s/p DARRIN x1 to the LAD in 2022 and DARRIN x1 to the RCA in 2022, IDDM2 and aortic stenosis admitted yesterday after a syncopal episode. He was having lunch with his " brother and had multiple alcoholic beverages. He then slumped onto the table and was unresponsive for about 10 minutes.     On arrival to the ED, his initial BP was 82/48. This improved with IV fluids. His ECG showed nonspecific T wave changes. Hs-troponin was mildly elevated but stable at 44 -> 46 -> 40 -> 37. Blood ethanol level was 0.19. Head/neck CT showed no major abnormalities.    He was feeling fine before the event and feels fine now. No chest pain/pressure/tightness, shortness of breath at rest or with exertion, light headedness/dizziness, pre-syncope, lower extremity swelling, palpitations, paroxysmal nocturnal dyspnea (PND), or orthopnea.    Review of Systems:  Further review of systems is otherwise negative/noncontributory (based on review of medical record (admission H&P) and 13 point review of systems reviewed. Pertinent positives noted).    Cardiac Diagnostics     ECG 1/10/2025 (personally reviewed and interpreted): SR with 1st degree AV block  ms, nonspecific ST/T abnormality    Telemetry (personally reviewed): SR, no arrhythmias    Echocardiogram 7/7/2023 (results reviewed):   Mild valvular aortic stenosis.  Left ventricular size, wall motion and function are normal. The ejection fraction is > 65%.  Normal right ventricle size and systolic function.    Cardiac Cath 11/20/2023 (results reviewed):   1.  Stent in proximal LAD remains widely patent  2.  Minimal circumflex lumen irregularity, no stenoses  3.  Moderately severe proximal LAD calcified stenosis with focal area of 80% stenosis in mid segment, progressive since previous angio.  Mild to moderate distal RCA narrowing appears unchanged compared to previous study.  4.  Successful PCI proximal to mid RCA with drug-eluting stent implant x1.  0% residual, RAY-3 flow.    Cardiac Stress Testing 11/1/2023 (results reviewed):     The nuclear stress test is abnormal.    There is a medium sized area of mild ischemia in the entire inferior  segment(s) of the left ventricle.    The left ventricular ejection fraction at rest is 60%.    There is no prior study for comparison.    Medical History  Surgical History Family History Social History   Past Medical History:   Diagnosis Date    Acute renal failure     6/11/2016, secondary to dehydration, normalized     Past Surgical History:   Procedure Laterality Date    ANKLE FRACTURE SURGERY Right     hardware    CV CORONARY ANGIOGRAM N/A 08/02/2022    Procedure: Coronary Angiogram;  Surgeon: Archie Ni MD;  Location: [facility] CATH LAB CV    CV CORONARY ANGIOGRAM N/A 11/20/2023    Procedure: Coronary Angiogram;  Surgeon: Chino Boyd MD;  Location: [facility] CATH LAB CV    CV INTRAVASULAR ULTRASOUND N/A 08/02/2022    Procedure: Intravascular Ultrasound;  Surgeon: Archie Ni MD;  Location: [facility] CATH LAB CV    CV LEFT HEART CATH N/A 11/20/2023    Procedure: Left Heart Catheterization;  Surgeon: Chino Boyd MD;  Location: [facility] CATH LAB CV    CV PCI N/A 08/02/2022    Percutaneous Coronary Intervention;  Surgeon: Archie Ni MD;  Location: [facility] CATH LAB CV    CV PCI STENT DRUG ELUTING N/A 11/20/2023    Procedure: Percutaneous Coronary Intervention Stent;  Surgeon: Chino Boyd MD;  Location: [facility] CATH LAB CV    KNEE SURGERY      MANDIBLE FRACTURE SURGERY      from fx    SHOULDER SURGERY Right     from fx and separation     Family History   Problem Relation Age of Onset    Colon Cancer Mother     Coronary Artery Disease Mother     Hypertension Mother     Diabetes Type 2  Mother     Hypertension Father     Bell's palsy Father     Diabetes Type 2  Father     Thyroid Disease Father     Sleep Apnea Father     Sleep Apnea Sister     Prostate Cancer Brother 57    Sleep Apnea Brother     Bell's palsy Maternal Grandmother     Thyroid Disease Daughter            Social History     Socioeconomic History    Marital status:      Spouse name: Not on file    Number of  children: 5    Years of education: Not on file    Highest education level: Not on file   Occupational History    Occupation: [employer]   Tobacco Use    Smoking status: Former     Current packs/day: 0.00     Types: Cigarettes     Quit date: 1985     Years since quittin.7    Smokeless tobacco: Current     Types: Snuff    Tobacco comments:     2 tins/week   Vaping Use    Vaping status: Never Used   Substance and Sexual Activity    Alcohol use: Yes     Alcohol/week: 2.0 standard drinks of alcohol     Types: 2 Standard drinks or equivalent per week     Comment: 10 drink per week    Drug use: No    Sexual activity: Yes     Partners: Female   Other Topics Concern    Not on file   Social History Narrative    Diet-            Exercise- Not regular. Work is physical, walking.     Social Drivers of Health     Financial Resource Strain: Low Risk  (1/10/2025)    Financial Resource Strain     Within the past 12 months, have you or your family members you live with been unable to get utilities (heat, electricity) when it was really needed?: No   Food Insecurity: Low Risk  (1/10/2025)    Food Insecurity     Within the past 12 months, did you worry that your food would run out before you got money to buy more?: No     Within the past 12 months, did the food you bought just not last and you didn t have money to get more?: No   Transportation Needs: Low Risk  (1/10/2025)    Transportation Needs     Within the past 12 months, has lack of transportation kept you from medical appointments, getting your medicines, non-medical meetings or appointments, work, or from getting things that you need?: No   Physical Activity: Inactive (10/3/2024)    Exercise Vital Sign     Days of Exercise per Week: 0 days     Minutes of Exercise per Session: 0 min   Stress: No Stress Concern Present (10/3/2024)    [employer] - Occupational Stress Questionnaire     Feeling of Stress : Only a little   Social  Connections: Unknown (10/3/2024)    Social Connection and Isolation Panel [NHANES]     Frequency of Communication with Friends and Family: Not on file     Frequency of Social Gatherings with Friends and Family: Once a week     Attends Caodaism Services: Not on file     Active Member of Clubs or Organizations: Not on file     Attends Club or Organization Meetings: Not on file     Marital Status: Not on file   Interpersonal Safety: Low Risk  (10/18/2023)    Interpersonal Safety     Do you feel physically and emotionally safe where you currently live?: Yes     Within the past 12 months, have you been hit, slapped, kicked or otherwise physically hurt by someone?: No     Within the past 12 months, have you been humiliated or emotionally abused in other ways by your partner or ex-partner?: No   Housing Stability: Low Risk  (1/10/2025)    Housing Stability     Do you have housing? : Yes     Are you worried about losing your housing?: No             Physical Examination   VITALS: BP (!) 154/93 (BP Location: Left arm)   Pulse 77   Temp 97.8  F (36.6  C) (Oral)   Resp 20   Wt 109.8 kg (242 lb)   SpO2 97%   BMI 34.48 kg/m    BMI: Body mass index is 34.48 kg/m .  Wt Readings from Last 3 Encounters:   01/10/25 109.8 kg (242 lb)   01/06/25 108.9 kg (240 lb)   10/03/24 107.5 kg (237 lb)         Intake/Output Summary (Last 24 hours) at 1/10/2025 1000  Last data filed at 1/10/2025 0922  Gross per 24 hour   Intake 3 ml   Output --   Net 3 ml       General Appearance:  Alert, cooperative, no distress, appears stated age    Head:  Normocephalic, without obvious abnormality, atraumatic   Eyes:  PERRL, conjunctiva/corneas clear, EOM's intact   Ears:  Normal external ear canals bilaterally   Nose: Nares normal, septum midline, no drainage   Throat: Lips, mucosa, and tongue normal; teeth and gums normal   Neck: Supple, symmetrical, trachea midline, no adenopathy, no carotid bruit or JVD    Back:   Symmetric, no abnormal curvature,  ROM normal   Lungs:   Clear to auscultation bilaterally, respirations unlabored   Chest Wall:  No tenderness or deformity   Heart:  Regular rate and rhythm , S1, S2 normal,no murmur, rub or gallop   Abdomen:   Soft, non-tender, bowel sounds active all four quadrants,  no masses, no organomegaly   Extremities: Extremities normal, atraumatic, no cyanosis or edema   Skin: Skin color, texture, turgor normal, no rashes or lesions   Psychiatric: Normal affect, pleasant and cooperative    Neurologic: Alert and oriented X 3, Moves all 4 extremities            Imaging      CT head/cervical spine 1/9/2025 (results reviewed):  HEAD CT:  1.  No evidence of acute intracranial abnormality.     CERVICAL SPINE CT:  1.  No fracture or traumatic subluxation of the cervical spine.  2.  Cervical spine degenerative changes resulting in moderate to severe spinal canal stenosis at C6-C7 and multilevel high-grade neural foraminal stenoses as detailed above.     Lab Results    Chemistry/lipid CBC Cardiac Enzymes/BNP/TSH/INR   Recent Labs   Lab Test 01/06/25  0800   CHOL 163   HDL 53   LDL 56   TRIG 269*     Recent Labs   Lab Test 01/06/25  0800 10/03/24  0824 06/07/24  0754   LDL 56 51 40     Recent Labs   Lab Test 01/10/25  0838 01/10/25  0612   NA  --  136   POTASSIUM  --  4.7   CHLORIDE  --  105   CO2  --  22   * 166*   BUN  --  27.2*   CR  --  1.41*   GFRESTIMATED  --  54*   RINA  --  9.1     Recent Labs   Lab Test 01/10/25  0612 01/09/25  1344 01/06/25  0800   CR 1.41* 1.52* 1.25*     Recent Labs   Lab Test 01/06/25  0800 10/03/24  0824 01/25/24  0827   A1C 8.6* 8.4* 8.8*          Recent Labs   Lab Test 01/10/25  0612   WBC 7.4   HGB 17.7   HCT 51.6   MCV 90        Recent Labs   Lab Test 01/10/25  0612 01/09/25  1344 11/20/23  0715   HGB 17.7 17.7 15.4    Recent Labs   Lab Test 08/02/22  0458 08/02/22  0155 08/01/22  2112   TROPONINI 0.16 0.15 0.12     Recent Labs   Lab Test 08/01/22  2112   BNP 89*     Recent Labs   Lab  Test 10/29/21  1616   TSH 3.18     Recent Labs   Lab Test 08/01/22  2112   INR 0.92           Current Inpatient Scheduled Medications   Scheduled Meds:  Current Facility-Administered Medications   Medication Dose Route Frequency Provider Last Rate Last Admin    aspirin EC tablet 81 mg  81 mg Oral Daily Lisy Anderson NP   81 mg at 01/10/25 0923    carvedilol (COREG) tablet 25 mg  25 mg Oral Daily Lisy Anderson NP   25 mg at 01/10/25 0923    insulin aspart (NovoLOG) injection (RAPID ACTING)  1-7 Units Subcutaneous TID AC Lisy Anderson NP   1 Units at 01/10/25 0922    insulin aspart (NovoLOG) injection (RAPID ACTING)  1-5 Units Subcutaneous At Bedtime Lisy Anderson NP        insulin aspart (NovoLOG) injection (RAPID ACTING)   Subcutaneous Daily with breakfast Lisy Anderson NP        insulin aspart (NovoLOG) injection (RAPID ACTING)   Subcutaneous Daily with lunch Lisy Anderson NP        insulin aspart (NovoLOG) injection (RAPID ACTING)   Subcutaneous Daily with supper Lisy Anderson NP        [Held by provider] insulin glargine (LANTUS PEN) injection 33 Units  33 Units Subcutaneous At Bedtime Lisy Anderson NP        [Held by provider] losartan (COZAAR) tablet 100 mg  100 mg Oral Daily Lisy Anderson NP        rosuvastatin (CRESTOR) tablet 40 mg  40 mg Oral Daily Lisy Anderson NP   40 mg at 01/10/25 0923    sodium chloride (PF) 0.9% PF flush 3 mL  3 mL Intracatheter Q8H Lisy Anderson NP   3 mL at 01/10/25 0922    tamsulosin (FLOMAX) capsule 0.4 mg  0.4 mg Oral Daily Lisy Anderson NP   0.4 mg at 01/10/25 0923            Medications Prior to Admission   Prior to Admission medications    Medication Sig Start Date End Date Taking? Authorizing Provider   aspirin (ASPIRIN LOW DOSE) 81 MG EC tablet TAKE 1 TABLET(81 MG) BY MOUTH DAILY. START TOMORROW 11/28/23  Yes Karlene Blevins MD   Bexagliflozin 20 MG TABS Take 20 mg by mouth daily. 11/27/24  Yes Janes Jeronimo MD   carvedilol (COREG) 12.5 MG tablet Take 25 mg by  mouth daily.   Yes Unknown, Entered By History   glipiZIDE (GLUCOTROL XL) 10 MG 24 hr tablet TAKE 2 TABLETS BY MOUTH DAILY 10/3/24  Yes Janes Jeronimo MD   LANTUS SOLOSTAR 100 UNIT/ML soln ADMINISTER 66 UNITS UNDER THE SKIN AT BEDTIME 9/30/24  Yes Janes Jeronimo MD   losartan (COZAAR) 100 MG tablet TAKE 1 TABLET(100 MG) BY MOUTH DAILY 8/6/24  Yes Janes Jeronimo MD   metFORMIN (GLUCOPHAGE XR) 500 MG 24 hr tablet TAKE 4 TABLETS(2000 MG) BY MOUTH DAILY 12/2/24  Yes Jnaes Jeronimo MD   rosuvastatin (CRESTOR) 40 MG tablet TAKE 1 TABLET(40 MG) BY MOUTH DAILY 11/29/24  Yes Karlene Blevins MD   sildenafil (REVATIO) 20 MG tablet Take 3 to 5 pills daily as needed for 12/23/24  Yes Janes Jeronimo MD   tamsulosin (FLOMAX) 0.4 MG capsule TAKE 1 CAPSULE BY MOUTH EVERY DAY 8/26/24  Yes Janes Jeronimo MD   blood glucose (ACCU-CHEK GUIDE) test strip USE 1 TEST TWICE DAILY 10/3/24   Janes Jeronimo MD   blood glucose (NO BRAND SPECIFIED) lancing device Device to be used with lancets. 12/7/23   Janes Jeronimo MD   blood glucose (NO BRAND SPECIFIED) test strip Use to test blood sugar daily for diabetes 3/6/24   Janes Jeronimo MD   blood glucose monitoring (NO BRAND SPECIFIED) meter device kit by In [employer] route as needed (glucose checks)  Patient not taking: Reported on 1/6/2025 12/7/23   Janes Jeronimo MD   blood glucose monitoring (SOFTCLIX) lancets TEST LANCETS TEST TWICE DAILY  Patient not taking: Reported on 1/6/2025 3/8/24   Janes Jeronimo MD   generic lancets [GENERIC LANCETS] Use 1 each As Directed 2 (two) times a day. Dispense brand per patient's insurance at pharmacy discretion. 7/14/20   Janes Jeronimo MD   insulin pen needle (B-D U/F) 31G X 5 MM miscellaneous TWICE DAILY TO INJECT VICTOZA AND LANTUS 12/21/23   Janes Jeronimo MD Brent E. White, MD [facility]  Non-Invasive Cardiologist  [facility]  Pager [phone]

## 2025-01-10 NOTE — PROGRESS NOTES
Patient declines hospital CPAP tonight. Uises CPAP at home. RT will follow up tomorrow night.     Niru Wilkes, RT

## 2025-01-10 NOTE — DISCHARGE SUMMARY
"[facility]  Hospitalist Discharge Summary      Date of Admission:  1/9/2025  Date of Discharge:  1/10/2025  Discharging Provider: Esme Braga MD  Discharge Service: Hospitalist Service  Discharge Diagnoses   Given collapse  Alcohol intoxication  CAD s/p DARRIN to LAD and RCA  Aortic stenosis   IDDM  CKD 3A  HTN  HLD  Obesity with BMI of 34.5    Clinically Significant Risk Factors   # DMII: A1C = 8.6 % (Ref range: 0.0 - 5.6 %) within past 6 months  # Obesity: Estimated body mass index is 34.48 kg/m  as calculated from the following:    Height as of 1/6/25: 1.784 m (5' 10.25\").    Weight as of this encounter: 109.8 kg (242 lb).     Follow-ups Needed After Discharge   Follow-up Appointments       Follow Up      Follow up with Dr. Blevins on 3/25/2025, as previously scheduled.      Hospital Follow-up with Existing Primary Care Provider (PCP)      Please see details below   Schedule Primary Care visit within: 7 Days        Unresulted Labs Ordered in the Past 30 Days of this Admission       Date and Time Order Name Status Description    1/10/2025  9:45 AM PSA tumor marker  0.28.     Discharge Disposition   Discharged to home  Condition at discharge: Stable    Hospital Course      Ezekiel Aldrich is a 69 y o male with PMH of CAD, s/p DARRIN to LAD and RCA, HTN, HLD, IDDM, CKD 3, obesity, alcohol use disorder, for evaluation of syncope and loss of consciousness 10 minutes.  The patient was drinking beers at a bar and then suddenly \"slumped\" onto the table. He was unresponsive for 10 minutes. By the time EMS arrived he was awake, responsive, but intoxicated. BP initially 110/60. In the ED patient's became hypotensive blood pressure 80 systolic.  Normal saline fluid bolus given blood pressure now normal 108 systolic.  EKG changes seen with ST depression present JVP, prolonged QTc at 505 ms.    # Syncope collapse, likely from intravascular hypovolemia and acute alcohol desiccation.  # Abnormal EKG, mild " elevated high-sensitivity troponin, not consistent with MI.  Echo without WMA.  #History of CAD, s/p DARRIN to LAD and RCA, in August 2022 and November 2023 respectively.  No recent angina.  #Essential hypertension.  Head CT results:No evidence of acute intracranial abnormality.  CT cervical spine results:No fracture or traumatic subluxation of the cervical spine.  Cervical spine degenerative changes resulting in moderate to severe spinal canal stenosis at C6-C7 and multilevel high-grade neural foraminal stenoses as detailed above.    S/p IVF.  On the day of discharge no signs of orthostatic hypotension.  Patient was seen by cardiology, recommended no further cardiac evaluation, continue PTA cardiac medications, decrease alcohol consumption, and follow-up with patient's primary cardiology Dr. Blevins, scheduled for 3/25/2025.    # Alcohol use disorder.    Patient admits to daily EtOH drinking  EtOH level on admission 0.19  No signs of alcohol withdrawal.  Patient was counseled on drinking moderation, not more than 7 drinks per week.    # CA on CKD 3.  Creatinine on admission 1.5 to trending up from 1.25 on 1/6.  S/p IVF.  Encourage p.o. fluids.  UA not infected.    # Diabetic mellitus  Hemoglobin A1c 8.6.  Continue PTA regimen of bexagliflozin, glipizide, metformin insulin.  Patient does not follow diabetic diet.  He was counseled on importance of diabetic diet, printout information on diet provided to him.    # Hyperlipidemia-on rosuvastatin    # BPH-on Flomax    Consultations This Hospital Stay   CARDIOLOGY IP CONSULT    Code Status   Full Code    Time Spent on this Encounter   I, Esme Braga MD, personally saw the patient today and spent greater than 30 minutes discharging this patient.    Esme Braag MD  [facility]  [address] [place] [id number]  Phone: [phone]  Fax:  [phone]  ______________________________________________________________________    Physical Exam   Vital Signs: Temp: 97.6  F (36.4  C) Temp src: Oral BP: 150/72 Pulse: 77   Resp: 18 SpO2: 97 % O2 Device: None (Room air)    Weight: 242 lbs 0 oz  General: Alert and oriented x 3. Not in obvious distress.  HEENT: NC, AT. Neck- supple, No JVP elevation, lymphadenopathy or thyromegaly. Trachea-central.  Chest: Clear to auscultation bilaterally.  Heart: S1S2 regular. No M/R/G.  Abdomen: Soft. NT, ND. No organomegaly. Bowel sounds- active.  Back: No spine tenderness. No CVA tenderness.  Extremities: No leg swelling. Peripheral pulses 2+ bilaterally.  Neuro: Cranial nerves 1-12 grossly normal. No focal neurological deficit      Primary Care Physician   Janes Jeronimo    Discharge Orders      Reason for your hospital stay    Syncope and collapse     Activity    Your activity upon discharge: activity as tolerated.     Follow Up    Follow up with Dr. Blevins on 3/25/2025, as previously scheduled.     Diet    Follow this diet upon discharge: Current Diet:Orders Placed This Encounter      Low Saturated Fat Na <2400 mg     Hospital Follow-up with Existing Primary Care Provider (PCP)    Please see details below          Significant Results and Procedures   Results for orders placed or performed during the hospital encounter of 01/09/25   CT Head w/o Contrast    Narrative    EXAM: CT HEAD W/O CONTRAST, CT CERVICAL SPINE W/O CONTRAST  LOCATION: [facility]  DATE: 1/9/2025  INDICATION: trauma syncope  COMPARISON: None.  TECHNIQUE:   1) Routine CT Head without IV contrast. Multiplanar reformats. Dose reduction techniques were used.  2) Routine CT Cervical Spine without IV contrast. Multiplanar reformats. Dose reduction techniques were used.  FINDINGS:   HEAD CT:   INTRACRANIAL CONTENTS: No intracranial hemorrhage, extraaxial collection, or mass effect.  No CT evidence of acute infarct. Mild presumed  chronic small vessel ischemic changes. Mild generalized volume loss. No hydrocephalus.   VISUALIZED ORBITS/SINUSES/MASTOIDS: No intraorbital abnormality. There is mucosal thickening of the left maxillary sinus and ethmoid air cells. No middle ear or mastoid effusion. Rightward bowing of the nasal septum.  BONES/SOFT TISSUES: No scalp hematoma. No skull fracture.  CERVICAL SPINE CT:   VERTEBRA: Normal vertebral body heights. There is trace anterolisthesis at C4-C5. No fracture or posttraumatic subluxation.   CANAL/FORAMINA: Advanced intervertebral disc height loss and endplate degenerative changes at C6-C7. Disc osteophyte complex at this level results in moderate to severe spinal canal stenosis. Facet and uncovertebral arthropathy contribute to moderate to   severe neural foraminal stenosis on the left at C3-C4, left at C4-C5, right at C5-C6, bilaterally at C6-C7, and on the left at C7-T1.  PARASPINAL: There is atherosclerotic calcification of the bilateral carotid arteries. Visualized lung fields are clear.      Impression    IMPRESSION:  HEAD CT:  1.  No evidence of acute intracranial abnormality.  CERVICAL SPINE CT:  1.  No fracture or traumatic subluxation of the cervical spine.  2.  Cervical spine degenerative changes resulting in moderate to severe spinal canal stenosis at C6-C7 and multilevel high-grade neural foraminal stenoses as detailed above.   CT Cervical Spine w/o Contrast    Narrative    EXAM: CT HEAD W/O CONTRAST, CT CERVICAL SPINE W/O CONTRAST  LOCATION: [facility]  DATE: 1/9/2025  INDICATION: trauma syncope  COMPARISON: None.  TECHNIQUE:   1) Routine CT Head without IV contrast. Multiplanar reformats. Dose reduction techniques were used.  2) Routine CT Cervical Spine without IV contrast. Multiplanar reformats. Dose reduction techniques were used.  FINDINGS:   HEAD CT:   INTRACRANIAL CONTENTS: No intracranial hemorrhage, extraaxial collection, or mass effect.  No CT evidence  of acute infarct. Mild presumed chronic small vessel ischemic changes. Mild generalized volume loss. No hydrocephalus.   VISUALIZED ORBITS/SINUSES/MASTOIDS: No intraorbital abnormality. There is mucosal thickening of the left maxillary sinus and ethmoid air cells. No middle ear or mastoid effusion. Rightward bowing of the nasal septum.  BONES/SOFT TISSUES: No scalp hematoma. No skull fracture.  CERVICAL SPINE CT:   VERTEBRA: Normal vertebral body heights. There is trace anterolisthesis at C4-C5. No fracture or posttraumatic subluxation.   CANAL/FORAMINA: Advanced intervertebral disc height loss and endplate degenerative changes at C6-C7. Disc osteophyte complex at this level results in moderate to severe spinal canal stenosis. Facet and uncovertebral arthropathy contribute to moderate to   severe neural foraminal stenosis on the left at C3-C4, left at C4-C5, right at C5-C6, bilaterally at C6-C7, and on the left at C7-T1.  PARASPINAL: There is atherosclerotic calcification of the bilateral carotid arteries. Visualized lung fields are clear.      Impression    IMPRESSION:  HEAD CT:  1.  No evidence of acute intracranial abnormality.  CERVICAL SPINE CT:  1.  No fracture or traumatic subluxation of the cervical spine.  2.  Cervical spine degenerative changes resulting in moderate to severe spinal canal stenosis at C6-C7 and multilevel high-grade neural foraminal stenoses as detailed above.   Echocardiogram Complete     Value    LVEF  65-70%    Narrative    [id number]  [id number]  [id number]  [id number]^OMAIRA^[place] [address]  Name: DEEP BLOCK  MRN: [id number]  : 1955  Study Date: 01/10/2025 08:34 AM  Age: 69 yrs  Gender: Male  Patient Location: [facility]  Reason For Study: Syncope  Ordering Physician: ERICK CASTANO  Performed By:   BSA: 2.3 m2  Height: 70 in  Weight: 242 lb  HR: 76  BP: 154/83  mmHg  ______________________________________________________________________________  Procedure  Echocardiogram with two-dimensional, color and spectral Doppler. Definity (NDC  #02369-526) given intravenously. Technically difficult study. Compared to the  prior study dated 7/7/2023, there are changes as noted.  ______________________________________________________________________________  Interpretation Summary  1. Left ventricular chamber size is normal. Mild concentric increase in wall  thickness. Systolic function is normal. The visually estimated left  ventricular ejection fraction is 65-70%.  2. Right ventricular chamber size and systolic function are normal.  3. Calcified trileaflet aortic valve with mild-to-moderate aortic stenosis.  Peak forward velocity measures 3.0 m/s, mean outflow gradient 21 mmHg,  calculated aortic valve area 1.5 cm2 and dimensionless index 0.31. No  significant aortic regurgitation.  4. Compared to the prior study dated 7/7/2023, there has been slight  progression of aortic stenosis.  ______________________________________________________________________________  Left Ventricle  The left ventricle is normal in size. There is mild concentric left  ventricular hypertrophy. The visual ejection fraction is 65-70%. Left  ventricular systolic function is normal. Grade I or early diastolic  dysfunction. Diastolic Doppler findings (E/E' ratio and/or other parameters)  suggest left ventricular filling pressures are normal. No regional wall motion  abnormalities noted.  Right Ventricle  The right ventricle is normal size. The right ventricular systolic function is  normal.  Atria  Normal left atrial size. Right atrial size is normal.  Mitral Valve  The mitral valve leaflets are mildly thickened. There is mild mitral annular  calcification. There is trace mitral regurgitation. There is no mitral valve  stenosis.  Tricuspid Valve  Tricuspid valve leaflets appear normal. There is trace  tricuspid  regurgitation. Right ventricular systolic pressure could not be approximated  due to inadequate tricuspid regurgitation. There is no tricuspid stenosis.  Aortic Valve  The aortic valve is trileaflet. Mild aortic valve calcification is present. No  aortic regurgitation is present. Mild to moderate valvular aortic stenosis.  Pulmonic Valve  Normal pulmonic valve. There is trace pulmonic valvular regurgitation. There  is no pulmonic valvular stenosis.  Vessels  The aorta root is normal. The thoracic aorta is normal. IVC diameter <2.1 cm  collapsing >50% with sniff suggests a normal RA pressure of 3 mmHg.  Pericardium  There is no pericardial effusion.  Rhythm  Sinus rhythm was noted.  ______________________________________________________________________________  MMode/2D Measurements & Calculations     IVSd: 1.2 cm  LVIDd: 4.6 cm  LVIDs: 3.0 cm  LVPWd: 1.2 cm  FS: 34.9 %  LV mass(C)d: 203.6 grams  LV mass(C)dI: 90.0 grams/m2  Ao root diam: 3.9 cm  asc Aorta Diam: 3.4 cm  LVOT diam: 2.5 cm  LVOT area: 4.9 cm2  Ao root diam index Ht(cm/m): 2.2  Ao root diam index BSA (cm/m2): 1.7  Asc Ao diam index BSA (cm/m2): 1.5  Asc Ao diam index Ht(cm/m): 1.9  EF Biplane: 71.5 %  LA Volume (BP): 57.4 ml  LA Volume Index (BP): 25.4 ml/m2  LA Volume Indexed (AL/bp): 26.5 ml/m2  RV Base: 4.9 cm  RWT: 0.52  TAPSE: 2.0 cm  Doppler Measurements & Calculations  MV E max sebastián: 74.4 cm/sec  MV A max sebastián: 94.3 cm/sec  MV E/A: 0.79  MV dec slope: 282.0 cm/sec2  MV dec time: 0.26 sec  Ao V2 max: 299.4 cm/sec  Ao max P.9 mmHg  Ao V2 mean: 218.0 cm/sec  Ao mean P.0 mmHg  Ao V2 VTI: 68.0 cm  CHUN(I,D): 1.4 cm2  CHUN(V,D): 1.5 cm2  LV V1 max PG: 3.4 mmHg  LV V1 max: 91.8 cm/sec  LV V1 VTI: 18.9 cm  SV(LVOT): 92.8 ml  SI(LVOT): 41.0 ml/m2  PA V2 max: 124.0 cm/sec  PA max P.2 mmHg  PA acc time: 0.10 sec  AV Sebastián Ratio (DI): 0.31  CHUN Index (cm2/m2): 0.60  E/E' av.2  Lateral E/e': 8.4  Medial E/e': 14.0  RV S Sebastián: 10.8  cm/sec  _____________________________________________________________________________  Report approved by: Gordon Lopes MD on 01/10/2025 11:15 AM        Discharge Medications   Current Discharge Medication List        CONTINUE these medications which have NOT CHANGED    Details   aspirin (ASPIRIN LOW DOSE) 81 MG EC tablet TAKE 1 TABLET(81 MG) BY MOUTH DAILY. START TOMORROW  Qty: 30 tablet, Refills: 11    Associated Diagnoses: ACS (acute coronary syndrome) (H)      Bexagliflozin 20 MG TABS Take 20 mg by mouth daily.  Qty: 90 tablet, Refills: 2    Associated Diagnoses: Type 2 diabetes mellitus with other specified complication, with long-term current use of insulin (H)      carvedilol (COREG) 12.5 MG tablet Take 25 mg by mouth daily.      glipiZIDE (GLUCOTROL XL) 10 MG 24 hr tablet TAKE 2 TABLETS BY MOUTH DAILY  Qty: 180 tablet, Refills: 3    Associated Diagnoses: Type 2 diabetes mellitus without complication, with long-term current use of insulin (H)      LANTUS SOLOSTAR 100 UNIT/ML soln ADMINISTER 66 UNITS UNDER THE SKIN AT BEDTIME  Qty: 45 mL, Refills: 5    Comments: If Lantus is not covered by insurance, may substitute Basaglar or Semglee or other insulin glargine product per insurance preference at same dose and frequency.  Associated Diagnoses: Type 2 diabetes mellitus without complication, with long-term current use of insulin (H)      losartan (COZAAR) 100 MG tablet TAKE 1 TABLET(100 MG) BY MOUTH DAILY  Qty: 90 tablet, Refills: 3    Associated Diagnoses: Essential hypertension      metFORMIN (GLUCOPHAGE XR) 500 MG 24 hr tablet TAKE 4 TABLETS(2000 MG) BY MOUTH DAILY  Qty: 360 tablet, Refills: 3    Associated Diagnoses: Type 2 diabetes mellitus without complication, with long-term current use of insulin (H)      rosuvastatin (CRESTOR) 40 MG tablet TAKE 1 TABLET(40 MG) BY MOUTH DAILY  Qty: 90 tablet, Refills: 0    Comments: Instruct pt to sched yrly follow-up for further refills.  mg  Associated Diagnoses: Status  post coronary artery stent placement; Mixed hyperlipidemia      sildenafil (REVATIO) 20 MG tablet Take 3 to 5 pills daily as needed for  Qty: 90 tablet, Refills: 1    Associated Diagnoses: Male erectile disorder      tamsulosin (FLOMAX) 0.4 MG capsule TAKE 1 CAPSULE BY MOUTH EVERY DAY  Qty: 90 capsule, Refills: 1    Associated Diagnoses: Benign prostatic hyperplasia with lower urinary tract symptoms, symptom details unspecified      !! blood glucose (ACCU-CHEK GUIDE) test strip USE 1 TEST TWICE DAILY  Qty: 200 strip, Refills: 1    Associated Diagnoses: Type 2 diabetes mellitus without complication, with long-term current use of insulin (H)      blood glucose (NO BRAND SPECIFIED) lancing device Device to be used with lancets.  Qty: 100 each, Refills: 1    Associated Diagnoses: Type 2 diabetes mellitus without complication, with long-term current use of insulin (H)      !! blood glucose (NO BRAND SPECIFIED) test strip Use to test blood sugar daily for diabetes  Qty: 100 strip, Refills: 3    Associated Diagnoses: Type 2 diabetes mellitus without complication, with long-term current use of insulin (H)      blood glucose monitoring (NO BRAND SPECIFIED) meter device kit by [employer] route as needed (glucose checks)  Qty: 1 kit, Refills: 0    Associated Diagnoses: Type 2 diabetes mellitus without complication, with long-term current use of insulin (H)      blood glucose monitoring (SOFTCLIX) lancets TEST LANCETS TEST TWICE DAILY  Qty: 100 each, Refills: 5    Comments: ZERO refills remain on this prescription. Your patient is requesting advance approval of refills for this medication to PREVENT ANY MISSED DOSES  Associated Diagnoses: Type 2 diabetes mellitus with other specified complication, with long-term current use of insulin (H)      generic lancets [GENERIC LANCETS] Use 1 each As Directed 2 (two) times a day. Dispense brand per patient's insurance at pharmacy discretion.  Qty: 200 each, Refills: 3    Associated  Diagnoses: Type 2 diabetes mellitus without complication, with long-term current use of insulin (H)      insulin pen needle (B-D U/F) 31G X 5 MM miscellaneous TWICE DAILY TO INJECT VICTOZA AND LANTUS  Qty: 100 each, Refills: 5    Associated Diagnoses: Type 2 diabetes mellitus with other specified complication, with long-term current use of insulin (H)       !! - Potential duplicate medications found. Please discuss with provider.        Allergies   Allergies   Allergen Reactions    Lisinopril Cough     Dry cough   This document is created with the help of Dragon dictation system. All grammatical errors are unintentional.

## 2025-01-10 NOTE — PROGRESS NOTES
Met with patient to review observation status billing under Medicare guidelines and provide a copy of the [facility] brochure. Patient alert, answering questions appropriately and engaged in the conversation.

## 2025-01-10 NOTE — H&P
"[facility]    History and Physical - Hospitalist Service       Date of Admission:  1/9/2025    Assessment & Plan      Ezekiel Aldrich is a 69 year old male with pertinent medical history of hyperlipidemia, hypertension, T2DM, coronary artery disease, and CKD stage 3a who presents with syncope.   The patient was drinking beers at a bar and then suddenly \"slumped\" onto the table. He was unresponsive for 10 minutes. By the time EMS arrived he was awake, responsive, but intoxicated. BP initially 110/60. In the ED patient's became hypotensive blood pressure 80 systolic.  Normal saline fluid bolus given blood pressure now normal 108 systolic.  EKG changes seen with ST depression and elevated troponin.  Repeat troponin pending.  Cardiology consult recommendations.  Echo pending.     # Syncope  # Abnormal EKG  # Elevated troponin  # Hypotension  Head CT results:No evidence of acute intracranial abnormality.  CT cervical spine results:No fracture or traumatic subluxation of the cervical spine.  Cervical spine degenerative changes resulting in moderate to severe spinal canal stenosis at C6-C7 and multilevel high-grade neural foraminal stenoses as detailed above.  He was unresponsive for 10 minutes.  BP initially 110/60. In the ED patient's became hypotensive blood pressure 80 systolic.   Normal saline fluid bolus given blood pressure now normal 108 systolic.    EKG obtained and reviewed changes seen with ST depression and prolonged QT  Elevated troponin- first troponin 44, repeat troponin 46, third troponin pending.   Cardiology consult recommendations.  Echo pending results.   N.p.o. after midnight pending cardiology recommendation  Orthostatic blood pressure results pending    # EtOH use  Patient admits to daily EtOH drinking  Last drink PTA  EtOH level today 0.19  [facility] protocol ordered    # CA on CKD  Creatinine today 1.5 to trending up from 1.25 on 1/6  Approximately patient's baseline " "creatinine 1.25  Saline 1 L fluid bolus given in ED  Encourage p.o. fluid  Trend BMP in a.m.    # Coronary artery disease, with cardiac stent  Cardiac stents placed 11/20/2023  Resume home Coreg  Aspirin    # Hypertension  Monitor blood pressure stable at this time  Hold home losartan for now due to hypotensive episode in ED    # Diabetic mellitus  Hemoglobin A1c 8.6  Monitor Accu-Cheks stable at this time  Diabetic diet  Sliding scale insulin as needed  Hold home bexagliflozin, glipizide, metformin  Resume home Lantus at half normal dose 33 units at bedtime(home dose 66 units)    # Hyperlipidemia  Resume home rosuvastatin    # ED  Hold sildenafil while inpatient    # BPH  Resume home Flomax            Diet: Moderate Consistent Carb (60 g CHO per Meal) Diet  NPO per Anesthesia Guidelines for Procedure/Surgery Except for: Meds    DVT Prophylaxis: Pneumatic Compression Devices  Viramontes Catheter: Not present  Lines: None     Cardiac Monitoring: ACTIVE order. Indication: Tachyarrhythmias, acute (48 hours)  Code Status: Full Code      Clinically Significant Risk Factors Present on Admission                 # Drug Induced Platelet Defect: home medication list includes an antiplatelet medication   # Hypertension: Noted on problem list          # DMII: A1C = 8.6 % (Ref range: 0.0 - 5.6 %) within past 6 months    # Obesity: Estimated body mass index is 34.19 kg/m  as calculated from the following:    Height as of 1/6/25: 1.784 m (5' 10.25\").    Weight as of this encounter: 108.9 kg (240 lb).              Disposition Plan     Medically Ready for Discharge: Anticipated in 2-4 Days         The patient's care was discussed with the Attending Physician, Dr. Daily .    Lisy Anderson NP  Hospitalist Service  [facility]  Securely message with [employer] (more info)  Text page via [facility] Paging/Directory     ______________________________________________________________________    Chief Complaint   Syncope    History " "is obtained from the patient and chart    History of Present Illness     Ezekiel Aldrich is a 69 year old male with pertinent medical history of hyperlipidemia, hypertension, T2DM, coronary artery disease, and CKD stage 3a who presents with syncope.   The patient was drinking beers with his friends/brothers at a bar and then suddenly \"slumped\" onto the table. He was unresponsive for 10 minutes. By the time EMS arrived he was awake, responsive, but intoxicated. . BP initially 110/60 but en route it was 180/50.   Here, he is awake, interactive, and moves all extremities. He is intoxicated stating \"I had a couple beers\". He denies any abdominal pain. His brother-in-law is a former [facility] ED provider. He denies abdominal pain.  Patient denies nausea vomiting diarrhea.  Patient denies chest pain shortness of breath or dizziness.  Patient states he did not feel dizzy or lightheaded prior to syncopal episode.  Patient denies having any episodes like this before.  Patient states he recently had a cardiac stent placed last year.  Patient denies fever chills or cough.  Patient states he feels well and would like to go home.       Past Medical History    Past Medical History:   Diagnosis Date    Acute renal failure     6/11/2016, secondary to dehydration, normalized       Past Surgical History   Past Surgical History:   Procedure Laterality Date    ANKLE FRACTURE SURGERY Right     hardware    CV CORONARY ANGIOGRAM N/A 08/02/2022    Procedure: Coronary Angiogram;  Surgeon: Archie Ni MD;  Location: [facility] CV    CV CORONARY ANGIOGRAM N/A 11/20/2023    Procedure: Coronary Angiogram;  Surgeon: Chino Boyd MD;  Location: [facility] CV    CV INTRAVASULAR ULTRASOUND N/A 08/02/2022    Procedure: Intravascular Ultrasound;  Surgeon: Archie Ni MD;  Location: [facility] CV    CV LEFT HEART CATH N/A 11/20/2023    Procedure: Left Heart Catheterization;  Surgeon: Chino Boyd, " MD;  Location: [facility] CV    CV PCI N/A 08/02/2022    Percutaneous Coronary Intervention;  Surgeon: Archie Ni MD;  Location: [facility] CV    CV PCI STENT DRUG ELUTING N/A 11/20/2023    Procedure: Percutaneous Coronary Intervention Stent;  Surgeon: Chino Boyd MD;  Location: [facility] CV    KNEE SURGERY      MANDIBLE FRACTURE SURGERY      from fx    SHOULDER SURGERY Right     from fx and separation       Prior to Admission Medications   Prior to Admission Medications   Prescriptions Last Dose Informant Patient Reported? Taking?   Bexagliflozin 20 MG TABS 1/9/2025 Morning  No Yes   Sig: Take 20 mg by mouth daily.   LANTUS SOLOSTAR 100 UNIT/ML soln 1/8/2025 Morning  No Yes   Sig: ADMINISTER 66 UNITS UNDER THE SKIN AT BEDTIME   aspirin (ASPIRIN LOW DOSE) 81 MG EC tablet 1/9/2025 Morning  No Yes   Sig: TAKE 1 TABLET(81 MG) BY MOUTH DAILY. START TOMORROW   blood glucose (ACCU-CHEK GUIDE) test strip   No No   Sig: USE 1 TEST TWICE DAILY   blood glucose (NO BRAND SPECIFIED) lancing device   No No   Sig: Device to be used with lancets.   blood glucose (NO BRAND SPECIFIED) test strip   No No   Sig: Use to test blood sugar daily for diabetes   blood glucose monitoring (NO BRAND SPECIFIED) meter device kit   No No   Sig: by In Vitro route as needed (glucose checks)   Patient not taking: Reported on 1/6/2025   blood glucose monitoring (SOFTCLIX) lancets   No No   Sig: TEST LANCETS TEST TWICE DAILY   Patient not taking: Reported on 1/6/2025   carvedilol (COREG) 12.5 MG tablet 1/9/2025 Morning  Yes Yes   Sig: Take 25 mg by mouth daily.   generic lancets   No No   Sig: [GENERIC LANCETS] Use 1 each As Directed 2 (two) times a day. Dispense brand per patient's insurance at pharmacy discretion.   glipiZIDE (GLUCOTROL XL) 10 MG 24 hr tablet 1/9/2025 Morning  No Yes   Sig: TAKE 2 TABLETS BY MOUTH DAILY   insulin pen needle (B-D U/F) 31G X 5 MM miscellaneous   No No   Sig: TWICE DAILY TO INJECT  VICTOZA AND LANTUS   losartan (COZAAR) 100 MG tablet 2025 Morning  No Yes   Sig: TAKE 1 TABLET(100 MG) BY MOUTH DAILY   metFORMIN (GLUCOPHAGE XR) 500 MG 24 hr tablet 2025 Morning  No Yes   Sig: TAKE 4 TABLETS(2000 MG) BY MOUTH DAILY   rosuvastatin (CRESTOR) 40 MG tablet 2025 Morning  No Yes   Sig: TAKE 1 TABLET(40 MG) BY MOUTH DAILY   sildenafil (REVATIO) 20 MG tablet Unknown  No Yes   Sig: Take 3 to 5 pills daily as needed for   tamsulosin (FLOMAX) 0.4 MG capsule 2025 Morning  No Yes   Sig: TAKE 1 CAPSULE BY MOUTH EVERY DAY      Facility-Administered Medications: None        Review of Systems    The 10 point Review of Systems is negative other than noted in the HPI or here.     Social History   I have reviewed this patient's social history and updated it with pertinent information if needed.  Social History     Tobacco Use    Smoking status: Former     Current packs/day: 0.00     Types: Cigarettes     Quit date: 1985     Years since quittin.7    Smokeless tobacco: Current     Types: Snuff    Tobacco comments:     2 tins/week   Vaping Use    Vaping status: Never Used   Substance Use Topics    Alcohol use: Yes     Alcohol/week: 2.0 standard drinks of alcohol     Types: 2 Standard drinks or equivalent per week     Comment: 10 drink per week    Drug use: No         Family History   I have reviewed this patient's family history and updated it with pertinent information if needed.  Family History   Problem Relation Age of Onset    Colon Cancer Mother     Coronary Artery Disease Mother     Hypertension Mother     Diabetes Type 2  Mother     Hypertension Father     Bell's palsy Father     Diabetes Type 2  Father     Thyroid Disease Father     Sleep Apnea Father     Sleep Apnea Sister     Prostate Cancer Brother 57    Sleep Apnea Brother     Bell's palsy Maternal Grandmother     Thyroid Disease Daughter          Allergies   Allergies   Allergen Reactions    Lisinopril Cough     Dry cough         Physical Exam   Vital Signs: Temp: 98.1  F (36.7  C) Temp src: Oral BP: 108/57 Pulse: 97   Resp: 22 SpO2: 96 %      Weight: 240 lbs 0 oz    Constitutional: awake, alert, cooperative, no apparent distress, and appears stated age  Hematologic / Lymphatic: no cervical lymphadenopathy and no supraclavicular lymphadenopathy  Respiratory: No increased work of breathing, good air exchange, clear to auscultation bilaterally, no crackles or wheezing  Cardiovascular: Normal apical impulse, regular rate and rhythm, normal S1 and S2, no S3 or S4, and no murmur noted  GI: No scars, normal bowel sounds, soft, non-distended, non-tender, no masses palpated, no hepatosplenomegally  Skin: no bruising or bleeding, normal skin color, texture, turgor, no redness, warmth, or swelling, and no rashes  Musculoskeletal: There is no redness, warmth, or swelling of the joints.    Neurologic: Awake, alert, oriented to name, place and time.  Cranial nerves II-XII are grossly intact.  Motor is 5 out of 5 bilaterally.    Neuropsychiatric: General: normal, calm, and normal eye contact    Medical Decision Making       75 MINUTES SPENT BY ME on the date of service doing chart review, history, exam, documentation & further activities per the note.      Data     I have personally reviewed the following data over the past 24 hrs:    11.0  \   17.7   / 178     138 103 24.0 (H) /  81   4.2 17 (L) 1.52 (H) \     ALT: 24 AST: 24 AP: 66 TBILI: 0.4   ALB: 4.2 TOT PROTEIN: 6.9 LIPASE: N/A     Trop: 46 (H) BNP: N/A       Imaging results reviewed over the past 24 hrs:   Recent Results (from the past 24 hours)   CT Head w/o Contrast    Narrative    EXAM: CT HEAD W/O CONTRAST, CT CERVICAL SPINE W/O CONTRAST  LOCATION: [facility]  DATE: 1/9/2025    INDICATION: trauma syncope  COMPARISON: None.  TECHNIQUE:   1) Routine CT Head without IV contrast. Multiplanar reformats. Dose reduction techniques were used.  2) Routine CT Cervical Spine  without IV contrast. Multiplanar reformats. Dose reduction techniques were used.    FINDINGS:   HEAD CT:   INTRACRANIAL CONTENTS: No intracranial hemorrhage, extraaxial collection, or mass effect.  No CT evidence of acute infarct. Mild presumed chronic small vessel ischemic changes. Mild generalized volume loss. No hydrocephalus.     VISUALIZED ORBITS/SINUSES/MASTOIDS: No intraorbital abnormality. There is mucosal thickening of the left maxillary sinus and ethmoid air cells. No middle ear or mastoid effusion. Rightward bowing of the nasal septum.    BONES/SOFT TISSUES: No scalp hematoma. No skull fracture.    CERVICAL SPINE CT:   VERTEBRA: Normal vertebral body heights. There is trace anterolisthesis at C4-C5. No fracture or posttraumatic subluxation.     CANAL/FORAMINA: Advanced intervertebral disc height loss and endplate degenerative changes at C6-C7. Disc osteophyte complex at this level results in moderate to severe spinal canal stenosis. Facet and uncovertebral arthropathy contribute to moderate to   severe neural foraminal stenosis on the left at C3-C4, left at C4-C5, right at C5-C6, bilaterally at C6-C7, and on the left at C7-T1.    PARASPINAL: There is atherosclerotic calcification of the bilateral carotid arteries. Visualized lung fields are clear.      Impression    IMPRESSION:  HEAD CT:  1.  No evidence of acute intracranial abnormality.    CERVICAL SPINE CT:  1.  No fracture or traumatic subluxation of the cervical spine.  2.  Cervical spine degenerative changes resulting in moderate to severe spinal canal stenosis at C6-C7 and multilevel high-grade neural foraminal stenoses as detailed above.   CT Cervical Spine w/o Contrast    Narrative    EXAM: CT HEAD W/O CONTRAST, CT CERVICAL SPINE W/O CONTRAST  LOCATION: [facility]  DATE: 1/9/2025    INDICATION: trauma syncope  COMPARISON: None.  TECHNIQUE:   1) Routine CT Head without IV contrast. Multiplanar reformats. Dose reduction  techniques were used.  2) Routine CT Cervical Spine without IV contrast. Multiplanar reformats. Dose reduction techniques were used.    FINDINGS:   HEAD CT:   INTRACRANIAL CONTENTS: No intracranial hemorrhage, extraaxial collection, or mass effect.  No CT evidence of acute infarct. Mild presumed chronic small vessel ischemic changes. Mild generalized volume loss. No hydrocephalus.     VISUALIZED ORBITS/SINUSES/MASTOIDS: No intraorbital abnormality. There is mucosal thickening of the left maxillary sinus and ethmoid air cells. No middle ear or mastoid effusion. Rightward bowing of the nasal septum.    BONES/SOFT TISSUES: No scalp hematoma. No skull fracture.    CERVICAL SPINE CT:   VERTEBRA: Normal vertebral body heights. There is trace anterolisthesis at C4-C5. No fracture or posttraumatic subluxation.     CANAL/FORAMINA: Advanced intervertebral disc height loss and endplate degenerative changes at C6-C7. Disc osteophyte complex at this level results in moderate to severe spinal canal stenosis. Facet and uncovertebral arthropathy contribute to moderate to   severe neural foraminal stenosis on the left at C3-C4, left at C4-C5, right at C5-C6, bilaterally at C6-C7, and on the left at C7-T1.    PARASPINAL: There is atherosclerotic calcification of the bilateral carotid arteries. Visualized lung fields are clear.      Impression    IMPRESSION:  HEAD CT:  1.  No evidence of acute intracranial abnormality.    CERVICAL SPINE CT:  1.  No fracture or traumatic subluxation of the cervical spine.  2.  Cervical spine degenerative changes resulting in moderate to severe spinal canal stenosis at C6-C7 and multilevel high-grade neural foraminal stenoses as detailed above.

## 2025-01-10 NOTE — PROGRESS NOTES
Discharged home with spouse. All personal belongings sent with him. Echo WNL, 2/10 echo cancelled. Patient stable.

## 2025-01-16 ENCOUNTER — TELEPHONE (OUTPATIENT)
Dept: FAMILY MEDICINE | Facility: CLINIC | Age: 70
End: 2025-01-16
Payer: MEDICARE

## 2025-01-16 NOTE — TELEPHONE ENCOUNTER
Writer called pharmacy to verify what alternative long acting insulin they have in stock that is an appropriate alternative for Lantus solostar 100 unit/mL. Original script noting ok to substitute basaglar or semglee. Confirmed Lantus solostar pen is on backorder and will not be in stock for a few weeks.    Pharmacy attempted to run alternative medications, noted they do not have current insurance on file for pt. Requested pt contact pharmacy to update insurance so they can continue processing alternative options for pt.     Writer viewed pt's insurance card on file, appears [employer] insurance  2024.    _____________________________      Writer called pt and left voicemail requesting pt call pharmacy with their updated insurance information to be able to process medication refill. Also requested pt give copy of insurance card to clinic next time they are at an appt at [facility].    Lani Villasenor RN

## 2025-01-16 NOTE — TELEPHONE ENCOUNTER
Please call the pharmacy and find out what insulin they have. I'm not just going fax in any kind of insulin until I know what other long acting in insulin. They have there are several different types.

## 2025-01-22 NOTE — TELEPHONE ENCOUNTER
Per following request - Can we follow up to see if patient received insulin?    [facility] has generic insulin for low cost and prescription is not needed, as last resort alternative.    ENA Randolph  [facility]  [phone]     RN called and left voice message for patient requesting a callback regarding if pharmacy was able to process refill for his lory acting insulin and if he was able to  his prescription.

## 2025-02-23 DIAGNOSIS — E78.2 MIXED HYPERLIPIDEMIA: ICD-10-CM

## 2025-02-23 DIAGNOSIS — Z95.5 STATUS POST CORONARY ARTERY STENT PLACEMENT: Primary | ICD-10-CM

## 2025-02-24 RX ORDER — ROSUVASTATIN CALCIUM 40 MG/1
40 TABLET, COATED ORAL DAILY
Qty: 30 TABLET | Refills: 0 | Status: SHIPPED | OUTPATIENT
Start: 2025-02-24

## 2025-02-24 RX ORDER — CARVEDILOL 12.5 MG/1
12.5 TABLET ORAL 2 TIMES DAILY WITH MEALS
Qty: 60 TABLET | Refills: 0 | Status: SHIPPED | OUTPATIENT
Start: 2025-02-24

## 2025-03-05 ENCOUNTER — VIRTUAL VISIT (OUTPATIENT)
Dept: PHARMACY | Facility: CLINIC | Age: 70
End: 2025-03-05

## 2025-03-05 DIAGNOSIS — Z79.4 TYPE 2 DIABETES MELLITUS WITH OTHER SPECIFIED COMPLICATION, WITH LONG-TERM CURRENT USE OF INSULIN (H): Primary | ICD-10-CM

## 2025-03-05 DIAGNOSIS — R35.1 BENIGN PROSTATIC HYPERPLASIA WITH NOCTURIA: ICD-10-CM

## 2025-03-05 DIAGNOSIS — E11.9 TYPE 2 DIABETES MELLITUS WITHOUT COMPLICATION, WITH LONG-TERM CURRENT USE OF INSULIN (H): ICD-10-CM

## 2025-03-05 DIAGNOSIS — E78.2 MIXED HYPERLIPIDEMIA: ICD-10-CM

## 2025-03-05 DIAGNOSIS — R52 PAIN: ICD-10-CM

## 2025-03-05 DIAGNOSIS — I10 PRIMARY HYPERTENSION: ICD-10-CM

## 2025-03-05 DIAGNOSIS — N40.1 BENIGN PROSTATIC HYPERPLASIA WITH NOCTURIA: ICD-10-CM

## 2025-03-05 DIAGNOSIS — Z79.4 TYPE 2 DIABETES MELLITUS WITHOUT COMPLICATION, WITH LONG-TERM CURRENT USE OF INSULIN (H): ICD-10-CM

## 2025-03-05 DIAGNOSIS — E11.69 TYPE 2 DIABETES MELLITUS WITH OTHER SPECIFIED COMPLICATION, WITH LONG-TERM CURRENT USE OF INSULIN (H): Primary | ICD-10-CM

## 2025-03-05 DIAGNOSIS — F52.8 OTHER SEXUAL DYSFUNCTION NOT DUE TO A SUBSTANCE OR KNOWN PHYSIOLOGICAL CONDITION: ICD-10-CM

## 2025-03-05 PROCEDURE — 99207 PR NO CHARGE LOS: CPT | Mod: 95

## 2025-03-05 NOTE — Clinical Note
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 Wondering if we can switch to metoprolol succinate instead. Also having significant dizziness in the mornings sometimes lasting 45 minutes.   He will be seeing you soon on 3/25.  Thanks, Sameera

## 2025-03-05 NOTE — Clinical Note
Ezekiel wanted me to pass along that he is having significant knee pain and had to order a cane recently. He wants to know what the next steps are for workup - can someone call him about this?  Thanks, Sameera

## 2025-03-05 NOTE — PROGRESS NOTES
Medication Therapy Management (MTM) Encounter    ASSESSMENT:                            Medication Adherence/Access: See below for considerations.    Diabetes   A1c not at goal <7% on max dose of metformin, SGLT2, and sulfonylurea. GLP1 limited by cost but we will see if he qualifies for assistance program today. Otherwise can consider DPP4 inhibitor. His BMP shows he may be dehydrated, possibly from Brenzavvy that could be contributing to his dizziness. We will continue to monitor at this time.   UACR not at goal <30mg/g though he is on max dose ACEi and SGLT2.     Hypertension   Patient may be experiencing orthostatic hypotension either from his carvedilol or Brenzavvy. Consider switching to metoprolol succinate for improved adherence - will run by cardiology.      Hyperlipidemia   LDL at goal <70 on high intensity statin.      Erectile dysfunction   PDE5 inhibitors ineffective. We discussed that high blood glucose could certainly be contributing as well.     Benign prostatic hyperplasia   Stable.     Pain  Will discuss workup with PCP.     PLAN:                            Diabetes  I sent a Ozempic prescription so the pharmacy can let you know what the copay might be  The first pen will allow you to take 0.25mg weekly for 4 weeks, then 0.5mg weekly for 2 weeks  Dr. Jeronimo faxed in his portion of the Ozempic application today. If we are not able to start Ozempic, then we will have to look at increasing insulin dose and working on dietary changes.   https://www.[employer].com/diabetes/help-with-costs/pap.html  I can mail out a paper copy of the application today.  I sent refills for Accuchek test strips    Heart  I will ask Dr. Blevins if we can switch carvedilol to a once daily medication instead    Knee pain  I will let Dr. Jeronimo know about your knee pain.     Follow-up: Return in 1 month (on 4/7/2025) for Follow up, with me, using a video visit.    SUBJECTIVE/OBJECTIVE:                          Ezekiel Aldrich is a  69 year old male seen for an initial visit. He was referred to me from Janes Jeronimo MD. Patient was accompanied by wife Maria Eugenia.     Reason for visit: diabetes management     Allergies/ADRs: Reviewed in chart  Past Medical History: Reviewed in chart  Tobacco: He reports that he quit smoking about 39 years ago. His smoking use included cigarettes. His smokeless tobacco use includes snuff.  Alcohol: none    Medication Adherence/Access: often misses second dose of twice daily medications. Cost of brand name medications. He feels his medications are working against each other and causing side effects.     Diabetes   Metformin XR 2000mg once daily   Brenzavvy 20mg once daily - this is his newest medication addition   Glipizide XL 20mg once daily   Lantus 66 units at bedtime - we discussed taking twice daily but patient reports adherence is issue so was recommended to just take once daily in the morning rather than not at all  Aspirin 81mg daily     Was on Ozempic but expensive. He was never instructed to increase his dose so he was also only on starting dose for several months. He did have decreased appetite. He did have side effects but willing to try again.     Declines CGM. Does not want to have sensor on his arm , does not mind poking finger.    Patient is experiencing the following side effects: dizziness  Current diabetes symptoms: polyuria and polydipsia  Diet/Exercise: not discussed     Blood sugar monitorin time(s) daily; Ranges: (patient reported) Fasting- 182, 208  Eye exam is up to date  Foot exam: due    Hypertension   Losartan 100mg once daily  Carvedilol 12.5mg twice daily with meals - forgetting evening dose.     For last 2-3 months, he does report dizzy spells when he wakes up. Some days this lasts 45 minutes before he can get up. Newest medications are Brenzavvy and rosuvastatin. He did recently have EKG and full workup that was unremarkable. No readings to report but does not think blood  pressure has been too high or low. Reports blood pressure usually <160/80.     Hyperlipidemia   Rosuvastatin 40mg once daily   Aspirin 81mg once daily   History stent placement.   Patient reports no significant myalgias or other side effects.  The 10-year ASCVD risk score (Rajeev GOMEZ, et al., 2019) is: 35.8%    Values used to calculate the score:      Age: 69 years      Sex: Male      Is Non- : No      Diabetic: Yes      Tobacco smoker: No      Systolic Blood Pressure: 150 mmHg      Is BP treated: Yes      HDL Cholesterol: 53 mg/dL      Total Cholesterol: 163 mg/dL     Erectile dysfunction   Sildenafil 20mg - 3-5 pills once daily as needed   Ineffective. Denies side effects. Sometimes takes 6 pills. He has tried both vardenafil and tadalafil as well per chart with no response.     Benign prostatic hyperplasia   Tamsulosin 0.4mg once daily   Reports polyuria but drinks a lot of water.  Less frequent nocturia with CPAP  Been 54 days since he had alcohol.    Pain  He would like Dr. Jeronimo to know about his knee pain. He worked in [employer] for 20 years and is having knee pain, had to order a cane recently. Not sure if he needs to have imaging done. Would like someone to call him about it.   Uses two Excedrin or Tylenol as needed though does not feel like either is working. Knows he should not take ibuprofen but does use it every few days.     Today's Vitals: There were no vitals taken for this visit.  ----------------    I spent 54 minutes with this patient today. All changes were made via collaborative practice agreement with Janes Jeronimo MD.     A summary of these recommendations was sent via [employer].    Sameera Noe PharmD  Medication Therapy Management (MTM) Pharmacist    Telemedicine Visit Details  The patient's medications can be safely assessed via a telemedicine encounter.  Type of service:  Video Conference via [employer]  Originating Location (pt. Location): Home    Distant Location  (provider location):  On-site  Start Time: 9:47 AM  End Time: 10:41 AM     Medication Therapy Recommendations  Hypertension   1 Current Medication: carvedilol (COREG) 12.5 MG tablet   Current Medication Sig: TAKE 1 TABLET(12.5 MG) BY MOUTH TWICE DAILY WITH MEALS   Rationale: Patient forgets to take - Adherence - Adherence   Recommendation: Change Medication - METOPROLOL SUCCINATE   Status: Contact Provider - Awaiting Response   Identified Date: 3/5/2025         Type 2 diabetes mellitus (H)   1 Current Medication: Bexagliflozin 20 MG TABS   Current Medication Sig: Take 20 mg by mouth daily.   Rationale: Synergistic therapy - Needs additional medication therapy - Indication   Recommendation: Start Medication - Ozempic (0.25 or 0.5 MG/DOSE) 2 MG/1.5ML Sopn   Status: Accepted per CPA   Identified Date: 3/5/2025 Completed Date: 3/5/2025

## 2025-03-05 NOTE — PATIENT INSTRUCTIONS
"Recommendations from today's MTM visit:                                                    MTM (medication therapy management) is a service provided by a clinical pharmacist designed to help you get the most of out of your medicines.   Today we reviewed what your medicines are for, how to know if they are working, that your medicines are safe and how to make your medicine regimen as easy as possible.      Diabetes  I sent a Ozempic prescription so the pharmacy can let you know what the copay might be  The first pen will allow you to take 0.25mg weekly for 4 weeks, then 0.5mg weekly for 2 weeks  Dr. Jeronimo faxed in his portion of the Ozempic application today. If we are not able to start Ozempic, then we will have to look at increasing insulin dose and working on dietary changes.   https://[email or web address]/diabetes/help-with-costs/pap.html  I can mail out a paper copy of the application today.  I sent refills for Accuchek test strips    Heart  I will ask Dr. Blevins if we can switch carvedilol to a once daily medication instead    Knee pain  I will let Dr. Jeronimo know about your knee pain.     Follow-up: Return in 1 month (on 4/7/2025) for Follow up, with me, using a video visit.    It was great speaking with you today.  I value your experience and would be very thankful for your time in providing feedback in our clinic survey. In the next few days, you may receive an email or text message from [employer] with a link to a survey related to your  clinical pharmacist.\"     To schedule another MTM appointment, please call the clinic directly or you may call the MTM scheduling line at [phone].    My Clinical Pharmacist's contact information:                                                      Please feel free to contact me with any questions or concerns you have.      Sameera Noe, PharmD  Medication Therapy Management (MTM) Pharmacist   "

## 2025-03-07 ENCOUNTER — NURSE TRIAGE (OUTPATIENT)
Dept: FAMILY MEDICINE | Facility: CLINIC | Age: 70
End: 2025-03-07
Payer: MEDICARE

## 2025-03-07 ENCOUNTER — TELEPHONE (OUTPATIENT)
Dept: FAMILY MEDICINE | Facility: CLINIC | Age: 70
End: 2025-03-07
Payer: MEDICARE

## 2025-03-07 NOTE — TELEPHONE ENCOUNTER
03/07/25  General Call      Reason for Call:  Missing pages    What are your questions or concerns:  Nicky from [employer] calling because they received the prescription and application for Ozempic. They are missing pages 1-6 which is the pt portion. Please have pt fill out and return via fax.    Fax: [phone]

## 2025-03-07 NOTE — TELEPHONE ENCOUNTER
"Patient has upcoming appt on 3/25/2025    RN let him know we would call and let him know plan next week.      1. LOCATION and RADIATION: left knee   2. QUALITY: sharp dull aching burning, nothing helps for pain relief.  3. SEVERITY: good day 2/10  bad day 9/10  4. 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 RECURRENT: patient states he has \"been on my knees since he I was 19, my  left knee is bad and my right knee is probably not far behind\".  6. SETTIN. AGGRAVATING FACTORS: does not run and hurts like hell to go upstairs, uses railing to take load  8. ASSOCIATED SYMPTOMS: no swelling or redness, cannot hear popping or grinding  9. OTHER SYMPTOMS: chest pain, difficulty breathing, fever, calf pain: had to heart stents in the last few years but denies these other symptoms.   10. PREGNANCY: NA      Reason for Disposition   Knee pain is a chronic symptom (recurrent or ongoing AND lasting > 4 weeks)    Additional Information   Negative: Sounds like a life-threatening emergency to the triager   Negative: Followed a knee injury   Negative: Swollen knee joint and fever   Negative: Patient sounds very sick or weak to the triager   Negative: Can't move swollen joint at all   Negative: Thigh or calf pain and only 1 side and present > 1 hour   Negative: Thigh or calf swelling and only 1 side   Negative: SEVERE pain (e.g., excruciating, unable to walk)   Negative: Very swollen knee joint   Negative: Painful rash with multiple small blisters grouped together (i.e., dermatomal distribution or 'band' or 'stripe')   Negative: Looks like a boil, infected sore, deep ulcer, or other infected rash (spreading redness, pus)   Negative: MODERATE pain (e.g., symptoms interfere with work or school, limping) and present > 3 days   Negative: Swollen knee joint (no fever or redness)   Negative: Fluid-filled sack just below kneecap (no fever or redness)   Negative: MILD knee pain persists > 7 days   Negative: Patient wants to " be seen    Protocols used: Knee Pain-A-OH      ENA Randolph  [facility]  [phone]    [facility]   Monday  - Thursday 7 AM - 6 PM    Friday  7 AM - 5 PM     -Please call your clinic for assistance from a nurse after hours.

## 2025-03-10 DIAGNOSIS — M25.561 CHRONIC PAIN OF BOTH KNEES: Primary | ICD-10-CM

## 2025-03-10 DIAGNOSIS — M25.562 CHRONIC PAIN OF BOTH KNEES: Primary | ICD-10-CM

## 2025-03-10 DIAGNOSIS — G89.29 CHRONIC PAIN OF BOTH KNEES: Primary | ICD-10-CM

## 2025-03-10 NOTE — TELEPHONE ENCOUNTER
Call to patient. No answer. Left detailed message from PCP on voicemail. (Recording requested message be left)

## 2025-03-10 NOTE — TELEPHONE ENCOUNTER
Referring to the orthopedic or sports medicine clinic at the [facility], he needs a workup for this and probably needs at a minimum cortisone injection.

## 2025-03-17 ENCOUNTER — PATIENT OUTREACH (OUTPATIENT)
Dept: CARE COORDINATION | Facility: CLINIC | Age: 70
End: 2025-03-17
Payer: MEDICARE

## 2025-03-25 ENCOUNTER — OFFICE VISIT (OUTPATIENT)
Dept: CARDIOLOGY | Facility: CLINIC | Age: 70
End: 2025-03-25
Payer: MEDICARE

## 2025-03-25 VITALS
WEIGHT: 239 LBS | DIASTOLIC BLOOD PRESSURE: 84 MMHG | SYSTOLIC BLOOD PRESSURE: 138 MMHG | BODY MASS INDEX: 34.05 KG/M2 | HEART RATE: 64 BPM | RESPIRATION RATE: 14 BRPM

## 2025-03-25 DIAGNOSIS — I10 BENIGN ESSENTIAL HYPERTENSION: ICD-10-CM

## 2025-03-25 DIAGNOSIS — Z79.4 TYPE 2 DIABETES MELLITUS WITH OTHER SPECIFIED COMPLICATION, WITH LONG-TERM CURRENT USE OF INSULIN (H): ICD-10-CM

## 2025-03-25 DIAGNOSIS — Z95.5 STATUS POST CORONARY ARTERY STENT PLACEMENT: Primary | ICD-10-CM

## 2025-03-25 DIAGNOSIS — I35.0 NONRHEUMATIC AORTIC VALVE STENOSIS: ICD-10-CM

## 2025-03-25 DIAGNOSIS — E78.2 MIXED HYPERLIPIDEMIA: ICD-10-CM

## 2025-03-25 DIAGNOSIS — E11.69 TYPE 2 DIABETES MELLITUS WITH OTHER SPECIFIED COMPLICATION, WITH LONG-TERM CURRENT USE OF INSULIN (H): ICD-10-CM

## 2025-03-25 PROCEDURE — 99417 PROLNG OP E/M EACH 15 MIN: CPT | Performed by: INTERNAL MEDICINE

## 2025-03-25 PROCEDURE — 3075F SYST BP GE 130 - 139MM HG: CPT | Performed by: INTERNAL MEDICINE

## 2025-03-25 PROCEDURE — 99214 OFFICE O/P EST MOD 30 MIN: CPT | Performed by: INTERNAL MEDICINE

## 2025-03-25 PROCEDURE — G2211 COMPLEX E/M VISIT ADD ON: HCPCS | Performed by: INTERNAL MEDICINE

## 2025-03-25 PROCEDURE — 3079F DIAST BP 80-89 MM HG: CPT | Performed by: INTERNAL MEDICINE

## 2025-03-25 NOTE — PROGRESS NOTES
Thank you, Dr. Janes Jeronimo, for asking the [facility] team to see Mr. Ezekiel Aldrich to follow-up on coronary artery disease, mixed hyperlipidemia, aortic valve stenosis.    Assessment/Recommendations   Assessment:    1.  Coronary artery disease, status post drug-eluting stenting to the left anterior descending in August 2022 with subsequent stenting of the proximal mid right coronary artery in November 2023.  He currently reports no symptoms of exertional chest discomfort or dyspnea although activity is becoming more limited due to progressive right knee pain.  He will be meeting with orthopedic surgery next week.  For now we will continue with current medical management and risk factor modification.  2.  Mixed hyperlipidemia with excellent LDL control on current dose of rosuvastatin.  3.  Aortic valve stenosis, mild to moderate by recent echocardiogram in January 2025.  Continue surveillance echo monitoring.  4.  Essential hypertension, borderline controlled  5.  Type 2 diabetes mellitus    Plan:  1.  Continue current medications  2.  Plan echocardiogram and follow-up in 1 year or sooner if new symptoms       History of Present Illness    Mr. Ezekiel Aldrich is a 69 year old male with history of coronary artery disease, status post DARRIN to the left anterior descending in August 2022 and subsequent stenting to the proximal mid right coronary artery in November 2023, type 2 diabetes mellitus, mixed hyperlipidemia, essential hypertension, aortic valve stenosis who presents today for a follow-up visit.  Did undergo an echocardiogram in January showing mild to moderate aortic valve stenosis which had progressed slightly from his prior study.  No other significant valve disease.  He reports no symptoms of exertional chest discomfort or dyspnea but admits that his activity is beginning to decline because of severe pain in his right knee.  He will be meeting with sports medicine next week for  further evaluation.  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    ECG (personally reviewed): No ECG today    Cardiac Imaging Studies (personally reviewed): No new imaging     Physical Examination Review of Systems   /84 (BP Location: Left arm, Patient Position: Sitting, Cuff Size: Adult Large)   Pulse 64   Resp 14   Wt 108.4 kg (239 lb)   BMI 34.05 kg/m    Body mass index is 34.05 kg/m .  Wt Readings from Last 3 Encounters:   03/25/25 108.4 kg (239 lb)   01/10/25 109.8 kg (242 lb)   01/06/25 108.9 kg (240 lb)     General Appearance:   Awake, Alert, No acute distress.   HEENT:  No scleral icterus; the mucous membranes were pink and moist.   Neck: No cervical bruits or jugular venous distention    Chest: The spine was straight. The chest was symmetric.   Lungs:   Respirations unlabored; the lungs are clear to auscultation. No wheezing   Cardiovascular:   Regular rate and rhythm.  S1 normal, S2 slightly reduced.  2/6 crescendo decrescendo systolic murmur heard along the left sternal border radiating to apex   Abdomen:  No organomegaly, masses, bruits, or tenderness. Bowels sounds are present   Extremities: No ankle edema   Skin: No xanthelasma. Warm, Dry.   Musculoskeletal: No tenderness.   Neurologic: Mood and affect are appropriate.    Encounter Vitals  BP: 138/84  Pulse: 64  Resp: 14  Weight: 108.4 kg (239 lb)                                         Medical History  Surgical History Family History Social History   Past Medical History:   Diagnosis Date    Acute renal failure     6/11/2016, secondary to dehydration, normalized    CAD (coronary artery disease)     Type 2 diabetes mellitus (H)     Past Surgical History:   Procedure Laterality Date    ANKLE FRACTURE SURGERY Right     hardware    CV CORONARY ANGIOGRAM N/A 08/02/2022    Procedure: Coronary Angiogram;  Surgeon: Archie Ni MD;  Location: [facility] CATH LAB CV    CV CORONARY ANGIOGRAM N/A 11/20/2023    Procedure:  Coronary Angiogram;  Surgeon: Chino Boyd MD;  Location: [facility] CATH [facility] CV    CV INTRAVASULAR ULTRASOUND N/A 2022    Procedure: Intravascular Ultrasound;  Surgeon: Archie Ni MD;  Location: [facility] LAB CV    CV LEFT HEART CATH N/A 2023    Procedure: Left Heart Catheterization;  Surgeon: Chino Boyd MD;  Location: [facility] LAB CV    CV PCI N/A 2022    Percutaneous Coronary Intervention;  Surgeon: Archie Ni MD;  Location: [facility] CV    CV PCI STENT DRUG ELUTING N/A 2023    Procedure: Percutaneous Coronary Intervention Stent;  Surgeon: Chino Boyd MD;  Location: [facility] CV    KNEE SURGERY      MANDIBLE FRACTURE SURGERY      from fx    SHOULDER SURGERY Right     from fx and separation    Family History   Problem Relation Age of Onset    Colon Cancer Mother     Coronary Artery Disease Mother     Hypertension Mother     Diabetes Type 2  Mother     Hypertension Father     Bell's palsy Father     Diabetes Type 2  Father     Thyroid Disease Father     Sleep Apnea Father     Sleep Apnea Sister     Prostate Cancer Brother 57    Sleep Apnea Brother     Bell's palsy Maternal Grandmother     Thyroid Disease Daughter     Social History     Socioeconomic History    Marital status:      Spouse name: Not on file    Number of children: 5    Years of education: Not on file    Highest education level: Not on file   Occupational History    Occupation: [employer]   Tobacco Use    Smoking status: Former     Current packs/day: 0.00     Types: Cigarettes     Quit date: 1985     Years since quittin.9    Smokeless tobacco: Current     Types: Snuff    Tobacco comments:     2 tins/week   Vaping Use    Vaping status: Never Used   Substance and Sexual Activity    Alcohol use: Yes     Alcohol/week: 2.0 standard drinks of alcohol     Types: 2 Standard drinks or equivalent per week     Comment: 10 drink per week    Drug use: No    Sexual  activity: Yes     Partners: Female   Other Topics Concern    Not on file   Social History Narrative    Diet-            Exercise- Not regular. Work is physical, walking.     Social Drivers of Health     Financial Resource Strain: Low Risk  (1/10/2025)    Financial Resource Strain     Within the past 12 months, have you or your family members you live with been unable to get utilities (heat, electricity) when it was really needed?: No   Food Insecurity: Low Risk  (1/10/2025)    Food Insecurity     Within the past 12 months, did you worry that your food would run out before you got money to buy more?: No     Within the past 12 months, did the food you bought just not last and you didn t have money to get more?: No   Transportation Needs: Low Risk  (1/10/2025)    Transportation Needs     Within the past 12 months, has lack of transportation kept you from medical appointments, getting your medicines, non-medical meetings or appointments, work, or from getting things that you need?: No   Physical Activity: Inactive (10/3/2024)    Exercise Vital Sign     Days of Exercise per Week: 0 days     Minutes of Exercise per Session: 0 min   Stress: No Stress Concern Present (10/3/2024)    [employer] - Occupational Stress Questionnaire     Feeling of Stress : Only a little   Social Connections: Unknown (10/3/2024)    Social Connection and Isolation Panel [NHANES]     Frequency of Communication with Friends and Family: Not on file     Frequency of Social Gatherings with Friends and Family: Once a week     Attends [employer] Services: Not on file     Active Member of Clubs or Organizations: Not on file     Attends Club or Organization Meetings: Not on file     Marital Status: Not on file   Interpersonal Safety: Low Risk  (10/18/2023)    Interpersonal Safety     Do you feel physically and emotionally safe where you currently live?: Yes     Within the past 12 months, have you been hit, slapped, kicked or  otherwise physically hurt by someone?: No     Within the past 12 months, have you been humiliated or emotionally abused in other ways by your partner or ex-partner?: No   Housing Stability: Low Risk  (1/10/2025)    Housing Stability     Do you have housing? : Yes     Are you worried about losing your housing?: No          Medications  Allergies   Current Outpatient Medications   Medication Sig Dispense Refill    aspirin (ASPIRIN LOW DOSE) 81 MG EC tablet TAKE 1 TABLET(81 MG) BY MOUTH DAILY. START TOMORROW 30 tablet 11    Bexagliflozin 20 MG TABS Take 20 mg by mouth daily. 90 tablet 2    blood glucose (NO BRAND SPECIFIED) test strip Use to test blood sugar once daily for diabetes 100 strip 3    carvedilol (COREG) 12.5 MG tablet TAKE 1 TABLET(12.5 MG) BY MOUTH TWICE DAILY WITH MEALS 60 tablet 0    generic lancets [GENERIC LANCETS] Use 1 each As Directed 2 (two) times a day. Dispense brand per patient's insurance at pharmacy discretion. 200 each 3    glipiZIDE (GLUCOTROL XL) 10 MG 24 hr tablet TAKE 2 TABLETS BY MOUTH DAILY 180 tablet 3    insulin pen needle (B-D U/F) 31G X 5 MM miscellaneous TWICE DAILY TO INJECT VICTOZA AND LANTUS 100 each 5    LANTUS SOLOSTAR 100 UNIT/ML soln ADMINISTER 66 UNITS UNDER THE SKIN AT BEDTIME 45 mL 5    losartan (COZAAR) 100 MG tablet TAKE 1 TABLET(100 MG) BY MOUTH DAILY 90 tablet 3    metFORMIN (GLUCOPHAGE XR) 500 MG 24 hr tablet TAKE 4 TABLETS(2000 MG) BY MOUTH DAILY 360 tablet 3    rosuvastatin (CRESTOR) 40 MG tablet TAKE 1 TABLET(40 MG) BY MOUTH DAILY 30 tablet 0    semaglutide (OZEMPIC) 2 MG/3ML pen Inject 0.25 mg subcutaneously every 7 days. 3 mL 0    sildenafil (REVATIO) 20 MG tablet Take 3 to 5 pills daily as needed for 90 tablet 1    tamsulosin (FLOMAX) 0.4 MG capsule TAKE 1 CAPSULE BY MOUTH EVERY DAY 90 capsule 1      Allergies   Allergen Reactions    Lisinopril Cough     Dry cough         Lab Results    Chemistry/lipid CBC Cardiac Enzymes/BNP/TSH/INR   Recent Labs   Lab Test  01/10/25  0612 01/09/25  1344 01/06/25  0800   TRIG  --   --  269*   LDL  --   --  56   BUN 27.2*   < > 19.2      < > 137   CO2 22   < > 25    < > = values in this interval not displayed.    Recent Labs   Lab Test 01/10/25  0612   WBC 7.4   HGB 17.7   HCT 51.6   MCV 90       Recent Labs   Lab Test 08/02/22  0458 08/02/22  0155 08/01/22  2112 10/29/21  1616   TROPONINI 0.16   < > 0.12  --    BNP  --   --  89*  --    TSH  --   --   --  3.18   INR  --   --  0.92  --     < > = values in this interval not displayed.        A total of 43 minutes was spent reviewing patient's medical records, obtaining history and performing examination, as well as discussing diagnoses/ recommendations with patient and answering all questions.

## 2025-03-25 NOTE — LETTER
3/25/2025    Janes Jeronimo MD  [address] [facility] [address]    RE: Ezekiel Aldrich       Dear Colleague,     I had the pleasure of seeing Ezekiel Aldrich in the [facility].      Thank you, Dr. Janes Jeronimo, for asking the [facility] team to see Mr. Ezekiel Aldrich to follow-up on coronary artery disease, mixed hyperlipidemia, aortic valve stenosis.    Assessment/Recommendations   Assessment:    1.  Coronary artery disease, status post drug-eluting stenting to the left anterior descending in August 2022 with subsequent stenting of the proximal mid right coronary artery in November 2023.  He currently reports no symptoms of exertional chest discomfort or dyspnea although activity is becoming more limited due to progressive right knee pain.  He will be meeting with orthopedic surgery next week.  For now we will continue with current medical management and risk factor modification.  2.  Mixed hyperlipidemia with excellent LDL control on current dose of rosuvastatin.  3.  Aortic valve stenosis, mild to moderate by recent echocardiogram in January 2025.  Continue surveillance echo monitoring.  4.  Essential hypertension, borderline controlled  5.  Type 2 diabetes mellitus    Plan:  1.  Continue current medications  2.  Plan echocardiogram and follow-up in 1 year or sooner if new symptoms       History of Present Illness    Mr. Ezekiel Aldrich is a 69 year old male with history of coronary artery disease, status post DARRIN to the left anterior descending in August 2022 and subsequent stenting to the proximal mid right coronary artery in November 2023, type 2 diabetes mellitus, mixed hyperlipidemia, essential hypertension, aortic valve stenosis who presents today for a follow-up visit.  Did undergo an echocardiogram in January showing mild to moderate aortic valve stenosis which had progressed slightly from his prior study.  No other significant valve disease.  He reports  no symptoms of exertional chest discomfort or dyspnea but admits that his activity is beginning to decline because of severe pain in his right knee.  He will be meeting with sports medicine next week for further evaluation.  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    ECG (personally reviewed): No ECG today    Cardiac Imaging Studies (personally reviewed): No new imaging     Physical Examination Review of Systems   /84 (BP Location: Left arm, Patient Position: Sitting, Cuff Size: Adult Large)   Pulse 64   Resp 14   Wt 108.4 kg (239 lb)   BMI 34.05 kg/m    Body mass index is 34.05 kg/m .  Wt Readings from Last 3 Encounters:   03/25/25 108.4 kg (239 lb)   01/10/25 109.8 kg (242 lb)   01/06/25 108.9 kg (240 lb)     General Appearance:   Awake, Alert, No acute distress.   HEENT:  No scleral icterus; the mucous membranes were pink and moist.   Neck: No cervical bruits or jugular venous distention    Chest: The spine was straight. The chest was symmetric.   Lungs:   Respirations unlabored; the lungs are clear to auscultation. No wheezing   Cardiovascular:   Regular rate and rhythm.  S1 normal, S2 slightly reduced.  2/6 crescendo decrescendo systolic murmur heard along the left sternal border radiating to apex   Abdomen:  No organomegaly, masses, bruits, or tenderness. Bowels sounds are present   Extremities: No ankle edema   Skin: No xanthelasma. Warm, Dry.   Musculoskeletal: No tenderness.   Neurologic: Mood and affect are appropriate.    Encounter Vitals  BP: 138/84  Pulse: 64  Resp: 14  Weight: 108.4 kg (239 lb)                                         Medical History  Surgical History Family History Social History   Past Medical History:   Diagnosis Date     Acute renal failure     6/11/2016, secondary to dehydration, normalized     CAD (coronary artery disease)      Type 2 diabetes mellitus (H)     Past Surgical History:   Procedure Laterality Date     ANKLE FRACTURE SURGERY Right      hardware     CV CORONARY ANGIOGRAM N/A 2022    Procedure: Coronary Angiogram;  Surgeon: Archie Ni MD;  Location: [facility] CV     CV CORONARY ANGIOGRAM N/A 2023    Procedure: Coronary Angiogram;  Surgeon: Chino Boyd MD;  Location: [facility] CATH LAB CV     CV INTRAVASULAR ULTRASOUND N/A 2022    Procedure: Intravascular Ultrasound;  Surgeon: Archie Ni MD;  Location: [facility] LAB CV     CV LEFT HEART CATH N/A 2023    Procedure: Left Heart Catheterization;  Surgeon: Chino Boyd MD;  Location: [facility] CV     CV PCI N/A 2022    Percutaneous Coronary Intervention;  Surgeon: Archie Ni MD;  Location: [facility] CV     CV PCI STENT DRUG ELUTING N/A 2023    Procedure: Percutaneous Coronary Intervention Stent;  Surgeon: Chino Boyd MD;  Location: [facility] CV     KNEE SURGERY       MANDIBLE FRACTURE SURGERY      from fx     SHOULDER SURGERY Right     from fx and separation    Family History   Problem Relation Age of Onset     Colon Cancer Mother      Coronary Artery Disease Mother      Hypertension Mother      Diabetes Type 2  Mother      Hypertension Father      Bell's palsy Father      Diabetes Type 2  Father      Thyroid Disease Father      Sleep Apnea Father      Sleep Apnea Sister      Prostate Cancer Brother 57     Sleep Apnea Brother      Bell's palsy Maternal Grandmother      Thyroid Disease Daughter     Social History     Socioeconomic History     Marital status:      Spouse name: Not on file     Number of children: 5     Years of education: Not on file     Highest education level: Not on file   Occupational History     Occupation: [employer]   Tobacco Use     Smoking status: Former     Current packs/day: 0.00     Types: Cigarettes     Quit date: 1985     Years since quittin.9     Smokeless tobacco: Current     Types: Snuff     Tobacco comments:     2 tins/week   Vaping Use      Vaping status: Never Used   Substance and Sexual Activity     Alcohol use: Yes     Alcohol/week: 2.0 standard drinks of alcohol     Types: 2 Standard drinks or equivalent per week     Comment: 10 drink per week     Drug use: No     Sexual activity: Yes     Partners: Female   Other Topics Concern     Not on file   Social History Narrative    Diet-            Exercise- Not regular. Work is physical, walking.     Social Drivers of Health     Financial Resource Strain: Low Risk  (1/10/2025)    Financial Resource Strain      Within the past 12 months, have you or your family members you live with been unable to get utilities (heat, electricity) when it was really needed?: No   Food Insecurity: Low Risk  (1/10/2025)    Food Insecurity      Within the past 12 months, did you worry that your food would run out before you got money to buy more?: No      Within the past 12 months, did the food you bought just not last and you didn t have money to get more?: No   Transportation Needs: Low Risk  (1/10/2025)    Transportation Needs      Within the past 12 months, has lack of transportation kept you from medical appointments, getting your medicines, non-medical meetings or appointments, work, or from getting things that you need?: No   Physical Activity: Inactive (10/3/2024)    Exercise Vital Sign      Days of Exercise per Week: 0 days      Minutes of Exercise per Session: 0 min   Stress: No Stress Concern Present (10/3/2024)    Citizen of [place] [employer] - Occupational Stress Questionnaire      Feeling of Stress : Only a little   Social Connections: Unknown (10/3/2024)    Social Connection and Isolation Panel [NHANES]      Frequency of Communication with Friends and Family: Not on file      Frequency of Social Gatherings with Friends and Family: Once a week      Attends Jain Services: Not on file      Active Member of Clubs or Organizations: Not on file      Attends Club or Organization Meetings: Not on file       Marital Status: Not on file   Interpersonal Safety: Low Risk  (10/18/2023)    Interpersonal Safety      Do you feel physically and emotionally safe where you currently live?: Yes      Within the past 12 months, have you been hit, slapped, kicked or otherwise physically hurt by someone?: No      Within the past 12 months, have you been humiliated or emotionally abused in other ways by your partner or ex-partner?: No   Housing Stability: Low Risk  (1/10/2025)    Housing Stability      Do you have housing? : Yes      Are you worried about losing your housing?: No          Medications  Allergies   Current Outpatient Medications   Medication Sig Dispense Refill     aspirin (ASPIRIN LOW DOSE) 81 MG EC tablet TAKE 1 TABLET(81 MG) BY MOUTH DAILY. START TOMORROW 30 tablet 11     Bexagliflozin 20 MG TABS Take 20 mg by mouth daily. 90 tablet 2     blood glucose (NO BRAND SPECIFIED) test strip Use to test blood sugar once daily for diabetes 100 strip 3     carvedilol (COREG) 12.5 MG tablet TAKE 1 TABLET(12.5 MG) BY MOUTH TWICE DAILY WITH MEALS 60 tablet 0     generic lancets [GENERIC LANCETS] Use 1 each As Directed 2 (two) times a day. Dispense brand per patient's insurance at pharmacy discretion. 200 each 3     glipiZIDE (GLUCOTROL XL) 10 MG 24 hr tablet TAKE 2 TABLETS BY MOUTH DAILY 180 tablet 3     insulin pen needle (B-D U/F) 31G X 5 MM miscellaneous TWICE DAILY TO INJECT VICTOZA AND LANTUS 100 each 5     LANTUS SOLOSTAR 100 UNIT/ML soln ADMINISTER 66 UNITS UNDER THE SKIN AT BEDTIME 45 mL 5     losartan (COZAAR) 100 MG tablet TAKE 1 TABLET(100 MG) BY MOUTH DAILY 90 tablet 3     metFORMIN (GLUCOPHAGE XR) 500 MG 24 hr tablet TAKE 4 TABLETS(2000 MG) BY MOUTH DAILY 360 tablet 3     rosuvastatin (CRESTOR) 40 MG tablet TAKE 1 TABLET(40 MG) BY MOUTH DAILY 30 tablet 0     semaglutide (OZEMPIC) 2 MG/3ML pen Inject 0.25 mg subcutaneously every 7 days. 3 mL 0     sildenafil (REVATIO) 20 MG tablet Take 3 to 5 pills daily as needed  for 90 tablet 1     tamsulosin (FLOMAX) 0.4 MG capsule TAKE 1 CAPSULE BY MOUTH EVERY DAY 90 capsule 1      Allergies   Allergen Reactions     Lisinopril Cough     Dry cough         Lab Results    Chemistry/lipid CBC Cardiac Enzymes/BNP/TSH/INR   Recent Labs   Lab Test 01/10/25  0612 01/09/25  1344 01/06/25  0800   TRIG  --   --  269*   LDL  --   --  56   BUN 27.2*   < > 19.2      < > 137   CO2 22   < > 25    < > = values in this interval not displayed.    Recent Labs   Lab Test 01/10/25  0612   WBC 7.4   HGB 17.7   HCT 51.6   MCV 90       Recent Labs   Lab Test 08/02/22  0458 08/02/22  0155 08/01/22  2112 10/29/21  1616   TROPONINI 0.16   < > 0.12  --    BNP  --   --  89*  --    TSH  --   --   --  3.18   INR  --   --  0.92  --     < > = values in this interval not displayed.        A total of 43 minutes was spent reviewing patient's medical records, obtaining history and performing examination, as well as discussing diagnoses/ recommendations with patient and answering all questions.                        Thank you for allowing me to participate in the care of your patient.      Sincerely,     Karlene Blevins MD     [facility]  cc:   Karlene Blevins MD  [address] [facility] [address], [address]  [place] [address]

## 2025-03-25 NOTE — PATIENT INSTRUCTIONS
Continue current medications  Set up echocardiogram in 1 year to follow up with aortic valve stenosis and then follow up with me after  Call if any new symptoms

## 2025-03-31 ENCOUNTER — TRANSFERRED RECORDS (OUTPATIENT)
Dept: ADMINISTRATIVE | Facility: CLINIC | Age: 70
End: 2025-03-31
Payer: MEDICARE

## 2025-03-31 NOTE — PROGRESS NOTES
ASSESSMENT & PLAN    Ezekiel was seen today for pain and pain.    Diagnoses and all orders for this visit:    Bilateral primary osteoarthritis of knee    Chronic pain of both knees  -     Orthopedic  Referral  -     XR Knee Bilateral 3 Views; Future    Other orders  -     Large Joint Injection/Arthocentesis: L knee joint        69 year old male presents with chronic bilateral knee pain.  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  Imaging does reveal bilateral osteoarthritis, and he does have a right effusion today.  He reports left knee instability, we discussed he likely has degenerative meniscus tear as well.  Discussed conservative treatment for osteoarthritis, and he requests an injection today.  Discussed that knee osteoarthritis requires a multifactorial treatment plan and I also recommend working on lower extremity strengthening, bracing as needed, pain pain medications as needed, and if no improvement consideration of knee replacement.  His A1c is 8.4 today, and discussed I would like to only inject one knee at a time to prevent elevations in blood sugar.    Plan:  -Try over the counter medication treatment options for osteoarthritis of the knee including XS Tylenol 1000mg up to three times daily, topical Voltaren (diclofenac) gel up to every 6 hours as needed  -Can trial equipment for knee offloading such as a knee braces (Neenca knee brace)  -Left knee aspiration and corticosteroid injection performed in clinic today.  Can repeat no sooner than every 3 months to the left knee.  Can come back for the right knee at any time, however monitor blood sugar for the next week after injection  -Consider PT and work on staying active with low-impact activity, start home exercise program  -Left knee corticosteroid injection today in clinic  -If above conservative measures fail, consider referral orthopedic surgery for evaluation/discussion of joint replacement    Large Joint  Injection/Arthocentesis: L knee joint    Date/Time: 4/7/2025 11:49 AM    Performed by: Korin Hamilton DO  Authorized by: Korin Hamilton DO    Indications:  Pain and osteoarthritis  Needle Size:  25 G  Guidance: ultrasound    Approach:  Anterolateral  Location:  Knee      Medications:  40 mg triamcinolone 40 MG/ML; 3 mL lidocaine 1 %; 4 mL ROPivacaine 5 MG/ML  Medications comment:  1ml of 8.4% Sodium Bicarbonate solution was used to buffer the local numbing agent for today's injection    Outcome:  Tolerated well, no immediate complications  Procedure discussed: discussed risks, benefits, and alternatives    Consent Given by:  Patient  Timeout: timeout called immediately prior to procedure    Prep: patient was prepped and draped in usual sterile fashion     Ultrasound was used to ensure safe and accurate needle placement and injection. Ultrasound images of the procedure were permanently stored.      Dr. Korin Hamilton DO, CAQ  [facility]  Sports Medicine     -----  Chief Complaint   Patient presents with    Left Knee - Pain    Right Knee - Pain       SUBJECTIVE  Ezekiel Aldrich is a/an 69 year old male who is seen in consultation at the request of  Janes Jeronimo M.D. for evaluation of bilateral knee pain. Gradual onset that started a few years ago. His left is more bothersome than the right. Pain is generalized. Symptoms are worsened by walking and kneeling. He has tried icing, ACE wrap, OTCs. He is diligent about wearing knee pads while working. Associated symptoms include: feeling of instability and pain .    The patient is seen alone.    Prior injury/Surgical history of affected joint: none  Social History/Occupation: Installs hardware floors    REVIEW OF SYSTEMS:  Pertinent positives/negative: As stated above in HPI    OBJECTIVE:  There were no vitals taken for this visit.   General: Alert and in no distress  CV: Extremities appear well perfused   Resp: normal  respiratory effort  MSK:  BILATERAL KNEE  Inspection:    Mild genu valgum  Palpation:    Tender about the medial joint line. Remainder of bony and ligamentous landmarks are nontender.  Moderate effusion is present on the right, mild effusion on the left    Patellofemoral crepitus is Present  Range of Motion:     00 extension to 1250 flexion  Strength:    Extensor mechanism intact  Special Tests: Deferred       RADIOLOGY:  Final results and radiologist's interpretation available in the [facility] health record.  Images below were personally reviewed and discussed with the patient in the office today.  My personal interpretation of the performed imaging: X-ray of the bilateral knees performed in clinic today reveal bilateral tricompartmental osteoarthritis, with mild left and moderate right patellofemoral osteoarthritis, moderate medial compartment joint space narrowing                 Disclaimer: This note consists of text and symbols derived from dictation and/or voice recognition software. As a result, there may be errors in the script that have gone undetected. Please consider this when interpreting information found in this chart.

## 2025-04-01 PROBLEM — D12.6 ADENOMATOUS POLYP OF COLON: Status: ACTIVE | Noted: 2025-04-01

## 2025-04-01 NOTE — PROCEDURES
[facility]   [address] [place] [address], [address], [place] [address]     Patient Name: Ezekiel Aldrich  Gender:  Male  Exam Date: 03/31/2025 Visit Number:  [id number]  Age: 69 Years YOB: 1955  Attending MD: Blake Yanez MD Medical Record#:  [id number]    Procedure: Colonoscopy   Indications: Previous adenomatous polyps, family history of colon cancer in mother at age 65      Referring MD: Janes Jeronimo MD  Primary MD:      Janes Jeronimo MD  Medications: Admitting Medications:   0.9% Normal Saline at TKO   Intra Procedure Medications:   Patient received monitored anesthesia care.     Complications: No immediate complications  ______________________________________________________________________________  Procedure:   An examination of the heart and lungs was performed and found to be within acceptable limits.  .  The patient was therefore deemed a reasonable candidate for endoscopy and sedation.   The risks and benefits of the procedure were explained to the patient.After obtaining informed consent, the patient received monitored anesthesia care and I passed the scope   without difficulty via the rectum to the cecum.  The appendiceal orifice and ic valve were identified.  The scope was retroflexed during the examination  The quality of the prep was excellent  (Miralax/Gatorade/2 tablets Bisacodyl/Magnesium Citrate).    This was a complete examination throughout the entire colon.    Findings:    Polyp location: transverse colon.  Quantity: 2.  Size:  3-4 mm.  Polyp shape:  sessile.         Maneuver: polypectomy was performed with a cold snare.       Removal:  complete.  Retrieval: complete.  Bleeding: none.    Diverticulosis.  Location: - sigmoid.    Description:  moderate.    Size:  medium.    Quantity:  several.  No inflammation present.    Impression:  Personal history of colonic polyps  Dvrtclos of lg int w/o perforation or abscess w/o bleeding    Preliminary  Plan:  Repeat colonoscopy in 5 years  Pathology Results:  A: COLON, TRANSVERSE, POLYPS:           1. Tubular adenomas (2)           2. Negative for high grade dysplasia           3. Per the colonoscopy report:               a. Polyp sizes: 3 mm - 4 mm               b. Resection: Complete               c. Retrieval: Complete      MICROSCOPIC  A: Performed     Electronically signed by: Josesito Alfaro MD    Interpreted at [facility], [address] [place] [id number]    Orders    Instruction(s)/Education:  Instruction/Education Timeframe Assessment   Colon Polyps  Z86.010   Diverticulosis/Diverticulitis  Z86.010         _Electronically signed by:___________________  Blake Yanez MD                 03/31/2025    cc: Janes Jeronimo MD  cc: Janes Jeronimo MD

## 2025-04-02 ENCOUNTER — PATIENT OUTREACH (OUTPATIENT)
Dept: GASTROENTEROLOGY | Facility: CLINIC | Age: 70
End: 2025-04-02
Payer: MEDICARE

## 2025-04-07 ENCOUNTER — VIRTUAL VISIT (OUTPATIENT)
Dept: PHARMACY | Facility: CLINIC | Age: 70
End: 2025-04-07
Payer: COMMERCIAL

## 2025-04-07 ENCOUNTER — OFFICE VISIT (OUTPATIENT)
Dept: FAMILY MEDICINE | Facility: CLINIC | Age: 70
End: 2025-04-07
Attending: FAMILY MEDICINE
Payer: MEDICARE

## 2025-04-07 ENCOUNTER — OFFICE VISIT (OUTPATIENT)
Dept: ORTHOPEDICS | Facility: CLINIC | Age: 70
End: 2025-04-07
Attending: FAMILY MEDICINE
Payer: MEDICARE

## 2025-04-07 ENCOUNTER — ANCILLARY PROCEDURE (OUTPATIENT)
Dept: GENERAL RADIOLOGY | Facility: CLINIC | Age: 70
End: 2025-04-07
Attending: STUDENT IN AN ORGANIZED HEALTH CARE EDUCATION/TRAINING PROGRAM
Payer: MEDICARE

## 2025-04-07 VITALS
RESPIRATION RATE: 14 BRPM | BODY MASS INDEX: 33.21 KG/M2 | HEIGHT: 70 IN | WEIGHT: 232 LBS | OXYGEN SATURATION: 98 % | DIASTOLIC BLOOD PRESSURE: 71 MMHG | SYSTOLIC BLOOD PRESSURE: 117 MMHG | TEMPERATURE: 98.5 F | HEART RATE: 79 BPM

## 2025-04-07 DIAGNOSIS — G89.29 CHRONIC PAIN OF BOTH KNEES: ICD-10-CM

## 2025-04-07 DIAGNOSIS — M25.562 CHRONIC PAIN OF BOTH KNEES: ICD-10-CM

## 2025-04-07 DIAGNOSIS — M25.561 CHRONIC PAIN OF BOTH KNEES: ICD-10-CM

## 2025-04-07 DIAGNOSIS — M17.0 BILATERAL PRIMARY OSTEOARTHRITIS OF KNEE: Primary | ICD-10-CM

## 2025-04-07 DIAGNOSIS — I25.118 CORONARY ARTERY DISEASE INVOLVING NATIVE CORONARY ARTERY OF NATIVE HEART WITH OTHER FORM OF ANGINA PECTORIS: ICD-10-CM

## 2025-04-07 DIAGNOSIS — E11.69 TYPE 2 DIABETES MELLITUS WITH OTHER SPECIFIED COMPLICATION, WITH LONG-TERM CURRENT USE OF INSULIN (H): Primary | ICD-10-CM

## 2025-04-07 DIAGNOSIS — Z79.4 TYPE 2 DIABETES MELLITUS WITH OTHER SPECIFIED COMPLICATION, WITH LONG-TERM CURRENT USE OF INSULIN (H): Primary | ICD-10-CM

## 2025-04-07 DIAGNOSIS — E11.9 TYPE 2 DIABETES MELLITUS WITHOUT COMPLICATION, WITH LONG-TERM CURRENT USE OF INSULIN (H): ICD-10-CM

## 2025-04-07 DIAGNOSIS — Z29.11 NEED FOR VACCINATION AGAINST RESPIRATORY SYNCYTIAL VIRUS: ICD-10-CM

## 2025-04-07 DIAGNOSIS — I10 PRIMARY HYPERTENSION: Primary | ICD-10-CM

## 2025-04-07 DIAGNOSIS — Z79.4 TYPE 2 DIABETES MELLITUS WITHOUT COMPLICATION, WITH LONG-TERM CURRENT USE OF INSULIN (H): ICD-10-CM

## 2025-04-07 DIAGNOSIS — Z23 NEED FOR SHINGLES VACCINE: ICD-10-CM

## 2025-04-07 DIAGNOSIS — E78.2 MIXED HYPERLIPIDEMIA: ICD-10-CM

## 2025-04-07 PROBLEM — R55 SYNCOPE, UNSPECIFIED SYNCOPE TYPE: Status: RESOLVED | Noted: 2025-01-09 | Resolved: 2025-04-07

## 2025-04-07 PROBLEM — R94.31 NONSPECIFIC ABNORMAL ELECTROCARDIOGRAM (ECG) (EKG): Status: RESOLVED | Noted: 2025-01-09 | Resolved: 2025-04-07

## 2025-04-07 LAB
ALBUMIN SERPL BCG-MCNC: 4.2 G/DL (ref 3.5–5.2)
ALP SERPL-CCNC: 66 U/L (ref 40–150)
ALT SERPL W P-5'-P-CCNC: 17 U/L (ref 0–70)
ANION GAP SERPL CALCULATED.3IONS-SCNC: 12 MMOL/L (ref 7–15)
AST SERPL W P-5'-P-CCNC: 15 U/L (ref 0–45)
BILIRUB SERPL-MCNC: 0.9 MG/DL
BUN SERPL-MCNC: 16 MG/DL (ref 8–23)
CALCIUM SERPL-MCNC: 9.3 MG/DL (ref 8.8–10.4)
CHLORIDE SERPL-SCNC: 103 MMOL/L (ref 98–107)
CHOLEST SERPL-MCNC: 106 MG/DL
CREAT SERPL-MCNC: 1.13 MG/DL (ref 0.67–1.17)
EGFRCR SERPLBLD CKD-EPI 2021: 71 ML/MIN/1.73M2
EST. AVERAGE GLUCOSE BLD GHB EST-MCNC: 194 MG/DL
FASTING STATUS PATIENT QL REPORTED: YES
FASTING STATUS PATIENT QL REPORTED: YES
GLUCOSE SERPL-MCNC: 187 MG/DL (ref 70–99)
HBA1C MFR BLD: 8.4 % (ref 0–5.6)
HCO3 SERPL-SCNC: 23 MMOL/L (ref 22–29)
HDLC SERPL-MCNC: 39 MG/DL
LDLC SERPL CALC-MCNC: 38 MG/DL
NONHDLC SERPL-MCNC: 67 MG/DL
POTASSIUM SERPL-SCNC: 4.8 MMOL/L (ref 3.4–5.3)
PROT SERPL-MCNC: 7.4 G/DL (ref 6.4–8.3)
SODIUM SERPL-SCNC: 138 MMOL/L (ref 135–145)
TRIGL SERPL-MCNC: 147 MG/DL

## 2025-04-07 PROCEDURE — 20611 DRAIN/INJ JOINT/BURSA W/US: CPT | Mod: LT | Performed by: STUDENT IN AN ORGANIZED HEALTH CARE EDUCATION/TRAINING PROGRAM

## 2025-04-07 PROCEDURE — 80053 COMPREHEN METABOLIC PANEL: CPT | Performed by: FAMILY MEDICINE

## 2025-04-07 PROCEDURE — 3074F SYST BP LT 130 MM HG: CPT | Performed by: FAMILY MEDICINE

## 2025-04-07 PROCEDURE — 99214 OFFICE O/P EST MOD 30 MIN: CPT | Performed by: FAMILY MEDICINE

## 2025-04-07 PROCEDURE — 99207 PR NO CHARGE LOS: CPT | Mod: 93

## 2025-04-07 PROCEDURE — 83036 HEMOGLOBIN GLYCOSYLATED A1C: CPT | Performed by: FAMILY MEDICINE

## 2025-04-07 PROCEDURE — G2211 COMPLEX E/M VISIT ADD ON: HCPCS | Performed by: FAMILY MEDICINE

## 2025-04-07 PROCEDURE — 80061 LIPID PANEL: CPT | Performed by: FAMILY MEDICINE

## 2025-04-07 PROCEDURE — 36415 COLL VENOUS BLD VENIPUNCTURE: CPT | Performed by: FAMILY MEDICINE

## 2025-04-07 PROCEDURE — 99204 OFFICE O/P NEW MOD 45 MIN: CPT | Mod: 25 | Performed by: STUDENT IN AN ORGANIZED HEALTH CARE EDUCATION/TRAINING PROGRAM

## 2025-04-07 PROCEDURE — 3078F DIAST BP <80 MM HG: CPT | Performed by: FAMILY MEDICINE

## 2025-04-07 PROCEDURE — 73562 X-RAY EXAM OF KNEE 3: CPT | Mod: TC | Performed by: INTERNAL MEDICINE

## 2025-04-07 RX ORDER — LIDOCAINE HYDROCHLORIDE 10 MG/ML
3 INJECTION, SOLUTION INFILTRATION; PERINEURAL
Status: COMPLETED | OUTPATIENT
Start: 2025-04-07 | End: 2025-04-07

## 2025-04-07 RX ORDER — ROPIVACAINE HYDROCHLORIDE 5 MG/ML
4 INJECTION, SOLUTION EPIDURAL; INFILTRATION; PERINEURAL
Status: COMPLETED | OUTPATIENT
Start: 2025-04-07 | End: 2025-04-07

## 2025-04-07 RX ORDER — TRIAMCINOLONE ACETONIDE 40 MG/ML
40 INJECTION, SUSPENSION INTRA-ARTICULAR; INTRAMUSCULAR
Status: COMPLETED | OUTPATIENT
Start: 2025-04-07 | End: 2025-04-07

## 2025-04-07 RX ADMIN — ROPIVACAINE HYDROCHLORIDE 4 ML: 5 INJECTION, SOLUTION EPIDURAL; INFILTRATION; PERINEURAL at 11:49

## 2025-04-07 RX ADMIN — TRIAMCINOLONE ACETONIDE 40 MG: 40 INJECTION, SUSPENSION INTRA-ARTICULAR; INTRAMUSCULAR at 11:49

## 2025-04-07 RX ADMIN — LIDOCAINE HYDROCHLORIDE 3 ML: 10 INJECTION, SOLUTION INFILTRATION; PERINEURAL at 11:49

## 2025-04-07 NOTE — PATIENT INSTRUCTIONS
1. Bilateral primary osteoarthritis of knee    2. Chronic pain of both knees        Plan:  -Try over the counter medication treatment options for osteoarthritis of the knee including XS Tylenol 1000mg up to three times daily, topical Voltaren (diclofenac) gel up to every 6 hours as needed  -Can trial equipment for knee offloading such as a knee braces (Neenca knee brace)  -Left knee aspiration and corticosteroid injection performed in clinic today.  Can repeat no sooner than every 3 months to the left knee.  Can come back for the right knee at any time, however monitor blood sugar for the next week after injection  -Consider PT and work on staying active with low-impact activity, start home exercise program  -Left knee corticosteroid injection today   -If above conservative measures fail, consider referral orthopedic surgery for evaluation/discussion of joint replacement      If you had imaging performed/ordered at your appointment today, you can expect to see your radiology results in [employer] within approximately 48 business hours.     If you have questions/concerns after your appointment, please send my team a [employer] message or call the clinic at [phone].     Dr. Korin Hamilton, DO, [facility]  Sports Medicine and Orthopedics    Dr. Hamilton's Clinic Locations and Contact Numbers:   [place] APPOINTMENTS: [phone]      [address] [facility] RADIOLOGY: [phone]   [place] [address] PHYSICAL THERAPY: [phone]    HAND THERAPY/OT: [phone]   [place] BILLING QUESTIONS: [phone] [address] FAX: [phone]   [place] [address]       .Non-Operative treatment of Knee Osteoarthritis    To Decrease Stress  Try to get regular exercise and stay active, try lower impact activity such as elliptical, bicycle, swimming, or walking  Muscle Strengthening: Consider physical therapy and start home exercise program  Weight Control  Consider using walking poles/cane or a knee brace to  "offload knee    To Decrease Pain  Tylenol (Acetaminophen) as needed 2 tablets (1,000 mg) three times per day, not to exceed 3,000 mg per day   Trial topical Voltaren (Diclofenac) gel up to 4x daily to area of pain  Glucosamine chondroitin or Tumeric (Curcumin) supplements. (May try taking for 3 months and if helpful, continue)  Steroid injections (no sooner than every 3 months)  \"Gel\" (Viscosupplementation) injections (Synvisc, Euflexxa, etc. are brand names)  Platelet Rich Plasma (Not covered by insurance)    Free Online Resources for Knee Osteoarthritis  My Knee exercise: Online program provides education and a 6-month knee strengthening program: [email or web address]  My Joint Yoga: Online 12-week yoga program with videos for knee/hip osteoarthritis: [email or web address]     Post-Injection Discharge Instructions    You may shower, however avoid swimming, tub baths or hot tubs for 24 hours following your procedure  You may have a mild to moderate increase in pain for a few days following the injection.  The lidocaine (local numbing medicine) will wear off in several hours. It usually takes 3-5 days for the steroid medication to start working although it may take up to 14 days for full effect.   You may use ice packs for 10-15 minutes, 3 to 4 times a day at the injection site for comfort if needed  You may use extra strength Tylenol for pain control if necessary   If you were fasting, you may resume your normal diet and medications after the procedure  If you have diabetes, your blood sugar may be higher than normal for 10-14 days following a steroid injection. Contact your doctor who manages your diabetes if your blood sugar is significantly higher than usual    If you experience any of the following, call Sports Medicine @ [phone]  -Fever over 100 degrees F  -Swelling, bleeding, redness, drainage, warmth at the injection site  -New or significant worsening pain       "

## 2025-04-07 NOTE — LETTER
April 8, 2025      Ezekiel Aldrich  [address] [facility] [address]        Dear ,    We are writing to inform you of your test results.  The A1c is still high at 8.4, the increased dose of Ozempic to 0.5 mg should help lower this, as discussed she will also discontinue glipizide.  Liver and kidney tests are normal  Overall the cholesterol is well-controlled    See me again in 3 months          Resulted Orders   Comprehensive metabolic panel   Result Value Ref Range    Sodium 138 135 - 145 mmol/L    Potassium 4.8 3.4 - 5.3 mmol/L    Carbon Dioxide (CO2) 23 22 - 29 mmol/L    Anion Gap 12 7 - 15 mmol/L    Urea Nitrogen 16.0 8.0 - 23.0 mg/dL    Creatinine 1.13 0.67 - 1.17 mg/dL    GFR Estimate 71 >60 mL/min/1.73m2      Comment:      eGFR calculated using 2021 CKD-EPI equation.    Calcium 9.3 8.8 - 10.4 mg/dL    Chloride 103 98 - 107 mmol/L    Glucose 187 (H) 70 - 99 mg/dL    Alkaline Phosphatase 66 40 - 150 U/L    AST 15 0 - 45 U/L    ALT 17 0 - 70 U/L    Protein Total 7.4 6.4 - 8.3 g/dL    Albumin 4.2 3.5 - 5.2 g/dL    Bilirubin Total 0.9 <=1.2 mg/dL    Patient Fasting > 8hrs? Yes    Lipid panel reflex to direct LDL Fasting   Result Value Ref Range    Cholesterol 106 <200 mg/dL    Triglycerides 147 <150 mg/dL    Direct Measure HDL 39 (L) >=40 mg/dL    LDL Cholesterol Calculated 38 <100 mg/dL    Non HDL Cholesterol 67 <130 mg/dL    Patient Fasting > 8hrs? Yes     Narrative    Cholesterol  Desirable: < 200 mg/dL  Borderline High: 200 - 239 mg/dL  High: >= 240 mg/dL    Triglycerides  Normal: < 150 mg/dL  Borderline High: 150 - 199 mg/dL  High: 200-499 mg/dL  Very High: >= 500 mg/dL    Direct Measure HDL  Female: >= 50 mg/dL   Male: >= 40 mg/dL    LDL Cholesterol  Desirable: < 100 mg/dL  Above Desirable: 100 - 129 mg/dL   Borderline High: 130 - 159 mg/dL   High:  160 - 189 mg/dL   Very High: >= 190 mg/dL    Non HDL Cholesterol  Desirable: < 130 mg/dL  Above Desirable: 130 - 159 mg/dL  Borderline High: 160  - 189 mg/dL  High: 190 - 219 mg/dL  Very High: >= 220 mg/dL   Hemoglobin A1c   Result Value Ref Range    Estimated Average Glucose 194 (H) <117 mg/dL    Hemoglobin A1C 8.4 (H) 0.0 - 5.6 %      Comment:      Normal <5.7%   Prediabetes 5.7-6.4%    Diabetes 6.5% or higher     Note: Adopted from ADA consensus guidelines.    Narrative    Results consistent with previous, repeat testing unnecessary        If you have any questions or concerns, please call the clinic at the number listed above.       Sincerely,      Janes Jeronimo MD    Electronically signed

## 2025-04-07 NOTE — LETTER
4/7/2025      Ezekiel Aldrich  [address] [facility] [address]      Dear Colleague,    Thank you for referring your patient, Ezekiel Aldrich, to the [facility]. Please see a copy of my visit note below.    ASSESSMENT & PLAN    Ezekiel was seen today for pain and pain.    Diagnoses and all orders for this visit:    Bilateral primary osteoarthritis of knee    Chronic pain of both knees  -     Orthopedic  Referral  -     XR Knee Bilateral 3 Views; Future    Other orders  -     Large Joint Injection/Arthocentesis: L knee joint        69 year old male presents with chronic bilateral knee pain.  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  Imaging does reveal bilateral osteoarthritis, and he does have a right effusion today.  He reports left knee instability, we discussed he likely has degenerative meniscus tear as well.  Discussed conservative treatment for osteoarthritis, and he requests an injection today.  Discussed that knee osteoarthritis requires a multifactorial treatment plan and I also recommend working on lower extremity strengthening, bracing as needed, pain pain medications as needed, and if no improvement consideration of knee replacement.  His A1c is 8.4 today, and discussed I would like to only inject one knee at a time to prevent elevations in blood sugar.    Plan:  -Try over the counter medication treatment options for osteoarthritis of the knee including XS Tylenol 1000mg up to three times daily, topical Voltaren (diclofenac) gel up to every 6 hours as needed  -Can trial equipment for knee offloading such as a knee braces (Neenca knee brace)  -Left knee aspiration and corticosteroid injection performed in clinic today.  Can repeat no sooner than every 3 months to the left knee.  Can come back for the right knee at any time, however monitor blood sugar for the next week after injection  -Consider PT and work on staying active  with low-impact activity, start home exercise program  -Left knee corticosteroid injection today in clinic  -If above conservative measures fail, consider referral orthopedic surgery for evaluation/discussion of joint replacement    Large Joint Injection/Arthocentesis: L knee joint    Date/Time: 4/7/2025 11:49 AM    Performed by: Korin Hamilton DO  Authorized by: Korin Hamilton DO    Indications:  Pain and osteoarthritis  Needle Size:  25 G  Guidance: ultrasound    Approach:  Anterolateral  Location:  Knee      Medications:  40 mg triamcinolone 40 MG/ML; 3 mL lidocaine 1 %; 4 mL ROPivacaine 5 MG/ML  Medications comment:  1ml of 8.4% Sodium Bicarbonate solution was used to buffer the local numbing agent for today's injection    Outcome:  Tolerated well, no immediate complications  Procedure discussed: discussed risks, benefits, and alternatives    Consent Given by:  Patient  Timeout: timeout called immediately prior to procedure    Prep: patient was prepped and draped in usual sterile fashion     Ultrasound was used to ensure safe and accurate needle placement and injection. Ultrasound images of the procedure were permanently stored.      Dr. Korin Hamilton DO, CAQ  [facility]  Sports Medicine     -----  Chief Complaint   Patient presents with     Left Knee - Pain     Right Knee - Pain       SUBJECTIVE  Ezekiel Aldrich is a/an 69 year old male who is seen in consultation at the request of  Janes Jeronimo M.D. for evaluation of bilateral knee pain. Gradual onset that started a few years ago. His left is more bothersome than the right. Pain is generalized. Symptoms are worsened by walking and kneeling. He has tried icing, ACE wrap, OTCs. He is diligent about wearing knee pads while working. Associated symptoms include: feeling of instability and pain .    The patient is seen alone.    Prior injury/Surgical history of affected joint: none  Social History/Occupation:  Installs hardware floors    REVIEW OF SYSTEMS:  Pertinent positives/negative: As stated above in HPI    OBJECTIVE:  There were no vitals taken for this visit.   General: Alert and in no distress  CV: Extremities appear well perfused   Resp: normal respiratory effort  MSK:  BILATERAL KNEE  Inspection:    Mild genu valgum  Palpation:    Tender about the medial joint line. Remainder of bony and ligamentous landmarks are nontender.  Moderate effusion is present on the right, mild effusion on the left    Patellofemoral crepitus is Present  Range of Motion:     00 extension to 1250 flexion  Strength:    Extensor mechanism intact  Special Tests: Deferred       RADIOLOGY:  Final results and radiologist's interpretation available in the [facility] health record.  Images below were personally reviewed and discussed with the patient in the office today.  My personal interpretation of the performed imaging: X-ray of the bilateral knees performed in clinic today reveal bilateral tricompartmental osteoarthritis, with mild left and moderate right patellofemoral osteoarthritis, moderate medial compartment joint space narrowing                 Disclaimer: This note consists of text and symbols derived from dictation and/or voice recognition software. As a result, there may be errors in the script that have gone undetected. Please consider this when interpreting information found in this chart.        Again, thank you for allowing me to participate in the care of your patient.        Sincerely,        Korin Hamilton, DO    Electronically signed

## 2025-04-07 NOTE — PROGRESS NOTES
"  Assessment & Plan     (I10) Hypertension  (primary encounter diagnosis)  Comment: Well-controlled on Coreg  Plan:      (E11.9,  Z79.4) Type 2 diabetes mellitus (H)  Comment: Suboptimal control with A1c at 8.4  Plan: Hemoglobin A1c, PRIMARY CARE FOLLOW-UP         SCHEDULING             (M25.561,  M25.562,  G89.29) Chronic pain of both knees  Comment: Bilateral knee pain, presumed underlying osteoarthritis  Plan:      (I25.118) Coronary artery disease   Comment: Clinically stable  Plan:      (E78.2) Hyperlipidemia  Comment: Controlled with Crestor  Plan: Comprehensive metabolic panel, Lipid panel         reflex to direct LDL Fasting             PLAN:  1.  Laboratory studies as above  2.  Increase Ozempic from 0.25 to 0.5 mg weekly  3.  Discontinue glipizide  4.  Patient is also working with our pharmacist  5.  Patient has follow-up with orthopedics in terms of his bilateral knee pain presume underlying osteoarthritis.  6.  3-month follow-up  7.  The longitudinal plan of care for the diagnosis(es)/condition(s) as documented were addressed during this visit. Due to the added complexity in care, I will continue to support Ezekiel in the subsequent management and with ongoing continuity of care.            BMI  Estimated body mass index is 33.05 kg/m  as calculated from the following:    Height as of this encounter: 1.784 m (5' 10.25\").    Weight as of this encounter: 105.2 kg (232 lb).             Subjective   Ezekiel is a 69 year old, presenting for the following health issues:  Recheck Medication, Diabetes, and RECHECK      4/7/2025     7:34 AM   Additional Questions   Roomed by Og     History of Present Illness       Diabetes:   He presents for follow up of diabetes.  He is checking home blood glucose one time daily.   He checks blood glucose before meals and before and after meals.  Blood glucose is never over 200 and never under 70. He is aware of hypoglycemia symptoms including shakiness, dizziness, weakness and " "lethargy.   He is concerned about other.   He is having numbness in feet.               Patient comes in for follow-up of his comorbidities.  Patient has a history of type 2 diabetes mellitus, has recently been suboptimally controlled and should be better controlled with the patient starting Ozempic in the last month or so he is tolerating this well, of note he is on metformin and glipizide as well as Lantus, I told the patient that I think it is likely will be able to do some changes to his medications had like to reduce the dose of his insulin as an exam    Patient has lost a few pounds of weight also on the Ozempic and is generally tolerating this well.    Patient has a history of known coronary artery disease followed by cardiology, of note he is not on a blood thinner any longer though he is still on low-dose aspirin of note his cardiac status appears to be stable.    I discussed with the patient that while I understand the concern about all the medications he is on there is a reason for all of these medications that we may overtime be able to adjust maybe even eliminated diabetic medication.    Patient was admitted briefly in January for alcohol intoxication of note he is abstaining from alcohol at this time he is going to , the cessation of alcohol should also help with his comorbidities and he is also making improvements to his diet                        Objective    /71   Pulse 79   Temp 98.5  F (36.9  C) (Oral)   Resp 14   Ht 1.784 m (5' 10.25\")   Wt 105.2 kg (232 lb)   SpO2 98%   BMI 33.05 kg/m    Body mass index is 33.05 kg/m .  Physical Exam               Signed Electronically by: Janes Jeronimo MD    "

## 2025-04-07 NOTE — PROGRESS NOTES
Medication Therapy Management (MTM) Encounter    ASSESSMENT:                            Medication Adherence/Access: See below for considerations.    Diabetes   A1c not at goal <7% on max dose of metformin, SGLT2, and sulfonylurea. Blood glucose improved on Ozempic though having overnight lows. Recommend discontinuing glipizide at this time. His BMP shows he may be dehydrated, possibly from Brenzavvy that could be contributing to his dizziness. We will continue to monitor at this time.   UACR not at goal <30mg/g though he is on max dose ACEi and SGLT2.     PLAN:                            Stop glipizide to avoid overnight lows.  Continue Ozempic 0.25mg for two more weeks, then increase to 0.5mg weekly. Your next refill should arrive at [facility] within the next 2 weeks.     Follow-up: Return in 4 weeks (on 5/5/2025) for Follow up, with me, using a phone visit.    SUBJECTIVE/OBJECTIVE:                          Ezekiel Aldrich is a 69 year old male seen for follow-up visit from 3/5/25.     Reason for visit: diabetes management     Allergies/ADRs: Reviewed in chart  Past Medical History: Reviewed in chart  Tobacco: He reports that he quit smoking about 39 years ago. His smoking use included cigarettes. His smokeless tobacco use includes snuff.  Alcohol: none    Medication Adherence/Access: often misses second dose of twice daily medications.   Approved for Ozempic through [email or web address]    Diabetes   Metformin XR 2000mg once daily   Brenzavvy 20mg once daily - this is his newest medication addition   Glipizide XL 20mg once daily   Lantus 66 units at bedtime - we discussed taking twice daily but patient reports adherence is issue so was recommended to just take once daily in the morning rather than not at all  Ozempic 0.25mg weekly - took for 2 weeks, then had to hold for colonoscopy, will resume on Wednesday  Aspirin 81mg daily     He reports constipation, though not sure if exacerbated by colonoscopy as well.     Declines  CGM. Does not want to have sensor on his arm , does not mind poking finger.    Patient is experiencing the following side effects: dizziness  Current diabetes symptoms: polyuria and polydipsia  Diet/Exercise: reports he is trying to eat healthier.      Blood sugar monitorin time(s) daily; Ranges: (patient reported) 68 once overnight. 180 after eating something unhealthy. 118 this morning.    Eye exam is up to date  Foot exam: due    Today's Vitals: There were no vitals taken for this visit.  ----------------    I spent 14 minutes with this patient today. All changes were made via collaborative practice agreement with Janes Jeronimo MD.     A summary of these recommendations was sent via [employer].    José Miguel HesterD  Medication Therapy Management (MTM) Pharmacist    Telemedicine Visit Details  The patient's medications can be safely assessed via a telemedicine encounter.  Type of service:  Telephone encounter  Originating Location (pt. Location): Home    Distant Location (provider location):  On-site  Start Time: 10:14 AM  End Time: 10:24 AM     Medication Therapy Recommendations  Type 2 diabetes mellitus (H)   1 Current Medication: glipiZIDE (GLUCOTROL XL) 10 MG 24 hr tablet (Discontinued)   Current Medication Sig: TAKE 2 TABLETS BY MOUTH DAILY   Rationale: Undesirable effect - Adverse medication event - Safety   Recommendation: Discontinue Medication   Status: Accepted per CPA   Identified Date: 2025 Completed Date: 2025

## 2025-04-07 NOTE — PATIENT INSTRUCTIONS
"Recommendations from today's MTM visit:                                                         Stop glipizide to avoid overnight lows.  Continue Ozempic 0.25mg for two more weeks, then increase to 0.5mg weekly. Your next refill should arrive at [facility] within the next 2 weeks.     Follow-up: Return in 4 weeks (on 5/5/2025) for Follow up, with me, using a phone visit.    It was great speaking with you today.  I value your experience and would be very thankful for your time in providing feedback in our clinic survey. In the next few days, you may receive an email or text message from [facility] [employer] with a link to a survey related to your  clinical pharmacist.\"     To schedule another MTM appointment, please call the clinic directly or you may call the MTM scheduling line at [phone].    My Clinical Pharmacist's contact information:                                                      Please feel free to contact me with any questions or concerns you have.      Sameera Noe, PharmD  Medication Therapy Management (MTM) Pharmacist   "

## 2025-04-15 ENCOUNTER — TELEPHONE (OUTPATIENT)
Dept: FAMILY MEDICINE | Facility: CLINIC | Age: 70
End: 2025-04-15
Payer: MEDICARE

## 2025-04-15 DIAGNOSIS — M25.552 BILATERAL HIP PAIN: Primary | ICD-10-CM

## 2025-04-15 DIAGNOSIS — M25.551 BILATERAL HIP PAIN: Primary | ICD-10-CM

## 2025-04-15 NOTE — TELEPHONE ENCOUNTER
Order/Referral Request    Who is requesting: Patient    Orders being requested: Sports Medicine for Hip    Reason service is needed/diagnosis: PT states Dr. Jeronimo referred his to Sports Medicine and had fluid from his knee removed and then a cortisone injection. He would like to have another referral placed for Sports Medicine for his hip now. His hip has been bothering him as well. Sports medicine provider said that she cannot address the hip without it stating on the order.  Can Phani place a new order?     When are orders needed by: ASAP    Has this been discussed with Provider: Yes    Does patient have a preference on a Group/Provider/Facility? [facility]    Does patient have an appointment scheduled?: No    Where to send orders: Place orders within Epic    [facility] or would you prefer to receive a phone call?:   Patient would prefer a phone call   Okay to leave a detailed message?: Yes at Cell number on file:    Telephone Information:   Mobile [phone]

## 2025-04-16 ENCOUNTER — PATIENT OUTREACH (OUTPATIENT)
Dept: CARE COORDINATION | Facility: CLINIC | Age: 70
End: 2025-04-16
Payer: MEDICARE

## 2025-04-16 NOTE — PROGRESS NOTES
ASSESSMENT & PLAN     Today we discussed the underlying etiology/pathology of patient's   1. Primary osteoarthritis of left hip    2. Type 2 diabetes mellitus with other specified complication, with long-term current use of insulin (H) last hgbA1c 8.4    3. History of ETOH abuse- sober for past 4 months and attending AA    4. Primary osteoarthritis of left knee- improved symptoms s/p CSI    5. BMI 32.0-32.9,adult- improved weight with semaglutide (OZEMPIC) 2 MG/3ML pen        Today a shared decision making model was used. The patient's values and choices were respected. The following information represents what was discussed and decided upon by the provider and the patient.  - We discussed the patient is dealing with some chronic left hemipelvic pain more noticeable over the last 2 months.  Pain certainly has become more noticeable since left knee cortisone injection was done last week with his left knee pain improving significantly and now he is able to focus more on his left hip discomfort.  - We discussed his past medical history including labor-intensive job installing hardwood floors and the stress that that job puts on his body and joints  - We reviewed his x-rays of his pelvis today showing moderate left hip osteoarthritis without evidence of fracture or dislocation.  We discussed that he has decreased rotation of his left hip with reproduction of his left hemipelvic pain which he senses in the groin and lateral hip tissues.  - We discussed the etiology of osteoarthritis today.  There are various treatment options for the mainstay of management of osteoarthritis including rest, activity modification, proper technique with ambulation of steps/stairs which is to lead (start) with good leg going up and lead (start) with painful/symptomatic leg going down,  maintaining healthy weight management due to the 1:4 rule for joint stress with excess weight/load, herbal supplementation such as turmeric (1000 mg daily) to  decrease inflammation in the body , healthy nutrition, use of oral NSAIDs (if not contraindicated due to patient being on chronic blood thinner medication or other medical conditions) with supplemental Tylenol up to 3000 mg daily, topical pain relievers such as Voltaren 1% gel to be applied 3 times a day to the area of pain, consideration for intra-articular cortisone injections, formal physical therapy for joint mobilization and strengthening and possible surgical consideration if all conservative treatments fail.   -At this time patient would like to avoid surgery at all costs and would like to begin with conservative treatment  - Patient will purchase over-the-counter turmeric as a supplement and should take 1000 mg daily.  Patient should not exceed this dosage without medical direction  - Patient also responded very favorably to his left knee corticosteroid injection done last week with Dr. Hamilton.  At this time we will get him set up for an ultrasound-guided left hip intra-articular injection next week.  We discussed risk and benefits to this injection.  Patient will come in for procedure only visit for his ultrasound-guided injection.  - Patient otherwise will follow-up as needed for continued management of his left hip.  Left hip injection should not be repeated any more frequently than twice a year.  - Patient should continue with Ozempic to work on helping improve his BMI as well as lower his blood sugars.  Last hemoglobin A1c was 8.4.    - Appointment line phone number is [[phone]]     Ricci Mendoza PA-C  [facility]    This note was completed in part using a voice recognition software, any grammatical or context distortion are unintentional and inherent to the software.           SUBJECTIVE  Ezekiel Aldrich is a/an 69 year old male who is seen in consultation at the request of  Janes Jeronimo M.D. for evaluation of left hip pain. The patient is seen by  themselves.    Expanded HPI: Patient recently saw Dr. Hamilton last week for ongoing left knee pain and was diagnosed with arthritis.  Left knee aspiration with corticosteroid injection was done with patient having significantly improved knee function with less pain.  He also purchased over-the-counter compressive knee sleeves at the recommendation of Dr. Hamilton which also has been beneficial for both legs.  Since his knee pain has decreased he has become more aware of his left hemipelvic pain which has been ongoing for a long time but more acutely uncomfortable over the last 2 months without known injury or trauma.  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  We discussed the patient also has a history of alcoholism drinking vodka.  Patient is sober for the last 4 months which is to be applauded and is attending AA.  Patient describes pain in his left hemipelvic region anterior in the groin and lateral trochanter region.  He denies any numbness or tingling of the lower extremities.  Patient has tried to avoid over-the-counter ibuprofen and Tylenol in the past but due to his hip discomfort he did purchase a bottle of ibuprofen and is using it appropriately over the last couple weeks with some improvement.    Onset: 2 month(s) ago.  Patient states since his left knee has become less painful he is more aware of his left hip pain.  Location of Pain: left hip, anterior groin and lateral   Rating of Pain at worst: 8/10  Rating of Pain Currently: 1/10  Worsened by: standing, walking, up and down the stairs.  Any movements.  Better with: nothing per patient.  Treatments tried: rest/activity avoidance, Tylenol, and ibuprofen.  No way he is doing exercises per patient.  Quality: sharp, stabbing  Associated symptoms: no distal numbness or tingling; denies swelling or warmth  Orthopedic history related to this area/condition: NO  Relevant  surgical history for this area: NO  Social history: social history: retired-semi.  Right handed.  Loves to go to car shows.    Past Medical History:   Diagnosis Date    Acute renal failure     2016, secondary to dehydration, normalized     Social History     Socioeconomic History    Marital status:     Number of children: 5   Occupational History    Occupation: [employer]   Tobacco Use    Smoking status: Former     Current packs/day: 0.00     Types: Cigarettes     Quit date: 1985     Years since quittin.9    Smokeless tobacco: Current     Types: Snuff    Tobacco comments:     2 tins/week   Vaping Use    Vaping status: Never Used   Substance and Sexual Activity    Alcohol use: Not Currently     Alcohol/week: 2.0 standard drinks of alcohol     Types: 2 Standard drinks or equivalent per week     Comment: Heavy in past, sober     Drug use: No    Sexual activity: Yes     Partners: Female   Social History Narrative    Diet-            Exercise- Not regular. Work is physical, walking.     Social Drivers of Health     Financial Resource Strain: Low Risk  (1/10/2025)    Financial Resource Strain     Within the past 12 months, have you or your family members you live with been unable to get utilities (heat, electricity) when it was really needed?: No   Food Insecurity: Low Risk  (1/10/2025)    Food Insecurity     Within the past 12 months, did you worry that your food would run out before you got money to buy more?: No     Within the past 12 months, did the food you bought just not last and you didn t have money to get more?: No   Transportation Needs: Low Risk  (1/10/2025)    Transportation Needs     Within the past 12 months, has lack of transportation kept you from medical appointments, getting your medicines, non-medical meetings or appointments, work, or from getting things that you need?: No   Physical Activity: Inactive (10/3/2024)    Exercise Vital Sign     Days of Exercise per Week: 0  days     Minutes of Exercise per Session: 0 min   Stress: No Stress Concern Present (10/3/2024)    [employer] - Occupational Stress Questionnaire     Feeling of Stress : Only a little   Social Connections: Unknown (10/3/2024)    Social Connection and Isolation Panel [NHANES]     Frequency of Social Gatherings with Friends and Family: Once a week   Interpersonal Safety: Low Risk  (4/7/2025)    Interpersonal Safety     Do you feel physically and emotionally safe where you currently live?: Yes     Within the past 12 months, have you been hit, slapped, kicked or otherwise physically hurt by someone?: No     Within the past 12 months, have you been humiliated or emotionally abused in other ways by your partner or ex-partner?: No   Housing Stability: Low Risk  (1/10/2025)    Housing Stability     Do you have housing? : Yes     Are you worried about losing your housing?: No         Patient's past medical, surgical, social, and family histories were personally reviewed today and no changes are noted.    REVIEW OF SYSTEMS:  10 point ROS is negative other than symptoms noted above in HPI, Past Medical History or as stated below  Constitutional: NEGATIVE for fever, chills, weight loss has occurred secondary to use of Ozempic for BMI and diabetes management  Skin: NEGATIVE for worrisome rashes, moles or lesions  GI/: NEGATIVE for bowel or bladder changes  Neuro: NEGATIVE for weakness, dizziness or paresthesias    OBJECTIVE:  Vital signs as noted in EPIC for 4/16/2025  General: healthy, alert and in no distress  HEENT: no scleral icterus or conjunctival erythema.  Patient has pinpoint pupils today  Skin: no suspicious lesions or rash. No jaundice.  CV: no pedal edema  Resp: normal respiratory effort without conversational dyspnea   Psych: normal mood and affect  Neuro: Normal light sensory exam of lower extremity      MSK:  Exam shows a pleasant 69-year-old male who ambulates full weightbearing  without assistive device.  Both lower extremities evaluated showing compression knee sleeves on both legs.  Patient walks with a slightly forward flexed lumbar spine position.  Patient can stand erect but this does cause some transverse low back pain.  He can forward flex and bring his hands to his knee level without reproduction of pain pelvic pain or lower extremity pain.  Patient is grossly neurovascularly intact L2-S1 bilaterally.  No significant peripheral edema.  Patient has decreased internal rotation of both hips but reproduction of left hemipelvic pain with internal rotation reproducing hemipelvic pain as well as some pain in the lateral thigh towards the knee but no significant knee pain.  Patient also has decreased ability for active hip flexion secondary to stiffness.  Resisted hip flexion against resistance on the left side reproduces his left hemipelvic pain but has adequate motor tone of the iliopsoas 4+/5 compared to 5 out of 5 on the right side.  He shows normal motor tone of the quadriceps and hamstrings bilaterally.  Patient can raise up onto his toes and heels.  Straight leg raise is negative for radicular component.  There is no pain to palpation around the lateral hip tissues and greater trochanter.  Slight discomfort noted in the inguinal area to palpation along the hip joint but no bulges or masses noted.          RADIOLOGY AND LABORATORY:   Personal Independent visualization of the below images done today:  X-rays of the patient's hips and pelvis are obtained and reviewed today and reviewed with the patient.  Patient shows moderate left hip osteoarthritis with near bone-on-bone changes in the superior femoral acetabular articulation with old ossification changes over the lateral acetabulum.  No evidence of fracture or dislocation.  Right hip joint space maintained.  Degenerative changes noted of the lumbar spine.    Patient's conditions were thoroughly discussed during today's clinical visit  with total time reviewing patient's previous medical records/history/radiology, face-to-face examination and discussion and plan of care with the patient and documentation being 55 minutes on today's clinical visit  Ricci Mendoza PA-C  [facility]    This note was completed in part using a voice recognition software, any grammatical or context distortion are unintentional and inherent to the software.

## 2025-04-17 ENCOUNTER — ANCILLARY PROCEDURE (OUTPATIENT)
Dept: GENERAL RADIOLOGY | Facility: CLINIC | Age: 70
End: 2025-04-17
Attending: PHYSICIAN ASSISTANT
Payer: MEDICARE

## 2025-04-17 ENCOUNTER — OFFICE VISIT (OUTPATIENT)
Dept: ORTHOPEDICS | Facility: CLINIC | Age: 70
End: 2025-04-17
Attending: FAMILY MEDICINE
Payer: MEDICARE

## 2025-04-17 VITALS — HEIGHT: 71 IN | WEIGHT: 232 LBS | BODY MASS INDEX: 32.48 KG/M2

## 2025-04-17 DIAGNOSIS — M25.551 BILATERAL HIP PAIN: ICD-10-CM

## 2025-04-17 DIAGNOSIS — Z79.4 TYPE 2 DIABETES MELLITUS WITH OTHER SPECIFIED COMPLICATION, WITH LONG-TERM CURRENT USE OF INSULIN (H): ICD-10-CM

## 2025-04-17 DIAGNOSIS — M16.12 PRIMARY OSTEOARTHRITIS OF LEFT HIP: Primary | ICD-10-CM

## 2025-04-17 DIAGNOSIS — E11.69 TYPE 2 DIABETES MELLITUS WITH OTHER SPECIFIED COMPLICATION, WITH LONG-TERM CURRENT USE OF INSULIN (H): ICD-10-CM

## 2025-04-17 DIAGNOSIS — M17.12 PRIMARY OSTEOARTHRITIS OF LEFT KNEE: ICD-10-CM

## 2025-04-17 DIAGNOSIS — F10.11 HISTORY OF ETOH ABUSE: ICD-10-CM

## 2025-04-17 DIAGNOSIS — M25.552 BILATERAL HIP PAIN: ICD-10-CM

## 2025-04-17 NOTE — PATIENT INSTRUCTIONS
Thank you for allowing me to be part of your care team. My personal goal for your visit today is that you felt that I listened to you, you understood your diagnosis and treatment options and our staff/clinic met your expectations. We strive to provide you excellent care.  If you felt like your expectations were not met at your visit today or if you have further questions about your visit or care, please send me a [employer] message and I would be happy answer any questions or listen to your feedback.  If you do not have [employer], you can call [phone] to speak with me.      If advanced imaging (MRI, CT scan) or blood work was ordered at your appointment today, I will contact you as we discussed today either by telephone call or [employer] message within 48 hours after your advanced imaging testing has been completed or when ALL your lab results are available to review/discuss with you.       Today we discussed the underlying etiology/pathology of patient's   1. Primary osteoarthritis of left hip    2. Type 2 diabetes mellitus with other specified complication, with long-term current use of insulin (H) last hgbA1c 8.4    3. History of ETOH abuse- sober for past 4 months and attending AA    4. Primary osteoarthritis of left knee- improved symptoms s/p CSI    5. BMI 32.0-32.9,adult- improved weight with semaglutide (OZEMPIC) 2 MG/3ML pen        Today a shared decision making model was used. The patient's values and choices were respected. The following information represents what was discussed and decided upon by the provider and the patient.  - We discussed the patient is dealing with some chronic left hemipelvic pain more noticeable over the last 2 months.  Pain certainly has become more noticeable since left knee cortisone injection was done last week with his left knee pain improving significantly and now he is able to focus more on his left hip discomfort.  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  We discussed that he has decreased rotation of his left hip with reproduction of his left hemipelvic pain which he senses in the groin and lateral hip tissues.  - We discussed the etiology of osteoarthritis today.  There are various treatment options for the mainstay of management of osteoarthritis including rest, activity modification, proper technique with ambulation of steps/stairs which is to lead (start) with good leg going up and lead (start) with painful/symptomatic leg going down,  maintaining healthy weight management due to the 1:4 rule for joint stress with excess weight/load, herbal supplementation such as turmeric (1000 mg daily) to decrease inflammation in the body , healthy nutrition, use of oral NSAIDs (if not contraindicated due to patient being on chronic blood thinner medication or other medical conditions) with supplemental Tylenol up to 3000 mg daily, topical pain relievers such as Voltaren 1% gel to be applied 3 times a day to the area of pain, consideration for intra-articular cortisone injections, formal physical therapy for joint mobilization and strengthening and possible surgical consideration if all conservative treatments fail.   -At this time patient would like to avoid surgery at all costs and would like to begin with conservative treatment  - Patient will purchase over-the-counter turmeric as a supplement and should take 1000 mg daily.  Patient should not exceed this dosage without medical direction  - Patient also responded very favorably to his left knee corticosteroid injection done last week with Dr. Hamilton.  At this time we will get him set up for an ultrasound-guided left hip intra-articular injection next week.  We discussed risk and benefits  to this injection.  Patient will come in for procedure only visit for his ultrasound-guided injection.  - Patient otherwise will follow-up as needed for continued management of his left hip.  Left hip injection should not be repeated any more frequently than twice a year.  - Patient should continue with Ozempic to work on helping improve his BMI as well as lower his blood sugars.  Last hemoglobin A1c was 8.4.    - Appointment line phone number is [[phone]]     Ricci Mendoza PA-C  [facility]    This note was completed in part using a voice recognition software, any grammatical or context distortion are unintentional and inherent to the software.

## 2025-04-17 NOTE — LETTER
4/17/2025      Ezekiel Aldrich  [address] [facility] [place] [address]      Dear Colleague,    Thank you for referring your patient, Ezekiel Aldrich, to the [facility] SPORTS MEDICINE CLINIC [facility]. Please see a copy of my visit note below.    ASSESSMENT & PLAN     Today we discussed the underlying etiology/pathology of patient's   1. Primary osteoarthritis of left hip    2. Type 2 diabetes mellitus with other specified complication, with long-term current use of insulin (H) last hgbA1c 8.4    3. History of ETOH abuse- sober for past 4 months and attending AA    4. Primary osteoarthritis of left knee- improved symptoms s/p CSI    5. BMI 32.0-32.9,adult- improved weight with semaglutide (OZEMPIC) 2 MG/3ML pen        Today a shared decision making model was used. The patient's values and choices were respected. The following information represents what was discussed and decided upon by the provider and the patient.  - We discussed the patient is dealing with some chronic left hemipelvic pain more noticeable over the last 2 months.  Pain certainly has become more noticeable since left knee cortisone injection was done last week with his left knee pain improving significantly and now he is able to focus more on his left hip discomfort.  - We discussed his past medical history including labor-intensive job installing hardwood floors and the stress that that job puts on his body and joints  - We reviewed his x-rays of his pelvis today showing moderate left hip osteoarthritis without evidence of fracture or dislocation.  We discussed that he has decreased rotation of his left hip with reproduction of his left hemipelvic pain which he senses in the groin and lateral hip tissues.  - We discussed the etiology of osteoarthritis today.  There are various treatment options for the mainstay of management of osteoarthritis including rest, activity modification, proper technique with ambulation of steps/stairs  which is to lead (start) with good leg going up and lead (start) with painful/symptomatic leg going down,  maintaining healthy weight management due to the 1:4 rule for joint stress with excess weight/load, herbal supplementation such as turmeric (1000 mg daily) to decrease inflammation in the body , healthy nutrition, use of oral NSAIDs (if not contraindicated due to patient being on chronic blood thinner medication or other medical conditions) with supplemental Tylenol up to 3000 mg daily, topical pain relievers such as Voltaren 1% gel to be applied 3 times a day to the area of pain, consideration for intra-articular cortisone injections, formal physical therapy for joint mobilization and strengthening and possible surgical consideration if all conservative treatments fail.   -At this time patient would like to avoid surgery at all costs and would like to begin with conservative treatment  - Patient will purchase over-the-counter turmeric as a supplement and should take 1000 mg daily.  Patient should not exceed this dosage without medical direction  - Patient also responded very favorably to his left knee corticosteroid injection done last week with Dr. Hamilton.  At this time we will get him set up for an ultrasound-guided left hip intra-articular injection next week.  We discussed risk and benefits to this injection.  Patient will come in for procedure only visit for his ultrasound-guided injection.  - Patient otherwise will follow-up as needed for continued management of his left hip.  Left hip injection should not be repeated any more frequently than twice a year.  - Patient should continue with Ozempic to work on helping improve his BMI as well as lower his blood sugars.  Last hemoglobin A1c was 8.4.    - Appointment line phone number is [[phone]]     Ricci Mendoza PA-C  [facility]    This note was completed in part using a voice recognition software, any grammatical or context  distortion are unintentional and inherent to the software.           SUBJECTIVE  Ezekiel Aldrich is a/an 69 year old male who is seen in consultation at the request of  Janes Jeronimo M.D. for evaluation of left hip pain. The patient is seen by themselves.    Expanded HPI: Patient recently saw Dr. Hamilton last week for ongoing left knee pain and was diagnosed with arthritis.  Left knee aspiration with corticosteroid injection was done with patient having significantly improved knee function with less pain.  He also purchased over-the-counter compressive knee sleeves at the recommendation of Dr. Hamilton which also has been beneficial for both legs.  Since his knee pain has decreased he has become more aware of his left hemipelvic pain which has been ongoing for a long time but more acutely uncomfortable over the last 2 months without known injury or trauma.  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  We discussed the patient also has a history of alcoholism drinking vodka.  Patient is sober for the last 4 months which is to be applauded and is attending AA.  Patient describes pain in his left hemipelvic region anterior in the groin and lateral trochanter region.  He denies any numbness or tingling of the lower extremities.  Patient has tried to avoid over-the-counter ibuprofen and Tylenol in the past but due to his hip discomfort he did purchase a bottle of ibuprofen and is using it appropriately over the last couple weeks with some improvement.    Onset: 2 month(s) ago.  Patient states since his left knee has become less painful he is more aware of his left hip pain.  Location of Pain: left hip, anterior groin and lateral   Rating of Pain at worst: 8/10  Rating of Pain Currently: 1/10  Worsened by: standing, walking, up and down the stairs.  Any movements.  Better with: nothing per patient.  Treatments tried: rest/activity  avoidance, Tylenol, and ibuprofen.  No way he is doing exercises per patient.  Quality: sharp, stabbing  Associated symptoms: no distal numbness or tingling; denies swelling or warmth  Orthopedic history related to this area/condition: NO  Relevant surgical history for this area: NO  Social history: social history: retired-semi.  Right handed.  Loves to go to car shows.    Past Medical History:   Diagnosis Date     Acute renal failure     2016, secondary to dehydration, normalized     Social History     Socioeconomic History     Marital status:      Number of children: 5   Occupational History     Occupation: [employer]   Tobacco Use     Smoking status: Former     Current packs/day: 0.00     Types: Cigarettes     Quit date: 1985     Years since quittin.9     Smokeless tobacco: Current     Types: Snuff     Tobacco comments:     2 tins/week   Vaping Use     Vaping status: Never Used   Substance and Sexual Activity     Alcohol use: Not Currently     Alcohol/week: 2.0 standard drinks of alcohol     Types: 2 Standard drinks or equivalent per week     Comment: Heavy in past, sober      Drug use: No     Sexual activity: Yes     Partners: Female   Social History Narrative    Diet-            Exercise- Not regular. Work is physical, walking.     Social Drivers of Health     Financial Resource Strain: Low Risk  (1/10/2025)    Financial Resource Strain      Within the past 12 months, have you or your family members you live with been unable to get utilities (heat, electricity) when it was really needed?: No   Food Insecurity: Low Risk  (1/10/2025)    Food Insecurity      Within the past 12 months, did you worry that your food would run out before you got money to buy more?: No      Within the past 12 months, did the food you bought just not last and you didn t have money to get more?: No   Transportation Needs: Low Risk  (1/10/2025)    Transportation Needs      Within the past 12 months, has lack  of transportation kept you from medical appointments, getting your medicines, non-medical meetings or appointments, work, or from getting things that you need?: No   Physical Activity: Inactive (10/3/2024)    Exercise Vital Sign      Days of Exercise per Week: 0 days      Minutes of Exercise per Session: 0 min   Stress: No Stress Concern Present (10/3/2024)    [employer] - Occupational Stress Questionnaire      Feeling of Stress : Only a little   Social Connections: Unknown (10/3/2024)    Social Connection and Isolation Panel [NHANES]      Frequency of Social Gatherings with Friends and Family: Once a week   Interpersonal Safety: Low Risk  (4/7/2025)    Interpersonal Safety      Do you feel physically and emotionally safe where you currently live?: Yes      Within the past 12 months, have you been hit, slapped, kicked or otherwise physically hurt by someone?: No      Within the past 12 months, have you been humiliated or emotionally abused in other ways by your partner or ex-partner?: No   Housing Stability: Low Risk  (1/10/2025)    Housing Stability      Do you have housing? : Yes      Are you worried about losing your housing?: No         Patient's past medical, surgical, social, and family histories were personally reviewed today and no changes are noted.    REVIEW OF SYSTEMS:  10 point ROS is negative other than symptoms noted above in HPI, Past Medical History or as stated below  Constitutional: NEGATIVE for fever, chills, weight loss has occurred secondary to use of Ozempic for BMI and diabetes management  Skin: NEGATIVE for worrisome rashes, moles or lesions  GI/: NEGATIVE for bowel or bladder changes  Neuro: NEGATIVE for weakness, dizziness or paresthesias    OBJECTIVE:  Vital signs as noted in EPIC for 4/16/2025  General: healthy, alert and in no distress  HEENT: no scleral icterus or conjunctival erythema.  Patient has pinpoint pupils today  Skin: no suspicious lesions or  rash. No jaundice.  CV: no pedal edema  Resp: normal respiratory effort without conversational dyspnea   Psych: normal mood and affect  Neuro: Normal light sensory exam of lower extremity      MSK:  Exam shows a pleasant 69-year-old male who ambulates full weightbearing without assistive device.  Both lower extremities evaluated showing compression knee sleeves on both legs.  Patient walks with a slightly forward flexed lumbar spine position.  Patient can stand erect but this does cause some transverse low back pain.  He can forward flex and bring his hands to his knee level without reproduction of pain pelvic pain or lower extremity pain.  Patient is grossly neurovascularly intact L2-S1 bilaterally.  No significant peripheral edema.  Patient has decreased internal rotation of both hips but reproduction of left hemipelvic pain with internal rotation reproducing hemipelvic pain as well as some pain in the lateral thigh towards the knee but no significant knee pain.  Patient also has decreased ability for active hip flexion secondary to stiffness.  Resisted hip flexion against resistance on the left side reproduces his left hemipelvic pain but has adequate motor tone of the iliopsoas 4+/5 compared to 5 out of 5 on the right side.  He shows normal motor tone of the quadriceps and hamstrings bilaterally.  Patient can raise up onto his toes and heels.  Straight leg raise is negative for radicular component.  There is no pain to palpation around the lateral hip tissues and greater trochanter.  Slight discomfort noted in the inguinal area to palpation along the hip joint but no bulges or masses noted.          RADIOLOGY AND LABORATORY:   Personal Independent visualization of the below images done today:  X-rays of the patient's hips and pelvis are obtained and reviewed today and reviewed with the patient.  Patient shows moderate left hip osteoarthritis with near bone-on-bone changes in the superior femoral acetabular  articulation with old ossification changes over the lateral acetabulum.  No evidence of fracture or dislocation.  Right hip joint space maintained.  Degenerative changes noted of the lumbar spine.    Patient's conditions were thoroughly discussed during today's clinical visit with total time reviewing patient's previous medical records/history/radiology, face-to-face examination and discussion and plan of care with the patient and documentation being 55 minutes on today's clinical visit  Ricci Mendoza PA-C  [facility]    This note was completed in part using a voice recognition software, any grammatical or context distortion are unintentional and inherent to the software.       Again, thank you for allowing me to participate in the care of your patient.        Sincerely,        Ricci Mendoza PA-C    Electronically signed

## 2025-04-21 NOTE — PROGRESS NOTES
Sports Medicine Procedure Note    Diagnoses and all orders for this visit:    Primary osteoarthritis of left hip  -     Orthopedic  Referral  -     Large Joint Injection/Arthocentesis: L hip joint        Ezekiel Aldrich is a/an 69 year old male who is seen for Corticosteroid injection of left hip .   Last injection: 4/7/25 Left knee steroid injection via ultrasound guidance, still providing relief, no complications.    -left hip injection. Tolerated well. Similar pain pre and post injection.   - will watch sugars due to risk of hyperglycemia with DM A1c > 8. 4/2025  -Post-injection instructions were provided to the patient  -Return > 3 months for repeat cortisone injection sooner if develops new or worsening symptoms.    Options for treatment and/or follow-up care were reviewed with the patient was actively involved in the decision making process. Patient verbalized understanding and was in agreement with the plan.    Agreeable to injection injection. Discussed risks and benefits. they are aware of risks such as but not limited to allergy, Soft tissue/ tendon damage, skin hypopigmentation, hyperglycemia, bleeding, and infection.         Large Joint Injection/Arthocentesis: L hip joint    Date/Time: 4/24/2025 10:08 AM    Performed by: Lisa Galvan DO  Authorized by: Lisa Galvan DO    Indications:  Osteoarthritis and pain  Needle Size:  25 G  Guidance: ultrasound    Approach:  Anterior  Location:  Hip      Site:  L hip joint  Medications:  40 mg triamcinolone 40 MG/ML; 4 mL ROPivacaine 5 MG/ML; 3 mL lidocaine 1 %  Outcome:  Tolerated well, no immediate complications  Procedure discussed: discussed risks, benefits, and alternatives    Consent Given by:  Patient  Timeout: timeout called immediately prior to procedure    Prep: patient was prepped and draped in usual sterile fashion     Ultrasound was used to ensure safe and accurate needle placement and injection. Ultrasound images of the procedure  were permanently stored.        Lisa Galvan DO  [facility] SPORTS MEDICINE CLINIC [facility]        Post-Injection Discharge Instructions    You may shower, however avoid swimming, tub baths or hot tubs for 24 hours following your procedure  You may have a mild to moderate increase in pain for a few days following the injection.  The lidocaine (local numbing medicine) will wear off in several hours. It usually takes 3-5 days for the steroid medication to start working although it may take up to 14 days for full effect.   You may use ice packs for 10-15 minutes, 3 to 4 times a day at the injection site for comfort if needed  You may use extra strength Tylenol for pain control if necessary   If you were fasting, you may resume your normal diet and medications after the procedure  If you have diabetes, your blood sugar may be higher than normal for 10-14 days following a steroid injection. Contact your doctor who manages your diabetes if your blood sugar is significantly higher than usual    If you experience any of the following, call Sports Medicine @ [phone] or [phone]  -Fever over 100 degrees F  -Swelling, bleeding, redness, drainage, warmth at the injection site  -New or significant worsening pain    Dr. iLsa Galvan, DO  [facility]  Sports Medicine     Lab Results   Component Value Date    A1C 8.4 04/07/2025    A1C 8.6 01/06/2025    A1C 8.4 10/03/2024    A1C 8.8 01/25/2024    A1C 7.6 10/18/2023

## 2025-04-22 ENCOUNTER — TELEPHONE (OUTPATIENT)
Dept: FAMILY MEDICINE | Facility: CLINIC | Age: 70
End: 2025-04-22
Payer: MEDICARE

## 2025-04-22 NOTE — TELEPHONE ENCOUNTER
04/22/25    Attempted to reach patient to: Relay a message    Regarding: Spoke with pt and relayed that he has Ozempic in the upright peds fridge at [facility]. Pt states he will swing by on Thursday to pick this up.

## 2025-04-24 ENCOUNTER — OFFICE VISIT (OUTPATIENT)
Dept: ORTHOPEDICS | Facility: CLINIC | Age: 70
End: 2025-04-24
Payer: MEDICARE

## 2025-04-24 DIAGNOSIS — M16.12 PRIMARY OSTEOARTHRITIS OF LEFT HIP: ICD-10-CM

## 2025-04-24 RX ORDER — LIDOCAINE HYDROCHLORIDE 10 MG/ML
3 INJECTION, SOLUTION INFILTRATION; PERINEURAL
Status: COMPLETED | OUTPATIENT
Start: 2025-04-24 | End: 2025-04-24

## 2025-04-24 RX ORDER — ROPIVACAINE HYDROCHLORIDE 5 MG/ML
4 INJECTION, SOLUTION EPIDURAL; INFILTRATION; PERINEURAL
Status: COMPLETED | OUTPATIENT
Start: 2025-04-24 | End: 2025-04-24

## 2025-04-24 RX ORDER — TRIAMCINOLONE ACETONIDE 40 MG/ML
40 INJECTION, SUSPENSION INTRA-ARTICULAR; INTRAMUSCULAR
Status: COMPLETED | OUTPATIENT
Start: 2025-04-24 | End: 2025-04-24

## 2025-04-24 RX ADMIN — ROPIVACAINE HYDROCHLORIDE 4 ML: 5 INJECTION, SOLUTION EPIDURAL; INFILTRATION; PERINEURAL at 10:08

## 2025-04-24 RX ADMIN — TRIAMCINOLONE ACETONIDE 40 MG: 40 INJECTION, SUSPENSION INTRA-ARTICULAR; INTRAMUSCULAR at 10:08

## 2025-04-24 RX ADMIN — LIDOCAINE HYDROCHLORIDE 3 ML: 10 INJECTION, SOLUTION INFILTRATION; PERINEURAL at 10:08

## 2025-04-24 NOTE — TELEPHONE ENCOUNTER
Patient presented in clinic to  Ozempic 2 mg/3 ml prefilled pens. 4 boxes picked up.     Lani Villasenor RN

## 2025-04-24 NOTE — PATIENT INSTRUCTIONS
1. Primary osteoarthritis of left hip      -Follow-up in two weeks with Ricci Mendoza if no improvement to discuss next steps.   Cortisone injection today to left hip. can repeat every 3-4 months as needed. Discussed risks. Consented. Tolerated well.     -  Injection Discharge Instructions    You may shower, however avoid swimming, tub baths or hot tubs for 24 hours following your procedure  You may have a mild to moderate increase in pain for several days following the injection.  It may take up to 14 days for the steroid medication to start working although you may feel the effect as early as a few days after the procedure.  You may use ice packs for 10-15 minutes, 3 to 4 times a day at the injection site for comfort  You may use anti-inflammatory medications (such as Ibuprofen or Aleve or Advil) or Tylenol for pain control if necessary  If you were fasting, you may resume your normal diet and medications after the procedure  If you have diabetes, check your blood sugar more frequently than usual as your blood sugar may be higher than normal for 10-14 days following a steroid injection. Contact your doctor who manages your diabetes if your blood sugar is higher than usual    If you experience any of the following, call  @ [phone] or [phone]  -Fever over 100 degree F  -Swelling, bleeding, redness, drainage, warmth at the injection site  - New or worsening pain     Please call [phone]  Ask for my team if you have any questions or concerns  Lisa Galvan DO  [facility]  Thank you for choosing [facility]!    CLINIC LOCATIONS:     [place]  TRIAGE LINE: [phone]   [address] [place] [address] APPOINTMENTS: [phone]   [place] [address] RADIOLOGY: [phone]   (Monday, Thursday & Friday) PHYSICAL THERAPY: [phone]    BILLING QUESTIONS: [phone]   [place] FAX: [phone] [address]    [place] [address]    (Wednesday)

## 2025-04-24 NOTE — LETTER
4/24/2025      Ezekiel Aldrich  [address] [facility] [address]      Dear Colleague,    Thank you for referring your patient, Ezekiel Aldrich, to the [facility] SPORTS MEDICINE CLINIC [facility]. Please see a copy of my visit note below.    Sports Medicine Procedure Note    Diagnoses and all orders for this visit:    Primary osteoarthritis of left hip  -     Orthopedic  Referral  -     Large Joint Injection/Arthocentesis: L hip joint        Ezekiel Aldrich is a/an 69 year old male who is seen for Corticosteroid injection of left hip .   Last injection: 4/7/25 Left knee steroid injection via ultrasound guidance, still providing relief, no complications.    -left hip injection. Tolerated well. Similar pain pre and post injection.   - will watch sugars due to risk of hyperglycemia with DM A1c > 8. 4/2025  -Post-injection instructions were provided to the patient  -Return > 3 months for repeat cortisone injection sooner if develops new or worsening symptoms.    Options for treatment and/or follow-up care were reviewed with the patient was actively involved in the decision making process. Patient verbalized understanding and was in agreement with the plan.    Agreeable to injection injection. Discussed risks and benefits. they are aware of risks such as but not limited to allergy, Soft tissue/ tendon damage, skin hypopigmentation, hyperglycemia, bleeding, and infection.         Large Joint Injection/Arthocentesis: L hip joint    Date/Time: 4/24/2025 10:08 AM    Performed by: Lisa Galvan DO  Authorized by: Lisa Galvan DO    Indications:  Osteoarthritis and pain  Needle Size:  25 G  Guidance: ultrasound    Approach:  Anterior  Location:  Hip      Site:  L hip joint  Medications:  40 mg triamcinolone 40 MG/ML; 4 mL ROPivacaine 5 MG/ML; 3 mL lidocaine 1 %  Outcome:  Tolerated well, no immediate complications  Procedure discussed: discussed risks, benefits, and alternatives     Consent Given by:  Patient  Timeout: timeout called immediately prior to procedure    Prep: patient was prepped and draped in usual sterile fashion     Ultrasound was used to ensure safe and accurate needle placement and injection. Ultrasound images of the procedure were permanently stored.        Lisa Galvan DO  [facility] SPORTS MEDICINE CLINIC [facility]        Post-Injection Discharge Instructions    You may shower, however avoid swimming, tub baths or hot tubs for 24 hours following your procedure  You may have a mild to moderate increase in pain for a few days following the injection.  The lidocaine (local numbing medicine) will wear off in several hours. It usually takes 3-5 days for the steroid medication to start working although it may take up to 14 days for full effect.   You may use ice packs for 10-15 minutes, 3 to 4 times a day at the injection site for comfort if needed  You may use extra strength Tylenol for pain control if necessary   If you were fasting, you may resume your normal diet and medications after the procedure  If you have diabetes, your blood sugar may be higher than normal for 10-14 days following a steroid injection. Contact your doctor who manages your diabetes if your blood sugar is significantly higher than usual    If you experience any of the following, call Sports Medicine @ [phone] or [phone]  -Fever over 100 degrees F  -Swelling, bleeding, redness, drainage, warmth at the injection site  -New or significant worsening pain    Dr. Lisa Galvan DO  [facility]  Sports Medicine     Lab Results   Component Value Date    A1C 8.4 04/07/2025    A1C 8.6 01/06/2025    A1C 8.4 10/03/2024    A1C 8.8 01/25/2024    A1C 7.6 10/18/2023     Again, thank you for allowing me to participate in the care of your patient.        Sincerely,        Lisa Galvan DO    Electronically signed

## 2025-04-27 DIAGNOSIS — Z79.4 TYPE 2 DIABETES MELLITUS WITH OTHER SPECIFIED COMPLICATION, WITH LONG-TERM CURRENT USE OF INSULIN (H): ICD-10-CM

## 2025-04-27 DIAGNOSIS — E11.69 TYPE 2 DIABETES MELLITUS WITH OTHER SPECIFIED COMPLICATION, WITH LONG-TERM CURRENT USE OF INSULIN (H): ICD-10-CM

## 2025-04-28 RX ORDER — SEMAGLUTIDE 0.68 MG/ML
0.25 INJECTION, SOLUTION SUBCUTANEOUS
Qty: 3 ML | Refills: 0 | Status: SHIPPED | OUTPATIENT
Start: 2025-04-28

## 2025-05-05 ENCOUNTER — VIRTUAL VISIT (OUTPATIENT)
Dept: PHARMACY | Facility: CLINIC | Age: 70
End: 2025-05-05
Payer: COMMERCIAL

## 2025-05-05 ENCOUNTER — TELEPHONE (OUTPATIENT)
Dept: FAMILY MEDICINE | Facility: CLINIC | Age: 70
End: 2025-05-05
Payer: MEDICARE

## 2025-05-05 DIAGNOSIS — E11.69 TYPE 2 DIABETES MELLITUS WITH OTHER SPECIFIED COMPLICATION, WITH LONG-TERM CURRENT USE OF INSULIN (H): Primary | ICD-10-CM

## 2025-05-05 DIAGNOSIS — I10 PRIMARY HYPERTENSION: ICD-10-CM

## 2025-05-05 DIAGNOSIS — Z79.4 TYPE 2 DIABETES MELLITUS WITH OTHER SPECIFIED COMPLICATION, WITH LONG-TERM CURRENT USE OF INSULIN (H): Primary | ICD-10-CM

## 2025-05-05 DIAGNOSIS — M16.12 PRIMARY OSTEOARTHRITIS OF LEFT HIP: ICD-10-CM

## 2025-05-05 PROCEDURE — 99207 PR NO CHARGE LOS: CPT | Mod: 93

## 2025-05-05 NOTE — PATIENT INSTRUCTIONS
"Recommendations from today's MTM visit:                                                         Decrease Lantus to 60 units once daily when you increase Ozempic to 0.5mg weekly.   There is 1 more refill of Brenzavvy at the [facility].   Work on taking carvedilol twice daily for blood pressure because the effect of the medication only lasts half the day.   I will ask if you can have your physical and follow-up on the same day.     Follow-up: Return in 5 weeks (on 6/9/2025) for Follow up, with me, using a phone visit.    It was great speaking with you today.  I value your experience and would be very thankful for your time in providing feedback in our clinic survey. In the next few days, you may receive an email or text message from [employer] with a link to a survey related to your  clinical pharmacist.\"     To schedule another MTM appointment, please call the clinic directly or you may call the MTM scheduling line at [phone].    My Clinical Pharmacist's contact information:                                                      Please feel free to contact me with any questions or concerns you have.      Sameera Noe, PharmD  Medication Therapy Management (MTM) Pharmacist   "

## 2025-05-05 NOTE — PROGRESS NOTES
Medication Therapy Management (MTM) Encounter    ASSESSMENT:                            Medication Adherence/Access: See below for considerations.    Diabetes   A1c not at goal <7%. Blood glucose improving on Ozempic. Recommend titrating up on Ozempic and decreasing insulin to avoid overnight lows.   UACR not at goal <30mg/g though he is on max dose ACEi and SGLT2.     Hypertension   Patient may be experiencing orthostatic hypotension either from his carvedilol or Brenzavvy. Consider switching to metoprolol succinate for improved adherence. Patient to work on adherence at this time.    Osteoarthritis   Partially improved with steroid injection. Follows with sports medicine clinic. Recently started on turmeric.     PLAN:                            Decrease Lantus to 60 units once daily when you increase Ozempic to 0.5mg weekly.   There is 1 more refill of Brenzavvy at the [facility].   Work on taking carvedilol twice daily for blood pressure.  I will ask if you can have your physical and follow-up on the same day.     Follow-up: Return in 5 weeks (on 6/9/2025) for Follow up, with me, using a phone visit.    SUBJECTIVE/OBJECTIVE:                          Ezekiel Aldrich is a 70 year old male seen for a follow-up visit from 4/7/25.       Reason for visit: diabetes follow-up     Allergies/ADRs: Reviewed in chart  Past Medical History: Reviewed in chart  Tobacco: He reports that he quit smoking about 40 years ago. His smoking use included cigarettes. His smokeless tobacco use includes snuff.  Alcohol: none    Medication Adherence/Access: often misses second dose of twice daily medications.   Approved for Ozempic through [employer]  Brenzavvy through [employer]    Diabetes   Metformin XR 2000mg once daily   Brenzavvy 20mg once daily - this is his newest medication addition   Lantus 66 units in the morning  Ozempic 0.25mg weekly - will increase to 0.5mg on Wed  Aspirin 81mg daily     Is experiencing constipation, used  OTC laxatives last night.   Eating more vegetables hamburger, chicken. Has changed his diet.     He reports los of 77 overnight that woke him up. Declines CGM. Does not want to have sensor on his arm , does not mind poking finger.    Current diabetes symptoms: polyuria and polydipsia     Blood sugar monitorin time(s) daily; Ranges: (patient reported) Fasting- 95, 124, 150 148, 79, today 358 (thinks he didn't take all of his insulin)  Eye exam is up to date  Foot exam: due    Hypertension   Losartan 100mg once daily  Carvedilol 12.5mg twice daily with meals - still taking both doses in the morning     For last 2-3 months, he does report dizzy spells when he wakes up. Some days this lasts 45 minutes before he can get up. Newest medications are Brenzavvy and rosuvastatin. He did recently have EKG and full workup that was unremarkable. No readings to report but does not think blood pressure has been too high or low.     Arthritis  Turmeric 1000mg once daily   He had steroid injection for knee and hip about 10 days apart. Knee pain improved but hip pain has not shown improvement so far though could take longer to have effect.     Today's Vitals: There were no vitals taken for this visit.  ----------------    I spent 22 minutes with this patient today. All changes were made via collaborative practice agreement with Janes Jeronimo MD.     A summary of these recommendations was sent via Good[employer] Goodie App.    José Miguel HesterD  Medication Therapy Management (MTM) Pharmacist    Telemedicine Visit Details  The patient's medications can be safely assessed via a telemedicine encounter.  Type of service:  Telephone visit  Originating Location (pt. Location): Home    Distant Location (provider location):  On-site  Start Time: 10:30 AM  End Time: 10:52 AM     Medication Therapy Recommendations  Type 2 diabetes mellitus (H)   1 Current Medication: OZEMPIC, 0.25 OR 0.5 MG/DOSE, 2 MG/3ML pen   Current Medication Sig: INJECT 0.25 MG  SUBCUTANEOUS EVERY 7 DAYS   Rationale: Dose too low - Dosage too low - Effectiveness   Recommendation: Increase Dose   Status: Accepted per CPA   Identified Date: 5/5/2025 Completed Date: 5/5/2025

## 2025-05-05 NOTE — Clinical Note
Hi - this patient has physical 5/27 and a follow-up on July. He is wondering if he do both of those appointments on 5/27? I told him split billing might take effect.  Thanks, Sameera

## 2025-05-05 NOTE — TELEPHONE ENCOUNTER
05/05/25    Spoke with Dr. Jeronimo who ok'd to combine both of pt's appts. Called pt and relayed that both appts can be done 05/27 and assisted in scheduling appt in July.  Thuy

## 2025-05-15 DIAGNOSIS — E78.2 MIXED HYPERLIPIDEMIA: ICD-10-CM

## 2025-05-15 DIAGNOSIS — Z95.5 STATUS POST CORONARY ARTERY STENT PLACEMENT: ICD-10-CM

## 2025-05-15 RX ORDER — CARVEDILOL 12.5 MG/1
12.5 TABLET ORAL 2 TIMES DAILY WITH MEALS
Qty: 180 TABLET | Refills: 1 | Status: SHIPPED | OUTPATIENT
Start: 2025-05-15

## 2025-05-27 ENCOUNTER — OFFICE VISIT (OUTPATIENT)
Dept: FAMILY MEDICINE | Facility: CLINIC | Age: 70
End: 2025-05-27
Payer: MEDICARE

## 2025-05-27 VITALS
DIASTOLIC BLOOD PRESSURE: 89 MMHG | RESPIRATION RATE: 12 BRPM | WEIGHT: 224 LBS | HEART RATE: 82 BPM | TEMPERATURE: 97.4 F | HEIGHT: 71 IN | BODY MASS INDEX: 31.36 KG/M2 | OXYGEN SATURATION: 99 % | SYSTOLIC BLOOD PRESSURE: 154 MMHG

## 2025-05-27 DIAGNOSIS — Z79.4 TYPE 2 DIABETES MELLITUS WITHOUT COMPLICATION, WITH LONG-TERM CURRENT USE OF INSULIN (H): ICD-10-CM

## 2025-05-27 DIAGNOSIS — E11.69 TYPE 2 DIABETES MELLITUS WITH OTHER SPECIFIED COMPLICATION, WITH LONG-TERM CURRENT USE OF INSULIN (H): Primary | ICD-10-CM

## 2025-05-27 DIAGNOSIS — M19.90 OSTEOARTHRITIS, UNSPECIFIED OSTEOARTHRITIS TYPE, UNSPECIFIED SITE: ICD-10-CM

## 2025-05-27 DIAGNOSIS — I10 PRIMARY HYPERTENSION: ICD-10-CM

## 2025-05-27 DIAGNOSIS — Z79.4 TYPE 2 DIABETES MELLITUS WITH OTHER SPECIFIED COMPLICATION, WITH LONG-TERM CURRENT USE OF INSULIN (H): Primary | ICD-10-CM

## 2025-05-27 DIAGNOSIS — F10.20 ALCOHOLISM (H): ICD-10-CM

## 2025-05-27 DIAGNOSIS — E78.2 MIXED HYPERLIPIDEMIA: ICD-10-CM

## 2025-05-27 DIAGNOSIS — E11.9 TYPE 2 DIABETES MELLITUS WITHOUT COMPLICATION, WITH LONG-TERM CURRENT USE OF INSULIN (H): ICD-10-CM

## 2025-05-27 PROBLEM — K57.30 DIVERTICULAR DISEASE OF LARGE INTESTINE: Status: RESOLVED | Noted: 2021-12-14 | Resolved: 2025-05-27

## 2025-05-27 PROBLEM — K64.9 HEMORRHOIDS: Status: RESOLVED | Noted: 2021-12-14 | Resolved: 2025-05-27

## 2025-05-27 PROBLEM — K64.9 HEMORRHOIDS: Status: ACTIVE | Noted: 2021-12-14

## 2025-05-27 PROBLEM — Z86.0100 HISTORY OF COLONIC POLYPS: Status: ACTIVE | Noted: 2021-12-16

## 2025-05-27 PROBLEM — K63.5 POLYP OF COLON: Status: RESOLVED | Noted: 2021-12-14 | Resolved: 2025-05-27

## 2025-05-27 PROBLEM — D12.6 ADENOMATOUS POLYP OF COLON: Status: RESOLVED | Noted: 2025-04-01 | Resolved: 2025-05-27

## 2025-05-27 PROBLEM — K63.5 POLYP OF COLON: Status: ACTIVE | Noted: 2021-12-14

## 2025-05-27 PROBLEM — K57.30 DIVERTICULAR DISEASE OF LARGE INTESTINE: Status: ACTIVE | Noted: 2021-12-14

## 2025-05-27 PROBLEM — Z86.0100 HISTORY OF COLONIC POLYPS: Status: RESOLVED | Noted: 2021-12-16 | Resolved: 2025-05-27

## 2025-05-27 PROCEDURE — G2211 COMPLEX E/M VISIT ADD ON: HCPCS | Performed by: FAMILY MEDICINE

## 2025-05-27 PROCEDURE — 3078F DIAST BP <80 MM HG: CPT | Performed by: FAMILY MEDICINE

## 2025-05-27 PROCEDURE — 3077F SYST BP >= 140 MM HG: CPT | Performed by: FAMILY MEDICINE

## 2025-05-27 PROCEDURE — 99214 OFFICE O/P EST MOD 30 MIN: CPT | Performed by: FAMILY MEDICINE

## 2025-05-27 RX ORDER — INSULIN GLARGINE 100 [IU]/ML
INJECTION, SOLUTION SUBCUTANEOUS
Status: SHIPPED
Start: 2025-05-27

## 2025-05-27 RX ORDER — GLIPIZIDE 10 MG/1
2 TABLET, FILM COATED, EXTENDED RELEASE ORAL
COMMUNITY
Start: 2025-05-14

## 2025-05-27 NOTE — PROGRESS NOTES
"  Assessment & Plan     (E11.69,  Z79.4) Type 2 diabetes mellitus with other specified complication, with long-term current use of insulin (H)  (primary encounter diagnosis)  Comment: Suboptimal control with A1c at 8.4 in April  Plan: insulin glargine (LANTUS SOLOSTAR) 100 UNIT/ML         pen, PRIMARY CARE FOLLOW-UP SCHEDULING             (I10) Primary hypertension  Comment: Also suboptimal control though the patient believes that pain and the use of nicotine pouches are playing a role.  Plan:      (E78.2) Mixed hyperlipidemia  Comment: Controlled with Crestor  Plan:      (F10.20) Alcoholism (H)  Comment: Patient is in recovery since January of this year  Plan:      (M19.90) Osteoarthritis, unspecified osteoarthritis type, unspecified site  Comment: Hip and left knee affected  Plan: Orthopedic  Referral           PLAN:  1.  Referral back to orthopedics of note the patient's hip and knee arthritis are now fairly disabling.  2.  Due to insurance the patient has been picking up his Ozempic year, he is working with the pharmacist he is a candidate for the Ozempic to be increased from 0.5 to 1 mg weekly  3.  In terms of the blood pressure for now watchful waiting though we may need to adjust if it remains elevated  4.  3-month follow-up  5.  The longitudinal plan of care for the diagnosis(es)/condition(s) as documented were addressed during this visit. Due to the added complexity in care, I will continue to support Ezekiel in the subsequent management and with ongoing continuity of care.            BMI  Estimated body mass index is 31.24 kg/m  as calculated from the following:    Height as of this encounter: 1.803 m (5' 11\").    Weight as of this encounter: 101.6 kg (224 lb).             Subjective   Ezekiel is a 70 year old, presenting for the following health issues:  Diabetes and Recheck Medication        5/27/2025     8:20 AM   Additional Questions   Roomed by Og     History of Present Illness       Reason for " visit:  Physical    He eats 0-1 servings of fruits and vegetables daily.He consumes 5 sweetened beverage(s) daily.He exercises with enough effort to increase his heart rate 9 or less minutes per day.  He exercises with enough effort to increase his heart rate 3 or less days per week.   He is taking medications regularly.   - Ezekiel A Almo, 70-year-old male.  - Blood pressure noted to be elevated.  - Has been using Ozempic, currently on 0.5 mg dose; started at 0.25 mg.  - Quit drinking alcohol almost five months ago; attends AA meetings regularly, at least five times a week.  - Reports eating healthier, including foods like spinach and lettuce.  - Experienced an episode of unresponsiveness at [facility]; was unresponsive from approximately 11:15 AM until 4:30 PM.  - Describes the event as passing out while having half a beer during lunch with friends.  - Decided to quit drinking following the incident.  - Reports knee and hip pain, particularly in the left hip and knee; received cortisone shot in the left knee with significant improvement within a day and a half.  - Left hip pain persists despite treatment; cortisone shot in the hip did not provide relief.  - Has been taking turmeric and other anti-inflammatories for pain management.  - Describes difficulty working due to knee and hip pain; has worked on hardwood floors for 56 years, often on knees.  - Experienced a fall due to hip pain while visiting granddaughters' school, resulting in a scuffed elbow.  - Reports blood sugars initially raised by cortisone but have since stabilized except for this morning's reading.  - Lost 15 lbs according to personal scale; attributes weight loss to healthier eating habits and reduced calorie intake from quitting alcohol.  - Had a colonoscopy recently, which required a temporary pause in Ozempic use.  - Chewed tobacco for 50 years but stopped about a month into quitting alcohol; currently uses nicotine pouches  "instead.  - Reports feeling frustrated with ongoing pain and the impact on daily activities.                  Objective    BP (!) 154/89   Pulse 82   Temp 97.4  F (36.3  C) (Oral)   Resp 12   Ht 1.803 m (5' 11\")   Wt 101.6 kg (224 lb)   SpO2 99%   BMI 31.24 kg/m    Body mass index is 31.24 kg/m .  Physical Exam               Signed Electronically by: Janes Jeronimo MD    "

## 2025-05-28 ENCOUNTER — PATIENT OUTREACH (OUTPATIENT)
Dept: CARE COORDINATION | Facility: CLINIC | Age: 70
End: 2025-05-28
Payer: MEDICARE

## 2025-06-03 SDOH — HEALTH STABILITY: PHYSICAL HEALTH: ON AVERAGE, HOW MANY MINUTES DO YOU ENGAGE IN EXERCISE AT THIS LEVEL?: 0 MIN

## 2025-06-03 SDOH — HEALTH STABILITY: PHYSICAL HEALTH: ON AVERAGE, HOW MANY DAYS PER WEEK DO YOU ENGAGE IN MODERATE TO STRENUOUS EXERCISE (LIKE A BRISK WALK)?: 0 DAYS

## 2025-06-03 NOTE — PROGRESS NOTES
ASSESSMENT & PLAN       Today we discussed the underlying etiology/pathology of patient's   1. Primary osteoarthritis of left hip- grade 4    2. Primary osteoarthritis of left knee, medial grade 3    3. Type 2 diabetes mellitus with other specified complication, with long-term current use of insulin (H) last hgbA1c 8.4    4. History of ETOH abuse- sober for past 4 months and attending AA    5. Chronic kidney disease, stage 3a (H)    6. H/O heart artery stent x 2, 2022 and 2023        Today a shared decision making model was used. The patient's values and choices were respected. The following information represents what was discussed and decided upon by the provider and the patient.  - We discussed the patient is dealing with chronic left hemipelvic pain which he feels in the inguinal region, lateral and buttock region with some radiation through his left thigh and likely to his left knee.  - We discussed his previous x-rays which show high-grade left hip osteoarthritis with bone-on-bone contact  - We also discussed that he has known left knee medial compartment osteoarthritis.  On exam his knee appears to be relatively benign for reproducing pain.  - We discussed the physical exam is much more consistent with referred pain to his left knee from his left hip as he has significant pain and loss of rotation with internal rotation testing and resisted hip flexion against resistance.  - At this time I recommend the patient meet with a surgeon to discuss total hip arthroplasty.  - Patient has been a nonresponder to intra-articular cortisone injection for the left hip.  Patient received temporary relief for about a week for his left knee corticosteroid injection.  - Patient will contact me in the future if you would like to pursue consultation with a surgeon and I will place that order.    - If you have questions, call phone number is [[phone]] and ask to be transferred to me    Ricci Mendoza PA-C  [facility]    This note was completed in part using a voice recognition software, any grammatical or context distortion are unintentional and inherent to the software.         SUBJECTIVE  Ezekiel Aldrich is a/an 70 year old male who is seen for follow up of left knee and left hip pain. The patient is seen by themselves.  Went to grandparents day about a 1-2 wks ago.  Was trying to step up on the curb and fell.  Fell on his left side and right elbow which he felt aggravated his left lower extremity.  He has been able to bear weight without assistive device since this acute event.  Pain continues to be in the left inguinal groin, left lateral hip and left buttock with radiation through his thigh to his left knee.  Patient also complains of left knee pain.  No symptoms really below the knee on either leg.  Since last visit on 04/17/2025 patient has gotten worse.  Original Date of Injury: 2+ months  Has been sober 5 months now and going to AA.  Has lost about 22 lbs. patient on Ozempic to improve his diabetes and help lose weight.  Has previous treatment plan been beneficial for condition: no  Treatment used since last visit:  corticosteroid injection left hip  Location of Pain: left knee-wearing knee brace, diffuse        Left hip-groin radiating lateral to posterior.  Rating of Pain at worst: 10/10  Rating of Pain Currently: 8/10  Worsened by: any movement or weightbearing  Better with: certain position lying down w/o moving  Treatments tried: corticosteroid injection (most recent date: 4/24/25-left hip-0 days of relief.  4/7/25--left knee-3 days of relief) that provided, x-ray hip 04/17/2025, x-ray knee 04/07/2025.  Ibuprofen, tumeric, and tylenol.  Quality: aching, sharp  Associated symptoms: weakness of left leg and feeling of instability.      Past Medical History:   Diagnosis Date    Acute renal failure     6/11/2016, secondary to dehydration, normalized     Social History     Socioeconomic History     Marital status:     Number of children: 5   Occupational History    Occupation: "[employer]   Tobacco Use    Smoking status: Former     Current packs/day: 0.00     Types: Cigarettes     Quit date: 1985     Years since quittin.1    Smokeless tobacco: Current     Types: Snuff    Tobacco comments:     2 tins/week   Vaping Use    Vaping status: Never Used   Substance and Sexual Activity    Alcohol use: Not Currently     Alcohol/week: 2.0 standard drinks of alcohol     Types: 2 Standard drinks or equivalent per week     Comment: Heavy in past, sober     Drug use: No    Sexual activity: Yes     Partners: Female   Social History Narrative    Diet-            Exercise- Not regular. Work is physical, walking.     Social Drivers of Health     Financial Resource Strain: Low Risk  (1/10/2025)    Financial Resource Strain     Within the past 12 months, have you or your family members you live with been unable to get utilities (heat, electricity) when it was really needed?: No   Food Insecurity: Low Risk  (1/10/2025)    Food Insecurity     Within the past 12 months, did you worry that your food would run out before you got money to buy more?: No     Within the past 12 months, did the food you bought just not last and you didn t have money to get more?: No   Transportation Needs: Low Risk  (1/10/2025)    Transportation Needs     Within the past 12 months, has lack of transportation kept you from medical appointments, getting your medicines, non-medical meetings or appointments, work, or from getting things that you need?: No   Physical Activity: Inactive (10/3/2024)    Exercise Vital Sign     Days of Exercise per Week: 0 days     Minutes of Exercise per Session: 0 min   Stress: No Stress Concern Present (10/3/2024)    [employer] - Occupational Stress Questionnaire     Feeling of Stress : Only a little   Social Connections: Unknown (10/3/2024)    Social Connection and Isolation  Panel [NHANES]     Frequency of Social Gatherings with Friends and Family: Once a week   Interpersonal Safety: Low Risk  (4/7/2025)    Interpersonal Safety     Do you feel physically and emotionally safe where you currently live?: Yes     Within the past 12 months, have you been hit, slapped, kicked or otherwise physically hurt by someone?: No     Within the past 12 months, have you been humiliated or emotionally abused in other ways by your partner or ex-partner?: No   Housing Stability: Low Risk  (1/10/2025)    Housing Stability     Do you have housing? : Yes     Are you worried about losing your housing?: No         Patient's past medical, surgical, social, and family histories were personally reviewed today and no changes are noted.  REVIEW OF SYSTEMS:  Review of Systems    OBJECTIVE:  Vital signs as noted in EPIC for 5/29/2025    General: healthy, alert and in no distress  HEENT: no scleral icterus or conjunctival erythema  Skin: no suspicious lesions or rash. No jaundice.  CV: no pedal edema  Resp: normal respiratory effort without conversational dyspnea   Psych: normal mood and affect  Neuro: Normal light sensory exam of lower extremity      MSK:  Exam shows a pleasant 70-year-old male who ambulates full weightbearing without assistive device.  Patient wearing an over-the-counter knee sleeve on the left knee recommended previously by Dr. Hamilton.  Patient walks with a slightly forward flexed lumbar spine position. Patient can stand erect but this does cause some transverse low back pain but does not reproduce any specific anterior hemipelvic pain, thigh pain or knee pain on the left side. He can forward flex and bring his hands to his knee level without reproduction of pain pelvic pain or lower extremity pain. Patient is grossly neurovascularly intact L2-S1 bilaterally to light touch. No significant peripheral edema. Patient has decreased internal rotation of both hips but reproduction of left hemipelvic pain  with internal rotation reproducing hemipelvic pain as well as some pain in the left thigh/knee.  patient also has decreased ability for active hip flexion secondary to stiffness. Resisted hip flexion against resistance on the left side reproduces his left hemipelvic pain but has adequate motor tone of the iliopsoas 4+/5 compared to 5 out of 5 on the right side. He shows normal motor tone of the quadriceps and hamstrings bilaterally. Patient can raise up onto his toes and heels. Straight leg raise is negative for radicular component. There is no pain to palpation around the lateral hip tissues and greater trochanter. Slight discomfort noted in the inguinal area to palpation along the hip joint but no bulges or masses noted.  Left knee shows no obvious bruising, swelling or ecchymosis.  No warmth or effusion.  Mild tenderness on the medial side of the knee joint line.  Ligament exam is stable.  Patient has essentially full knee extension and flexion past 120 degrees without significant crepitation.  Ligament exam of MCL and LCL are intact with correctable mild varus deformity.        RADIOLOGY AND LABORATORY:   Personal Independent visualization of the below images done today:  Previous x-rays of the patient's pelvis as well as left knee are personally reviewed today and I agree with interpretation below.  Patient has high-grade bone-on-bone osteoarthritis of the left hip.  Moderate arthritic changes of the medial compartment of the right knee.  Results for orders placed or performed in visit on 04/17/25   XR Pelvis w 1 View Each Hip    Narrative    EXAM: XR PELVIS AND HIP BILATERAL 1 VIEW  LOCATION: [facility]  DATE: 4/17/2025    INDICATION:  Bilateral hip pain, Bilateral hip pain  COMPARISON: None.      Impression    IMPRESSION: Both hips negative for fracture or dislocation. Degenerative change both hip joints, greater on the left with significant superior joint space narrowing. Pelvis  negative for fracture. Degenerative change at the SI joints bilaterally.     Narrative & Impression   EXAM: XR KNEE BILATERAL 3 VIEWS  LOCATION: [facility]  DATE: 4/7/2025     INDICATION:  Chronic pain of both knees, Chronic pain of both knees, Chronic pain of both knees  COMPARISON: None.                                                                      IMPRESSION:   No acute displaced fracture or subluxation. Mild patellofemoral compartment knee arthrosis bilaterally. No left or right knee joint effusion. Right quadriceps enthesophyte. Vascular calcifications.     Patient's conditions were thoroughly discussed during today's clinical visit with total time reviewing patient's previous medical records/history/radiology, face-to-face examination and discussion and plan of care with the patient and documentation being 45 minutes on today's clinical visit  Ricci Mendoza PA-C  [facility]    This note was completed in part using a voice recognition software, any grammatical or context distortion are unintentional and inherent to the software.

## 2025-06-04 ENCOUNTER — OFFICE VISIT (OUTPATIENT)
Dept: ORTHOPEDICS | Facility: CLINIC | Age: 70
End: 2025-06-04
Attending: FAMILY MEDICINE
Payer: MEDICARE

## 2025-06-04 DIAGNOSIS — M16.12 PRIMARY OSTEOARTHRITIS OF LEFT HIP: Primary | ICD-10-CM

## 2025-06-04 DIAGNOSIS — M17.12 PRIMARY OSTEOARTHRITIS OF LEFT KNEE: ICD-10-CM

## 2025-06-04 DIAGNOSIS — Z95.5 H/O HEART ARTERY STENT: ICD-10-CM

## 2025-06-04 DIAGNOSIS — Z79.4 TYPE 2 DIABETES MELLITUS WITH OTHER SPECIFIED COMPLICATION, WITH LONG-TERM CURRENT USE OF INSULIN (H): ICD-10-CM

## 2025-06-04 DIAGNOSIS — E11.69 TYPE 2 DIABETES MELLITUS WITH OTHER SPECIFIED COMPLICATION, WITH LONG-TERM CURRENT USE OF INSULIN (H): ICD-10-CM

## 2025-06-04 DIAGNOSIS — N18.31 CHRONIC KIDNEY DISEASE, STAGE 3A (H): ICD-10-CM

## 2025-06-04 DIAGNOSIS — F10.11 HISTORY OF ETOH ABUSE: ICD-10-CM

## 2025-06-04 NOTE — PATIENT INSTRUCTIONS
Thank you for allowing me to be part of your care team. My personal goal for your visit today is that you felt that I listened to you, you understood your diagnosis and treatment options and our staff/clinic met your expectations. We strive to provide you excellent care.  If you felt like your expectations were not met at your visit today or if you have further questions about your visit or care, please send me a Streamt message and I would be happy answer any questions or listen to your feedback.  If you do not have [employer], you can call [phone] to speak with me.      If advanced imaging (MRI, CT scan) or blood work was ordered at your appointment today, I will contact you as we discussed today either by telephone call or klinify message within 48 hours after your advanced imaging testing has been completed or when ALL your lab results are available to review/discuss with you.       Today we discussed the underlying etiology/pathology of patient's   1. Primary osteoarthritis of left hip- grade 4    2. Primary osteoarthritis of left knee, medial grade 3    3. Type 2 diabetes mellitus with other specified complication, with long-term current use of insulin (H) last hgbA1c 8.4    4. History of ETOH abuse- sober for past 4 months and attending AA    5. Chronic kidney disease, stage 3a (H)    6. H/O heart artery stent x 2, 2022 and 2023        Today a shared decision making model was used. The patient's values and choices were respected. The following information represents what was discussed and decided upon by the provider and the patient.  - We discussed the patient is dealing with chronic left hemipelvic pain which he feels in the inguinal region, lateral and buttock region with some radiation through his left thigh and likely to his left knee.  - We discussed his previous x-rays which show high-grade left hip osteoarthritis with bone-on-bone contact  - We also discussed that he has known left knee medial  compartment osteoarthritis.  On exam his knee appears to be relatively benign for reproducing pain.  - We discussed the physical exam is much more consistent with referred pain to his knee from his hip as he has significant pain and loss of rotation with internal rotation testing and resisted hip flexion against resistance.  - At this time I recommend the patient meet with a surgeon to discuss total hip arthroplasty.  - Patient has been a nonresponder to intra-articular cortisone injection for the left hip.  Patient received temporary relief for about a week for his left knee corticosteroid injection.  - Patient will contact me in the future if you would like to pursue consultation with a surgeon and I will place that order.    - If you have questions, call phone number is [[phone]] and ask to be transferred to me    Ricci Mendoza PA-C  [facility]    This note was completed in part using a voice recognition software, any grammatical or context distortion are unintentional and inherent to the software.

## 2025-06-04 NOTE — LETTER
6/4/2025      Ezekiel Aldrich  [address] [place] [address]      Dear Colleague,    Thank you for referring your patient, Ezekiel Aldrich, to the [facility]. Please see a copy of my visit note below.    ASSESSMENT & PLAN       Today we discussed the underlying etiology/pathology of patient's   1. Primary osteoarthritis of left hip- grade 4    2. Primary osteoarthritis of left knee, medial grade 3    3. Type 2 diabetes mellitus with other specified complication, with long-term current use of insulin (H) last hgbA1c 8.4    4. History of ETOH abuse- sober for past 4 months and attending AA    5. Chronic kidney disease, stage 3a (H)    6. H/O heart artery stent x 2, 2022 and 2023        Today a shared decision making model was used. The patient's values and choices were respected. The following information represents what was discussed and decided upon by the provider and the patient.  - We discussed the patient is dealing with chronic left hemipelvic pain which he feels in the inguinal region, lateral and buttock region with some radiation through his left thigh and likely to his left knee.  - We discussed his previous x-rays which show high-grade left hip osteoarthritis with bone-on-bone contact  - We also discussed that he has known left knee medial compartment osteoarthritis.  On exam his knee appears to be relatively benign for reproducing pain.  - We discussed the physical exam is much more consistent with referred pain to his left knee from his left hip as he has significant pain and loss of rotation with internal rotation testing and resisted hip flexion against resistance.  - At this time I recommend the patient meet with a surgeon to discuss total hip arthroplasty.  - Patient has been a nonresponder to intra-articular cortisone injection for the left hip.  Patient received temporary relief for about a week for his left knee corticosteroid injection.  -  Patient will contact me in the future if you would like to pursue consultation with a surgeon and I will place that order.    - If you have questions, call phone number is [[phone]] and ask to be transferred to me    Ricci Mendoza PA-C  [facility]    This note was completed in part using a voice recognition software, any grammatical or context distortion are unintentional and inherent to the software.         SUBJECTIVE  Ezekiel Aldrich is a/an 70 year old male who is seen for follow up of left knee and left hip pain. The patient is seen by themselves.  Went to grandparents day about a 1-2 wks ago.  Was trying to step up on the curb and fell.  Fell on his left side and right elbow which he felt aggravated his left lower extremity.  He has been able to bear weight without assistive device since this acute event.  Pain continues to be in the left inguinal groin, left lateral hip and left buttock with radiation through his thigh to his left knee.  Patient also complains of left knee pain.  No symptoms really below the knee on either leg.  Since last visit on 04/17/2025 patient has gotten worse.  Original Date of Injury: 2+ months  Has been sober 5 months now and going to AA.  Has lost about 22 lbs. patient on Ozempic to improve his diabetes and help lose weight.  Has previous treatment plan been beneficial for condition: no  Treatment used since last visit:  corticosteroid injection left hip  Location of Pain: left knee-wearing knee brace, diffuse        Left hip-groin radiating lateral to posterior.  Rating of Pain at worst: 10/10  Rating of Pain Currently: 8/10  Worsened by: any movement or weightbearing  Better with: certain position lying down w/o moving  Treatments tried: corticosteroid injection (most recent date: 4/24/25-left hip-0 days of relief.  4/7/25--left knee-3 days of relief) that provided, x-ray hip 04/17/2025, x-ray knee 04/07/2025.  Ibuprofen, tumeric, and  tylenol.  Quality: aching, sharp  Associated symptoms: weakness of left leg and feeling of instability.      Past Medical History:   Diagnosis Date     Acute renal failure     2016, secondary to dehydration, normalized     Social History     Socioeconomic History     Marital status:      Number of children: 5   Occupational History     Occupation: appening   Tobacco Use     Smoking status: Former     Current packs/day: 0.00     Types: Cigarettes     Quit date: 1985     Years since quittin.1     Smokeless tobacco: Current     Types: Snuff     Tobacco comments:     2 tins/week   Vaping Use     Vaping status: Never Used   Substance and Sexual Activity     Alcohol use: Not Currently     Alcohol/week: 2.0 standard drinks of alcohol     Types: 2 Standard drinks or equivalent per week     Comment: Heavy in past, sober      Drug use: No     Sexual activity: Yes     Partners: Female   Social History Narrative    Diet-            Exercise- Not regular. Work is physical, walking.     Social Drivers of Health     Financial Resource Strain: Low Risk  (1/10/2025)    Financial Resource Strain      Within the past 12 months, have you or your family members you live with been unable to get utilities (heat, electricity) when it was really needed?: No   Food Insecurity: Low Risk  (1/10/2025)    Food Insecurity      Within the past 12 months, did you worry that your food would run out before you got money to buy more?: No      Within the past 12 months, did the food you bought just not last and you didn t have money to get more?: No   Transportation Needs: Low Risk  (1/10/2025)    Transportation Needs      Within the past 12 months, has lack of transportation kept you from medical appointments, getting your medicines, non-medical meetings or appointments, work, or from getting things that you need?: No   Physical Activity: Inactive (10/3/2024)    Exercise Vital Sign      Days of Exercise per Week: 0 days       Minutes of Exercise per Session: 0 min   Stress: No Stress Concern Present (10/3/2024)    [employer] [place] [employer] - Occupational Stress Questionnaire      Feeling of Stress : Only a little   Social Connections: Unknown (10/3/2024)    Social Connection and Isolation Panel [NHANES]      Frequency of Social Gatherings with Friends and Family: Once a week   Interpersonal Safety: Low Risk  (4/7/2025)    Interpersonal Safety      Do you feel physically and emotionally safe where you currently live?: Yes      Within the past 12 months, have you been hit, slapped, kicked or otherwise physically hurt by someone?: No      Within the past 12 months, have you been humiliated or emotionally abused in other ways by your partner or ex-partner?: No   Housing Stability: Low Risk  (1/10/2025)    Housing Stability      Do you have housing? : Yes      Are you worried about losing your housing?: No         Patient's past medical, surgical, social, and family histories were personally reviewed today and no changes are noted.  REVIEW OF SYSTEMS:  Review of Systems    OBJECTIVE:  Vital signs as noted in EPIC for 5/29/2025    General: healthy, alert and in no distress  HEENT: no scleral icterus or conjunctival erythema  Skin: no suspicious lesions or rash. No jaundice.  CV: no pedal edema  Resp: normal respiratory effort without conversational dyspnea   Psych: normal mood and affect  Neuro: Normal light sensory exam of lower extremity      MSK:  Exam shows a pleasant 70-year-old male who ambulates full weightbearing without assistive device.  Patient wearing an over-the-counter knee sleeve on the left knee recommended previously by Dr. Hamilton.  Patient walks with a slightly forward flexed lumbar spine position. Patient can stand erect but this does cause some transverse low back pain but does not reproduce any specific anterior hemipelvic pain, thigh pain or knee pain on the left side. He can forward flex and bring  his hands to his knee level without reproduction of pain pelvic pain or lower extremity pain. Patient is grossly neurovascularly intact L2-S1 bilaterally to light touch. No significant peripheral edema. Patient has decreased internal rotation of both hips but reproduction of left hemipelvic pain with internal rotation reproducing hemipelvic pain as well as some pain in the left thigh/knee.  patient also has decreased ability for active hip flexion secondary to stiffness. Resisted hip flexion against resistance on the left side reproduces his left hemipelvic pain but has adequate motor tone of the iliopsoas 4+/5 compared to 5 out of 5 on the right side. He shows normal motor tone of the quadriceps and hamstrings bilaterally. Patient can raise up onto his toes and heels. Straight leg raise is negative for radicular component. There is no pain to palpation around the lateral hip tissues and greater trochanter. Slight discomfort noted in the inguinal area to palpation along the hip joint but no bulges or masses noted.  Left knee shows no obvious bruising, swelling or ecchymosis.  No warmth or effusion.  Mild tenderness on the medial side of the knee joint line.  Ligament exam is stable.  Patient has essentially full knee extension and flexion past 120 degrees without significant crepitation.  Ligament exam of MCL and LCL are intact with correctable mild varus deformity.        RADIOLOGY AND LABORATORY:   Personal Independent visualization of the below images done today:  Previous x-rays of the patient's pelvis as well as left knee are personally reviewed today and I agree with interpretation below.  Patient has high-grade bone-on-bone osteoarthritis of the left hip.  Moderate arthritic changes of the medial compartment of the right knee.  Results for orders placed or performed in visit on 04/17/25   XR Pelvis w 1 View Each Hip    Narrative    EXAM: XR PELVIS AND HIP BILATERAL 1 VIEW  LOCATION: [facility]  DATE: 4/17/2025    INDICATION:  Bilateral hip pain, Bilateral hip pain  COMPARISON: None.      Impression    IMPRESSION: Both hips negative for fracture or dislocation. Degenerative change both hip joints, greater on the left with significant superior joint space narrowing. Pelvis negative for fracture. Degenerative change at the SI joints bilaterally.     Narrative & Impression   EXAM: XR KNEE BILATERAL 3 VIEWS  [facility]  DATE: 4/7/2025     INDICATION:  Chronic pain of both knees, Chronic pain of both knees, Chronic pain of both knees  COMPARISON: None.                                                                      IMPRESSION:   No acute displaced fracture or subluxation. Mild patellofemoral compartment knee arthrosis bilaterally. No left or right knee joint effusion. Right quadriceps enthesophyte. Vascular calcifications.     Patient's conditions were thoroughly discussed during today's clinical visit with total time reviewing patient's previous medical records/history/radiology, face-to-face examination and discussion and plan of care with the patient and documentation being 45 minutes on today's clinical visit  Ricci Mendoza PA-C  [facility]    This note was completed in part using a voice recognition software, any grammatical or context distortion are unintentional and inherent to the software.     Again, thank you for allowing me to participate in the care of your patient.        Sincerely,        Ricci Mendoza PA-C    Electronically signed

## 2025-06-05 ENCOUNTER — MYC MEDICAL ADVICE (OUTPATIENT)
Dept: CARDIOLOGY | Facility: CLINIC | Age: 70
End: 2025-06-05
Payer: MEDICARE

## 2025-06-09 ENCOUNTER — VIRTUAL VISIT (OUTPATIENT)
Dept: PHARMACY | Facility: CLINIC | Age: 70
End: 2025-06-09
Payer: COMMERCIAL

## 2025-06-09 ENCOUNTER — PATIENT OUTREACH (OUTPATIENT)
Dept: CARE COORDINATION | Facility: CLINIC | Age: 70
End: 2025-06-09
Payer: MEDICARE

## 2025-06-09 DIAGNOSIS — E11.9 TYPE 2 DIABETES MELLITUS WITHOUT COMPLICATION, WITH LONG-TERM CURRENT USE OF INSULIN (H): ICD-10-CM

## 2025-06-09 DIAGNOSIS — Z79.4 TYPE 2 DIABETES MELLITUS WITH OTHER SPECIFIED COMPLICATION, WITH LONG-TERM CURRENT USE OF INSULIN (H): Primary | ICD-10-CM

## 2025-06-09 DIAGNOSIS — I10 PRIMARY HYPERTENSION: ICD-10-CM

## 2025-06-09 DIAGNOSIS — Z79.4 TYPE 2 DIABETES MELLITUS WITHOUT COMPLICATION, WITH LONG-TERM CURRENT USE OF INSULIN (H): ICD-10-CM

## 2025-06-09 DIAGNOSIS — E11.69 TYPE 2 DIABETES MELLITUS WITH OTHER SPECIFIED COMPLICATION, WITH LONG-TERM CURRENT USE OF INSULIN (H): Primary | ICD-10-CM

## 2025-06-09 PROCEDURE — 99207 PR NO CHARGE LOS: CPT | Mod: 93

## 2025-06-09 RX ORDER — INSULIN GLARGINE 100 [IU]/ML
INJECTION, SOLUTION SUBCUTANEOUS
Qty: 15 ML | Refills: 0 | Status: SHIPPED | OUTPATIENT
Start: 2025-06-09

## 2025-06-09 NOTE — PROGRESS NOTES
Medication Therapy Management (MTM) Encounter    ASSESSMENT:                            Medication Adherence/Access: See below for considerations.    Diabetes   A1c not at goal <7%. Blood glucose improving on Ozempic. Plan in place to increase Ozempic when refill arrives at clinic. He should not be on glipizide any longer.   UACR not at goal <30mg/g though he is on max dose ACEi and SGLT2.      Hypertension   Carvedilol adherence improved. Need to update blood pressure readings.     PLAN:                            In 2 more weeks, increase Ozempic to 1mg and decrease Lantus to 55 units once daily.   Double check that you have stopped glipizide.   Schedule appointment with Dr. Jeronimo end of August and get A1c rechecked then.   Check blood pressure when you get a chance and send me readings.     Follow-up: Return in 5 weeks (on 7/16/2025) for Follow up, with me, using a phone visit.    SUBJECTIVE/OBJECTIVE:                          Ezekiel Aldrich is a 70 year old male seen for a follow-up visit from 5/5/25. Patient was accompanied by Maria Eugenia wife.      Reason for visit: diabetes follow-up     Allergies/ADRs: Reviewed in chart  Past Medical History: Reviewed in chart  Tobacco: He reports that he quit smoking about 40 years ago. His smoking use included cigarettes. His smokeless tobacco use includes snuff.  Alcohol: none    Medication Adherence/Access: often misses second dose of twice daily medications.   Approved for Ozempic through [facility]  Brenzavvy through [facility]    Diabetes   Metformin XR 2000mg once daily - worried that this is causing muscle loss which Maria Eugenia has noticed over the past year.   Brenzavvy 20mg once daily - this is his newest medication addition   Lantus 66 units in the morning  Ozempic 0.5mg weekly - 2 weeks left  Aspirin 81mg daily     Low of 70. No overnight lows that have woken him up. Declines CGM. Does not want to have sensor on his arm 24/7, does not mind poking finger.  Is experiencing  constipation  Has changed his diet. Eating more salmon. Trying to avoid chocolate cake.     Current diabetes symptoms: polyuria and polydipsia     Blood sugar monitorin time(s) daily; Ranges: (patient reported) Fasting- 110-120, 160 this morning due to buffet last night.    Eye exam is up to date  Foot exam: due    Hypertension   Losartan 100mg once daily  Carvedilol 12.5mg twice daily with meals - he has gotten better with adherence.     No readings to report but does not think blood pressure has been too high or low.       Today's Vitals: There were no vitals taken for this visit.  ----------------    I spent 18 minutes with this patient today.      A summary of these recommendations was sent via [employer]. All changes were made via collaborative practice agreement with Janes Jeronimo MD.     Sameera Noe, José MiguelD  Medication Therapy Management (MTM) Pharmacist    Telemedicine Visit Details  The patient's medications can be safely assessed via a telemedicine encounter.  Type of service:  Telephone visit  Originating Location (pt. Location): Home    Distant Location (provider location):  On-site  Start Time: 10:30 AM  End Time: 10:48 AM     Medication Therapy Recommendations  Type 2 diabetes mellitus (H)   1 Current Medication: insulin glargine (LANTUS SOLOSTAR) 100 UNIT/ML pen   Current Medication Sig: ADMINISTER 60 UNITS UNDER THE SKIN AT BEDTIME   Rationale: Dose too high - Dosage too high - Safety   Recommendation: Decrease Dose   Status: Accepted per CPA   Identified Date: 2025 Completed Date: 2025

## 2025-06-09 NOTE — PATIENT INSTRUCTIONS
"Recommendations from today's MTM visit:                                                       In 2 more weeks, increase Ozempic to 1mg and decrease Lantus to 55 units once daily.   Double check that you have stopped glipizide.   Schedule appointment with Dr. Jeronimo end of August and get A1c rechecked then.   Check blood pressure when you get a chance and send me readings.     Follow-up: Return in 5 weeks (on 7/16/2025) for Follow up, with me, using a phone visit.    It was great speaking with you today.  I value your experience and would be very thankful for your time in providing feedback in our clinic survey. In the next few days, you may receive an email or text message from [employer] with a link to a survey related to your  clinical pharmacist.\"     To schedule another MTM appointment, please call the clinic directly or you may call the MTM scheduling line at [phone].    My Clinical Pharmacist's contact information:                                                      Please feel free to contact me with any questions or concerns you have.      Sameera Noe, PharmD  Medication Therapy Management (MTM) Pharmacist   "

## 2025-06-10 ASSESSMENT — HOOS JR
SITTING: EXTREME
RISING FROM SITTING: EXTREME
GOING UP OR DOWN STAIRS: EXTREME
WALKING ON UNEVEN SURFACE: EXTREME
BENDING TO THE FLOOR TO PICK UP OBJECT: EXTREME
HOOS JR TOTAL INTERVAL SCORE: 15.63
LYING IN BED (TURNING OVER, MAINTAINING HIP POSITION): MODERATE

## 2025-06-10 NOTE — PROGRESS NOTES
HISTORY OF PRESENT ILLNESS    Ezekiel Aldrich is a 70 year old male type 2 diabetic last A1C 8.4, who is seen in consultation at the request of Ricci Mendoza PA-C for evaluation of left hip pain that has been present for approximately  3 months.      Pain at night  Pain to walk,   [place] pain.  Anterior and posterior hip   Difficulty with putting shoe and sock on that.  Previous treatment: had a corticosteroid injection in the hip joint-- no improvement, not even temporary  Tumeric.     Patient has been a nonresponder to intra-articular cortisone injection for the left hip.       Left knee also bothersome. Often they hurt together.  Had a corticosteroid injection in the left knee 2 months+ ago which helped, only for a few days, but pain now is back.  Pain with any weight bearing   Knee clicks  Knee gave out x 1  Pain anteriorly    Left lower extremity is weak.    Orthopedic PMH: open reduction and internal fixation right ankle fracture years ago.   History of lumbar DEGENERATIVE DISC DISEASE seen on a 2017 lumbar xray.     Other PMH:   Past Medical History:   Diagnosis Date    Acute renal failure     6/11/2016, secondary to dehydration, normalized       Surgical:   Past Surgical History:   Procedure Laterality Date    ANKLE FRACTURE SURGERY Right     hardware    CV CORONARY ANGIOGRAM N/A 08/02/2022    Procedure: Coronary Angiogram;  Surgeon: Archie Ni MD;  Location: [facility] CV    CV CORONARY ANGIOGRAM N/A 11/20/2023    Procedure: Coronary Angiogram;  Surgeon: Chino Boyd MD;  Location: [facility] LAB CV    CV INTRAVASULAR ULTRASOUND N/A 08/02/2022    Procedure: Intravascular Ultrasound;  Surgeon: Archie Ni MD;  Location: [facility] LAB CV    CV LEFT HEART CATH N/A 11/20/2023    Procedure: Left Heart Catheterization;  Surgeon: Chino Boyd MD;  Location: [facility] CV    CV PCI N/A 08/02/2022    Percutaneous Coronary Intervention;  Surgeon: Archie Ni MD;   Location: [facility] CATH LAB CV    CV PCI STENT DRUG ELUTING N/A 2023    Procedure: Percutaneous Coronary Intervention Stent;  Surgeon: Chino Boyd MD;  Location: [facility] CATH LAB CV    KNEE SURGERY      MANDIBLE FRACTURE SURGERY      from fx    SHOULDER SURGERY Right     from fx and separation       Family Hx:    Family History   Problem Relation Age of Onset    Colon Cancer Mother     Coronary Artery Disease Mother     Hypertension Mother     Diabetes Type 2  Mother     Hypertension Father     Bell's palsy Father     Diabetes Type 2  Father     Thyroid Disease Father     Sleep Apnea Father     Sleep Apnea Sister     Prostate Cancer Brother 57    Sleep Apnea Brother     Bell's palsy Maternal Grandmother     Thyroid Disease Daughter        Social Hx:   Social History     Socioeconomic History    Marital status:     Number of children: 5   Occupational History    Occupation: [employer]   Tobacco Use    Smoking status: Former     Current packs/day: 0.00     Types: Cigarettes     Quit date: 1985     Years since quittin.1    Smokeless tobacco: Current     Types: Snuff    Tobacco comments:     2 tins/week   Vaping Use    Vaping status: Never Used   Substance and Sexual Activity    Alcohol use: Not Currently     Alcohol/week: 2.0 standard drinks of alcohol     Types: 2 Standard drinks or equivalent per week     Comment: Heavy in past, sober     Drug use: No    Sexual activity: Yes     Partners: Female   Social History Narrative    Diet-            Exercise- Not regular. Work is physical, walking.     Social Drivers of Health     Financial Resource Strain: Low Risk  (1/10/2025)    Financial Resource Strain     Within the past 12 months, have you or your family members you live with been unable to get utilities (heat, electricity) when it was really needed?: No   Food Insecurity: Low Risk  (1/10/2025)    Food Insecurity     Within the past 12 months, did you worry that your food would  run out before you got money to buy more?: No     Within the past 12 months, did the food you bought just not last and you didn t have money to get more?: No   Transportation Needs: Low Risk  (1/10/2025)    Transportation Needs     Within the past 12 months, has lack of transportation kept you from medical appointments, getting your medicines, non-medical meetings or appointments, work, or from getting things that you need?: No   Physical Activity: Inactive (6/3/2025)    Exercise Vital Sign     Days of Exercise per Week: 0 days     Minutes of Exercise per Session: 0 min   Stress: No Stress Concern Present (10/3/2024)    [employer] [place] [employer] - Occupational Stress Questionnaire     Feeling of Stress : Only a little   Social Connections: Unknown (10/3/2024)    Social Connection and Isolation Panel [NHANES]     Frequency of Social Gatherings with Friends and Family: Once a week   Interpersonal Safety: Low Risk  (4/7/2025)    Interpersonal Safety     Do you feel physically and emotionally safe where you currently live?: Yes     Within the past 12 months, have you been hit, slapped, kicked or otherwise physically hurt by someone?: No     Within the past 12 months, have you been humiliated or emotionally abused in other ways by your partner or ex-partner?: No   Housing Stability: Low Risk  (1/10/2025)    Housing Stability     Do you have housing? : Yes     Are you worried about losing your housing?: No   .    REVIEW OF SYSTEMS:    CONSTITUTIONAL:  NEGATIVE for fever, chills, change in weight   Light headed when stands up frequently.  INTEGUMENTARY/SKIN:  NEGATIVE for worrisome rashes, moles or lesions  EYES:  NEGATIVE for vision changes or irritation  ENT/MOUTH:  NEGATIVE for ear, mouth and throat problems  RESP:  NEGATIVE for significant cough or SOB  BREAST:  NEGATIVE for masses, tenderness or discharge  CV:  NEGATIVE for chest pain, palpitations or peripheral edema  GI:  NEGATIVE for nausea,  "abdominal pain, heartburn, or change in bowel habits  :  Negative   MUSCULOSKELETAL:  See HPI above  NEURO:  NEGATIVE for weakness, dizziness or paresthesias  ENDOCRINE:  NEGATIVE for temperature intolerance, skin/hair changes  HEME/ALLERGY/IMMUNE:  NEGATIVE for bleeding problems  PSYCHIATRIC:  NEGATIVE for changes in mood or affect    PHYSICAL EXAM:  BP (!) 145/90 (BP Location: Right arm, Patient Position: Sitting, Cuff Size: Adult Large)   Pulse 75   Ht 1.803 m (5' 11\")   Wt 101.6 kg (224 lb)   SpO2 98%   BMI 31.24 kg/m       GENERAL APPEARANCE: healthy, alert, and no distress   SKIN: no suspicious lesions or rashes  NEURO: Normal strength and tone, sensory exam grossly normal, mentation intact, speech normal, and oriented times 3  PSYCH:  mentation appears normal and affect normal/bright  VASCULAR: normal pulses, and capp refill in lower extremity's.    Gait: hunched, using furniture for support.  Very stiff and very poor mobility  Palpation: Tender:   left greater trochanter  left gluteus medius  left SI joint  Left lumbar area  Sciatic notch without radiation.  Strength:  full strength  Special tests:    Hip range of motion: Internal rotation: 0, External rotation: 30*, Abduction 30*, Flexion 110*, 10 degree flexion contracture  Leg lengths: not tested    Lumbar range of motion: limited  Toe stand: able.    Heel stand: Able  Seated SLR: equivocal  Supine SLR: + with pain posterior knee.  Sensation:normal  Motor: all normal      Left Knee Exam:    Alignment: mild varus  Squat: 30 % pain in hip, knee back .    Patellofemoral joint: moderate crepitations in the patellofemoral joint.  Effusion: minimal  ROM: 3-120*  Tender: non-tender   Masses: none  Ligaments:   Lachman's: stable   Anterior/Posterior drawer: stable,   Varus/Valgus stress: stable to varus and valgus stress  McMurrays: can't perform dt hip pain.      EXAM: XR PELVIS AND HIP BILATERAL 1 VIEW  LOCATION: [facility]  DATE: " 4/17/2025  Both hips negative for fracture or dislocation. Degenerative change both hip joints, greater on the left with significant superior joint space narrowing. Pelvis negative for fracture. Degenerative change at the SI joints bilaterally.      Lumbar xrays done today: severe multilevel hypertrophic DEGENERATIVE DISC DISEASE, particularly in the lower segments    Assessment/Plan:  Severe osteoarthritis left hip  Moderate osteoarthritis, left knee, mainly medial compartment, but pain seems to be anterior.  Lumbar DEGENERATIVE DISC DISEASE, possible lumbar stenosis as a possible source of some of his pain. Clinical signs include hunched gait, lower extremity weakness, back pain and tenderness, lack of any response to steroid injection.    Plan:  Lumbar xray was performed today  I do think his hip would benefit from replacement.  There are 2 issues. First is that he had steroid injection in the hip recently, so would have to wait until August at the earliest to do this. The other is his elevated A1C level, which would need to be down to 7 or so for surgery.  I suggest a lumbar MRI because of the generalized lower extremity pain, and weakness.  I think if he has stenosis that he could get some relief from a corticosteroid injection and also give him a better chance of a good recovery from hip replacement.  Physical therapy was ordered for the back, and hip pain.    For the knee I think we should try another corticosteroid injection , and get him in for an  brace trial. Physical therapy for the knee as well.  With the patient's consent, left knee(s) injected intra-articularly with 80mg of Depomedrol and 4cc of local anesthetic after sterile prep.   He will work with his primary care provider on getting the A1C significantly lower.    Return to clinic in 4-6 weeks.  Before he has his hip replaced, he needs to have his A1C around 7, and needs xrays of the right hip for templating.  Will [facility] up with the  [facility]     JYOTI Leslie MD  Dept. Orthopedic Surgery  [facility]

## 2025-06-11 ENCOUNTER — ANCILLARY PROCEDURE (OUTPATIENT)
Dept: GENERAL RADIOLOGY | Facility: CLINIC | Age: 70
End: 2025-06-11
Attending: ORTHOPAEDIC SURGERY
Payer: MEDICARE

## 2025-06-11 ENCOUNTER — OFFICE VISIT (OUTPATIENT)
Dept: ORTHOPEDICS | Facility: CLINIC | Age: 70
End: 2025-06-11
Attending: PHYSICIAN ASSISTANT
Payer: MEDICARE

## 2025-06-11 VITALS
BODY MASS INDEX: 31.36 KG/M2 | DIASTOLIC BLOOD PRESSURE: 90 MMHG | HEIGHT: 71 IN | OXYGEN SATURATION: 98 % | SYSTOLIC BLOOD PRESSURE: 145 MMHG | HEART RATE: 75 BPM | WEIGHT: 224 LBS

## 2025-06-11 DIAGNOSIS — M16.12 PRIMARY OSTEOARTHRITIS OF LEFT HIP: ICD-10-CM

## 2025-06-11 DIAGNOSIS — R73.09 HEMOGLOBIN A1C 8.0 PERCENT OR GREATER: ICD-10-CM

## 2025-06-11 DIAGNOSIS — R29.898 WEAKNESS OF RIGHT LOWER EXTREMITY: ICD-10-CM

## 2025-06-11 DIAGNOSIS — M54.50 CHRONIC LOW BACK PAIN, UNSPECIFIED BACK PAIN LATERALITY, UNSPECIFIED WHETHER SCIATICA PRESENT: Primary | ICD-10-CM

## 2025-06-11 DIAGNOSIS — M51.362 DEGENERATION OF INTERVERTEBRAL DISC OF LUMBAR REGION WITH DISCOGENIC BACK PAIN AND LOWER EXTREMITY PAIN: ICD-10-CM

## 2025-06-11 DIAGNOSIS — M17.12 PRIMARY OSTEOARTHRITIS OF LEFT KNEE: ICD-10-CM

## 2025-06-11 DIAGNOSIS — G89.29 CHRONIC LOW BACK PAIN, UNSPECIFIED BACK PAIN LATERALITY, UNSPECIFIED WHETHER SCIATICA PRESENT: Primary | ICD-10-CM

## 2025-06-11 DIAGNOSIS — M54.50 CHRONIC LOW BACK PAIN, UNSPECIFIED BACK PAIN LATERALITY, UNSPECIFIED WHETHER SCIATICA PRESENT: ICD-10-CM

## 2025-06-11 DIAGNOSIS — G89.29 CHRONIC LOW BACK PAIN, UNSPECIFIED BACK PAIN LATERALITY, UNSPECIFIED WHETHER SCIATICA PRESENT: ICD-10-CM

## 2025-06-11 PROCEDURE — 20610 DRAIN/INJ JOINT/BURSA W/O US: CPT | Mod: LT | Performed by: ORTHOPAEDIC SURGERY

## 2025-06-11 PROCEDURE — 72100 X-RAY EXAM L-S SPINE 2/3 VWS: CPT | Mod: TC | Performed by: RADIOLOGY

## 2025-06-11 PROCEDURE — 3080F DIAST BP >= 90 MM HG: CPT | Performed by: ORTHOPAEDIC SURGERY

## 2025-06-11 PROCEDURE — 1125F AMNT PAIN NOTED PAIN PRSNT: CPT | Performed by: ORTHOPAEDIC SURGERY

## 2025-06-11 PROCEDURE — 3077F SYST BP >= 140 MM HG: CPT | Performed by: ORTHOPAEDIC SURGERY

## 2025-06-11 PROCEDURE — 99214 OFFICE O/P EST MOD 30 MIN: CPT | Mod: 25 | Performed by: ORTHOPAEDIC SURGERY

## 2025-06-11 RX ORDER — LIDOCAINE HYDROCHLORIDE 10 MG/ML
4 INJECTION, SOLUTION EPIDURAL; INFILTRATION; INTRACAUDAL; PERINEURAL
Status: COMPLETED | OUTPATIENT
Start: 2025-06-11 | End: 2025-06-11

## 2025-06-11 RX ORDER — METHYLPREDNISOLONE ACETATE 80 MG/ML
80 INJECTION, SUSPENSION INTRA-ARTICULAR; INTRALESIONAL; INTRAMUSCULAR; SOFT TISSUE
Status: COMPLETED | OUTPATIENT
Start: 2025-06-11 | End: 2025-06-11

## 2025-06-11 RX ADMIN — LIDOCAINE HYDROCHLORIDE 4 ML: 10 INJECTION, SOLUTION EPIDURAL; INFILTRATION; INTRACAUDAL; PERINEURAL at 10:06

## 2025-06-11 RX ADMIN — METHYLPREDNISOLONE ACETATE 80 MG: 80 INJECTION, SUSPENSION INTRA-ARTICULAR; INTRALESIONAL; INTRAMUSCULAR; SOFT TISSUE at 10:06

## 2025-06-11 ASSESSMENT — PAIN SCALES - GENERAL: PAINLEVEL_OUTOF10: SEVERE PAIN (7)

## 2025-06-11 NOTE — PROGRESS NOTES
Large Joint Injection/Arthocentesis: L knee joint    Date/Time: 6/11/2025 10:06 AM    Performed by: Scottie Pennington  Authorized by: Josesito Leslie MD    Indications:  Pain and osteoarthritis  Needle Size:  22 G  Guidance: landmark guided    Approach:  Anterolateral  Location:  Knee      Medications:  80 mg methylPREDNISolone 80 MG/ML; 4 mL lidocaine (PF) 1 %  Outcome:  Tolerated well, no immediate complications  Procedure discussed: discussed risks, benefits, and alternatives    Consent Given by:  Patient  Timeout: timeout called immediately prior to procedure    Prep: patient was prepped and draped in usual sterile fashion      c

## 2025-06-11 NOTE — PATIENT INSTRUCTIONS
AFTER VISIT SUMMARY    [facility] CORTISONE Injection Discharge Instructions    You may shower, however avoid swimming, tub baths or hot tubs for 24 hours following your procedure    You may have a mild to moderate increase in pain for several days following the injection.    It may take up to 14 days for the steroid medication to start working although you may feel the effect as early as a few days after the procedure.    You may use ice packs for 10-15 minutes, 3 to 4 times a day at the injection site for comfort    You may use anti-inflammatory medications (such as Ibuprofen or Aleve or Advil) or Tylenol for pain control if necessary    If you were fasting, you may resume your normal diet and medications after the procedure    If you have diabetes, check your blood sugar more frequently than usual as your blood sugar may be higher than normal for 10-14 days following a steroid injection. Contact your doctor who manages your diabetes if your blood sugar is higher than usual      If you experience any of the following, call [facility] [phone]  -Fever over 100 degree F  -Swelling, bleeding, redness, drainage, warmth at the injection site  - New or worsening pain

## 2025-06-11 NOTE — LETTER
6/11/2025      Ezekiel Aldrich  [address] [address]      Dear Colleague,    Thank you for referring your patient, Ezekiel Aldrich, to the [facility]. Please see a copy of my visit note below.    HISTORY OF PRESENT ILLNESS    Ezekiel Aldrich is a 70 year old male type 2 diabetic last A1C 8.4, who is seen in consultation at the request of Ricci Mendoza PA-C for evaluation of left hip pain that has been present for approximately  3 months.      Pain at night  Pain to walk,   [place] pain.  Anterior and posterior hip   Difficulty with putting shoe and sock on that.  Previous treatment: had a corticosteroid injection in the hip joint-- no improvement, not even temporary  Tumeric.     Patient has been a nonresponder to intra-articular cortisone injection for the left hip.       Left knee also bothersome. Often they hurt together.  Had a corticosteroid injection in the left knee 2 months+ ago which helped, only for a few days, but pain now is back.  Pain with any weight bearing   Knee clicks  Knee gave out x 1  Pain anteriorly    Left lower extremity is weak.    Orthopedic PMH: open reduction and internal fixation right ankle fracture years ago.   History of lumbar DEGENERATIVE DISC DISEASE seen on a 2017 lumbar xray.     Other PMH:   Past Medical History:   Diagnosis Date     Acute renal failure     6/11/2016, secondary to dehydration, normalized       Surgical:   Past Surgical History:   Procedure Laterality Date     ANKLE FRACTURE SURGERY Right     hardware     CV CORONARY ANGIOGRAM N/A 08/02/2022    Procedure: Coronary Angiogram;  Surgeon: Archie Ni MD;  Location: [facility] CV     CV CORONARY ANGIOGRAM N/A 11/20/2023    Procedure: Coronary Angiogram;  Surgeon: Chino Boyd MD;  Location: [facility] CV     CV INTRAVASULAR ULTRASOUND N/A 08/02/2022    Procedure: Intravascular Ultrasound;  Surgeon: Archie Ni MD;  Location: [facility] CV      CV LEFT HEART CATH N/A 2023    Procedure: Left Heart Catheterization;  Surgeon: Chino Boyd MD;  Location: [facility] CATH LAB CV     CV PCI N/A 2022    Percutaneous Coronary Intervention;  Surgeon: Archie Ni MD;  Location: [facility] LAB CV     CV PCI STENT DRUG ELUTING N/A 2023    Procedure: Percutaneous Coronary Intervention Stent;  Surgeon: Chino Boyd MD;  Location: [facility] CATH LAB CV     KNEE SURGERY       MANDIBLE FRACTURE SURGERY      from fx     SHOULDER SURGERY Right     from fx and separation       Family Hx:    Family History   Problem Relation Age of Onset     Colon Cancer Mother      Coronary Artery Disease Mother      Hypertension Mother      Diabetes Type 2  Mother      Hypertension Father      Bell's palsy Father      Diabetes Type 2  Father      Thyroid Disease Father      Sleep Apnea Father      Sleep Apnea Sister      Prostate Cancer Brother 57     Sleep Apnea Brother      Bell's palsy Maternal Grandmother      Thyroid Disease Daughter        Social Hx:   Social History     Socioeconomic History     Marital status:      Number of children: 5   Occupational History     Occupation: [employer]   Tobacco Use     Smoking status: Former     Current packs/day: 0.00     Types: Cigarettes     Quit date: 1985     Years since quittin.1     Smokeless tobacco: Current     Types: Snuff     Tobacco comments:     2 tins/week   Vaping Use     Vaping status: Never Used   Substance and Sexual Activity     Alcohol use: Not Currently     Alcohol/week: 2.0 standard drinks of alcohol     Types: 2 Standard drinks or equivalent per week     Comment: Heavy in past, sober      Drug use: No     Sexual activity: Yes     Partners: Female   Social History Narrative    Diet-            Exercise- Not regular. Work is physical, walking.     Social Drivers of Health     Financial Resource Strain: Low Risk  (1/10/2025)    Financial Resource Strain      Within  the past 12 months, have you or your family members you live with been unable to get utilities (heat, electricity) when it was really needed?: No   Food Insecurity: Low Risk  (1/10/2025)    Food Insecurity      Within the past 12 months, did you worry that your food would run out before you got money to buy more?: No      Within the past 12 months, did the food you bought just not last and you didn t have money to get more?: No   Transportation Needs: Low Risk  (1/10/2025)    Transportation Needs      Within the past 12 months, has lack of transportation kept you from medical appointments, getting your medicines, non-medical meetings or appointments, work, or from getting things that you need?: No   Physical Activity: Inactive (6/3/2025)    Exercise Vital Sign      Days of Exercise per Week: 0 days      Minutes of Exercise per Session: 0 min   Stress: No Stress Concern Present (10/3/2024)    [place] [employer] - Occupational Stress Questionnaire      Feeling of Stress : Only a little   Social Connections: Unknown (10/3/2024)    Social Connection and Isolation Panel [NHANES]      Frequency of Social Gatherings with Friends and Family: Once a week   Interpersonal Safety: Low Risk  (4/7/2025)    Interpersonal Safety      Do you feel physically and emotionally safe where you currently live?: Yes      Within the past 12 months, have you been hit, slapped, kicked or otherwise physically hurt by someone?: No      Within the past 12 months, have you been humiliated or emotionally abused in other ways by your partner or ex-partner?: No   Housing Stability: Low Risk  (1/10/2025)    Housing Stability      Do you have housing? : Yes      Are you worried about losing your housing?: No   .    REVIEW OF SYSTEMS:    CONSTITUTIONAL:  NEGATIVE for fever, chills, change in weight   Light headed when stands up frequently.  INTEGUMENTARY/SKIN:  NEGATIVE for worrisome rashes, moles or lesions  EYES:  NEGATIVE for  "vision changes or irritation  ENT/MOUTH:  NEGATIVE for ear, mouth and throat problems  RESP:  NEGATIVE for significant cough or SOB  BREAST:  NEGATIVE for masses, tenderness or discharge  CV:  NEGATIVE for chest pain, palpitations or peripheral edema  GI:  NEGATIVE for nausea, abdominal pain, heartburn, or change in bowel habits  :  Negative   MUSCULOSKELETAL:  See HPI above  NEURO:  NEGATIVE for weakness, dizziness or paresthesias  ENDOCRINE:  NEGATIVE for temperature intolerance, skin/hair changes  HEME/ALLERGY/IMMUNE:  NEGATIVE for bleeding problems  PSYCHIATRIC:  NEGATIVE for changes in mood or affect    PHYSICAL EXAM:  BP (!) 145/90 (BP Location: Right arm, Patient Position: Sitting, Cuff Size: Adult Large)   Pulse 75   Ht 1.803 m (5' 11\")   Wt 101.6 kg (224 lb)   SpO2 98%   BMI 31.24 kg/m       GENERAL APPEARANCE: healthy, alert, and no distress   SKIN: no suspicious lesions or rashes  NEURO: Normal strength and tone, sensory exam grossly normal, mentation intact, speech normal, and oriented times 3  PSYCH:  mentation appears normal and affect normal/bright  VASCULAR: normal pulses, and capp refill in lower extremity's.    Gait: hunched, using furniture for support.  Very stiff and very poor mobility  Palpation: Tender:   left greater trochanter  left gluteus medius  left SI joint  Left lumbar area  Sciatic notch without radiation.  Strength:  full strength  Special tests:    Hip range of motion: Internal rotation: 0, External rotation: 30*, Abduction 30*, Flexion 110*, 10 degree flexion contracture  Leg lengths: not tested    Lumbar range of motion: limited  Toe stand: able.    Heel stand: Able  Seated SLR: equivocal  Supine SLR: + with pain posterior knee.  Sensation:normal  Motor: all normal      Left Knee Exam:    Alignment: mild varus  Squat: 30 % pain in hip, knee back .    Patellofemoral joint: moderate crepitations in the patellofemoral joint.  Effusion: minimal  ROM: 3-120*  Tender: non-tender "   Masses: none  Ligaments:   Lachman's: stable   Anterior/Posterior drawer: stable,   Varus/Valgus stress: stable to varus and valgus stress  McMurrays: can't perform dt hip pain.      EXAM: XR PELVIS AND HIP BILATERAL 1 VIEW  LOCATION: [facility]  DATE: 4/17/2025  Both hips negative for fracture or dislocation. Degenerative change both hip joints, greater on the left with significant superior joint space narrowing. Pelvis negative for fracture. Degenerative change at the SI joints bilaterally.      Lumbar xrays done today: severe multilevel hypertrophic DEGENERATIVE DISC DISEASE, particularly in the lower segments    Assessment/Plan:  Severe osteoarthritis left hip  Moderate osteoarthritis, left knee, mainly medial compartment, but pain seems to be anterior.  Lumbar DEGENERATIVE DISC DISEASE, possible lumbar stenosis as a possible source of some of his pain. Clinical signs include hunched gait, lower extremity weakness, back pain and tenderness, lack of any response to steroid injection.    Plan:  Lumbar xray was performed today  I do think his hip would benefit from replacement.  There are 2 issues. First is that he had steroid injection in the hip recently, so would have to wait until August at the earliest to do this. The other is his elevated A1C level, which would need to be down to 7 or so for surgery.  I suggest a lumbar MRI because of the generalized lower extremity pain, and weakness.  I think if he has stenosis that he could get some relief from a corticosteroid injection and also give him a better chance of a good recovery from hip replacement.  Physical therapy was ordered for the back, and hip pain.    For the knee I think we should try another corticosteroid injection , and get him in for an  brace trial. Physical therapy for the knee as well.  With the patient's consent, left knee(s) injected intra-articularly with 80mg of Depomedrol and 4cc of local anesthetic after  sterile prep.   He will work with his primary care provider on getting the A1C significantly lower.    Return to clinic in 4-6 weeks.  Before he has his hip replaced, he needs to have his A1C around 7, and needs xrays of the right hip for templating.  Will follow up with the lumbar MRI results via [facility].     JYOTI Leslie MD  Dept. Orthopedic Surgery  [facility]    Large Joint Injection/Arthocentesis: L knee joint    Date/Time: 6/11/2025 10:06 AM    Performed by: Scottie Pennington  Authorized by: Josesito Leslie MD    Indications:  Pain and osteoarthritis  Needle Size:  22 G  Guidance: landmark guided    Approach:  Anterolateral  Location:  Knee      Medications:  80 mg methylPREDNISolone 80 MG/ML; 4 mL lidocaine (PF) 1 %  Outcome:  Tolerated well, no immediate complications  Procedure discussed: discussed risks, benefits, and alternatives    Consent Given by:  Patient  Timeout: timeout called immediately prior to procedure    Prep: patient was prepped and draped in usual sterile fashion      c    Again, thank you for allowing me to participate in the care of your patient.        Sincerely,        Josesito Leslie MD    Electronically signed

## 2025-06-17 ENCOUNTER — MYC MEDICAL ADVICE (OUTPATIENT)
Dept: ORTHOPEDICS | Facility: CLINIC | Age: 70
End: 2025-06-17
Payer: MEDICARE

## 2025-06-17 DIAGNOSIS — F40.240 CLAUSTROPHOBIA: Primary | ICD-10-CM

## 2025-06-17 RX ORDER — DIAZEPAM 5 MG/1
5 TABLET ORAL ONCE
Qty: 1 TABLET | Refills: 0 | Status: SHIPPED | OUTPATIENT
Start: 2025-06-17 | End: 2025-06-17

## 2025-06-17 NOTE — TELEPHONE ENCOUNTER
Called patient to review recent elevated blood pressure reading.  Per [place] Community Me[place], patients blood pressure is out of parameters and recheck blood pressure is recommended.    Last Blood Pressure: 151/72  Last Heart Rate: 82  Date: 5/27/25  Location: [facility]      Pt will recheck his BP and call the secure message phone [phone] to relay.  Thanks    LUIS ENRIQUE Elliott

## 2025-06-20 ENCOUNTER — MYC MEDICAL ADVICE (OUTPATIENT)
Dept: PHARMACY | Facility: CLINIC | Age: 70
End: 2025-06-20
Payer: MEDICARE

## 2025-06-20 DIAGNOSIS — Z79.4 TYPE 2 DIABETES MELLITUS WITHOUT COMPLICATION, WITH LONG-TERM CURRENT USE OF INSULIN (H): ICD-10-CM

## 2025-06-20 DIAGNOSIS — E11.9 TYPE 2 DIABETES MELLITUS WITHOUT COMPLICATION, WITH LONG-TERM CURRENT USE OF INSULIN (H): ICD-10-CM

## 2025-06-20 DIAGNOSIS — E11.69 TYPE 2 DIABETES MELLITUS WITH OTHER SPECIFIED COMPLICATION, WITH LONG-TERM CURRENT USE OF INSULIN (H): ICD-10-CM

## 2025-06-20 DIAGNOSIS — Z79.4 TYPE 2 DIABETES MELLITUS WITH OTHER SPECIFIED COMPLICATION, WITH LONG-TERM CURRENT USE OF INSULIN (H): ICD-10-CM

## 2025-06-22 RX ORDER — INSULIN GLARGINE 100 [IU]/ML
INJECTION, SOLUTION SUBCUTANEOUS
Qty: 45 ML | Refills: 1 | Status: SHIPPED | OUTPATIENT
Start: 2025-06-22

## 2025-06-22 RX ORDER — INSULIN GLARGINE 100 [IU]/ML
INJECTION, SOLUTION SUBCUTANEOUS
Qty: 15 ML | Refills: 0 | OUTPATIENT
Start: 2025-06-22

## 2025-06-23 NOTE — TELEPHONE ENCOUNTER
Pt has not called back to update BP since last week when spoke with pt.  Closing task. Thanks    LUIS ENRIQUE Elliott

## 2025-06-24 VITALS — HEART RATE: 65 BPM | SYSTOLIC BLOOD PRESSURE: 115 MMHG | DIASTOLIC BLOOD PRESSURE: 63 MMHG

## 2025-06-24 NOTE — TELEPHONE ENCOUNTER
Patient returned call and left voicemail message with update blood pressure reading.      Last Blood Pressure: 145/90  Last Heart Rate: 75  Date: 06/11/25  Location: Other Specialty    Today's Blood Pressure: 135/75, 121/76, 115/63  Today's Heart Rate: 68, 63, 65  Location: Home [facility]    Patient reported blood pressure updated in Epic. Blood pressure falls within [place] Community Measures guidelines.  Patient will follow up as previously advised.

## 2025-06-25 ENCOUNTER — HOSPITAL ENCOUNTER (OUTPATIENT)
Dept: MRI IMAGING | Facility: HOSPITAL | Age: 70
Discharge: HOME | End: 2025-06-25
Attending: ORTHOPAEDIC SURGERY
Payer: MEDICARE

## 2025-06-25 DIAGNOSIS — M51.362 DEGENERATION OF INTERVERTEBRAL DISC OF LUMBAR REGION WITH DISCOGENIC BACK PAIN AND LOWER EXTREMITY PAIN: ICD-10-CM

## 2025-06-25 DIAGNOSIS — R29.898 WEAKNESS OF RIGHT LOWER EXTREMITY: ICD-10-CM

## 2025-06-25 PROCEDURE — 72148 MRI LUMBAR SPINE W/O DYE: CPT

## 2025-06-26 ENCOUNTER — RESULTS FOLLOW-UP (OUTPATIENT)
Dept: ORTHOPEDICS | Facility: CLINIC | Age: 70
End: 2025-06-26

## 2025-06-30 DIAGNOSIS — M48.061 FORAMINAL STENOSIS OF LUMBAR REGION: ICD-10-CM

## 2025-06-30 DIAGNOSIS — M51.362 DEGENERATION OF INTERVERTEBRAL DISC OF LUMBAR REGION WITH DISCOGENIC BACK PAIN AND LOWER EXTREMITY PAIN: Primary | ICD-10-CM

## 2025-07-01 ENCOUNTER — PATIENT OUTREACH (OUTPATIENT)
Dept: CARE COORDINATION | Facility: CLINIC | Age: 70
End: 2025-07-01
Payer: MEDICARE

## 2025-07-02 ENCOUNTER — THERAPY VISIT (OUTPATIENT)
Dept: PHYSICAL THERAPY | Facility: CLINIC | Age: 70
End: 2025-07-02
Attending: ORTHOPAEDIC SURGERY
Payer: MEDICARE

## 2025-07-02 DIAGNOSIS — M17.12 PRIMARY OSTEOARTHRITIS OF LEFT KNEE: ICD-10-CM

## 2025-07-02 PROCEDURE — 97161 PT EVAL LOW COMPLEX 20 MIN: CPT | Mod: GP | Performed by: PHYSICAL THERAPIST

## 2025-07-02 PROCEDURE — 97530 THERAPEUTIC ACTIVITIES: CPT | Mod: GP | Performed by: PHYSICAL THERAPIST

## 2025-07-02 NOTE — PROGRESS NOTES
PHYSICAL THERAPY EVALUATION  Type of Visit: Evaluation       Fall Risk Screen:  Have you fallen 2 or more times in the past year?: No  Have you fallen and had an injury in the past year?: No    Subjective   Patient reports a long history of left knee  and left hip pain that he feels is a consequence of his lifelong career as a  which required kneeling on the ground with forward posture. He reports his pain has increased over the recent years and he reports drinking alcohol to address the joint pain. He is not 6 months sober and attending AA 5 days per week.     He reports the pain is located over the anteromedial aspect of the left knee and in the left hip groin crease.     He report pain increases with all weightbearing activity and improves with rest. He has steroid injections in his left knee which has been helpful. He continues to work, but is not in the field at this time.           Presenting condition or subjective complaint: Sore knee  Date of onset: 06/11/25    Relevant medical history: Arthritis; Diabetes; High blood pressure; Sleep disorder like apnea   Dates & types of surgery:      Prior diagnostic imaging/testing results: MRI; X-ray     Prior therapy history for the same diagnosis, illness or injury: No        Living Environment  Social support: With a significant other or spouse   Type of home: House   Stairs to enter the home: Yes 10 Is there a railing: Yes     Ramp: No   Stairs inside the home: Yes 20 Is there a railing: Yes     Help at home: None  Equipment owned: Straight Cane     Employment: Yes [employer]  Hobbies/Interests: Classic cars, swimming    Patient goals for therapy: Work         Objective   KNEE EVALUATION  PAIN: Pain Level with Use: 6/10  INTEGUMENTARY (edema, incisions): WNL  POSTURE: Standing Posture: Rounded shoulders, Forward head  Sitting Posture: Rounded shoulders, Forward head  GAIT:  Weightbearing Status: WBAT  Assistive Device(s):  None  Gait Deviations: Stance time decreased  Stride length decreased  Knee extension decreased L, Knee flexion decreased L  Decreased dynamic control L  Trunk flexion  ROM: left knee AROM 0- deg. Pain reported into end range knee extension and end range knee flexion. Left knee PROM extension 5 deg from straight, painful    STRENGTH: Left knee strength grossly 4/5 with a fair quad set.   SPECIAL TESTS:    Left Right                            Ligamentous Stability Positive    Anterior Drawer (ACL) Negative,      Posterior Drawer (PCL) Negative,           Valgus Stress Testing at 0 Deg and 30 Deg Trace,      Varus Stress Testing at 0 Deg and 30 Deg  Trace,        PALPATION: left knee medial joint line, lateral patella  Ossur Knee  Brace Trial:     Pain Rating    Affected Joint: Left Knee    Without  brace:    At rest 2/10  Walkin/10    Squattin/10   Stairs: 8/10      Wearing  brace:    At rest 2/10  Walking: 3/10    Squatting: 3/10   Stairs: /10      Brace and Measurements:    Brace trialed:     Type - Ossur  One X  Size - Medium and Short Version (Ossur  One/X Braces Only)  Side - Medial  Affected Knee - Left Knee        Other Comments:  See PT evaluation in Epic for any additional information including joint special testing/ligament testing    .ossur  Assessment & Plan   CLINICAL IMPRESSIONS  Medical Diagnosis: Primary osteoarthritis of left knee    Treatment Diagnosis: Primary osteoarthritis of left knee, abnormal gait, left hip pain   Impression/Assessment: Patient is a 70 year old male with left knee and hip pain complaints.  The following significant findings have been identified: Pain, Decreased ROM/flexibility, Decreased strength, Impaired gait, Impaired muscle performance, Decreased activity tolerance, and Impaired posture. These impairments interfere with their ability to perform self care tasks, work tasks, recreational activities, household chores,  driving , household mobility, and community mobility as compared to previous level of function.     Clinical Decision Making (Complexity):  Clinical Presentation: Stable/Uncomplicated  Clinical Presentation Rationale: based on medical and personal factors listed in PT evaluation  Clinical Decision Making (Complexity): Low complexity    PLAN OF CARE  Treatment Interventions:  Interventions: Manual Therapy, Neuromuscular Re-education, Therapeutic Activity, Therapeutic Exercise    Long Term Goals     PT Goal 1  Goal Identifier: stairs  Goal Description: patient will be able to negotiate stairs with a step through pattern and 0/10 left knee pain  Rationale: to maximize safety and independence with performance of ADLs and functional tasks;to maximize safety and independence with transportation;to maximize safety and independence within the community;to maximize safety and independence within the home;to maximize safety and independence with self cares  Goal Progress: negotiates stairs with a step to pattern 5/10 left kene pain  Target Date: 09/30/25      Frequency of Treatment: 1 x per week for 4 weeks  Duration of Treatment: decreasing to 2x per month for 2 months    Recommended Referrals to Other Professionals:   Education Assessment:   Learner/Method: Listening;Demonstration    Risks and benefits of evaluation/treatment have been explained.   Patient/Family/caregiver agrees with Plan of Care.     Evaluation Time:     PT Eval, Low Complexity Minutes (06078): 16       Signing Clinician: Rigoberto Villeda PT        [facility]                                                                                   OUTPATIENT PHYSICAL THERAPY      PLAN OF TREATMENT FOR OUTPATIENT REHABILITATION   Patient's Last Name, First Name, Ezekiel Marquez YOB: 1955   Provider's Name   [facility]   Medical Record No.  [id number]     Onset Date: 06/11/25  Start  of Care Date: 07/02/25     Medical Diagnosis:  Primary osteoarthritis of left knee      PT Treatment Diagnosis:  Primary osteoarthritis of left knee, abnormal gait, left hip pain Plan of Treatment  Frequency/Duration: 1 x per week for 4 weeks/ decreasing to 2x per month for 2 months    Certification date from 07/02/25 to 09/30/25         See note for plan of treatment details and functional goals     Rigoberto Villeda, PT                         I CERTIFY THE NEED FOR THESE SERVICES FURNISHED UNDER        THIS PLAN OF TREATMENT AND WHILE UNDER MY CARE     (Physician attestation of this document indicates review and certification of the therapy plan).              Referring Provider:  Josesito Leslie    Initial Assessment  See Epic Evaluation- Start of Care Date: 07/02/25

## 2025-07-03 ENCOUNTER — PATIENT OUTREACH (OUTPATIENT)
Dept: CARE COORDINATION | Facility: CLINIC | Age: 70
End: 2025-07-03
Payer: MEDICARE

## 2025-07-18 ENCOUNTER — PREP FOR PROCEDURE (OUTPATIENT)
Dept: OTHER | Facility: CLINIC | Age: 70
End: 2025-07-18

## 2025-07-18 DIAGNOSIS — M47.817 LUMBOSACRAL SPONDYLOSIS WITHOUT MYELOPATHY: Primary | ICD-10-CM

## 2025-07-21 DIAGNOSIS — Z95.5 STATUS POST CORONARY ARTERY STENT PLACEMENT: ICD-10-CM

## 2025-07-21 DIAGNOSIS — E78.2 MIXED HYPERLIPIDEMIA: ICD-10-CM

## 2025-07-21 RX ORDER — ROSUVASTATIN CALCIUM 40 MG/1
40 TABLET, COATED ORAL DAILY
Qty: 90 TABLET | Refills: 2 | Status: SHIPPED | OUTPATIENT
Start: 2025-07-21

## 2025-07-23 ENCOUNTER — TELEPHONE (OUTPATIENT)
Dept: PALLIATIVE MEDICINE | Facility: OTHER | Age: 70
End: 2025-07-23
Payer: MEDICARE

## 2025-07-23 ENCOUNTER — MYC MEDICAL ADVICE (OUTPATIENT)
Dept: PHARMACY | Facility: CLINIC | Age: 70
End: 2025-07-23
Payer: MEDICARE

## 2025-07-23 DIAGNOSIS — Z79.4 TYPE 2 DIABETES MELLITUS WITH OTHER SPECIFIED COMPLICATION, WITH LONG-TERM CURRENT USE OF INSULIN (H): ICD-10-CM

## 2025-07-23 DIAGNOSIS — E11.69 TYPE 2 DIABETES MELLITUS WITH OTHER SPECIFIED COMPLICATION, WITH LONG-TERM CURRENT USE OF INSULIN (H): ICD-10-CM

## 2025-07-23 NOTE — TELEPHONE ENCOUNTER
Screening questions for MBB Injections:    Injection to be done at which interventional clinic site? [facility]    Procedure ordered by Dr. Reynaga    Procedure ordered? Lumbar Medial Branch Block    What insurance would patient like us to bill for this procedure? Medicare/Aetna    MEDICA: REQUIRES A PA FOR BOTH MBB   Worker's comp- Any injection DO NOT SCHEDULE and route to Narda Pan.    [employer] insurance - ANY INJECTION, DO NOT SCHEDULE and route to Ximena Tamayo.     MBBs must be scheduled with elapsed time interval of at least 2 weeks and not more than 6 months between the First MBB and the Second MBB for insurance purposes     [employer] - Any injection besides hip/shoulder/knee joint DO NOT SCHEDULE and route to Ximena Tamayo. She will obtain PA and call pt back to schedule procedure or notify pt of denial.     [employer]- PA required for all MBB's    **BCBS- ALL need to be routed to Ximena for review if a PA is needed**  IF SCHEDULING IN [place] [facility] OR [facility] PLEASE SCHEDULE AT LEAST 7-10         BUSINESS DAYS OUT SO A PA CAN BE OBTAINED    Genicular Nerve blocks- ALL insurances except for- [employer], Medicare (straight not supplement) and [employer]. Need to be reviewed by Ximena before scheduling.       Is patient scheduled at [facility]? no   If YES, route every encounter to [facility] CARE NAVIGATION POOL [[phone]]    Any chance of pregnancy? NO   If YES, do NOT schedule and route to RN pool  - Dr. Green route to Destinee Shah and PM&R Nurse  [[id number]]      Is an  needed? No     Patient has a drive home? (mandatory) Yes     Is patient taking any blood thinners (plavix, coumadin, jantoven, warfarin, heparin, pradaxa or dabigatran )? No    If hold needed, do NOT schedule, route to RN pool/ Dr. Green's Team     Does the patient have a bleeding or clotting disorder? No  If YES, okay to schedule AND route to RN nurse cecilia/ Dr. Green's Team  **For any patients with  platelet count <100, must be forwarded to provider**    Is patient diabetic? yes If YES, have them bring their glucometer.    Does patient have an active infection or treated for one within the past week? No    Is patient currently taking any antibiotics?  No  For patients on chronic, preventative, or prophylactic antibiotics, procedures may be scheduled.   For patients on antibiotics for active or recent infection:antibiotic course must have been completed for 4 days    Is patient currently taking any steroid medications? (i.e. Prednisone, Medrol)  No   For patients on steroid medications, course must have been completed for 4 days    Is patient actively being treated for cancer or immunocompromised? No   If YES, do NOT schedule and route to RN/ Dr. Green's Team    Are you able to get on and off an exam table with minimal or no assistance? Yes   If NO, do NOT schedule and route to RN/ Dr. Green's Team    Are you able to roll over and lay on your stomach with minimal or no assistance? Yes   If NO, do NOT schedule and route to RN/ Dr. Lopezs Team    Any allergies to contrast dye, iodine, shellfish, or numbing and steroid medications? No  (If so, inform nursing and note in scheduling comments.)    Allergies: Lisinopril     Does patient have an MRI/CT?  Not Applicable  Check Procedure Scheduling Grid to see if required.    Was the MRI done within the last 3 years?  NA  If yes, where was the MRI done i.e.[facility], [facility], [facility], [facility] etc?     If no, do not schedule and route to nursing/ Dr. Lopezs Team  If MRI was not done at [facility], [facility] or [facility] do NOT schedule and route to nursing.    If pt has an imaging disc, the injection MAY be scheduled but pt has to bring disc to appt.   If they show up without the disc the injection cannot be done    Is patient able to transfer to a procedure table with minimal or no assistance? Yes  If NO, do NOT schedule and route to RN/ Dr. Green's  Team    Medial Branch Block Pre-Procedure Instructions  It is okay to take long acting pain medications (if you are on them) the day of the procedure but try not to take any short acting medications unless absolutely necessary.    YES: ok   Long acting meds would include: Gabapentin (Neurontin), MS Contin, Oxycontin        Short acting meds would include:  Percocet, Oxycodone, Vicodin, Ibuprofen   The day of the procedure, you should try to do things that provoke your pain, since the injection is being done to see if it will relieve your pain . YES: ok   If your pain level is a 4 out of 10 or less on the day of the procedu re, please call [phone] to reschedule.  YES: ok     Reminders:    If you are started on any steroids or antibiotics between now and your appointment, you must contact us because it may affect our ability to perform your procedure ok    Instructed pt to arrive 30 minutes early for IV start if required. (Check Procedure Scheduling Grid) RACHEL     If this is for a cervical MBB aspirin needs to be held for 6 days.  NO     Do not schedule procedures requiring IV placement in the first appointment of the day or first appointment after lunch. Do NOT schedule at 0745, 0815 or 1245.  ok    For patients 85 or older we recommend having an adult stay w/ them for the remainder of the day.      Does the patient have any questions? no

## 2025-07-24 RX ORDER — BEXAGLIFLOZIN 20 MG
20 TABLET ORAL DAILY
Qty: 90 TABLET | Refills: 2 | Status: SHIPPED | OUTPATIENT
Start: 2025-07-24

## 2025-08-04 ENCOUNTER — TELEPHONE (OUTPATIENT)
Dept: FAMILY MEDICINE | Facility: CLINIC | Age: 70
End: 2025-08-04
Payer: MEDICARE

## 2025-08-05 ENCOUNTER — MYC MEDICAL ADVICE (OUTPATIENT)
Dept: CARDIOLOGY | Facility: CLINIC | Age: 70
End: 2025-08-05
Payer: MEDICARE

## 2025-08-06 ENCOUNTER — VIRTUAL VISIT (OUTPATIENT)
Dept: PHARMACY | Facility: CLINIC | Age: 70
End: 2025-08-06
Payer: COMMERCIAL

## 2025-08-06 DIAGNOSIS — G89.29 CHRONIC LOW BACK PAIN, UNSPECIFIED BACK PAIN LATERALITY, UNSPECIFIED WHETHER SCIATICA PRESENT: Primary | ICD-10-CM

## 2025-08-06 DIAGNOSIS — Z79.4 TYPE 2 DIABETES MELLITUS WITHOUT COMPLICATION, WITH LONG-TERM CURRENT USE OF INSULIN (H): ICD-10-CM

## 2025-08-06 DIAGNOSIS — M54.50 CHRONIC LOW BACK PAIN, UNSPECIFIED BACK PAIN LATERALITY, UNSPECIFIED WHETHER SCIATICA PRESENT: Primary | ICD-10-CM

## 2025-08-06 DIAGNOSIS — E11.9 TYPE 2 DIABETES MELLITUS WITHOUT COMPLICATION, WITH LONG-TERM CURRENT USE OF INSULIN (H): ICD-10-CM

## 2025-08-06 DIAGNOSIS — E11.69 TYPE 2 DIABETES MELLITUS WITH OTHER SPECIFIED COMPLICATION, WITH LONG-TERM CURRENT USE OF INSULIN (H): ICD-10-CM

## 2025-08-06 DIAGNOSIS — Z79.4 TYPE 2 DIABETES MELLITUS WITH OTHER SPECIFIED COMPLICATION, WITH LONG-TERM CURRENT USE OF INSULIN (H): ICD-10-CM

## 2025-08-06 PROCEDURE — 99207 PR NO CHARGE LOS: CPT | Mod: 93

## 2025-08-06 RX ORDER — INSULIN GLARGINE 100 [IU]/ML
INJECTION, SOLUTION SUBCUTANEOUS
Qty: 45 ML | Refills: 1 | Status: SHIPPED | OUTPATIENT
Start: 2025-08-06

## 2025-08-11 ENCOUNTER — THERAPY VISIT (OUTPATIENT)
Dept: PHYSICAL THERAPY | Facility: CLINIC | Age: 70
End: 2025-08-11
Payer: MEDICARE

## 2025-08-11 DIAGNOSIS — M17.12 PRIMARY OSTEOARTHRITIS OF LEFT KNEE: Primary | ICD-10-CM

## 2025-08-11 DIAGNOSIS — I24.9 ACS (ACUTE CORONARY SYNDROME) (H): ICD-10-CM

## 2025-08-11 PROCEDURE — 97110 THERAPEUTIC EXERCISES: CPT | Mod: GP | Performed by: PHYSICAL THERAPIST

## 2025-08-11 RX ORDER — ASPIRIN 81 MG/1
81 TABLET, COATED ORAL DAILY
Qty: 30 TABLET | Refills: 8 | Status: SHIPPED | OUTPATIENT
Start: 2025-08-11

## 2025-08-19 DIAGNOSIS — E11.9 TYPE 2 DIABETES MELLITUS WITHOUT COMPLICATION, WITH LONG-TERM CURRENT USE OF INSULIN (H): ICD-10-CM

## 2025-08-19 DIAGNOSIS — Z79.4 TYPE 2 DIABETES MELLITUS WITHOUT COMPLICATION, WITH LONG-TERM CURRENT USE OF INSULIN (H): ICD-10-CM

## 2025-08-19 RX ORDER — METFORMIN HYDROCHLORIDE 500 MG/1
TABLET, EXTENDED RELEASE ORAL
Qty: 360 TABLET | Refills: 3 | OUTPATIENT
Start: 2025-08-19

## 2025-08-27 ENCOUNTER — OFFICE VISIT (OUTPATIENT)
Dept: FAMILY MEDICINE | Facility: CLINIC | Age: 70
End: 2025-08-27
Attending: FAMILY MEDICINE
Payer: MEDICARE

## 2025-08-27 VITALS
WEIGHT: 211 LBS | RESPIRATION RATE: 18 BRPM | BODY MASS INDEX: 29.54 KG/M2 | TEMPERATURE: 97.3 F | DIASTOLIC BLOOD PRESSURE: 82 MMHG | SYSTOLIC BLOOD PRESSURE: 138 MMHG | OXYGEN SATURATION: 99 % | HEIGHT: 71 IN | HEART RATE: 77 BPM

## 2025-08-27 DIAGNOSIS — E78.2 MIXED HYPERLIPIDEMIA: ICD-10-CM

## 2025-08-27 DIAGNOSIS — F10.20 ALCOHOLISM (H): ICD-10-CM

## 2025-08-27 DIAGNOSIS — N18.31 TYPE 2 DIABETES MELLITUS WITH STAGE 3A CHRONIC KIDNEY DISEASE, WITH LONG-TERM CURRENT USE OF INSULIN (H): Primary | ICD-10-CM

## 2025-08-27 DIAGNOSIS — I10 PRIMARY HYPERTENSION: ICD-10-CM

## 2025-08-27 DIAGNOSIS — Z79.4 TYPE 2 DIABETES MELLITUS WITH STAGE 3A CHRONIC KIDNEY DISEASE, WITH LONG-TERM CURRENT USE OF INSULIN (H): Primary | ICD-10-CM

## 2025-08-27 DIAGNOSIS — M19.90 OSTEOARTHRITIS, UNSPECIFIED OSTEOARTHRITIS TYPE, UNSPECIFIED SITE: ICD-10-CM

## 2025-08-27 DIAGNOSIS — N18.31 CHRONIC KIDNEY DISEASE, STAGE 3A (H): ICD-10-CM

## 2025-08-27 DIAGNOSIS — E11.22 TYPE 2 DIABETES MELLITUS WITH STAGE 3A CHRONIC KIDNEY DISEASE, WITH LONG-TERM CURRENT USE OF INSULIN (H): Primary | ICD-10-CM

## 2025-08-27 PROBLEM — I44.0 AV BLOCK, 1ST DEGREE: Status: RESOLVED | Noted: 2025-01-10 | Resolved: 2025-08-27

## 2025-08-27 PROBLEM — Z95.5 H/O HEART ARTERY STENT: Status: RESOLVED | Noted: 2025-06-04 | Resolved: 2025-08-27

## 2025-08-27 PROBLEM — F10.11 HISTORY OF ETOH ABUSE: Status: RESOLVED | Noted: 2025-06-04 | Resolved: 2025-08-27

## 2025-08-27 LAB
ALBUMIN SERPL BCG-MCNC: 4.3 G/DL (ref 3.5–5.2)
ALP SERPL-CCNC: 80 U/L (ref 40–150)
ALT SERPL W P-5'-P-CCNC: 29 U/L (ref 0–70)
ANION GAP SERPL CALCULATED.3IONS-SCNC: 10 MMOL/L (ref 7–15)
AST SERPL W P-5'-P-CCNC: 25 U/L (ref 0–45)
BILIRUB SERPL-MCNC: 0.6 MG/DL
BUN SERPL-MCNC: 20.2 MG/DL (ref 8–23)
CALCIUM SERPL-MCNC: 9.9 MG/DL (ref 8.8–10.4)
CHLORIDE SERPL-SCNC: 101 MMOL/L (ref 98–107)
CHOLEST SERPL-MCNC: 109 MG/DL
CREAT SERPL-MCNC: 1.2 MG/DL (ref 0.67–1.17)
EGFRCR SERPLBLD CKD-EPI 2021: 65 ML/MIN/1.73M2
EST. AVERAGE GLUCOSE BLD GHB EST-MCNC: 143 MG/DL
FASTING STATUS PATIENT QL REPORTED: YES
FASTING STATUS PATIENT QL REPORTED: YES
GLUCOSE SERPL-MCNC: 150 MG/DL (ref 70–99)
HBA1C MFR BLD: 6.6 % (ref 0–5.6)
HCO3 SERPL-SCNC: 27 MMOL/L (ref 22–29)
HDLC SERPL-MCNC: 48 MG/DL
LDLC SERPL CALC-MCNC: 39 MG/DL
NONHDLC SERPL-MCNC: 61 MG/DL
POTASSIUM SERPL-SCNC: 4.5 MMOL/L (ref 3.4–5.3)
PROT SERPL-MCNC: 7.6 G/DL (ref 6.4–8.3)
SODIUM SERPL-SCNC: 138 MMOL/L (ref 135–145)
TRIGL SERPL-MCNC: 112 MG/DL

## 2025-08-27 PROCEDURE — 36415 COLL VENOUS BLD VENIPUNCTURE: CPT | Performed by: FAMILY MEDICINE

## 2025-08-27 PROCEDURE — 3075F SYST BP GE 130 - 139MM HG: CPT | Performed by: FAMILY MEDICINE

## 2025-08-27 PROCEDURE — 3079F DIAST BP 80-89 MM HG: CPT | Performed by: FAMILY MEDICINE

## 2025-08-27 PROCEDURE — 99214 OFFICE O/P EST MOD 30 MIN: CPT | Performed by: FAMILY MEDICINE

## 2025-08-27 PROCEDURE — 80053 COMPREHEN METABOLIC PANEL: CPT | Performed by: FAMILY MEDICINE

## 2025-08-27 PROCEDURE — 80061 LIPID PANEL: CPT | Performed by: FAMILY MEDICINE

## 2025-08-27 PROCEDURE — 3044F HG A1C LEVEL LT 7.0%: CPT | Performed by: FAMILY MEDICINE

## 2025-08-27 PROCEDURE — G2211 COMPLEX E/M VISIT ADD ON: HCPCS | Performed by: FAMILY MEDICINE

## 2025-08-27 PROCEDURE — 83036 HEMOGLOBIN GLYCOSYLATED A1C: CPT | Performed by: FAMILY MEDICINE

## 2025-08-28 ENCOUNTER — RESULTS FOLLOW-UP (OUTPATIENT)
Dept: FAMILY MEDICINE | Facility: CLINIC | Age: 70
End: 2025-08-28
Payer: MEDICARE

## 2025-09-02 ENCOUNTER — TRANSFERRED RECORDS (OUTPATIENT)
Dept: HEALTH INFORMATION MANAGEMENT | Facility: CLINIC | Age: 70
End: 2025-09-02
Payer: MEDICARE

## 2025-09-03 ENCOUNTER — PATIENT OUTREACH (OUTPATIENT)
Dept: CARE COORDINATION | Facility: CLINIC | Age: 70
End: 2025-09-03

## 2025-09-03 ENCOUNTER — RADIOLOGY INJECTION OFFICE VISIT (OUTPATIENT)
Dept: PALLIATIVE MEDICINE | Facility: OTHER | Age: 70
End: 2025-09-03
Attending: STUDENT IN AN ORGANIZED HEALTH CARE EDUCATION/TRAINING PROGRAM
Payer: MEDICARE

## 2025-09-03 VITALS
HEART RATE: 81 BPM | OXYGEN SATURATION: 99 % | DIASTOLIC BLOOD PRESSURE: 68 MMHG | SYSTOLIC BLOOD PRESSURE: 125 MMHG | RESPIRATION RATE: 16 BRPM

## 2025-09-03 DIAGNOSIS — M47.817 SPONDYLOSIS OF LUMBOSACRAL REGION WITHOUT MYELOPATHY OR RADICULOPATHY: ICD-10-CM

## 2025-09-03 PROCEDURE — 64493 INJ PARAVERT F JNT L/S 1 LEV: CPT | Mod: LT | Performed by: STUDENT IN AN ORGANIZED HEALTH CARE EDUCATION/TRAINING PROGRAM

## 2025-09-03 PROCEDURE — 255N000002 HC RX 255 OP 636: Performed by: STUDENT IN AN ORGANIZED HEALTH CARE EDUCATION/TRAINING PROGRAM

## 2025-09-03 PROCEDURE — 250N000011 HC RX IP 250 OP 636: Performed by: STUDENT IN AN ORGANIZED HEALTH CARE EDUCATION/TRAINING PROGRAM

## 2025-09-03 RX ORDER — ROPIVACAINE HYDROCHLORIDE 5 MG/ML
3 INJECTION, SOLUTION EPIDURAL; INFILTRATION; PERINEURAL ONCE
Status: COMPLETED | OUTPATIENT
Start: 2025-09-03 | End: 2025-09-03

## 2025-09-03 RX ADMIN — IOHEXOL 0.5 ML: 180 INJECTION INTRAVENOUS at 08:46

## 2025-09-03 RX ADMIN — ROPIVACAINE HYDROCHLORIDE 3 ML: 5 INJECTION EPIDURAL; INFILTRATION; PERINEURAL at 11:37

## 2025-09-03 ASSESSMENT — PAIN SCALES - GENERAL
PAINLEVEL_OUTOF10: MODERATE PAIN (5)
PAINLEVEL_OUTOF10: SEVERE PAIN (7)

## (undated) DEVICE — COVER ULTRASOUND PROBE STRL 9001C0197

## (undated) DEVICE — KIT HAND CONTROL ACIST 014644 AR-P54

## (undated) DEVICE — CUSTOM PACK CORONARY SAN5BCRHEA

## (undated) DEVICE — Device

## (undated) DEVICE — MANIFOLD KIT ANGIO AUTOMATED 014613

## (undated) DEVICE — 0.035IN X 260CM INQWIRE DIAGNOSTIC GUIDEWIRE, FIXED-CORE PTFE COATED, 3MM J-TIP

## (undated) DEVICE — CATH IVUS OPTICROSS HD 3FR 1MM 135CML H74939352040

## (undated) DEVICE — CATH ANGIO JUDKINS JL5 6FRX100CM INFINITI 534622T

## (undated) DEVICE — CATH LAUNCHER 6FR AL 1.0 LA6AL10

## (undated) DEVICE — CATH ANGIO INFINITI PIGTAIL 155 6 SH 6FRX110CM 534654S

## (undated) DEVICE — SHTH INTRO 0.021IN ID 6FR DIA

## (undated) DEVICE — SHEATH ULTIMUM 6FR 12CM 407845

## (undated) DEVICE — CATH ANGIO INFINITI 3DRC 6FRX100CM 534676T

## (undated) DEVICE — DEVICE INFLATION SYR W/ HEMOSTASIS VALVE 12IN EXT IN4904

## (undated) DEVICE — CATH ANGIO INFINITI AL2 4FRX100CM 538446

## (undated) DEVICE — ELECTRODE ADULT PACING MULTI P-211-M1

## (undated) DEVICE — KIT LG BORE TOUHY ACCESS PLUS MAP152

## (undated) DEVICE — GUIDEWIRE FORTE FLOPPY J TOP 34949-05J

## (undated) DEVICE — CATH BALLOON NC EMERGE 3.75X12MM H7493926712370

## (undated) DEVICE — SYR ANGIOGRAPHY MULTIUSE KIT ACIST 014612

## (undated) DEVICE — CATH ANGIO IMPULSE 6FR 100CML LEFT 4

## (undated) DEVICE — CATH BALLOON EMERGE 3.0X12MM H7493918912300

## (undated) DEVICE — CATH ANGIO INFINITI JL3.5 4FRX100CM 538418

## (undated) DEVICE — SLEEVE TR BAND RADIAL COMPRESSION DEVICE 29CM XX-RF06L

## (undated) DEVICE — CATH BALLOON NC EMERGE 3.50X8MM H7493926708350

## (undated) DEVICE — CATH BALLOON EMERGE 3.0X20MM H7493918920300

## (undated) DEVICE — CATH DIAG 4FR JR 5.0 538423

## (undated) DEVICE — CATH ANGIO INFINITI AL1 4FRX100CM 538445

## (undated) DEVICE — SHEATH GLIDE RADIAL 4FR 25CM 0.021

## (undated) DEVICE — ELECTRODE DEFIB CADENCE 22550R

## (undated) DEVICE — CATH ANGIO INFINITI IM 4FRX100CM 538460

## (undated) DEVICE — CATH LAUNCHER 6FR JL 4.0 LA6JL40

## (undated) DEVICE — INTRO SHEATH 6FRX10CM PINNACLE RSS602

## (undated) RX ORDER — FENTANYL CITRATE 50 UG/ML
INJECTION, SOLUTION INTRAMUSCULAR; INTRAVENOUS
Status: DISPENSED
Start: 2022-08-02

## (undated) RX ORDER — CLOPIDOGREL 300 MG/1
TABLET, FILM COATED ORAL
Status: DISPENSED
Start: 2023-11-20

## (undated) RX ORDER — MORPHINE SULFATE 2 MG/ML
INJECTION, SOLUTION INTRAMUSCULAR; INTRAVENOUS
Status: DISPENSED
Start: 2022-08-02

## (undated) RX ORDER — FENTANYL CITRATE 50 UG/ML
INJECTION, SOLUTION INTRAMUSCULAR; INTRAVENOUS
Status: DISPENSED
Start: 2023-11-20

## (undated) RX ORDER — LIDOCAINE HYDROCHLORIDE 10 MG/ML
INJECTION, SOLUTION INFILTRATION; PERINEURAL
Status: DISPENSED
Start: 2022-08-02

## (undated) RX ORDER — DIAZEPAM 5 MG
TABLET ORAL
Status: DISPENSED
Start: 2023-11-20

## (undated) RX ORDER — ASPIRIN 325 MG
TABLET ORAL
Status: DISPENSED
Start: 2022-08-02

## (undated) RX ORDER — DIAZEPAM 10 MG
TABLET ORAL
Status: DISPENSED
Start: 2022-08-02

## (undated) RX ORDER — ASPIRIN 81 MG/1
TABLET, CHEWABLE ORAL
Status: DISPENSED
Start: 2023-11-20

## (undated) RX ORDER — HYDRALAZINE HYDROCHLORIDE 20 MG/ML
INJECTION INTRAMUSCULAR; INTRAVENOUS
Status: DISPENSED
Start: 2022-08-02

## (undated) RX ORDER — HYDRALAZINE HYDROCHLORIDE 20 MG/ML
INJECTION INTRAMUSCULAR; INTRAVENOUS
Status: DISPENSED
Start: 2023-11-20

## (undated) RX ORDER — OXYCODONE HYDROCHLORIDE 5 MG/1
TABLET ORAL
Status: DISPENSED
Start: 2022-08-02